# Patient Record
Sex: FEMALE | Race: WHITE | NOT HISPANIC OR LATINO | Employment: PART TIME | URBAN - METROPOLITAN AREA
[De-identification: names, ages, dates, MRNs, and addresses within clinical notes are randomized per-mention and may not be internally consistent; named-entity substitution may affect disease eponyms.]

---

## 2023-08-10 ENCOUNTER — PATIENT OUTREACH (OUTPATIENT)
Dept: CARE COORDINATION | Facility: CLINIC | Age: 58
End: 2023-08-10
Payer: COMMERCIAL

## 2023-08-10 NOTE — PROGRESS NOTES
Outreach call to patient to support a smooth transition of care from recent admission.  Left voicemail message for patient with my contact information.  Enrolled patient in [employer] chatbot for additional support and patient education through transition period.  Will continue to monitor through transition period.    Meena Hurley RN/CM

## 2023-08-29 ENCOUNTER — PATIENT OUTREACH (OUTPATIENT)
Dept: CARE COORDINATION | Facility: CLINIC | Age: 58
End: 2023-08-29
Payer: COMMERCIAL

## 2023-08-29 NOTE — PROGRESS NOTES
Outreach call to patient to support a smooth transition of care from recent admission.  Left voicemail message for patient with my contact information.    Meena Hurley RN/CM

## 2023-09-15 ENCOUNTER — PATIENT OUTREACH (OUTPATIENT)
Dept: CARE COORDINATION | Facility: CLINIC | Age: 58
End: 2023-09-15
Payer: COMMERCIAL

## 2023-09-15 NOTE — PROGRESS NOTES
Outreach call to patient to check in 30 days after hospital discharge to support smooth transition of care.  Left message with CM name and contact number. No additional outreach needed at this time.   Meena Hurley RN/CM

## 2023-10-04 ENCOUNTER — TELEPHONE (OUTPATIENT)
Dept: PRIMARY CARE | Facility: CLINIC | Age: 58
End: 2023-10-04
Payer: COMMERCIAL

## 2023-10-05 ENCOUNTER — TELEPHONE (OUTPATIENT)
Dept: PRIMARY CARE | Facility: CLINIC | Age: 58
End: 2023-10-05
Payer: COMMERCIAL

## 2023-10-06 DIAGNOSIS — F32.A DEPRESSION, UNSPECIFIED DEPRESSION TYPE: Primary | ICD-10-CM

## 2023-10-06 RX ORDER — FLUOXETINE HYDROCHLORIDE 40 MG/1
40 CAPSULE ORAL DAILY
Qty: 90 CAPSULE | Refills: 1 | Status: SHIPPED | OUTPATIENT
Start: 2023-10-06 | End: 2024-03-10 | Stop reason: SDUPTHER

## 2023-10-23 DIAGNOSIS — Z12.39 ENCOUNTER FOR SCREENING FOR MALIGNANT NEOPLASM OF BREAST, UNSPECIFIED SCREENING MODALITY: Primary | ICD-10-CM

## 2023-10-31 ENCOUNTER — HOSPITAL ENCOUNTER (OUTPATIENT)
Dept: RADIOLOGY | Facility: HOSPITAL | Age: 58
Discharge: HOME | End: 2023-10-31
Payer: COMMERCIAL

## 2023-10-31 DIAGNOSIS — Z12.39 ENCOUNTER FOR SCREENING FOR MALIGNANT NEOPLASM OF BREAST, UNSPECIFIED SCREENING MODALITY: ICD-10-CM

## 2023-10-31 PROCEDURE — 77063 BREAST TOMOSYNTHESIS BI: CPT

## 2023-10-31 PROCEDURE — 77067 SCR MAMMO BI INCL CAD: CPT | Performed by: RADIOLOGY

## 2023-10-31 PROCEDURE — 77063 BREAST TOMOSYNTHESIS BI: CPT | Performed by: RADIOLOGY

## 2023-11-30 ENCOUNTER — APPOINTMENT (OUTPATIENT)
Dept: PRIMARY CARE | Facility: CLINIC | Age: 58
End: 2023-11-30
Payer: COMMERCIAL

## 2023-12-04 ENCOUNTER — ANCILLARY PROCEDURE (OUTPATIENT)
Dept: RADIOLOGY | Facility: CLINIC | Age: 58
End: 2023-12-04
Payer: COMMERCIAL

## 2023-12-04 ENCOUNTER — TELEPHONE (OUTPATIENT)
Dept: PRIMARY CARE | Facility: CLINIC | Age: 58
End: 2023-12-04
Payer: COMMERCIAL

## 2023-12-04 DIAGNOSIS — R05.9 COUGH, UNSPECIFIED TYPE: ICD-10-CM

## 2023-12-04 DIAGNOSIS — J06.9 VIRAL UPPER RESPIRATORY TRACT INFECTION: Primary | ICD-10-CM

## 2023-12-04 PROCEDURE — 71046 X-RAY EXAM CHEST 2 VIEWS: CPT | Mod: FY

## 2023-12-04 PROCEDURE — 71046 X-RAY EXAM CHEST 2 VIEWS: CPT | Performed by: RADIOLOGY

## 2023-12-04 RX ORDER — DOXYCYCLINE 100 MG/1
100 CAPSULE ORAL 2 TIMES DAILY
Qty: 14 CAPSULE | Refills: 0 | Status: SHIPPED | OUTPATIENT
Start: 2023-12-04 | End: 2023-12-14 | Stop reason: ALTCHOICE

## 2023-12-04 NOTE — TELEPHONE ENCOUNTER
Patient called stating that she has been sick since the middle of November.  She is coughing up yellow and green phlegm.  She wants to know if you would be willing to call in an antibiotic for her.  Both you and Dr Cedillo are double booked tomorrow.  [facility] in [place]

## 2023-12-11 ENCOUNTER — ANCILLARY PROCEDURE (OUTPATIENT)
Dept: RADIOLOGY | Facility: CLINIC | Age: 58
End: 2023-12-11
Payer: COMMERCIAL

## 2023-12-11 ENCOUNTER — TELEPHONE (OUTPATIENT)
Dept: PRIMARY CARE | Facility: CLINIC | Age: 58
End: 2023-12-11
Payer: COMMERCIAL

## 2023-12-11 DIAGNOSIS — R05.9 COUGH, UNSPECIFIED TYPE: ICD-10-CM

## 2023-12-11 DIAGNOSIS — R05.9 COUGH, UNSPECIFIED TYPE: Primary | ICD-10-CM

## 2023-12-11 PROCEDURE — 71046 X-RAY EXAM CHEST 2 VIEWS: CPT | Mod: FY

## 2023-12-11 PROCEDURE — 71046 X-RAY EXAM CHEST 2 VIEWS: CPT | Performed by: RADIOLOGY

## 2023-12-11 NOTE — TELEPHONE ENCOUNTER
Patient called stating that she has had little improvement after 7 days on antibiotics.  She is asking if there is any way you could put in an order for an xray for her.

## 2023-12-12 ENCOUNTER — TELEPHONE (OUTPATIENT)
Dept: PRIMARY CARE | Facility: CLINIC | Age: 58
End: 2023-12-12
Payer: COMMERCIAL

## 2023-12-13 NOTE — TELEPHONE ENCOUNTER
Finished Antibiotic yesterday.  Still has the cough.  When it is productive its yellow.  Does she need more antibiotic.

## 2023-12-14 ENCOUNTER — OFFICE VISIT (OUTPATIENT)
Dept: PRIMARY CARE | Facility: CLINIC | Age: 58
End: 2023-12-14
Payer: COMMERCIAL

## 2023-12-14 VITALS
DIASTOLIC BLOOD PRESSURE: 75 MMHG | OXYGEN SATURATION: 94 % | HEART RATE: 64 BPM | BODY MASS INDEX: 36.26 KG/M2 | WEIGHT: 231 LBS | HEIGHT: 67 IN | SYSTOLIC BLOOD PRESSURE: 108 MMHG

## 2023-12-14 DIAGNOSIS — R74.8 ELEVATED LIVER ENZYMES: Primary | ICD-10-CM

## 2023-12-14 DIAGNOSIS — Z95.820 S/P ANGIOPLASTY WITH STENT: ICD-10-CM

## 2023-12-14 DIAGNOSIS — F19.982 DRUG-INDUCED INSOMNIA (MULTI): ICD-10-CM

## 2023-12-14 DIAGNOSIS — G82.20: ICD-10-CM

## 2023-12-14 DIAGNOSIS — I50.9 EDEMA DUE TO CONGESTIVE HEART FAILURE (MULTI): ICD-10-CM

## 2023-12-14 DIAGNOSIS — R35.0 URINARY FREQUENCY: ICD-10-CM

## 2023-12-14 DIAGNOSIS — B37.9 CANDIDIASIS: ICD-10-CM

## 2023-12-14 DIAGNOSIS — R21 SKIN RASH: ICD-10-CM

## 2023-12-14 PROBLEM — F41.9 ANXIETY: Status: ACTIVE | Noted: 2023-12-14

## 2023-12-14 PROBLEM — I21.9 MYOCARDIAL INFARCTION (MULTI): Status: ACTIVE | Noted: 2023-12-14

## 2023-12-14 PROBLEM — J18.9 PNEUMONIA: Status: ACTIVE | Noted: 2023-08-01

## 2023-12-14 PROBLEM — G47.00 INSOMNIA: Status: ACTIVE | Noted: 2023-12-14

## 2023-12-14 PROBLEM — G47.33 OSA (OBSTRUCTIVE SLEEP APNEA): Status: ACTIVE | Noted: 2023-12-14

## 2023-12-14 LAB
POC APPEARANCE, URINE: CLEAR
POC BILIRUBIN, URINE: NEGATIVE
POC BLOOD, URINE: ABNORMAL
POC COLOR, URINE: YELLOW
POC GLUCOSE, URINE: NEGATIVE MG/DL
POC KETONES, URINE: NEGATIVE MG/DL
POC LEUKOCYTES, URINE: NEGATIVE
POC NITRITE,URINE: NEGATIVE
POC PH, URINE: 6 PH
POC PROTEIN, URINE: NEGATIVE MG/DL
POC SPECIFIC GRAVITY, URINE: 1.01
POC UROBILINOGEN, URINE: 0.2 EU/DL

## 2023-12-14 PROCEDURE — 99396 PREV VISIT EST AGE 40-64: CPT | Performed by: NURSE PRACTITIONER

## 2023-12-14 PROCEDURE — 81003 URINALYSIS AUTO W/O SCOPE: CPT | Performed by: NURSE PRACTITIONER

## 2023-12-14 PROCEDURE — 1036F TOBACCO NON-USER: CPT | Performed by: NURSE PRACTITIONER

## 2023-12-14 PROCEDURE — 3008F BODY MASS INDEX DOCD: CPT | Performed by: NURSE PRACTITIONER

## 2023-12-14 RX ORDER — NYSTATIN 100000 [USP'U]/G
1 POWDER TOPICAL 2 TIMES DAILY
Qty: 30 G | Refills: 11 | Status: SHIPPED | OUTPATIENT
Start: 2023-12-14 | End: 2024-12-13

## 2023-12-14 RX ORDER — ERGOCALCIFEROL 1.25 MG/1
1 CAPSULE ORAL
COMMUNITY

## 2023-12-14 RX ORDER — CLONAZEPAM 0.5 MG/1
0.5 TABLET, ORALLY DISINTEGRATING ORAL NIGHTLY
COMMUNITY
Start: 2023-06-26 | End: 2023-12-14 | Stop reason: SDUPTHER

## 2023-12-14 RX ORDER — LEVOTHYROXINE SODIUM 50 UG/1
50 TABLET ORAL EVERY MORNING
COMMUNITY
End: 2024-02-02

## 2023-12-14 RX ORDER — NYSTATIN 10B UNIT
POWDER (EA) MISCELLANEOUS
COMMUNITY
Start: 2003-05-12 | End: 2024-01-02 | Stop reason: ENTERED-IN-ERROR

## 2023-12-14 RX ORDER — NYSTATIN AND TRIAMCINOLONE ACETONIDE 100000; 1 [USP'U]/G; MG/G
OINTMENT TOPICAL 2 TIMES DAILY
Qty: 15 G | Refills: 0 | Status: SHIPPED | OUTPATIENT
Start: 2023-12-14 | End: 2024-01-12

## 2023-12-14 RX ORDER — ALBUTEROL SULFATE 90 UG/1
2 AEROSOL, METERED RESPIRATORY (INHALATION) EVERY 4 HOURS PRN
COMMUNITY
End: 2024-01-12

## 2023-12-14 RX ORDER — POTASSIUM CHLORIDE 750 MG/1
10 CAPSULE, EXTENDED RELEASE ORAL DAILY
COMMUNITY
End: 2024-01-04 | Stop reason: HOSPADM

## 2023-12-14 RX ORDER — CLOTRIMAZOLE 1 %
CREAM (GRAM) TOPICAL EVERY 12 HOURS
COMMUNITY
Start: 2021-12-15 | End: 2024-01-02 | Stop reason: ENTERED-IN-ERROR

## 2023-12-14 RX ORDER — GABAPENTIN 400 MG/1
400 CAPSULE ORAL 3 TIMES DAILY
COMMUNITY

## 2023-12-14 RX ORDER — METOPROLOL SUCCINATE 25 MG/1
25 TABLET, EXTENDED RELEASE ORAL EVERY MORNING
COMMUNITY
End: 2024-01-09

## 2023-12-14 RX ORDER — IBUPROFEN 600 MG/1
TABLET ORAL
COMMUNITY
End: 2024-01-02 | Stop reason: ENTERED-IN-ERROR

## 2023-12-14 RX ORDER — KETOCONAZOLE 20 MG/G
1 CREAM TOPICAL 2 TIMES DAILY
Status: ON HOLD | COMMUNITY
Start: 2018-11-09 | End: 2024-01-19

## 2023-12-14 RX ORDER — AMITRIPTYLINE HYDROCHLORIDE 25 MG/1
25 TABLET, FILM COATED ORAL NIGHTLY
COMMUNITY
Start: 2023-09-21

## 2023-12-14 RX ORDER — ALBUTEROL SULFATE 0.83 MG/ML
2.5 SOLUTION RESPIRATORY (INHALATION) EVERY 4 HOURS PRN
COMMUNITY

## 2023-12-14 RX ORDER — PNV NO.95/FERROUS FUM/FOLIC AC 28MG-0.8MG
100 TABLET ORAL EVERY MORNING
COMMUNITY
End: 2024-01-19 | Stop reason: SINTOL

## 2023-12-14 RX ORDER — NYSTATIN 100000 U/G
OINTMENT TOPICAL 2 TIMES DAILY
Qty: 30 G | Refills: 3 | Status: SHIPPED | OUTPATIENT
Start: 2023-12-14 | End: 2024-04-23

## 2023-12-14 RX ORDER — TIZANIDINE 4 MG/1
4 TABLET ORAL NIGHTLY PRN
COMMUNITY

## 2023-12-14 RX ORDER — ATORVASTATIN CALCIUM 40 MG/1
40 TABLET, FILM COATED ORAL NIGHTLY
COMMUNITY

## 2023-12-14 RX ORDER — MECLIZINE HYDROCHLORIDE 25 MG/1
25 TABLET ORAL EVERY 6 HOURS PRN
COMMUNITY

## 2023-12-14 RX ORDER — FUROSEMIDE 40 MG/1
40 TABLET ORAL 2 TIMES DAILY
Status: ON HOLD | COMMUNITY
End: 2024-01-04 | Stop reason: SDUPTHER

## 2023-12-14 RX ORDER — ASPIRIN 81 MG/1
1 TABLET ORAL EVERY MORNING
COMMUNITY
Start: 2014-10-27

## 2023-12-14 RX ORDER — CLONAZEPAM 0.5 MG/1
0.5 TABLET, ORALLY DISINTEGRATING ORAL NIGHTLY
Qty: 30 TABLET | Refills: 2 | Status: SHIPPED | OUTPATIENT
Start: 2023-12-14 | End: 2024-02-22 | Stop reason: SDUPTHER

## 2023-12-14 RX ORDER — NITROGLYCERIN 0.4 MG/1
TABLET SUBLINGUAL
Status: ON HOLD | COMMUNITY
End: 2024-01-04 | Stop reason: SDUPTHER

## 2023-12-14 RX ORDER — KETOCONAZOLE 20 MG/ML
SHAMPOO, SUSPENSION TOPICAL
COMMUNITY
Start: 2017-09-28 | End: 2024-01-02 | Stop reason: ENTERED-IN-ERROR

## 2023-12-14 RX ORDER — FLUCONAZOLE 150 MG/1
150 TABLET ORAL ONCE
Qty: 1 TABLET | Refills: 0 | Status: SHIPPED | OUTPATIENT
Start: 2023-12-14 | End: 2023-12-14

## 2023-12-14 ASSESSMENT — ENCOUNTER SYMPTOMS
HEADACHES: 0
FATIGUE: 0
NUMBNESS: 0
PSYCHIATRIC NEGATIVE: 1
COUGH: 1
MUSCULOSKELETAL NEGATIVE: 1
SORE THROAT: 0
DIZZINESS: 0
SHORTNESS OF BREATH: 1
CHILLS: 0
PALPITATIONS: 0
ABDOMINAL PAIN: 0
CONSTIPATION: 0
WEAKNESS: 0
NAUSEA: 0
DIARRHEA: 0
VOMITING: 0
FEVER: 0

## 2023-12-14 ASSESSMENT — PATIENT HEALTH QUESTIONNAIRE - PHQ9
1. LITTLE INTEREST OR PLEASURE IN DOING THINGS: NOT AT ALL
SUM OF ALL RESPONSES TO PHQ9 QUESTIONS 1 AND 2: 0
2. FEELING DOWN, DEPRESSED OR HOPELESS: NOT AT ALL

## 2023-12-14 NOTE — PROGRESS NOTES
Subjective   Patient ID: Elaine Coffey is a 58 y.o. female who presents here to establish relationship with primary care provider.        HPI   Here to establish primary care provider.  .  4 children.  Works as a  at nursing home.  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  Non-smoker.  No alcohol use.  No illicit drug use.  Denies chest pain, palpitations, dizziness,  or GI issues.  Was recently treated for pneumonia, was hospitalized, still feels like she is recovering.  Does have occasional shortness of breath residual.  reports a rash behind ears, underneath breasts, under pannus down into her groin.  Has had it for a long time.  It is painful and red.  Has tried many different things at home and nothing seems to help.  Was using over-the-counter athlete's foot medication, chlorhexidine soap.  Has tried to keep it dry.  Gets UTIs with sex.  Has has slight symptoms today.  Would like to have it checked  Does have a cardiac history of MI with stent.  Sees cardiology on a regular basis for CAD follow-up and heart failure.  Does take a diuretic daily.  Has Lap-Band.  Has been prescribed reflux medicine for cough and hoarse voice.  Has stopped taking it although it did work at the time.  Diagnosed with MS 10 years ago.  Follows closely with that provider.  History of sleep apnea.  Does have CPAP but does not wear it.  Takes prescription medications as prescribed.  Takes clonazepam and Elavil for sleep.  Needs clonazepam filled today.      Review of Systems   Constitutional:  Negative for chills, fatigue and fever.   HENT:  Negative for congestion, ear pain and sore throat.    Eyes:  Negative for visual disturbance.   Respiratory:  Positive for cough and shortness of breath.    Cardiovascular:  Negative for chest pain, palpitations and leg swelling.   Gastrointestinal:  Negative for abdominal pain, constipation, diarrhea, nausea and vomiting.   Genitourinary: Negative.   "  Musculoskeletal: Negative.    Skin:  Positive for rash.   Neurological:  Negative for dizziness, weakness, numbness and headaches.   Psychiatric/Behavioral: Negative.         Objective   /75   Pulse 64   Ht 1.702 m (5' 7\")   Wt 105 kg (231 lb)   SpO2 94%   BMI 36.18 kg/m²     Physical Exam  Constitutional:       General: She is not in acute distress.     Appearance: Normal appearance.   HENT:      Head: Normocephalic and atraumatic.      Right Ear: Tympanic membrane, ear canal and external ear normal.      Left Ear: Tympanic membrane, ear canal and external ear normal.      Nose: Nose normal.      Mouth/Throat:      Mouth: Mucous membranes are moist.      Pharynx: Oropharynx is clear.   Eyes:      Extraocular Movements: Extraocular movements intact.      Conjunctiva/sclera: Conjunctivae normal.      Pupils: Pupils are equal, round, and reactive to light.   Cardiovascular:      Rate and Rhythm: Normal rate and regular rhythm.      Pulses: Normal pulses.      Heart sounds: Normal heart sounds. No murmur heard.  Pulmonary:      Effort: Pulmonary effort is normal.      Breath sounds: Normal breath sounds. No wheezing, rhonchi or rales.   Abdominal:      General: Bowel sounds are normal.      Palpations: Abdomen is soft.      Tenderness: There is no abdominal tenderness.   Musculoskeletal:         General: Normal range of motion.      Cervical back: Normal range of motion and neck supple.   Lymphadenopathy:      Comments: No lymphadenopathy noted   Skin:     General: Skin is warm and dry.      Findings: Rash (Red, shiny, small cracks, no signs of infection) present.          Neurological:      General: No focal deficit present.      Mental Status: She is alert and oriented to person, place, and time.      Cranial Nerves: No cranial nerve deficit.      Coordination: Coordination normal.      Gait: Gait normal.   Psychiatric:         Mood and Affect: Mood normal.         Behavior: Behavior normal. "         Assessment/Plan   Problem List Items Addressed This Visit             ICD-10-CM    S/P angioplasty with stent Z95.820    Skin rash R21    Edema due to congestive heart failure (CMS/HCC) I50.9     Edema controlled with Lasix  Sees cardiology         Relevant Medications    metoprolol succinate XL (Toprol-XL) 25 mg 24 hr tablet    nitroglycerin (Nitrostat) 0.4 mg SL tablet    Elevated liver enzymes - Primary R74.8    Relevant Orders    Comprehensive Metabolic Panel    TSH with reflex to Free T4 if abnormal    Lipid Panel    Vitamin D 25-Hydroxy,Total (for eval of Vitamin D levels)    CBC and Auto Differential    Paraparesis, bilateral (CMS/HCC) G82.20     Other Visit Diagnoses         Codes    Candidiasis     B37.9    Relevant Medications    fluconazole (Diflucan) 150 mg tablet    nystatin (Mycostatin) 100,000 unit/gram powder    nystatin (Mycostatin) ointment    nystatin-triamcinolone (Mycolog II) ointment    Urinary frequency     R35.0    Relevant Orders    POCT UA Automated manually resulted (Completed)        Follow up in 1 year or sooner if needed    I HAVE REVIEWED THE OARRS, WHICH WAS APPROPRIATE. I HAVE EVALUATED THE RISKS OF ABUSE, ADDICTION, DIVERSION, AND DEPENDENCE. PRESCRIBED MEDICATION SEEMS APPROPRIATE FOR DOCUMENTED DIAGNOSIS.  CSA: Obtained 12/14/2023  Urine drug screen: Performed today 12/14/2023.  Appropriately positive for benzos.

## 2023-12-14 NOTE — ASSESSMENT & PLAN NOTE
Continue clonazepam 0.5 mg at night for sleep  Will get CSA and urine drug screen today, as well as check OARRS report

## 2023-12-14 NOTE — ASSESSMENT & PLAN NOTE
Take Diflucan 150 mg x 1 dose today  Call office or MyChart message if rash does not improve or worsens

## 2023-12-14 NOTE — PATIENT INSTRUCTIONS
For yeast: Diflucan 150 mg x 1 dose today.  Start nystatin triamcinolone ointment to affected areas for 7 days.  Then start nystatin ointment daily or as needed.  May also use nystatin powder to affected areas daily or as needed.  Use Cetaphil soap once daily only.  Keep areas dry may use hair dryer.  Do not put any other products on affected area.  Have labs drawn.  Fast 12 hours.  May have black coffee tea or water  Suggest starting reflux medication for cough and hoarse voice, likely GERD symptoms.  Follow-up with cardiology at least annually  Follow-up in 2 to 3 months to check on rash.

## 2023-12-14 NOTE — ASSESSMENT & PLAN NOTE
Use nystatin prescriptions as directed.  Keep affected areas clean and dry.  Use mild soap.  Suggested silver fabric can be purchased online to wick moisture in between folds, look into purchasing this

## 2023-12-15 ENCOUNTER — LAB (OUTPATIENT)
Dept: LAB | Facility: LAB | Age: 58
End: 2023-12-15
Payer: COMMERCIAL

## 2023-12-15 DIAGNOSIS — R74.8 ELEVATED LIVER ENZYMES: ICD-10-CM

## 2023-12-15 LAB
ALBUMIN SERPL BCP-MCNC: 4.4 G/DL (ref 3.4–5)
ALP SERPL-CCNC: 125 U/L (ref 33–110)
ALT SERPL W P-5'-P-CCNC: 19 U/L (ref 7–45)
ANION GAP SERPL CALC-SCNC: 11 MMOL/L (ref 10–20)
AST SERPL W P-5'-P-CCNC: 17 U/L (ref 9–39)
BASOPHILS # BLD AUTO: 0.02 X10*3/UL (ref 0–0.1)
BASOPHILS NFR BLD AUTO: 0.5 %
BILIRUB SERPL-MCNC: 0.9 MG/DL (ref 0–1.2)
BUN SERPL-MCNC: 13 MG/DL (ref 6–23)
CALCIUM SERPL-MCNC: 9.1 MG/DL (ref 8.6–10.3)
CHLORIDE SERPL-SCNC: 101 MMOL/L (ref 98–107)
CHOLEST SERPL-MCNC: 157 MG/DL (ref 0–199)
CHOLESTEROL/HDL RATIO: 3.3
CO2 SERPL-SCNC: 33 MMOL/L (ref 21–32)
CREAT SERPL-MCNC: 0.86 MG/DL (ref 0.5–1.05)
EOSINOPHIL # BLD AUTO: 0.22 X10*3/UL (ref 0–0.7)
EOSINOPHIL NFR BLD AUTO: 5.4 %
ERYTHROCYTE [DISTWIDTH] IN BLOOD BY AUTOMATED COUNT: 12.3 % (ref 11.5–14.5)
GFR SERPL CREATININE-BSD FRML MDRD: 79 ML/MIN/1.73M*2
GLUCOSE SERPL-MCNC: 92 MG/DL (ref 74–99)
HCT VFR BLD AUTO: 43.3 % (ref 36–46)
HDLC SERPL-MCNC: 47 MG/DL
HGB BLD-MCNC: 14.5 G/DL (ref 12–16)
IMM GRANULOCYTES # BLD AUTO: 0 X10*3/UL (ref 0–0.7)
IMM GRANULOCYTES NFR BLD AUTO: 0 % (ref 0–0.9)
LDLC SERPL CALC-MCNC: 86 MG/DL
LYMPHOCYTES # BLD AUTO: 1.36 X10*3/UL (ref 1.2–4.8)
LYMPHOCYTES NFR BLD AUTO: 33.2 %
MCH RBC QN AUTO: 29.3 PG (ref 26–34)
MCHC RBC AUTO-ENTMCNC: 33.5 G/DL (ref 32–36)
MCV RBC AUTO: 88 FL (ref 80–100)
MONOCYTES # BLD AUTO: 0.42 X10*3/UL (ref 0.1–1)
MONOCYTES NFR BLD AUTO: 10.2 %
NEUTROPHILS # BLD AUTO: 2.08 X10*3/UL (ref 1.2–7.7)
NEUTROPHILS NFR BLD AUTO: 50.7 %
NON HDL CHOLESTEROL: 110 MG/DL (ref 0–149)
NRBC BLD-RTO: 0 /100 WBCS (ref 0–0)
PLATELET # BLD AUTO: 170 X10*3/UL (ref 150–450)
POTASSIUM SERPL-SCNC: 3.4 MMOL/L (ref 3.5–5.3)
PROT SERPL-MCNC: 6.3 G/DL (ref 6.4–8.2)
RBC # BLD AUTO: 4.95 X10*6/UL (ref 4–5.2)
SODIUM SERPL-SCNC: 142 MMOL/L (ref 136–145)
TRIGL SERPL-MCNC: 122 MG/DL (ref 0–149)
TSH SERPL-ACNC: 2.27 MIU/L (ref 0.44–3.98)
VLDL: 24 MG/DL (ref 0–40)
WBC # BLD AUTO: 4.1 X10*3/UL (ref 4.4–11.3)

## 2023-12-15 PROCEDURE — 84443 ASSAY THYROID STIM HORMONE: CPT

## 2023-12-15 PROCEDURE — 80061 LIPID PANEL: CPT

## 2023-12-15 PROCEDURE — 85025 COMPLETE CBC W/AUTO DIFF WBC: CPT

## 2023-12-15 PROCEDURE — 80053 COMPREHEN METABOLIC PANEL: CPT

## 2023-12-15 PROCEDURE — 36415 COLL VENOUS BLD VENIPUNCTURE: CPT

## 2023-12-15 PROCEDURE — 82306 VITAMIN D 25 HYDROXY: CPT

## 2023-12-16 LAB — 25(OH)D3 SERPL-MCNC: 48 NG/ML (ref 30–100)

## 2023-12-22 ENCOUNTER — APPOINTMENT (OUTPATIENT)
Dept: PRIMARY CARE | Facility: CLINIC | Age: 58
End: 2023-12-22
Payer: COMMERCIAL

## 2024-01-02 ENCOUNTER — APPOINTMENT (OUTPATIENT)
Dept: RADIOLOGY | Facility: HOSPITAL | Age: 59
DRG: 281 | End: 2024-01-02
Payer: COMMERCIAL

## 2024-01-02 ENCOUNTER — APPOINTMENT (OUTPATIENT)
Dept: CARDIOLOGY | Facility: HOSPITAL | Age: 59
DRG: 281 | End: 2024-01-02
Payer: COMMERCIAL

## 2024-01-02 ENCOUNTER — HOSPITAL ENCOUNTER (INPATIENT)
Facility: HOSPITAL | Age: 59
LOS: 2 days | Discharge: HOME | DRG: 281 | End: 2024-01-04
Attending: STUDENT IN AN ORGANIZED HEALTH CARE EDUCATION/TRAINING PROGRAM | Admitting: FAMILY MEDICINE
Payer: COMMERCIAL

## 2024-01-02 DIAGNOSIS — I10 PRIMARY HYPERTENSION: ICD-10-CM

## 2024-01-02 DIAGNOSIS — R07.9 CHEST PAIN, UNSPECIFIED TYPE: Primary | ICD-10-CM

## 2024-01-02 DIAGNOSIS — I21.4 NON-ST ELEVATION (NSTEMI) MYOCARDIAL INFARCTION (MULTI): ICD-10-CM

## 2024-01-02 LAB
ALBUMIN SERPL BCP-MCNC: 4.1 G/DL (ref 3.4–5)
ALP SERPL-CCNC: 113 U/L (ref 33–110)
ALT SERPL W P-5'-P-CCNC: 16 U/L (ref 7–45)
ANION GAP SERPL CALC-SCNC: 9 MMOL/L (ref 10–20)
APTT PPP: 35 SECONDS (ref 27–38)
AST SERPL W P-5'-P-CCNC: 15 U/L (ref 9–39)
B-HCG SERPL-ACNC: 2 MIU/ML
BASOPHILS # BLD AUTO: 0.02 X10*3/UL (ref 0–0.1)
BASOPHILS NFR BLD AUTO: 0.5 %
BILIRUB SERPL-MCNC: 0.8 MG/DL (ref 0–1.2)
BUN SERPL-MCNC: 12 MG/DL (ref 6–23)
CALCIUM SERPL-MCNC: 9 MG/DL (ref 8.6–10.3)
CARDIAC TROPONIN I PNL SERPL HS: 106 NG/L (ref 0–13)
CARDIAC TROPONIN I PNL SERPL HS: 55 NG/L (ref 0–13)
CARDIAC TROPONIN I PNL SERPL HS: 64 NG/L (ref 0–13)
CARDIAC TROPONIN I PNL SERPL HS: 9 NG/L (ref 0–13)
CHLORIDE SERPL-SCNC: 102 MMOL/L (ref 98–107)
CO2 SERPL-SCNC: 34 MMOL/L (ref 21–32)
CREAT SERPL-MCNC: 0.89 MG/DL (ref 0.5–1.05)
EOSINOPHIL # BLD AUTO: 0.21 X10*3/UL (ref 0–0.7)
EOSINOPHIL NFR BLD AUTO: 5.4 %
ERYTHROCYTE [DISTWIDTH] IN BLOOD BY AUTOMATED COUNT: 12.2 % (ref 11.5–14.5)
ERYTHROCYTE [DISTWIDTH] IN BLOOD BY AUTOMATED COUNT: 12.2 % (ref 11.5–14.5)
FLUAV RNA RESP QL NAA+PROBE: NOT DETECTED
FLUBV RNA RESP QL NAA+PROBE: NOT DETECTED
GFR SERPL CREATININE-BSD FRML MDRD: 76 ML/MIN/1.73M*2
GLUCOSE SERPL-MCNC: 103 MG/DL (ref 74–99)
HCT VFR BLD AUTO: 42.2 % (ref 36–46)
HCT VFR BLD AUTO: 42.2 % (ref 36–46)
HGB BLD-MCNC: 14.1 G/DL (ref 12–16)
HGB BLD-MCNC: 14.3 G/DL (ref 12–16)
IMM GRANULOCYTES # BLD AUTO: 0.01 X10*3/UL (ref 0–0.7)
IMM GRANULOCYTES NFR BLD AUTO: 0.3 % (ref 0–0.9)
INR PPP: 1 (ref 0.9–1.1)
LIPASE SERPL-CCNC: 38 U/L (ref 9–82)
LYMPHOCYTES # BLD AUTO: 1.12 X10*3/UL (ref 1.2–4.8)
LYMPHOCYTES NFR BLD AUTO: 29 %
MCH RBC QN AUTO: 28.8 PG (ref 26–34)
MCH RBC QN AUTO: 28.9 PG (ref 26–34)
MCHC RBC AUTO-ENTMCNC: 33.4 G/DL (ref 32–36)
MCHC RBC AUTO-ENTMCNC: 33.9 G/DL (ref 32–36)
MCV RBC AUTO: 85 FL (ref 80–100)
MCV RBC AUTO: 86 FL (ref 80–100)
MONOCYTES # BLD AUTO: 0.35 X10*3/UL (ref 0.1–1)
MONOCYTES NFR BLD AUTO: 9.1 %
NEUTROPHILS # BLD AUTO: 2.15 X10*3/UL (ref 1.2–7.7)
NEUTROPHILS NFR BLD AUTO: 55.7 %
NRBC BLD-RTO: 0 /100 WBCS (ref 0–0)
NRBC BLD-RTO: 0 /100 WBCS (ref 0–0)
PLATELET # BLD AUTO: 156 X10*3/UL (ref 150–450)
PLATELET # BLD AUTO: 192 X10*3/UL (ref 150–450)
POTASSIUM SERPL-SCNC: 3.8 MMOL/L (ref 3.5–5.3)
PROT SERPL-MCNC: 6.3 G/DL (ref 6.4–8.2)
PROTHROMBIN TIME: 11.4 SECONDS (ref 9.8–12.8)
RBC # BLD AUTO: 4.9 X10*6/UL (ref 4–5.2)
RBC # BLD AUTO: 4.95 X10*6/UL (ref 4–5.2)
SARS-COV-2 RNA RESP QL NAA+PROBE: NOT DETECTED
SODIUM SERPL-SCNC: 141 MMOL/L (ref 136–145)
UFH PPP CHRO-ACNC: 0.4 IU/ML
UFH PPP CHRO-ACNC: 0.5 IU/ML
WBC # BLD AUTO: 3.9 X10*3/UL (ref 4.4–11.3)
WBC # BLD AUTO: 4.9 X10*3/UL (ref 4.4–11.3)

## 2024-01-02 PROCEDURE — 2500000004 HC RX 250 GENERAL PHARMACY W/ HCPCS (ALT 636 FOR OP/ED): Performed by: STUDENT IN AN ORGANIZED HEALTH CARE EDUCATION/TRAINING PROGRAM

## 2024-01-02 PROCEDURE — 71045 X-RAY EXAM CHEST 1 VIEW: CPT | Mod: REPEAT PROCEDURE BY SAME PHYSICIAN | Performed by: RADIOLOGY

## 2024-01-02 PROCEDURE — 2500000001 HC RX 250 WO HCPCS SELF ADMINISTERED DRUGS (ALT 637 FOR MEDICARE OP): Performed by: PHYSICIAN ASSISTANT

## 2024-01-02 PROCEDURE — 96375 TX/PRO/DX INJ NEW DRUG ADDON: CPT

## 2024-01-02 PROCEDURE — 83690 ASSAY OF LIPASE: CPT | Performed by: STUDENT IN AN ORGANIZED HEALTH CARE EDUCATION/TRAINING PROGRAM

## 2024-01-02 PROCEDURE — 2500000004 HC RX 250 GENERAL PHARMACY W/ HCPCS (ALT 636 FOR OP/ED): Performed by: PHYSICIAN ASSISTANT

## 2024-01-02 PROCEDURE — 1200000002 HC GENERAL ROOM WITH TELEMETRY DAILY

## 2024-01-02 PROCEDURE — 93005 ELECTROCARDIOGRAM TRACING: CPT

## 2024-01-02 PROCEDURE — 85610 PROTHROMBIN TIME: CPT | Performed by: STUDENT IN AN ORGANIZED HEALTH CARE EDUCATION/TRAINING PROGRAM

## 2024-01-02 PROCEDURE — 99285 EMERGENCY DEPT VISIT HI MDM: CPT | Performed by: STUDENT IN AN ORGANIZED HEALTH CARE EDUCATION/TRAINING PROGRAM

## 2024-01-02 PROCEDURE — 96374 THER/PROPH/DIAG INJ IV PUSH: CPT

## 2024-01-02 PROCEDURE — 71045 X-RAY EXAM CHEST 1 VIEW: CPT | Performed by: RADIOLOGY

## 2024-01-02 PROCEDURE — 85025 COMPLETE CBC W/AUTO DIFF WBC: CPT | Performed by: STUDENT IN AN ORGANIZED HEALTH CARE EDUCATION/TRAINING PROGRAM

## 2024-01-02 PROCEDURE — 83036 HEMOGLOBIN GLYCOSYLATED A1C: CPT | Mod: GEALAB | Performed by: PHYSICIAN ASSISTANT

## 2024-01-02 PROCEDURE — 84484 ASSAY OF TROPONIN QUANT: CPT | Performed by: INTERNAL MEDICINE

## 2024-01-02 PROCEDURE — 84484 ASSAY OF TROPONIN QUANT: CPT | Performed by: STUDENT IN AN ORGANIZED HEALTH CARE EDUCATION/TRAINING PROGRAM

## 2024-01-02 PROCEDURE — 80053 COMPREHEN METABOLIC PANEL: CPT | Performed by: STUDENT IN AN ORGANIZED HEALTH CARE EDUCATION/TRAINING PROGRAM

## 2024-01-02 PROCEDURE — 71045 X-RAY EXAM CHEST 1 VIEW: CPT | Mod: 76

## 2024-01-02 PROCEDURE — 36415 COLL VENOUS BLD VENIPUNCTURE: CPT | Performed by: STUDENT IN AN ORGANIZED HEALTH CARE EDUCATION/TRAINING PROGRAM

## 2024-01-02 PROCEDURE — 99223 1ST HOSP IP/OBS HIGH 75: CPT | Performed by: STUDENT IN AN ORGANIZED HEALTH CARE EDUCATION/TRAINING PROGRAM

## 2024-01-02 PROCEDURE — 85520 HEPARIN ASSAY: CPT | Performed by: STUDENT IN AN ORGANIZED HEALTH CARE EDUCATION/TRAINING PROGRAM

## 2024-01-02 PROCEDURE — 71045 X-RAY EXAM CHEST 1 VIEW: CPT

## 2024-01-02 PROCEDURE — 84702 CHORIONIC GONADOTROPIN TEST: CPT | Performed by: PHYSICIAN ASSISTANT

## 2024-01-02 PROCEDURE — 87636 SARSCOV2 & INF A&B AMP PRB: CPT | Performed by: STUDENT IN AN ORGANIZED HEALTH CARE EDUCATION/TRAINING PROGRAM

## 2024-01-02 PROCEDURE — 85027 COMPLETE CBC AUTOMATED: CPT | Performed by: STUDENT IN AN ORGANIZED HEALTH CARE EDUCATION/TRAINING PROGRAM

## 2024-01-02 PROCEDURE — 2500000001 HC RX 250 WO HCPCS SELF ADMINISTERED DRUGS (ALT 637 FOR MEDICARE OP): Performed by: STUDENT IN AN ORGANIZED HEALTH CARE EDUCATION/TRAINING PROGRAM

## 2024-01-02 RX ORDER — POLYETHYLENE GLYCOL 3350 17 G/17G
17 POWDER, FOR SOLUTION ORAL DAILY
Status: DISCONTINUED | OUTPATIENT
Start: 2024-01-02 | End: 2024-01-04 | Stop reason: HOSPADM

## 2024-01-02 RX ORDER — FUROSEMIDE 40 MG/1
40 TABLET ORAL 2 TIMES DAILY
Status: CANCELLED | OUTPATIENT
Start: 2024-01-02

## 2024-01-02 RX ORDER — ALBUTEROL SULFATE 0.83 MG/ML
2.5 SOLUTION RESPIRATORY (INHALATION) EVERY 4 HOURS PRN
Status: CANCELLED | OUTPATIENT
Start: 2024-01-02

## 2024-01-02 RX ORDER — HEPARIN SODIUM 5000 [USP'U]/ML
4000 INJECTION, SOLUTION INTRAVENOUS; SUBCUTANEOUS ONCE
Status: COMPLETED | OUTPATIENT
Start: 2024-01-02 | End: 2024-01-02

## 2024-01-02 RX ORDER — ATORVASTATIN CALCIUM 80 MG/1
80 TABLET, FILM COATED ORAL NIGHTLY
Status: DISCONTINUED | OUTPATIENT
Start: 2024-01-02 | End: 2024-01-03

## 2024-01-02 RX ORDER — MORPHINE SULFATE 2 MG/ML
2 INJECTION, SOLUTION INTRAMUSCULAR; INTRAVENOUS EVERY 5 MIN PRN
Status: DISCONTINUED | OUTPATIENT
Start: 2024-01-02 | End: 2024-01-04 | Stop reason: HOSPADM

## 2024-01-02 RX ORDER — METOPROLOL SUCCINATE 25 MG/1
25 TABLET, EXTENDED RELEASE ORAL DAILY
Status: DISCONTINUED | OUTPATIENT
Start: 2024-01-02 | End: 2024-01-04 | Stop reason: HOSPADM

## 2024-01-02 RX ORDER — CLONAZEPAM 0.5 MG/1
0.5 TABLET, ORALLY DISINTEGRATING ORAL NIGHTLY
Status: CANCELLED | OUTPATIENT
Start: 2024-01-02

## 2024-01-02 RX ORDER — ERGOCALCIFEROL 1.25 MG/1
1250 CAPSULE ORAL
Status: CANCELLED | OUTPATIENT
Start: 2024-01-03

## 2024-01-02 RX ORDER — ATORVASTATIN CALCIUM 40 MG/1
40 TABLET, FILM COATED ORAL NIGHTLY
Status: CANCELLED | OUTPATIENT
Start: 2024-01-02

## 2024-01-02 RX ORDER — NAPROXEN SODIUM 220 MG/1
81 TABLET, FILM COATED ORAL DAILY
Status: DISCONTINUED | OUTPATIENT
Start: 2024-01-02 | End: 2024-01-04 | Stop reason: HOSPADM

## 2024-01-02 RX ORDER — LEVOTHYROXINE SODIUM 50 UG/1
50 TABLET ORAL DAILY
Status: CANCELLED | OUTPATIENT
Start: 2024-01-02

## 2024-01-02 RX ORDER — TIZANIDINE 4 MG/1
4 TABLET ORAL NIGHTLY PRN
Status: CANCELLED | OUTPATIENT
Start: 2024-01-02

## 2024-01-02 RX ORDER — AMITRIPTYLINE HYDROCHLORIDE 25 MG/1
25 TABLET, FILM COATED ORAL NIGHTLY
Status: CANCELLED | OUTPATIENT
Start: 2024-01-02

## 2024-01-02 RX ORDER — NAPROXEN SODIUM 220 MG/1
324 TABLET, FILM COATED ORAL ONCE
Status: COMPLETED | OUTPATIENT
Start: 2024-01-02 | End: 2024-01-02

## 2024-01-02 RX ORDER — FLUOXETINE HYDROCHLORIDE 20 MG/1
40 CAPSULE ORAL EVERY MORNING
Status: CANCELLED | OUTPATIENT
Start: 2024-01-03

## 2024-01-02 RX ORDER — METOPROLOL SUCCINATE 50 MG/1
25 TABLET, EXTENDED RELEASE ORAL EVERY MORNING
Status: CANCELLED | OUTPATIENT
Start: 2024-01-03

## 2024-01-02 RX ORDER — AMOXICILLIN 250 MG
2 CAPSULE ORAL 2 TIMES DAILY PRN
Status: DISCONTINUED | OUTPATIENT
Start: 2024-01-02 | End: 2024-01-04 | Stop reason: HOSPADM

## 2024-01-02 RX ORDER — ACETAMINOPHEN 325 MG/1
650 TABLET ORAL EVERY 4 HOURS PRN
Status: DISCONTINUED | OUTPATIENT
Start: 2024-01-02 | End: 2024-01-04 | Stop reason: HOSPADM

## 2024-01-02 RX ORDER — ONDANSETRON HYDROCHLORIDE 2 MG/ML
4 INJECTION, SOLUTION INTRAVENOUS ONCE
Status: COMPLETED | OUTPATIENT
Start: 2024-01-02 | End: 2024-01-02

## 2024-01-02 RX ORDER — NYSTATIN 100000 [USP'U]/G
1 POWDER TOPICAL 2 TIMES DAILY
Status: CANCELLED | OUTPATIENT
Start: 2024-01-02

## 2024-01-02 RX ORDER — ACETAMINOPHEN 650 MG/1
650 SUPPOSITORY RECTAL EVERY 4 HOURS PRN
Status: DISCONTINUED | OUTPATIENT
Start: 2024-01-02 | End: 2024-01-04 | Stop reason: HOSPADM

## 2024-01-02 RX ORDER — METOPROLOL SUCCINATE 50 MG/1
25 TABLET, EXTENDED RELEASE ORAL DAILY
Status: CANCELLED | OUTPATIENT
Start: 2024-01-02

## 2024-01-02 RX ORDER — ASPIRIN 81 MG/1
81 TABLET ORAL DAILY
Status: CANCELLED | OUTPATIENT
Start: 2024-01-02

## 2024-01-02 RX ORDER — PNV NO.95/FERROUS FUM/FOLIC AC 28MG-0.8MG
100 TABLET ORAL
Status: CANCELLED | OUTPATIENT
Start: 2024-01-02

## 2024-01-02 RX ORDER — CLOTRIMAZOLE 1 %
CREAM (GRAM) TOPICAL EVERY 12 HOURS
Status: CANCELLED | OUTPATIENT
Start: 2024-01-02

## 2024-01-02 RX ORDER — HEPARIN SODIUM 5000 [USP'U]/ML
2000-4000 INJECTION, SOLUTION INTRAVENOUS; SUBCUTANEOUS EVERY 4 HOURS PRN
Status: DISCONTINUED | OUTPATIENT
Start: 2024-01-02 | End: 2024-01-03

## 2024-01-02 RX ORDER — ATORVASTATIN CALCIUM 40 MG/1
40 TABLET, FILM COATED ORAL DAILY
Status: CANCELLED | OUTPATIENT
Start: 2024-01-02

## 2024-01-02 RX ORDER — HEPARIN SODIUM 10000 [USP'U]/100ML
0-4000 INJECTION, SOLUTION INTRAVENOUS CONTINUOUS
Status: DISCONTINUED | OUTPATIENT
Start: 2024-01-02 | End: 2024-01-04

## 2024-01-02 RX ORDER — MECLIZINE HYDROCHLORIDE 25 MG/1
25 TABLET ORAL EVERY 6 HOURS PRN
Status: CANCELLED | OUTPATIENT
Start: 2024-01-02

## 2024-01-02 RX ORDER — FLUOXETINE HYDROCHLORIDE 20 MG/1
40 CAPSULE ORAL DAILY
Status: CANCELLED | OUTPATIENT
Start: 2024-01-02

## 2024-01-02 RX ORDER — LEVOTHYROXINE SODIUM 50 UG/1
50 TABLET ORAL EVERY MORNING
Status: CANCELLED | OUTPATIENT
Start: 2024-01-03

## 2024-01-02 RX ORDER — ACETAMINOPHEN 160 MG/5ML
650 SOLUTION ORAL EVERY 4 HOURS PRN
Status: DISCONTINUED | OUTPATIENT
Start: 2024-01-02 | End: 2024-01-04 | Stop reason: HOSPADM

## 2024-01-02 RX ORDER — PNV NO.95/FERROUS FUM/FOLIC AC 28MG-0.8MG
100 TABLET ORAL EVERY MORNING
Status: CANCELLED | OUTPATIENT
Start: 2024-01-03

## 2024-01-02 RX ADMIN — ATORVASTATIN CALCIUM 80 MG: 80 TABLET, FILM COATED ORAL at 20:49

## 2024-01-02 RX ADMIN — ASPIRIN 81 MG 324 MG: 81 TABLET ORAL at 10:02

## 2024-01-02 RX ADMIN — TICAGRELOR 180 MG: 90 TABLET ORAL at 18:43

## 2024-01-02 RX ADMIN — ASPIRIN 81 MG 81 MG: 81 TABLET ORAL at 18:43

## 2024-01-02 RX ADMIN — HEPARIN SODIUM 4000 UNITS: 5000 INJECTION INTRAVENOUS; SUBCUTANEOUS at 12:29

## 2024-01-02 RX ADMIN — METOPROLOL SUCCINATE 25 MG: 25 TABLET, EXTENDED RELEASE ORAL at 18:43

## 2024-01-02 RX ADMIN — HEPARIN SODIUM 1000 UNITS/HR: 10000 INJECTION, SOLUTION INTRAVENOUS at 12:27

## 2024-01-02 RX ADMIN — ONDANSETRON 4 MG: 2 INJECTION, SOLUTION INTRAMUSCULAR; INTRAVENOUS at 12:35

## 2024-01-02 SDOH — SOCIAL STABILITY: SOCIAL INSECURITY: HAS ANYONE EVER THREATENED TO HURT YOUR FAMILY OR YOUR PETS?: NO

## 2024-01-02 SDOH — SOCIAL STABILITY: SOCIAL INSECURITY: HAVE YOU HAD THOUGHTS OF HARMING ANYONE ELSE?: NO

## 2024-01-02 SDOH — SOCIAL STABILITY: SOCIAL INSECURITY: ARE THERE ANY APPARENT SIGNS OF INJURIES/BEHAVIORS THAT COULD BE RELATED TO ABUSE/NEGLECT?: NO

## 2024-01-02 SDOH — SOCIAL STABILITY: SOCIAL INSECURITY: ARE YOU OR HAVE YOU BEEN THREATENED OR ABUSED PHYSICALLY, EMOTIONALLY, OR SEXUALLY BY ANYONE?: NO

## 2024-01-02 SDOH — SOCIAL STABILITY: SOCIAL INSECURITY: DO YOU FEEL ANYONE HAS EXPLOITED OR TAKEN ADVANTAGE OF YOU FINANCIALLY OR OF YOUR PERSONAL PROPERTY?: NO

## 2024-01-02 SDOH — SOCIAL STABILITY: SOCIAL INSECURITY: DOES ANYONE TRY TO KEEP YOU FROM HAVING/CONTACTING OTHER FRIENDS OR DOING THINGS OUTSIDE YOUR HOME?: NO

## 2024-01-02 SDOH — SOCIAL STABILITY: SOCIAL INSECURITY: ABUSE: ADULT

## 2024-01-02 SDOH — SOCIAL STABILITY: SOCIAL INSECURITY: WERE YOU ABLE TO COMPLETE ALL THE BEHAVIORAL HEALTH SCREENINGS?: YES

## 2024-01-02 SDOH — SOCIAL STABILITY: SOCIAL INSECURITY: DO YOU FEEL UNSAFE GOING BACK TO THE PLACE WHERE YOU ARE LIVING?: NO

## 2024-01-02 ASSESSMENT — ACTIVITIES OF DAILY LIVING (ADL)
LACK_OF_TRANSPORTATION: NO
WALKS IN HOME: INDEPENDENT
HEARING - RIGHT EAR: FUNCTIONAL
JUDGMENT_ADEQUATE_SAFELY_COMPLETE_DAILY_ACTIVITIES: YES
GROOMING: INDEPENDENT
TOILETING: INDEPENDENT
DRESSING YOURSELF: INDEPENDENT
BATHING: INDEPENDENT
HEARING - LEFT EAR: FUNCTIONAL
PATIENT'S MEMORY ADEQUATE TO SAFELY COMPLETE DAILY ACTIVITIES?: YES
FEEDING YOURSELF: INDEPENDENT
ASSISTIVE_DEVICE: EYEGLASSES
ADEQUATE_TO_COMPLETE_ADL: YES

## 2024-01-02 ASSESSMENT — COGNITIVE AND FUNCTIONAL STATUS - GENERAL
MOBILITY SCORE: 24
DAILY ACTIVITIY SCORE: 24
PATIENT BASELINE BEDBOUND: NO

## 2024-01-02 ASSESSMENT — LIFESTYLE VARIABLES
AUDIT-C TOTAL SCORE: 0
SKIP TO QUESTIONS 9-10: 1
HAVE YOU EVER FELT YOU SHOULD CUT DOWN ON YOUR DRINKING: NO
HAVE PEOPLE ANNOYED YOU BY CRITICIZING YOUR DRINKING: NO
HOW MANY STANDARD DRINKS CONTAINING ALCOHOL DO YOU HAVE ON A TYPICAL DAY: PATIENT DOES NOT DRINK
REASON UNABLE TO ASSESS: NO
AUDIT-C TOTAL SCORE: 0
HOW OFTEN DO YOU HAVE A DRINK CONTAINING ALCOHOL: NEVER
EVER FELT BAD OR GUILTY ABOUT YOUR DRINKING: NO
HOW OFTEN DO YOU HAVE 6 OR MORE DRINKS ON ONE OCCASION: NEVER
EVER HAD A DRINK FIRST THING IN THE MORNING TO STEADY YOUR NERVES TO GET RID OF A HANGOVER: NO

## 2024-01-02 ASSESSMENT — PAIN SCALES - GENERAL
PAINLEVEL_OUTOF10: 0 - NO PAIN
PAINLEVEL_OUTOF10: 0 - NO PAIN

## 2024-01-02 ASSESSMENT — PAIN - FUNCTIONAL ASSESSMENT: PAIN_FUNCTIONAL_ASSESSMENT: 0-10

## 2024-01-02 ASSESSMENT — COLUMBIA-SUICIDE SEVERITY RATING SCALE - C-SSRS
6. HAVE YOU EVER DONE ANYTHING, STARTED TO DO ANYTHING, OR PREPARED TO DO ANYTHING TO END YOUR LIFE?: NO
1. IN THE PAST MONTH, HAVE YOU WISHED YOU WERE DEAD OR WISHED YOU COULD GO TO SLEEP AND NOT WAKE UP?: NO
2. HAVE YOU ACTUALLY HAD ANY THOUGHTS OF KILLING YOURSELF?: NO

## 2024-01-02 ASSESSMENT — PATIENT HEALTH QUESTIONNAIRE - PHQ9
SUM OF ALL RESPONSES TO PHQ9 QUESTIONS 1 & 2: 0
1. LITTLE INTEREST OR PLEASURE IN DOING THINGS: NOT AT ALL
2. FEELING DOWN, DEPRESSED OR HOPELESS: NOT AT ALL

## 2024-01-02 NOTE — ED PROVIDER NOTES
CC: Chest Pain (Left sided rib pain that pt describes as a burning sensation. Pt also has c/o SOB, nausea, and weakness. Hx of MI 6 years ago. State she woke up at 0730 this AM with the pain.)     HPI:  Patient is a 58-year-old with a history of MI, hypertension, hyperlipidemia and MS not currently on medications presents to the emergency department chest pain.  Describes as a burning sensation in her ribs.  States it feels exactly like her prior MI.  Endorses associated nausea and states her legs feel weak.  She is also had associated cough.  No vomiting.  Diagnosed with pneumonia about a month ago at urgent care and written for doxycycline.  Was also given a prescription for amoxicillin which she started taking 2 weeks ago because she felt like her pneumonia symptoms were recurring.  Completed the course but still has a cough with purulent sputum production.  States she feels very tired.  PT came to the emergency department today because she woke up with this burning sensation and feels weak and dizzy.  She states her legs feels like noodles.  She denies abdominal pain.    Records Reviewed:  Recent available ED and inpatient notes reviewed in EMR.    PMHx/PSHx:  Per HPI.   - has no past medical history on file.  - has a past surgical history that includes  section, classic (2014); Other surgical history (2014); Other surgical history (2014); Hysterectomy (2014); and Other surgical history (2020).  - has Anxiety; Depression; Insomnia; LAP-BAND surgery status; Multiple sclerosis (CMS/HCC); Migraines; Myocardial infarction (CMS/HCC); FARZANA (obstructive sleep apnea); Pneumonia; S/P angioplasty with stent; Skin rash; Vitamin D deficiency; Edema due to congestive heart failure (CMS/HCC); Elevated liver enzymes; Paraparesis, bilateral (CMS/HCC); Candidiasis; and Urinary frequency on their problem list.    Medications:  Reviewed in EMR. See EMR for complete list of medications and  doses.    Allergies:  Gadolinium-containing contrast media, Iodinated contrast media, and Iodine    Social History:  - Tobacco:  reports that she has never smoked. She has never used smokeless tobacco.   - Alcohol:  reports no history of alcohol use.   - Illicit Drugs:  reports no history of drug use.     ROS:  Per HPI.       ???????????????????????????????????????????????????????????????  Triage Vitals:  T 36.5 °C (97.7 °F)  HR 87  /65  RR 16  O2 97 % None (Room air)    Physical Exam  ???????????????????????????????????????????????????????????????  GEN: Uncomfortable appearing, no acute distress  HEAD: atraumatic  EYES: PERRL, EOMI, no scleral icterus  ENT: mmm  NECK: no JVD  CVS/CHEST: reg rate, nl rhythm.  No rash.  PULM: CTA b/l no wheezes, crackles, or rhonchi   GI: soft, NT/ND, no rebound or guarding   BACK: no CVA tenderness, no vertebral point tenderness  EXT: no LE edema, 2+ periph pulses in bilat radial and DP   NEURO: Awake and alert, Strength and sensation is equal in b/l upper and lower extremities  SKIN: warm, dry, no rashes   PSYCH: AAOx3 answers questions appropriately    EKG:  See ED course     Assessment and Plan:  Patient presents to the emergency department with chest pain.  On arrival, patient is initially hemodynamically stable.  Initial EKG is nonischemic.  Given the character of patient's chest pain patient given 324 mg of chewable aspirin.  Patient has a heart score of 4.  Serial EKGs and troponins will be obtained given the concern for acute coronary syndrome.  Based on patient's history and physical, I do have a lower suspicion for other etiologies of chest pain including aortic dissection, given the character of the pain with equal peripheral pulses in upper and lower extremities.  I do have a lower suspicion for pulmonary embolism given lack of risk factors, no clinical signs of DVT, normal heart rate and is saturating appropriately on room air.  EKG without signs of  pericarditis.  On exam patient does have bilateral breath sounds.  Low suspicion for pneumothorax.  No history of vomiting and therefore low suspicion for esophageal rupture.  Patient's exam and history does appear concerning for acute coronary syndrome.  Pending initial work-up patient will be offered observation for provocative testing, serial labs and cardiology consultation.  Troponin elevated.  Placed on heparin drip for NSTEMI and cardiology, Dr. Lopez placed on consult as he is on for Dr. Gutierres today.  Patient admitted to medicine with cardiology on consult.      ED Course:  ED Course as of 01/02/24 0921   Tue Jan 02, 2024   0837 EKG read by me reviewed by me is normal sinus rhythm at 80 bpm.  Left axis deviation.  No significant ST segment elevation or depression.  No significant T wave abnormalities. [HD]      ED Course User Index  [HD] Odette Duong DO         Diagnoses as of 01/02/24 0921   Chest pain, unspecified type       Social Determinants Limiting Care:  None identified    Disposition:  admitted    Odette Duong DO      Procedures ? SmartLinks last updated 1/2/2024 9:21 AM        Odette Duong DO  01/02/24 1524

## 2024-01-02 NOTE — H&P
"History Of Present Illness  Elaine Coffey is a 58 y.o. female presenting with [facility] CAD s/p COLETTE (2018), MA, Asthma presented from home via EMS with left sided lower rib chest pain shortly after waking up in the morning. Describes pain as dull lasting for about 1-1.5 hr. Associated with dyspnea on exertion, nausea and lightheadedness. No worsening or alleviating factors. Denies fever, rigor, LE edema, cough, syncope. Pt was seen in 2023 with CP s/p stress test w/o evidence of ischemic event.  Initial ED work up was significant for transient hypotension (sBP of 88), elevated troponin w/o acute ST changes on ECG. Pt is currently asymptomatic during my evaluation.     Heart Rate:  [45-87]   Temp:  [36.5 °C (97.7 °F)]   Resp:  [13-25]   BP: (116-132)/(46-88)   Height:  [170.2 cm (5' 7\")]   Weight:  [105 kg (231 lb)]   SpO2:  [92 %-97 %]      Past Medical History  History reviewed. No pertinent past medical history.    Surgical History  Past Surgical History:   Procedure Laterality Date     SECTION, CLASSIC  2014     Section    HYSTERECTOMY  2014    Hysterectomy    OTHER SURGICAL HISTORY  2014    Oophorectomy - Bilat (Removal Of Both Ovaries) Laparoscopic    OTHER SURGICAL HISTORY  2014    Laparosc Gastric Restrictive Proc By Adjustable Gastric Band    OTHER SURGICAL HISTORY  2020    Abscess incision and drainage        Social History  She reports that she has never smoked. She has never used smokeless tobacco. She reports that she does not drink alcohol and does not use drugs.    Family History  Family History   Problem Relation Name Age of Onset    Bilateral breast cancer Mother      Ovarian cancer Maternal Grandmother          Allergies  Gadolinium-containing contrast media, Iodinated contrast media, and Iodine    Review of Systems   All other systems reviewed and are negative.       Physical Exam  Constitutional:       Appearance: Normal appearance. She is obese. She is not " "ill-appearing.   HENT:      Head: Normocephalic and atraumatic.      Right Ear: External ear normal.      Left Ear: External ear normal.      Nose: Nose normal.      Mouth/Throat:      Mouth: Mucous membranes are moist.   Eyes:      General: No scleral icterus.        Right eye: No discharge.         Left eye: No discharge.      Extraocular Movements: Extraocular movements intact.      Conjunctiva/sclera: Conjunctivae normal.      Pupils: Pupils are equal, round, and reactive to light.   Cardiovascular:      Rate and Rhythm: Normal rate and regular rhythm.   Pulmonary:      Effort: Pulmonary effort is normal.      Breath sounds: Normal breath sounds.   Abdominal:      General: Abdomen is flat. Bowel sounds are normal.      Palpations: Abdomen is soft.   Musculoskeletal:         General: Normal range of motion.   Skin:     General: Skin is warm and dry.      Capillary Refill: Capillary refill takes less than 2 seconds.   Neurological:      General: No focal deficit present.      Mental Status: She is alert and oriented to person, place, and time. Mental status is at baseline.   Psychiatric:         Mood and Affect: Mood normal.         Thought Content: Thought content normal.         Judgment: Judgment normal.          Last Recorded Vitals  Blood pressure (!) 125/46, pulse 62, temperature 36.5 °C (97.7 °F), resp. rate 14, height 1.702 m (5' 7\"), weight 105 kg (231 lb), SpO2 94 %.    Relevant Results      Lab Results   Component Value Date    WBC 4.9 01/02/2024    HGB 14.1 01/02/2024    HCT 42.2 01/02/2024    MCV 86 01/02/2024     01/02/2024     Lab Results   Component Value Date    GLUCOSE 103 (H) 01/02/2024    CALCIUM 9.0 01/02/2024     01/02/2024    K 3.8 01/02/2024    CO2 34 (H) 01/02/2024     01/02/2024    BUN 12 01/02/2024    CREATININE 0.89 01/02/2024     Lab Results   Component Value Date    ALT 16 01/02/2024    AST 15 01/02/2024    ALKPHOS 113 (H) 01/02/2024    BILITOT 0.8 01/02/2024 "       Encounter Date: 01/02/24   ECG 12 lead   Result Value    Ventricular Rate 80    Atrial Rate 80    MD Interval 138    QRS Duration 92    QT Interval 408    QTC Calculation(Bazett) 470    P Axis 59    R Axis 2    T Axis 56    QRS Count 13    Q Onset 209    P Onset 140    P Offset 189    T Offset 413    QTC Fredericia 449    Narrative    Normal sinus rhythm  Low voltage QRS  Inferior infarct (cited on or before 02-JAN-2024)  Cannot rule out Anterior infarct , age undetermined  Abnormal ECG  When compared with ECG of 25-AUG-2023 13:15,  Previous ECG has undetermined rhythm, needs review     === 01/02/24 ===    XR CHEST 1 VIEW    - Impression -  No pleural effusion      Assessment/Plan   Principal Problem:    Chest pain    # Chest pain  # Elevated troponin concerned for NSTEMI  # CAD s/p COLETTE (2018)   # Transient hypotension  # MS not active therapy  # Asthma w/o exacerbation  # Obesity  -CP orderset  -ASA, statin, hep gtt  -hold metoprolol due to hypotension  -PRN IVF  -Cards recs appreciated         Jody Landers

## 2024-01-02 NOTE — PROGRESS NOTES
"Pharmacy Medication History Review    Elaine Coffey is a 58 y.o. female admitted for Chest pain. Pharmacy reviewed the patient's [id number] medications and allergies for accuracy.    The list below reflectives the updated PTA list. Please review each medication in order reconciliation for additional clarification and justification.  (Not in a hospital admission)       The list below reflectives the updated allergy list. Please review each documented allergy for additional clarification and justification.  Allergies  Reviewed by Allison Henry RN on 1/2/2024        Severity Reactions Comments    Gadolinium-containing Contrast Media High Anaphylaxis Pt developed nausea without vomiting , SOB, after receiving the IV contrast . Pt states she always gets nauseated but this time \"was worse\"/pt/    Iodinated Contrast Media High Anaphylaxis Unable to breath, chest pain, severe itching of hands and flushing of chest and face, hypotension and bradycardia. This was reaction to MRI contrast. Patient does not have any known allergy to CT contrast.    Iodine High Itching, Shortness of breath Chest pains, turned red, face hot            Below are additional concerns with the patient's PTA list.      Kelsi Juan CPhT    "

## 2024-01-02 NOTE — LETTER
January 4, 2024     Patient: Elaine Coffey   YOB: 1965   Date of Visit: 1/2/2024       To Whom It May Concern:    Elaine Coffey was admitted to the hospital from 1/2/2024 to 1/4/24 ?. Please excuse Elaine for her absence from work, she may return to work on 1/23/24    If you have any questions or concerns, please don't hesitate to call.         Sincerely,       Jody Landers, DO

## 2024-01-02 NOTE — NURSING NOTE
Accidentally entered two heparin assays for 1725 and 1726, these are not correct times, do not collect.  The next heparin assay is at 2100

## 2024-01-02 NOTE — LETTER
January 4, 2024     Patient: Elaine Coffey   YOB: 1965   Date of Visit: 1/2/2024       To Whom It May Concern:    Elaine Coffey was admitted to the hospital from 1/2/2024 to ?. Please excuse Elaine for her absence from work she may go back to work on 1/8/2023  If you have any questions or concerns, please don't hesitate to call.         Sincerely,         Jody Landers, DO

## 2024-01-02 NOTE — CONSULTS
"Inpatient consult to Cardiology  Consult performed by: Katia Reina PA-C  Consult ordered by: Odette Duong DO        History Of Present Illness:    Elaine Coffey is a 58 y.o. female presenting with L sided chest pain (burning and dull/aching in quality) that began after she woke up this morning. Pain did not radiate, improved in severity overtime but is ongoing. No noticed aggravating or alleviating factors. Reports associated weakness, fatigue and feeling as if her legs \"were rubber\" as well as shortness of breath with minimal exertion. Symptoms are similar in quality and severity to 2018 when she had a heart attack. Reports that she has felt more fatigued over the last few days but otherwise has been active, no issues. Denies any headache or blurry vision, recent illness. Is compliant with all medications.      Last Recorded Vitals:  Vitals:    01/02/24 1500 01/02/24 1515 01/02/24 1547 01/02/24 1614   BP: 128/57  134/83 138/78   BP Location:   Left arm Left arm   Patient Position:    Sitting   Pulse: 70 68 76 85   Resp: 17 14 18 18   Temp:    36.7 °C (98.1 °F)   TempSrc:    Temporal   SpO2: 96% 94% 93% 93%   Weight:   105 kg (231 lb)    Height:   1.702 m (5' 7\")        Last Labs:  CBC - 1/2/2024: 12:25 PM  4.9 14.1 192    42.2      CMP - 1/2/2024:  8:37 AM  9.0 6.3 15 --- 0.8   _ 4.1 16 113      PTT - 1/2/2024: 12:25 PM  1.0   11.4 35     Troponin I, High Sensitivity   Date/Time Value Ref Range Status   01/02/2024 04:46 PM 55 (HH) 0 - 13 ng/L Final     Comment:     Previous result verified on 1/2/2024 1034 on specimen/case [id number] called with component [facility] for procedure Troponin I, High Sensitivity, Initial with value 64 ng/L.   01/02/2024 11:31  (HH) 0 - 13 ng/L Final     Comment:     Previous result verified on 1/2/2024 1034 on specimen/case [id number] called with component [facility] for procedure Troponin I, High Sensitivity, Initial with value 64 ng/L.   01/02/2024 10:01 AM 64 () 0 " - 13 ng/L Final     BNP   Date/Time Value Ref Range Status   08/25/2023 11:35 AM 15 0 - 99 pg/mL Final     Comment:     .  <100 pg/mL - Heart failure unlikely  100-299 pg/mL - Intermediate probability of acute heart  .               failure exacerbation. Correlate with clinical  .               context and patient history.    >=300 pg/mL - Heart Failure likely. Correlate with clinical  .               context and patient history.  BNP testing is performed using different testing   methodology at [facility] than at other   system hospitals. Direct result comparisons should   only be made within the same method.     05/02/2023 03:05 PM 17 0 - 99 pg/mL Final     Comment:     .  <100 pg/mL - Heart failure unlikely  100-299 pg/mL - Intermediate probability of acute heart  .               failure exacerbation. Correlate with clinical  .               context and patient history.    >=300 pg/mL - Heart Failure likely. Correlate with clinical  .               context and patient history.  BNP testing is performed using different testing   methodology at [facility] ([facility] than at other   system hospitals. Direct result comparisons should   only be made within the same method.       Hemoglobin A1C   Date/Time Value Ref Range Status   09/23/2020 02:03 PM 5.6 4.0 - 6.0 % Final     Comment:     Hemoglobin A1C levels are related to mean blood glucose during the   preceding 2-3 months. The relationship table below may be used as a   general guide. Each 1% increase in HGB A1C is a reflection of an   increase in mean glucose of approximately 30 mg/dl.   Reference: Diabetes Care, volume 29, supplement 1 Jan. 2006                        HGB A1C ................. Approx. Mean Glucose   _______________________________________________   6%   ...............................  120 mg/dl   7%   ...............................  150 mg/dl   8%   ...............................  180 mg/dl   9%    "...............................  210 mg/dl   10%  ...............................  240 mg/dl  Performed at [address] [place] [address]       LDL Calculated   Date/Time Value Ref Range Status   12/15/2023 08:51 AM 86 <=99 mg/dL Final     Comment:                                 Near   Borderline      AGE      Desirable  Optimal    High     High     Very High     0-19 Y     0 - 109     ---    110-129   >/= 130     ----    20-24 Y     0 - 119     ---    120-159   >/= 160     ----      >24 Y     0 -  99   100-129  130-159   160-189     >/=190     09/23/2020 02:03 PM 79 65 - 130 MG/DL Final   01/01/2018 02:34 AM 67 65 - 130 MG/DL Final     VLDL   Date/Time Value Ref Range Status   12/15/2023 08:51 AM 24 0 - 40 mg/dL Final   01/06/2018 06:45 AM 31 0 - 40 mg/dL Final      Last I/O:  No intake/output data recorded.    Past Cardiology Tests (Last 3 Years):  EKG:  ECG 12 lead 01/02/2024 (Preliminary)  EKG from ED admission independently reviewed, NSR w/o ischemic changes.     ECG 12 lead 01/02/2024 (Preliminary)    Echo:  Transthoacic Echocardiogram (8/20/2021):   CONCLUSIONS:   1. The left ventricular systolic function is normal with a 60% estimated ejection fraction.   2. Spectral Doppler shows an impaired relaxation pattern of left ventricular diastolic filling.   3. There is trace to mild mitral and tricuspid regurgitation.    Ejection Fractions:  No results found for: \"EF\"  Cath:  No results found for this or any previous visit from the past 1095 days.    Stress Test:  Nuclear Stress Test 05/25/2023  IMPRESSION:  1. No evidence of inducible myocardial ischemia. Predominantly fixed  moderate-sized moderate to severe perfusion defects involving  inferior wall as well as inferolateral wall, likely represent prior  infarction. Similar to prior exam on 11/20/2018.  2. The left ventricle is normal in size.  3. Gated images demonstrate normal LV wall motion except hypokinesis  in the inferior wall with a " post-stress LV EF estimated at 56%.    Cardiac Imaging:  No results found for this or any previous visit from the past 1095 days.      Past Medical History:  She has no past medical history on file.    Past Surgical History:  She has a past surgical history that includes  section, classic (2014); Other surgical history (2014); Other surgical history (2014); Hysterectomy (2014); and Other surgical history (2020).      Social History:  She reports that she has never smoked. She has never used smokeless tobacco. She reports that she does not drink alcohol and does not use drugs.  Former smoker. No etoh or illicit drug use.   Family History:  Family History   Problem Relation Name Age of Onset    Bilateral breast cancer Mother      Ovarian cancer Maternal Grandmother     MI mother and father.      Allergies:  Gadolinium-containing contrast media, Iodinated contrast media, and Iodine    Inpatient Medications:  Scheduled medications   Medication Dose Route Frequency    lidocaine-diphenhydraMINE-Maalox 1:1:1  10 mL Swish & Swallow Once    polyethylene glycol  17 g oral Daily     PRN medications   Medication    heparin    oxygen     Continuous Medications   Medication Dose Last Rate    heparin  0-4,000 Units/hr 1,000 Units/hr (24 1625)     Outpatient Medications:  Current Outpatient Medications   Medication Instructions    albuterol 90 mcg/actuation inhaler 2 puffs, inhalation, Every 4 hours PRN    albuterol 2.5 mg, nebulization, Every 4 hours PRN    amitriptyline (ELAVIL) 25 mg, oral, Nightly    aspirin 81 mg EC tablet 1 tablet, oral, Daily    atorvastatin (LIPITOR) 40 mg, oral, Nightly    clonazePAM (KLONOPIN) 0.5 mg, oral, Nightly    cyanocobalamin (VITAMIN B-12) 100 mcg, oral, Every morning    ergocalciferol (Vitamin D-2) 1.25 MG (86525 UT) capsule 1 capsule, oral, Weekly, On     FLUoxetine (PROZAC) 40 mg, oral, Daily    furosemide (LASIX) 40 mg, oral, 2 times daily     gabapentin (NEURONTIN) 400 mg, oral, 3 times daily, Last OARRS fill: 4/7/23 #270 for 90 days    ketoconazole (NIZOral) 2 % cream 1 Application, Topical, 2 times daily    levothyroxine (SYNTHROID, LEVOXYL) 50 mcg, oral, Every morning    meclizine (ANTIVERT) 25 mg, oral, Every 6 hours PRN    metoprolol succinate XL (TOPROL-XL) 25 mg, oral, Every morning    nitroglycerin (Nitrostat) 0.4 mg SL tablet DISSOLVE 1 TABLET UNDER THE TONGUE AS NEEDED FOR CHEST PAIN.    nystatin (Mycostatin) 100,000 unit/gram powder 1 Application, Topical, 2 times daily    nystatin (Mycostatin) ointment Topical, 2 times daily    nystatin-triamcinolone (Mycolog II) ointment Topical, 2 times daily    potassium chloride ER (Micro-K) 10 mEq ER capsule 10 mEq, oral, Daily    tiZANidine (ZANAFLEX) 4 mg, oral, Nightly PRN, FOR SPASMS       Physical Exam:  Physical Exam  Constitutional:       Appearance: Normal appearance.   HENT:      Head: Normocephalic.      Nose: Nose normal.      Mouth/Throat:      Mouth: Mucous membranes are moist.   Eyes:      Conjunctiva/sclera: Conjunctivae normal.   Neck:      Comments: No jvd  Cardiovascular:      Rate and Rhythm: Normal rate and regular rhythm.      Heart sounds: No murmur heard.     No friction rub. No gallop.   Pulmonary:      Effort: Pulmonary effort is normal.      Breath sounds: Normal breath sounds.   Abdominal:      Palpations: Abdomen is soft.   Musculoskeletal:         General: No swelling.      Right lower leg: No edema.      Left lower leg: No edema.   Skin:     General: Skin is warm and dry.   Neurological:      General: No focal deficit present.      Mental Status: She is alert and oriented to person, place, and time. Mental status is at baseline.   Psychiatric:         Mood and Affect: Mood normal.         Behavior: Behavior normal.            Assessment/Plan   Elaine Coffey is a 57 yo female with a PMH of Asthma, MS, CAD w/ LHC with PTCA vs PCI in 2018 who presented with chest pain,  shortness of breath. Troponin 9 >64> 106. EKG non ischemic.     #Chest Pain  #NSTEMI  #Hx CAD s/p LHC in 2018 with PTCA vs PCI @ OSH    -Heparin gtt  -ASA/Brilinta loading  -Lipid panel, hgba1c pending  -Monitor on tele  -NPO @ midnight for [facility]  -Echo in AM  -Hold home Lasix  -c/w home Metoprolol   -Will follow     Peripheral IV 01/02/24 20 G Proximal;Right;Anterior Forearm (Active)   Site Assessment Clean;Dry;Intact 01/02/24 1551   Dressing Status Dry;Clean 01/02/24 1551   Number of days: 0       Code Status:  No Order    I spent  minutes in the professional and overall care of this patient.        Katia Reina PA-C

## 2024-01-03 ENCOUNTER — APPOINTMENT (OUTPATIENT)
Dept: CARDIOLOGY | Facility: HOSPITAL | Age: 59
DRG: 281 | End: 2024-01-03
Payer: COMMERCIAL

## 2024-01-03 LAB
ANION GAP SERPL CALC-SCNC: 12 MMOL/L (ref 10–20)
AORTIC VALVE MEAN GRADIENT: 6
AORTIC VALVE PEAK VELOCITY: 1.59
AV PEAK GRADIENT: 10.1
AVA (PEAK VEL): 2.07
AVA (VTI): 2.25
BUN SERPL-MCNC: 12 MG/DL (ref 6–23)
CALCIUM SERPL-MCNC: 9.3 MG/DL (ref 8.6–10.3)
CHLORIDE SERPL-SCNC: 101 MMOL/L (ref 98–107)
CHOLEST SERPL-MCNC: 143 MG/DL (ref 0–199)
CHOLESTEROL/HDL RATIO: 3.7
CO2 SERPL-SCNC: 32 MMOL/L (ref 21–32)
CREAT SERPL-MCNC: 0.91 MG/DL (ref 0.5–1.05)
EJECTION FRACTION APICAL 4 CHAMBER: 69.9
EJECTION FRACTION: 63
ERYTHROCYTE [DISTWIDTH] IN BLOOD BY AUTOMATED COUNT: 12.2 % (ref 11.5–14.5)
EST. AVERAGE GLUCOSE BLD GHB EST-MCNC: 105 MG/DL
EST. AVERAGE GLUCOSE BLD GHB EST-MCNC: 105 MG/DL
GFR SERPL CREATININE-BSD FRML MDRD: 74 ML/MIN/1.73M*2
GLUCOSE SERPL-MCNC: 108 MG/DL (ref 74–99)
HBA1C MFR BLD: 5.3 %
HBA1C MFR BLD: 5.3 %
HCT VFR BLD AUTO: 42.6 % (ref 36–46)
HDLC SERPL-MCNC: 38.8 MG/DL
HGB BLD-MCNC: 14.2 G/DL (ref 12–16)
LDLC SERPL CALC-MCNC: 74 MG/DL
LEFT ATRIUM VOLUME AREA LENGTH INDEX BSA: 22.8
LEFT VENTRICLE INTERNAL DIMENSION DIASTOLE: 5.1 (ref 3.5–6)
LEFT VENTRICULAR OUTFLOW TRACT DIAMETER: 2
MCH RBC QN AUTO: 28.6 PG (ref 26–34)
MCHC RBC AUTO-ENTMCNC: 33.3 G/DL (ref 32–36)
MCV RBC AUTO: 86 FL (ref 80–100)
MITRAL VALVE E/A RATIO: 1.03
MITRAL VALVE E/E' RATIO: 8.1
NON HDL CHOLESTEROL: 104 MG/DL (ref 0–149)
NRBC BLD-RTO: 0 /100 WBCS (ref 0–0)
PLATELET # BLD AUTO: 177 X10*3/UL (ref 150–450)
POTASSIUM SERPL-SCNC: 3.9 MMOL/L (ref 3.5–5.3)
RBC # BLD AUTO: 4.96 X10*6/UL (ref 4–5.2)
RIGHT VENTRICLE FREE WALL PEAK S': 11
RIGHT VENTRICLE PEAK SYSTOLIC PRESSURE: 22.5
SODIUM SERPL-SCNC: 141 MMOL/L (ref 136–145)
TRICUSPID ANNULAR PLANE SYSTOLIC EXCURSION: 2.2
TRIGL SERPL-MCNC: 150 MG/DL (ref 0–149)
UFH PPP CHRO-ACNC: 0.4 IU/ML
VLDL: 30 MG/DL (ref 0–40)
WBC # BLD AUTO: 4.8 X10*3/UL (ref 4.4–11.3)

## 2024-01-03 PROCEDURE — 7100000009 HC PHASE TWO TIME - INITIAL BASE CHARGE: Performed by: INTERNAL MEDICINE

## 2024-01-03 PROCEDURE — 7100000010 HC PHASE TWO TIME - EACH INCREMENTAL 1 MINUTE: Performed by: INTERNAL MEDICINE

## 2024-01-03 PROCEDURE — 2500000004 HC RX 250 GENERAL PHARMACY W/ HCPCS (ALT 636 FOR OP/ED): Performed by: STUDENT IN AN ORGANIZED HEALTH CARE EDUCATION/TRAINING PROGRAM

## 2024-01-03 PROCEDURE — 2720000007 HC OR 272 NO HCPCS: Performed by: INTERNAL MEDICINE

## 2024-01-03 PROCEDURE — 93306 TTE W/DOPPLER COMPLETE: CPT | Performed by: STUDENT IN AN ORGANIZED HEALTH CARE EDUCATION/TRAINING PROGRAM

## 2024-01-03 PROCEDURE — 2500000004 HC RX 250 GENERAL PHARMACY W/ HCPCS (ALT 636 FOR OP/ED): Performed by: PHYSICIAN ASSISTANT

## 2024-01-03 PROCEDURE — 85520 HEPARIN ASSAY: CPT | Performed by: STUDENT IN AN ORGANIZED HEALTH CARE EDUCATION/TRAINING PROGRAM

## 2024-01-03 PROCEDURE — 85027 COMPLETE CBC AUTOMATED: CPT | Performed by: FAMILY MEDICINE

## 2024-01-03 PROCEDURE — 2500000005 HC RX 250 GENERAL PHARMACY W/O HCPCS: Performed by: INTERNAL MEDICINE

## 2024-01-03 PROCEDURE — B2111ZZ FLUOROSCOPY OF MULTIPLE CORONARY ARTERIES USING LOW OSMOLAR CONTRAST: ICD-10-PCS | Performed by: INTERNAL MEDICINE

## 2024-01-03 PROCEDURE — 2500000004 HC RX 250 GENERAL PHARMACY W/ HCPCS (ALT 636 FOR OP/ED): Performed by: INTERNAL MEDICINE

## 2024-01-03 PROCEDURE — 2550000001 HC RX 255 CONTRASTS: Performed by: INTERNAL MEDICINE

## 2024-01-03 PROCEDURE — 2500000001 HC RX 250 WO HCPCS SELF ADMINISTERED DRUGS (ALT 637 FOR MEDICARE OP): Performed by: PHYSICIAN ASSISTANT

## 2024-01-03 PROCEDURE — 80048 BASIC METABOLIC PNL TOTAL CA: CPT | Performed by: FAMILY MEDICINE

## 2024-01-03 PROCEDURE — 2500000002 HC RX 250 W HCPCS SELF ADMINISTERED DRUGS (ALT 637 FOR MEDICARE OP, ALT 636 FOR OP/ED): Performed by: PHYSICIAN ASSISTANT

## 2024-01-03 PROCEDURE — 1200000002 HC GENERAL ROOM WITH TELEMETRY DAILY

## 2024-01-03 PROCEDURE — C1894 INTRO/SHEATH, NON-LASER: HCPCS | Performed by: INTERNAL MEDICINE

## 2024-01-03 PROCEDURE — 76942 ECHO GUIDE FOR BIOPSY: CPT | Performed by: INTERNAL MEDICINE

## 2024-01-03 PROCEDURE — 93458 L HRT ARTERY/VENTRICLE ANGIO: CPT | Performed by: INTERNAL MEDICINE

## 2024-01-03 PROCEDURE — 83036 HEMOGLOBIN GLYCOSYLATED A1C: CPT | Mod: GEALAB | Performed by: FAMILY MEDICINE

## 2024-01-03 PROCEDURE — 4A023N7 MEASUREMENT OF CARDIAC SAMPLING AND PRESSURE, LEFT HEART, PERCUTANEOUS APPROACH: ICD-10-PCS | Performed by: INTERNAL MEDICINE

## 2024-01-03 PROCEDURE — 93306 TTE W/DOPPLER COMPLETE: CPT

## 2024-01-03 PROCEDURE — 96373 THER/PROPH/DIAG INJ IA: CPT | Performed by: INTERNAL MEDICINE

## 2024-01-03 PROCEDURE — C1887 CATHETER, GUIDING: HCPCS | Performed by: INTERNAL MEDICINE

## 2024-01-03 PROCEDURE — 2500000004 HC RX 250 GENERAL PHARMACY W/ HCPCS (ALT 636 FOR OP/ED): Performed by: FAMILY MEDICINE

## 2024-01-03 PROCEDURE — 99152 MOD SED SAME PHYS/QHP 5/>YRS: CPT | Performed by: INTERNAL MEDICINE

## 2024-01-03 PROCEDURE — 80061 LIPID PANEL: CPT | Performed by: STUDENT IN AN ORGANIZED HEALTH CARE EDUCATION/TRAINING PROGRAM

## 2024-01-03 PROCEDURE — 36415 COLL VENOUS BLD VENIPUNCTURE: CPT | Performed by: FAMILY MEDICINE

## 2024-01-03 PROCEDURE — 99232 SBSQ HOSP IP/OBS MODERATE 35: CPT | Performed by: PHYSICIAN ASSISTANT

## 2024-01-03 RX ORDER — HEPARIN SODIUM 1000 [USP'U]/ML
INJECTION, SOLUTION INTRAVENOUS; SUBCUTANEOUS AS NEEDED
Status: DISCONTINUED | OUTPATIENT
Start: 2024-01-03 | End: 2024-01-03 | Stop reason: HOSPADM

## 2024-01-03 RX ORDER — ATORVASTATIN CALCIUM 40 MG/1
40 TABLET, FILM COATED ORAL NIGHTLY
Status: DISCONTINUED | OUTPATIENT
Start: 2024-01-03 | End: 2024-01-04 | Stop reason: HOSPADM

## 2024-01-03 RX ORDER — DIPHENHYDRAMINE HYDROCHLORIDE 50 MG/ML
INJECTION INTRAMUSCULAR; INTRAVENOUS AS NEEDED
Status: DISCONTINUED | OUTPATIENT
Start: 2024-01-03 | End: 2024-01-03 | Stop reason: HOSPADM

## 2024-01-03 RX ORDER — FAMOTIDINE 10 MG/ML
INJECTION INTRAVENOUS AS NEEDED
Status: DISCONTINUED | OUTPATIENT
Start: 2024-01-03 | End: 2024-01-03 | Stop reason: HOSPADM

## 2024-01-03 RX ORDER — NITROGLYCERIN 40 MG/100ML
INJECTION INTRAVENOUS AS NEEDED
Status: DISCONTINUED | OUTPATIENT
Start: 2024-01-03 | End: 2024-01-03 | Stop reason: HOSPADM

## 2024-01-03 RX ORDER — EZETIMIBE 10 MG/1
10 TABLET ORAL NIGHTLY
Status: DISCONTINUED | OUTPATIENT
Start: 2024-01-03 | End: 2024-01-04 | Stop reason: HOSPADM

## 2024-01-03 RX ORDER — ONDANSETRON HYDROCHLORIDE 2 MG/ML
INJECTION, SOLUTION INTRAVENOUS AS NEEDED
Status: DISCONTINUED | OUTPATIENT
Start: 2024-01-03 | End: 2024-01-03 | Stop reason: HOSPADM

## 2024-01-03 RX ORDER — VERAPAMIL HYDROCHLORIDE 2.5 MG/ML
INJECTION, SOLUTION INTRAVENOUS AS NEEDED
Status: DISCONTINUED | OUTPATIENT
Start: 2024-01-03 | End: 2024-01-03 | Stop reason: HOSPADM

## 2024-01-03 RX ORDER — FENTANYL CITRATE 50 UG/ML
INJECTION, SOLUTION INTRAMUSCULAR; INTRAVENOUS AS NEEDED
Status: DISCONTINUED | OUTPATIENT
Start: 2024-01-03 | End: 2024-01-03 | Stop reason: HOSPADM

## 2024-01-03 RX ORDER — ENOXAPARIN SODIUM 100 MG/ML
40 INJECTION SUBCUTANEOUS DAILY
Status: DISCONTINUED | OUTPATIENT
Start: 2024-01-04 | End: 2024-01-04 | Stop reason: HOSPADM

## 2024-01-03 RX ORDER — LIDOCAINE HYDROCHLORIDE 20 MG/ML
INJECTION, SOLUTION INFILTRATION; PERINEURAL AS NEEDED
Status: DISCONTINUED | OUTPATIENT
Start: 2024-01-03 | End: 2024-01-03 | Stop reason: HOSPADM

## 2024-01-03 RX ORDER — MIDAZOLAM HYDROCHLORIDE 1 MG/ML
INJECTION INTRAMUSCULAR; INTRAVENOUS AS NEEDED
Status: DISCONTINUED | OUTPATIENT
Start: 2024-01-03 | End: 2024-01-03 | Stop reason: HOSPADM

## 2024-01-03 RX ADMIN — ATORVASTATIN CALCIUM 40 MG: 40 TABLET, FILM COATED ORAL at 20:32

## 2024-01-03 RX ADMIN — ACETAMINOPHEN 650 MG: 325 TABLET ORAL at 22:30

## 2024-01-03 RX ADMIN — ACETAMINOPHEN 650 MG: 325 TABLET ORAL at 09:16

## 2024-01-03 RX ADMIN — HEPARIN SODIUM 1000 UNITS/HR: 10000 INJECTION, SOLUTION INTRAVENOUS at 09:15

## 2024-01-03 RX ADMIN — ASPIRIN 81 MG 81 MG: 81 TABLET ORAL at 09:16

## 2024-01-03 RX ADMIN — TICAGRELOR 90 MG: 90 TABLET ORAL at 20:32

## 2024-01-03 RX ADMIN — SODIUM CHLORIDE, SODIUM LACTATE, POTASSIUM CHLORIDE, AND CALCIUM CHLORIDE 1000 ML: 600; 310; 30; 20 INJECTION, SOLUTION INTRAVENOUS at 05:00

## 2024-01-03 RX ADMIN — METOPROLOL SUCCINATE 25 MG: 25 TABLET, EXTENDED RELEASE ORAL at 09:16

## 2024-01-03 RX ADMIN — POLYETHYLENE GLYCOL 3350 17 G: 17 POWDER, FOR SOLUTION ORAL at 09:16

## 2024-01-03 RX ADMIN — TICAGRELOR 90 MG: 90 TABLET ORAL at 09:16

## 2024-01-03 RX ADMIN — EZETIMIBE 10 MG: 10 TABLET ORAL at 20:32

## 2024-01-03 ASSESSMENT — PAIN SCALES - GENERAL
PAINLEVEL_OUTOF10: 0 - NO PAIN
PAINLEVEL_OUTOF10: 3

## 2024-01-03 ASSESSMENT — PAIN - FUNCTIONAL ASSESSMENT
PAIN_FUNCTIONAL_ASSESSMENT: 0-10

## 2024-01-03 ASSESSMENT — COGNITIVE AND FUNCTIONAL STATUS - GENERAL
DAILY ACTIVITIY SCORE: 24
MOBILITY SCORE: 24

## 2024-01-03 NOTE — PROGRESS NOTES
01/03/24 1034   Discharge Planning   Living Arrangements Spouse/significant other   Support Systems Spouse/significant other   Assistance Needed patient is alert and oriented x3, independent with ADL's, uses no assistive devices, no DME, drives   Type of Residence Private residence   Number of Stairs to Enter Residence 1   Number of Stairs Within Residence 14   Home or Post Acute Services None   Patient expects to be discharged to: Home no needs   Does the patient need discharge transport arranged? Yes   RoundTrip coordination needed? Yes   Has discharge transport been arranged? No

## 2024-01-03 NOTE — PROGRESS NOTES
Subjective Data:  Patient doing well. Reports short (minutes) episodes of return of L sided chest discomfort overnight. Resolved quickly and without intervention. Denies any chest pain, chest pressure, palpitations, dizziness, cough, shortness of breath, orthopnea, edema.  Is anxious regarding LHC.     Overnight Events:    No events reported aside from above.      Objective Data:  Last Recorded Vitals:  Vitals:    01/02/24 2153 01/03/24 0431 01/03/24 0915 01/03/24 1355   BP:  144/77 142/84 123/67   BP Location:  Left arm     Patient Position:  Sitting     Pulse:  73 69 68   Resp:  18  18   Temp: 36.1 °C (97 °F) 36.3 °C (97.3 °F)  36.4 °C (97.5 °F)   TempSrc:  Temporal     SpO2:  94%     Weight:  101 kg (221 lb 12.5 oz)     Height:           Last Labs:  CBC - 1/3/2024:  6:40 AM  4.8 14.2 177    42.6      CMP - 1/3/2024:  6:40 AM  9.3 6.3 15 --- 0.8   _ 4.1 16 113      PTT - 1/2/2024: 12:25 PM  1.0   11.4 35     TROPHS   Date/Time Value Ref Range Status   01/02/2024 04:46 PM 55 0 - 13 ng/L Final     Comment:     Previous result verified on 1/2/2024 1034 on specimen/case [id number] called with component [facility] for procedure Troponin I, High Sensitivity, Initial with value 64 ng/L.   01/02/2024 11:31  0 - 13 ng/L Final     Comment:     Previous result verified on 1/2/2024 1034 on specimen/case [id number] called with component [facility] for procedure Troponin I, High Sensitivity, Initial with value 64 ng/L.   01/02/2024 10:01 AM 64 0 - 13 ng/L Final     BNP   Date/Time Value Ref Range Status   08/25/2023 11:35 AM 15 0 - 99 pg/mL Final     Comment:     .  <100 pg/mL - Heart failure unlikely  100-299 pg/mL - Intermediate probability of acute heart  .               failure exacerbation. Correlate with clinical  .               context and patient history.    >=300 pg/mL - Heart Failure likely. Correlate with clinical  .               context and patient history.  BNP testing is performed using different testing    methodology at [facility] than at other   [facility]. Direct result comparisons should   only be made within the same method.     05/02/2023 03:05 PM 17 0 - 99 pg/mL Final     Comment:     .  <100 pg/mL - Heart failure unlikely  100-299 pg/mL - Intermediate probability of acute heart  .               failure exacerbation. Correlate with clinical  .               context and patient history.    >=300 pg/mL - Heart Failure likely. Correlate with clinical  .               context and patient history.  BNP testing is performed using different testing   methodology at [facility] than at other   [facility]. Direct result comparisons should   only be made within the same method.       HGBA1C   Date/Time Value Ref Range Status   01/02/2024 12:25 PM 5.3 see below % Final   09/23/2020 02:03 PM 5.6 4.0 - 6.0 % Final     Comment:     Hemoglobin A1C levels are related to mean blood glucose during the   preceding 2-3 months. The relationship table below may be used as a   general guide. Each 1% increase in HGB A1C is a reflection of an   increase in mean glucose of approximately 30 mg/dl.   Reference: Diabetes Care, volume 29, supplement 1 Jan. 2006                        HGB A1C ................. Approx. Mean Glucose   _______________________________________________   6%   ...............................  120 mg/dl   7%   ...............................  150 mg/dl   8%   ...............................  180 mg/dl   9%   ...............................  210 mg/dl   10%  ...............................  240 mg/dl  Performed at [address] [address]       LDLCALC   Date/Time Value Ref Range Status   01/03/2024 06:40 AM 74 <=99 mg/dL Final     Comment:                                 Near   Borderline      AGE      Desirable  Optimal    High     High     Very High     0-19 Y     0 - 109     ---    110-129   >/= 130     ----    20-24 Y     0 - 119     ---    120-159    >/= 160     ----      >24 Y     0 -  99   100-129  130-159   160-189     >/=190     12/15/2023 08:51 AM 86 <=99 mg/dL Final     Comment:                                 Near   Borderline      AGE      Desirable  Optimal    High     High     Very High     0-19 Y     0 - 109     ---    110-129   >/= 130     ----    20-24 Y     0 - 119     ---    120-159   >/= 160     ----      >24 Y     0 -  99   100-129  130-159   160-189     >/=190     09/23/2020 02:03 PM 79 65 - 130 MG/DL Final   01/01/2018 02:34 AM 67 65 - 130 MG/DL Final     VLDL   Date/Time Value Ref Range Status   01/03/2024 06:40 AM 30 0 - 40 mg/dL Final   12/15/2023 08:51 AM 24 0 - 40 mg/dL Final   01/06/2018 06:45 AM 31 0 - 40 mg/dL Final      Last I/O:  I/O last 3 completed shifts:  In: 39.7 (0.4 mL/kg) [I.V.:39.7 (0.4 mL/kg)]  Out: - (0 mL/kg)   Weight: 100.6 kg     Past Cardiology Tests (Last 3 Years):  EKG:  ECG 12 lead 01/02/2024 (Preliminary)      ECG 12 lead 01/02/2024 (Preliminary)    Echo:  Transthoracic Echo (TTE) Complete 01/03/2024  CONCLUSIONS:   1. Left ventricular systolic function is normal with a 55-60% estimated ejection fraction.   2. RVSP within normal limits.     Transthoracic Echocardiogram (8/20/2021):   CONCLUSIONS:   1. The left ventricular systolic function is normal with a 60% estimated ejection fraction.   2. Spectral Doppler shows an impaired relaxation pattern of left ventricular diastolic filling.   3. There is trace to mild mitral and tricuspid regurgitation.     Ejection Fractions:  EF   Date/Time Value Ref Range Status   01/03/2024 08:47 AM 63       Cath:  No results found for this or any previous visit from the past 1095 days.    Stress Test:  Nuclear Stress Test 05/25/2023  IMPRESSION:  1. No evidence of inducible myocardial ischemia. Predominantly fixed  moderate-sized moderate to severe perfusion defects involving  inferior wall as well as inferolateral wall, likely represent prior  infarction. Similar to prior exam on  11/20/2018.  2. The left ventricle is normal in size.  3. Gated images demonstrate normal LV wall motion except hypokinesis  in the inferior wall with a post-stress LV EF estimated at 56%.    Cardiac Imaging:  No results found for this or any previous visit from the past 1095 days.      Inpatient Medications:  Scheduled medications   Medication Dose Route Frequency    aspirin  81 mg oral Daily    atorvastatin  80 mg oral Nightly    lidocaine-diphenhydraMINE-Maalox 1:1:1  10 mL Swish & Swallow Once    metoprolol succinate XL  25 mg oral Daily    perflutren lipid microspheres  0.5-10 mL of dilution intravenous Once in imaging    perflutren protein A microsphere  0.5 mL intravenous Once in imaging    polyethylene glycol  17 g oral Daily    sulfur hexafluoride microsphr  2 mL intravenous Once in imaging    ticagrelor  90 mg oral BID     PRN medications   Medication    acetaminophen    Or    acetaminophen    Or    acetaminophen    heparin    morphine    oxygen    sennosides-docusate sodium     Continuous Medications   Medication Dose Last Rate    heparin  0-4,000 Units/hr 1,000 Units/hr (01/03/24 0915)       Physical Exam:  Physical Exam  Constitutional:       Appearance: Normal appearance.   HENT:      Head: Normocephalic.      Nose: Nose normal.      Mouth/Throat:      Mouth: Mucous membranes are moist.   Eyes:      Conjunctiva/sclera: Conjunctivae normal.   Cardiovascular:      Rate and Rhythm: Normal rate and regular rhythm.      Heart sounds: No murmur heard.     No friction rub. No gallop.      Comments: Tele: NSR 65 bpm  Pulmonary:      Effort: Pulmonary effort is normal.      Breath sounds: Normal breath sounds.   Abdominal:      Palpations: Abdomen is soft.   Musculoskeletal:         General: No swelling. Normal range of motion.   Skin:     General: Skin is warm and dry.   Neurological:      General: No focal deficit present.      Mental Status: She is alert and oriented to person, place, and time. Mental status  is at baseline.   Psychiatric:         Mood and Affect: Mood normal.         Behavior: Behavior normal.            Assessment/Plan   Elaine Coffey is a 59 yo female with a PMH of Asthma, MS, CAD w/ LHC with PTCA vs PCI in 2018 who presented with chest pain, shortness of breath. Troponin 9 >64> 106. EKG non ischemic. Documented contrast allergy to MRI (Gadolinium contrast). No hx of issue w/ CT contrast, previous LHC x3 w/o allergy.      #Chest Pain  #NSTEMI  #Hx CAD s/p LHC in 2018 with PTCA vs PCI @ OSH     -Heparin gtt  -Continue ASA, Brilinta, Metoprolol  -Lipid panel reveiwed, hgba1c 5.5  -Dec Atorvastatin to 40mg daily, Start Zetia 10mg daily   -Monitor on tele  -NPO for LHC today   -Echo reviewed with normal LVSF, EF 55-60%.   -Hold home Lasix, resume post LHC  -c/w home Metoprolol   -Will follow        Peripheral IV 01/02/24 20 G Proximal;Right;Anterior Forearm (Active)   Site Assessment Clean;Dry;Intact 01/03/24 1501   Dressing Status Dry;Clean 01/03/24 1501   Number of days: 1       Code Status:  Full Code    I spent  minutes in the professional and overall care of this patient.        Katia Reina PA-C

## 2024-01-03 NOTE — PROGRESS NOTES
[facility] Hospitalist Progress Note     [phone]: Please page me for patient care issues.  [phone]: Please page night hospitalist for any issues.     Elaine Coffey  :  1965(58 y.o.)  MRN:  [id number]  PCP: Tena Lee, SHEILA-CNP    Assessment and Plan:    Principal Problem:    Chest pain  Active Problems:    Chest pain, unspecified type    # NSTEMI  # CAD s/p COLETTE (2018)   # Transient hypotension  # MS not active therapy   # Asthma w/o exacerbation  # Obesity  -CP order set  -started on Ticagrelor per cards   -ASA, statin, metoprolol, hep gtt  -TTE w/o acute findings  -PRN IVF  -plans for cardiac cath on 1/3/23  -Cards recs appreciated        DVT Prophylaxis: full anticoagulation hep gtt    Code status: Full Code  Diet: NPO Diet; Effective midnight    Disposition:anticipate discharge 1/3 vs  pending cardiac cath and cards final recs    patient and family updated, I personally examined the patient, and I personally reviewed chart, data, labs radiology reports    Total time spent: 35 minutes, of total time providing counseling or in coordination of care. Total time on this day of visit includes record and documentation review before and after visit including documentation and time not explicitly included on EMR time stamp    Subjective:  Interval History:  HPI  The patient complains of chest pain  The patient feels their symptoms areimproving  no events or new concerns    Elaine Coffey is a 58 y.o. female presenting with [facility] CAD s/p COLETTE (), MA, Asthma presented from home via EMS with left sided lower rib chest pain shortly after waking up in the morning. Describes pain as dull lasting for about 1-1.5 hr. Associated with dyspnea on exertion, nausea and lightheadedness. No worsening or alleviating factors. Denies fever, rigor, LE edema, cough, syncope. Pt was seen in 2023 with CP s/p stress test w/o evidence of ischemic event.  Initial ED work up was significant for transient hypotension (sBP of 88),  "elevated troponin w/o acute ST changes on ECG. Pt is currently asymptomatic during my evaluation.      Scheduled Meds:aspirin, 81 mg, oral, Daily  atorvastatin, 80 mg, oral, Nightly  lidocaine-diphenhydraMINE-Maalox 1:1:1, 10 mL, Swish & Swallow, Once  metoprolol succinate XL, 25 mg, oral, Daily  perflutren lipid microspheres, 0.5-10 mL of dilution, intravenous, Once in imaging  perflutren protein A microsphere, 0.5 mL, intravenous, Once in imaging  polyethylene glycol, 17 g, oral, Daily  sulfur hexafluoride microsphr, 2 mL, intravenous, Once in imaging  ticagrelor, 90 mg, oral, BID      Continuous Infusions:heparin, 0-4,000 Units/hr, Last Rate: 1,000 Units/hr (01/03/24 0915)      PRN Meds:PRN medications: acetaminophen **OR** acetaminophen **OR** acetaminophen, heparin, morphine, oxygen, sennosides-docusate sodium    Review of Systems   All other systems reviewed and are negative.      Interval Pertinent History:  Social History     Tobacco Use    Smoking status: Never    Smokeless tobacco: Never   Substance Use Topics    Alcohol use: Never       Objective:    Patient Vitals for the past 24 hrs:   BP Temp Temp src Pulse Resp SpO2 Height Weight   01/03/24 0915 142/84 -- -- 69 -- -- -- --   01/03/24 0431 144/77 36.3 °C (97.3 °F) Temporal 73 18 94 % -- 101 kg (221 lb 12.5 oz)   01/02/24 2153 -- 36.1 °C (97 °F) -- -- -- -- -- --   01/02/24 2130 132/76 36.3 °C (97.3 °F) Temporal 72 18 96 % -- --   01/02/24 1614 138/78 36.7 °C (98.1 °F) Temporal 85 18 93 % -- --   01/02/24 1547 134/83 -- -- 76 18 93 % 1.702 m (5' 7\") 105 kg (231 lb)   01/02/24 1515 -- -- -- 68 14 94 % -- --   01/02/24 1500 128/57 -- -- 70 17 96 % -- --   01/02/24 1445 -- -- -- 62 14 95 % -- --   01/02/24 1430 126/57 -- -- 76 23 95 % -- --   01/02/24 1415 -- -- -- 71 15 95 % -- --   01/02/24 1400 132/88 -- -- 76 13 96 % -- --   01/02/24 1345 -- -- -- 68 25 97 % -- --   01/02/24 1330 116/78 -- -- 57 16 96 % -- --   01/02/24 1315 -- -- -- 61 17 96 % -- -- "   24 1300 118/68 -- -- 69 18 97 % -- --   24 1245 -- -- -- 62 16 94 % -- --   24 1230 -- -- -- (!) 45 16 97 % -- --   24 1215 -- -- -- 59 15 96 % -- --   24 1200 -- -- -- 61 13 96 % -- --   24 1145 -- -- -- 62 14 94 % -- --   24 1135 (!) 125/46 -- -- 59 16 96 % -- --   24 1035 116/57 -- -- 63 14 96 % -- --   24 1030 -- -- -- 59 17 96 % -- --   24 1015 -- -- -- 66 16 97 % -- --       Average, Min, and Max for last 24 hours Vitals:  TEMPERATURE:  Temp  Av.3 °C (97.4 °F)  Min: 36.1 °C (97 °F)  Max: 36.7 °C (98.1 °F)    RESPIRATIONS RANGE: Resp  Av.6  Min: 13  Max: 25    PULSE RANGE: Pulse  Av.2  Min: 45  Max: 85    BLOOD PRESSURE RANGE:  Systolic (24hrs), Av , Min:116 , Max:144   ; Diastolic (24hrs), Av, Min:46, Max:88      PULSE OXIMETRY RANGE: SpO2  Av.4 %  Min: 93 %  Max: 97 %    I/O last 3 completed shifts:  In: 39.7 (0.4 mL/kg) [I.V.:39.7 (0.4 mL/kg)]  Out: - (0 mL/kg)   Weight: 100.6 kg     Physical Exam  Vitals and nursing note reviewed. Chaperone present: spouse at bedside.   Constitutional:       Appearance: Normal appearance. She is obese.   HENT:      Head: Normocephalic and atraumatic.      Right Ear: External ear normal.      Left Ear: External ear normal.      Nose: Nose normal.      Mouth/Throat:      Mouth: Mucous membranes are moist.   Eyes:      General: No scleral icterus.        Right eye: No discharge.         Left eye: No discharge.      Extraocular Movements: Extraocular movements intact.      Conjunctiva/sclera: Conjunctivae normal.      Pupils: Pupils are equal, round, and reactive to light.   Cardiovascular:      Rate and Rhythm: Normal rate and regular rhythm.   Pulmonary:      Effort: Pulmonary effort is normal.      Breath sounds: Normal breath sounds.   Abdominal:      General: Abdomen is flat. Bowel sounds are normal.      Palpations: Abdomen is soft.   Musculoskeletal:         General: Normal range  "of motion.   Skin:     General: Skin is warm and dry.      Capillary Refill: Capillary refill takes less than 2 seconds.   Neurological:      General: No focal deficit present.      Mental Status: She is alert and oriented to person, place, and time. Mental status is at baseline.   Psychiatric:         Mood and Affect: Mood normal.         Thought Content: Thought content normal.         Judgment: Judgment normal.         Lab Results   Component Value Date     01/03/2024    K 3.9 01/03/2024     01/03/2024    CO2 32 01/03/2024    BUN 12 01/03/2024    CREATININE 0.91 01/03/2024    GLUCOSE 108 (H) 01/03/2024    CALCIUM 9.3 01/03/2024    PROT 6.3 (L) 01/02/2024    BILITOT 0.8 01/02/2024    ALKPHOS 113 (H) 01/02/2024    AST 15 01/02/2024    ALT 16 01/02/2024    GLOB 2.6 09/23/2020       Lab Results   Component Value Date    WBC 4.8 01/03/2024    HGB 14.2 01/03/2024    HCT 42.6 01/03/2024    MCV 86 01/03/2024     01/03/2024    LYMPHOPCT 29.0 01/02/2024    RBC 4.96 01/03/2024    MCH 28.6 01/03/2024    MCHC 33.3 01/03/2024    RDW 12.2 01/03/2024    CRP 0.12 03/11/2023       No components found for: \"LABA1C\"  No components found for: \"LFT\"    IMAGES:     Transthoracic Echo (TTE) Complete    Result Date: 1/3/2024   [facility], [address] [address]               Tel [phone] and Fax [phone] TRANSTHORACIC ECHOCARDIOGRAM REPORT  Patient Name:      JOSSY Sarkar Physician:    [id number] Jessie Lopez MD Study Date:        1/3/2024             Ordering Provider:    [id number] DAMION HALL MRN/PID:           [id number]             Fellow: Accession#:        [id number]         Nurse: Date of Birth/Age: 1965 / 58 years Sonographer:          Radha Pugh                                                               [facility] Gender:         "    F                    Additional Staff: Height:            170.18 cm            Admit Date:           1/2/2024 Weight:            100.25 kg            Admission Status:     Inpatient -                                                               Routine BSA:               2.11 m2              Encounter#:           [id number]                                         Department Location:  [facility] Non                                                               Invasive Blood Pressure: 144 /77 mmHg Study Type:    TRANSTHORACIC ECHO (TTE) COMPLETE Diagnosis/ICD: Chest pain, unspecified-R07.9 Indication:    Chest Pain CPT Code:      Echo Complete w Full Doppler-[id number] Patient History: Pertinent History: Chest Pain and Dyspnea. Study Detail: The following Echo studies were performed: 2D, M-Mode, Doppler and               color flow. Technically challenging study due to prominent lung               artifact. The patient was awake.  PHYSICIAN INTERPRETATION: Left Ventricle: The left ventricular systolic function is normal, with an estimated ejection fraction of 55-60%. The left ventricular cavity size is normal. Spectral Doppler shows a normal pattern of left ventricular diastolic filling. Left Atrium: The left atrium is normal in size. Right Ventricle: The right ventricle is normal in size. There is normal right ventricular global systolic function. Right Atrium: The right atrium is normal in size. Aortic Valve: The aortic valve is trileaflet. There is no evidence of aortic valve stenosis. There is no evidence of aortic valve regurgitation. The peak instantaneous gradient of the aortic valve is 10.1 mmHg. The mean gradient of the aortic valve is 6.0 mmHg. Mitral Valve: The mitral valve is normal in structure. There is trace to mild mitral valve regurgitation. Tricuspid Valve: The tricuspid valve was not well visualized. There is trace to mild tricuspid regurgitation. The Doppler estimated RVSP is within normal limits  at 22.5 mmHg. Pulmonic Valve: The pulmonic valve is not well visualized. There is physiologic pulmonic valve regurgitation. Pericardium: There is no pericardial effusion noted. Aorta: The aortic root is normal. Systemic Veins: The inferior vena cava appears to be of normal size. There is IVC inspiratory collapse greater than 50%. In comparison to the previous echocardiogram(s): Compared with study from 8/20/2021, no significant change.  CONCLUSIONS:  1. Left ventricular systolic function is normal with a 55-60% estimated ejection fraction.  2. RVSP within normal limits. QUANTITATIVE DATA SUMMARY: 2D MEASUREMENTS:                           Normal Ranges: Ao Root d:     2.90 cm    (2.0-3.7cm) IVSd:          1.41 cm    (0.6-1.1cm) LVPWd:         1.41 cm    (0.6-1.1cm) LVIDd:         5.10 cm    (3.9-5.9cm) LVIDs:         3.71 cm LV Mass Index: 143.9 g/m2 LV % FS        27.3 % LA VOLUME:                               Normal Ranges: LA Vol A4C:        38.4 ml    (22+/-6mL/m2) LA Vol A2C:        57.3 ml LA Vol BP:         48.0 ml LA Vol Index A4C:  18.2ml/m2 LA Vol Index A2C:  27.2 ml/m2 LA Vol Index BP:   22.8 ml/m2 LA Area A4C:       15.1 cm2 LA Area A2C:       18.9 cm2 LA Major Axis A4C: 5.0 cm LA Major Axis A2C: 5.3 cm LA Volume Index:   21.3 ml/m2 LA Vol A4C:        35.0 ml LA Vol A2C:        55.0 ml RA VOLUME BY A/L METHOD:                       Normal Ranges: RA Area A4C: 13.3 cm2 M-MODE MEASUREMENTS:                  Normal Ranges: Ao Root: 2.70 cm (2.0-3.7cm) AoV Exc: 1.60 cm (1.5-2.5cm) LAs:     3.20 cm (2.7-4.0cm) AORTA MEASUREMENTS:                      Normal Ranges: AoV Exc:     1.60 cm (1.5-2.5cm) Ao Sinus, d: 2.90 cm (2.1-3.5cm) Asc Ao, d:   3.10 cm (2.1-3.4cm) LV SYSTOLIC FUNCTION BY 2D PLANIMETRY (MOD):                     Normal Ranges: EF-A4C View: 69.9 % (>=55%) EF-A2C View: 54.4 % EF-Biplane:  62.6 % LV DIASTOLIC FUNCTION:                               Normal Ranges: MV Peak E:        0.89 m/s     (0.7-1.2 m/s) MV Peak A:        0.86 m/s    (0.42-0.7 m/s) E/A Ratio:        1.03        (1.0-2.2) MV e'             0.11 m/s    (>8.0) MV lateral e'     0.11 m/s MV medial e'      0.07 m/s MV A Dur:         143.00 msec E/e' Ratio:       8.10        (<8.0) PulmV Sys Carlos:    62.70 cm/s PulmV Dotson Carlos:   49.70 cm/s PulmV S/D Carlos:    1.30 PulmV A Revs Carlos: 26.70 cm/s PulmV A Revs Dur: 108.00 msec MITRAL VALVE:                 Normal Ranges: MV DT: 248 msec (150-240msec) AORTIC VALVE:                                    Normal Ranges: AoV Vmax:                1.59 m/s  (<=1.7m/s) AoV Peak PG:             10.1 mmHg (<20mmHg) AoV Mean P.0 mmHg  (1.7-11.5mmHg) LVOT Max Carlos:            1.05 m/s  (<=1.1m/s) AoV VTI:                 35.60 cm  (18-25cm) LVOT VTI:                25.50 cm LVOT Diameter:           2.00 cm   (1.8-2.4cm) AoV Area, VTI:           2.25 cm2  (2.5-5.5cm2) AoV Area,Vmax:           2.07 cm2  (2.5-4.5cm2) AoV Dimensionless Index: 0.72  RIGHT VENTRICLE: RV Basal 3.08 cm RV Mid   2.47 cm RV Major 7.6 cm TAPSE:   22.4 mm RV s'    0.11 m/s TRICUSPID VALVE/RVSP:                             Normal Ranges: Peak TR Velocity: 2.21 m/s RV Syst Pressure: 22.5 mmHg (< 30mmHg) IVC Diam:         1.74 cm PULMONIC VALVE:                      Normal Ranges: PV Max Carlos: 0.9 m/s  (0.6-0.9m/s) PV Max PG:  3.3 mmHg Pulmonary Veins: PulmV A Revs Dur: 108.00 msec PulmV A Revs Carlos: 26.70 cm/s PulmV Dotson Carlos:   49.70 cm/s PulmV S/D Carlos:    1.30 PulmV Sys Carlos:    62.70 cm/s  [id number] Jessie Lopez MD Electronically signed on 1/3/2024 at 9:31:43 AM  ** Final **    === 10/06/20 ===    MR ANKLE LEFT WO CONTRAST    - Impression -  1. 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  2. No posterior tibial tenosynovitis. No tendinitis or focal  tendon tear.  3. Sequela of chronic subtalar sprain with chronic thickened cervical  ligament and mild hindfoot valgus.  4. Chronic plantar fasciitis.    This report has been produced using speech recognition.    Original Interpreting Physician:   SALVADOR MONTES MD  Original Transcribed by/Date: PSCB   Oct  7 2020  8:41A  Original Electronically Signed by/Date: SALVADOR MONTES MD Oct  7 2020  8:41A    Addendum Interpreting Physician:  Addendum Transcribed by/Date: NO ADDENDUM  Addendum Electronically Signed by/Date:  === 12/11/21 ===    CT ABDOMEN PELVIS WO IV CONTRAST    - Impression -  Appearance of bilateral abdominal wall cellulitis, more significant  on the right. Few foci of subcutaneous air within the right abdominal  wall may be related to recent injection site versus infection. No  drainable collection seen. Mild right inguinal lymphadenopathy,  likely reactive.    Additional findings including cholelithiasis, as detail.    Additional results of the last 24 hours have been reviewed.    Electronically signed by Jody Landers DO on 01/03/24 at 10:09 AM

## 2024-01-04 ENCOUNTER — PHARMACY VISIT (OUTPATIENT)
Dept: PHARMACY | Facility: CLINIC | Age: 59
End: 2024-01-04
Payer: MEDICARE

## 2024-01-04 VITALS
RESPIRATION RATE: 18 BRPM | OXYGEN SATURATION: 96 % | HEIGHT: 67 IN | BODY MASS INDEX: 34.81 KG/M2 | SYSTOLIC BLOOD PRESSURE: 117 MMHG | HEART RATE: 66 BPM | TEMPERATURE: 96.9 F | DIASTOLIC BLOOD PRESSURE: 70 MMHG | WEIGHT: 221.78 LBS

## 2024-01-04 LAB
ANION GAP SERPL CALC-SCNC: 12 MMOL/L (ref 10–20)
ATRIAL RATE: 65 BPM
ATRIAL RATE: 80 BPM
BUN SERPL-MCNC: 16 MG/DL (ref 6–23)
CALCIUM SERPL-MCNC: 9.4 MG/DL (ref 8.6–10.3)
CHLORIDE SERPL-SCNC: 101 MMOL/L (ref 98–107)
CO2 SERPL-SCNC: 28 MMOL/L (ref 21–32)
CREAT SERPL-MCNC: 0.87 MG/DL (ref 0.5–1.05)
ERYTHROCYTE [DISTWIDTH] IN BLOOD BY AUTOMATED COUNT: 11.9 % (ref 11.5–14.5)
GFR SERPL CREATININE-BSD FRML MDRD: 78 ML/MIN/1.73M*2
GLUCOSE SERPL-MCNC: 165 MG/DL (ref 74–99)
HCT VFR BLD AUTO: 41 % (ref 36–46)
HGB BLD-MCNC: 14.2 G/DL (ref 12–16)
MCH RBC QN AUTO: 29 PG (ref 26–34)
MCHC RBC AUTO-ENTMCNC: 34.6 G/DL (ref 32–36)
MCV RBC AUTO: 84 FL (ref 80–100)
NRBC BLD-RTO: 0 /100 WBCS (ref 0–0)
P AXIS: 59 DEGREES
P AXIS: 67 DEGREES
P OFFSET: 189 MS
P OFFSET: 191 MS
P ONSET: 139 MS
P ONSET: 140 MS
PLATELET # BLD AUTO: 207 X10*3/UL (ref 150–450)
POTASSIUM SERPL-SCNC: 3.7 MMOL/L (ref 3.5–5.3)
PR INTERVAL: 138 MS
PR INTERVAL: 144 MS
Q ONSET: 209 MS
Q ONSET: 211 MS
QRS COUNT: 11 BEATS
QRS COUNT: 13 BEATS
QRS DURATION: 92 MS
QRS DURATION: 96 MS
QT INTERVAL: 408 MS
QT INTERVAL: 412 MS
QTC CALCULATION(BAZETT): 428 MS
QTC CALCULATION(BAZETT): 470 MS
QTC FREDERICIA: 422 MS
QTC FREDERICIA: 449 MS
R AXIS: -1 DEGREES
R AXIS: 2 DEGREES
RBC # BLD AUTO: 4.89 X10*6/UL (ref 4–5.2)
SODIUM SERPL-SCNC: 137 MMOL/L (ref 136–145)
T AXIS: 41 DEGREES
T AXIS: 56 DEGREES
T OFFSET: 413 MS
T OFFSET: 417 MS
VENTRICULAR RATE: 65 BPM
VENTRICULAR RATE: 80 BPM
WBC # BLD AUTO: 10.3 X10*3/UL (ref 4.4–11.3)

## 2024-01-04 PROCEDURE — 99239 HOSP IP/OBS DSCHRG MGMT >30: CPT | Performed by: INTERNAL MEDICINE

## 2024-01-04 PROCEDURE — RXMED WILLOW AMBULATORY MEDICATION CHARGE

## 2024-01-04 PROCEDURE — 85027 COMPLETE CBC AUTOMATED: CPT | Performed by: FAMILY MEDICINE

## 2024-01-04 PROCEDURE — 36415 COLL VENOUS BLD VENIPUNCTURE: CPT | Performed by: FAMILY MEDICINE

## 2024-01-04 PROCEDURE — 2500000004 HC RX 250 GENERAL PHARMACY W/ HCPCS (ALT 636 FOR OP/ED): Performed by: PHYSICIAN ASSISTANT

## 2024-01-04 PROCEDURE — 2500000001 HC RX 250 WO HCPCS SELF ADMINISTERED DRUGS (ALT 637 FOR MEDICARE OP): Performed by: PHYSICIAN ASSISTANT

## 2024-01-04 PROCEDURE — 94760 N-INVAS EAR/PLS OXIMETRY 1: CPT

## 2024-01-04 PROCEDURE — 80048 BASIC METABOLIC PNL TOTAL CA: CPT | Performed by: FAMILY MEDICINE

## 2024-01-04 PROCEDURE — 99232 SBSQ HOSP IP/OBS MODERATE 35: CPT | Performed by: PHYSICIAN ASSISTANT

## 2024-01-04 RX ORDER — NITROGLYCERIN 0.4 MG/1
TABLET SUBLINGUAL
Qty: 84 TABLET | Refills: 1 | Status: SHIPPED | OUTPATIENT
Start: 2024-01-04 | End: 2024-01-20 | Stop reason: HOSPADM

## 2024-01-04 RX ORDER — SPIRONOLACTONE 25 MG/1
25 TABLET ORAL DAILY
Qty: 90 TABLET | Refills: 1 | Status: SHIPPED | OUTPATIENT
Start: 2024-01-04 | End: 2024-01-04 | Stop reason: SDUPTHER

## 2024-01-04 RX ORDER — FUROSEMIDE 40 MG/1
40 TABLET ORAL DAILY
Start: 2024-01-04 | End: 2024-01-04 | Stop reason: SDUPTHER

## 2024-01-04 RX ORDER — SPIRONOLACTONE 25 MG/1
25 TABLET ORAL DAILY
Qty: 90 TABLET | Refills: 1 | Status: SHIPPED | OUTPATIENT
Start: 2024-01-04 | End: 2024-01-20 | Stop reason: HOSPADM

## 2024-01-04 RX ORDER — FUROSEMIDE 10 MG/ML
40 INJECTION INTRAMUSCULAR; INTRAVENOUS ONCE
Status: COMPLETED | OUTPATIENT
Start: 2024-01-04 | End: 2024-01-04

## 2024-01-04 RX ORDER — SPIRONOLACTONE 25 MG/1
25 TABLET ORAL DAILY
Status: DISCONTINUED | OUTPATIENT
Start: 2024-01-04 | End: 2024-01-04 | Stop reason: HOSPADM

## 2024-01-04 RX ORDER — EZETIMIBE 10 MG/1
10 TABLET ORAL NIGHTLY
Qty: 90 TABLET | Refills: 1 | Status: CANCELLED | OUTPATIENT
Start: 2024-01-04

## 2024-01-04 RX ORDER — NITROGLYCERIN 0.4 MG/1
TABLET SUBLINGUAL
Qty: 90 TABLET | Refills: 1 | Status: SHIPPED | OUTPATIENT
Start: 2024-01-04 | End: 2024-01-04 | Stop reason: SDUPTHER

## 2024-01-04 RX ORDER — FUROSEMIDE 40 MG/1
40 TABLET ORAL DAILY
Qty: 90 TABLET | Refills: 1 | Status: SHIPPED | OUTPATIENT
Start: 2024-01-04 | End: 2024-01-20 | Stop reason: HOSPADM

## 2024-01-04 RX ADMIN — ASPIRIN 81 MG 81 MG: 81 TABLET ORAL at 09:04

## 2024-01-04 RX ADMIN — ACETAMINOPHEN 650 MG: 325 TABLET ORAL at 09:06

## 2024-01-04 RX ADMIN — TICAGRELOR 90 MG: 90 TABLET ORAL at 09:05

## 2024-01-04 RX ADMIN — FUROSEMIDE 40 MG: 10 INJECTION, SOLUTION INTRAMUSCULAR; INTRAVENOUS at 11:24

## 2024-01-04 RX ADMIN — SPIRONOLACTONE 25 MG: 25 TABLET ORAL at 11:24

## 2024-01-04 RX ADMIN — METOPROLOL SUCCINATE 25 MG: 25 TABLET, EXTENDED RELEASE ORAL at 09:05

## 2024-01-04 ASSESSMENT — COGNITIVE AND FUNCTIONAL STATUS - GENERAL
MOBILITY SCORE: 24
DAILY ACTIVITIY SCORE: 24
MOBILITY SCORE: 24
DAILY ACTIVITIY SCORE: 24

## 2024-01-04 ASSESSMENT — PAIN DESCRIPTION - LOCATION: LOCATION: ARM

## 2024-01-04 ASSESSMENT — PAIN DESCRIPTION - ORIENTATION: ORIENTATION: RIGHT

## 2024-01-04 ASSESSMENT — PAIN SCALES - GENERAL
PAINLEVEL_OUTOF10: 2
PAINLEVEL_OUTOF10: 3

## 2024-01-04 ASSESSMENT — PAIN SCALES - WONG BAKER: WONGBAKER_NUMERICALRESPONSE: HURTS LITTLE BIT

## 2024-01-04 NOTE — CARE PLAN
The patient's goals for the shift include      The clinical goals for the shift include progressing    Problem: Pain  Goal: Takes deep breaths with improved pain control throughout the shift  Outcome: Progressing  Goal: Turns in bed with improved pain control throughout the shift  Outcome: Progressing  Goal: Walks with improved pain control throughout the shift  Outcome: Progressing  Goal: Performs ADL's with improved pain control throughout shift  Outcome: Progressing  Goal: Participates in PT with improved pain control throughout the shift  Outcome: Progressing  Goal: Free from opioid side effects throughout the shift  Outcome: Progressing  Goal: Free from acute confusion related to pain meds throughout the shift  Outcome: Progressing

## 2024-01-04 NOTE — NURSING NOTE
Met with patient and family at bedside to discuss Cardiac Rehab and eligibility.   Informational Brochure provided. Patient and family verbalized understanding. Julien LEWIS.

## 2024-01-04 NOTE — PROGRESS NOTES
Subjective Data:  Patient doing well. Denies any chest pain, chest pressure, palpitations, dizziness, cough, shortness of breath, orthopnea, edema.      Overnight Events:    No acute issues reported.      Objective Data:  Last Recorded Vitals:  Vitals:    01/03/24 1720 01/03/24 1735 01/03/24 2020 01/04/24 0540   BP: 120/70 116/71 135/76 122/86   BP Location:   Left arm Left arm   Patient Position:   Lying Sitting   Pulse: 63 62 81 77   Resp: 17 18 18    Temp:   36 °C (96.8 °F) 36.1 °C (96.9 °F)   TempSrc:   Temporal Temporal   SpO2:   93% 94%   Weight:       Height:           Last Labs:  CBC - 1/4/2024:  7:06 AM  10.3 14.2 207    41.0      CMP - 1/4/2024:  7:07 AM  9.4 6.3 15 --- 0.8   _ 4.1 16 113      PTT - 1/2/2024: 12:25 PM  1.0   11.4 35     TROPHS   Date/Time Value Ref Range Status   01/02/2024 04:46 PM 55 0 - 13 ng/L Final     Comment:     Previous result verified on 1/2/2024 1034 on specimen/case [id number] called with component TRPHS for procedure Troponin I, High Sensitivity, Initial with value 64 ng/L.   01/02/2024 11:31  0 - 13 ng/L Final     Comment:     Previous result verified on 1/2/2024 1034 on specimen/case [id number] called with component TRPHS for procedure Troponin I, High Sensitivity, Initial with value 64 ng/L.   01/02/2024 10:01 AM 64 0 - 13 ng/L Final     BNP   Date/Time Value Ref Range Status   08/25/2023 11:35 AM 15 0 - 99 pg/mL Final     Comment:     .  <100 pg/mL - Heart failure unlikely  100-299 pg/mL - Intermediate probability of acute heart  .               failure exacerbation. Correlate with clinical  .               context and patient history.    >=300 pg/mL - Heart Failure likely. Correlate with clinical  .               context and patient history.  BNP testing is performed using different testing   methodology at [facility] than at other   [facility] hospitals. Direct result comparisons should   only be made within the same method.     05/02/2023 03:05  PM 17 0 - 99 pg/mL Final     Comment:     .  <100 pg/mL - Heart failure unlikely  100-299 pg/mL - Intermediate probability of acute heart  .               failure exacerbation. Correlate with clinical  .               context and patient history.    >=300 pg/mL - Heart Failure likely. Correlate with clinical  .               context and patient history.  BNP testing is performed using different testing   methodology at [facility] than at other   [facility]. Direct result comparisons should   only be made within the same method.       HGBA1C   Date/Time Value Ref Range Status   01/03/2024 06:40 AM 5.3 see below % Final   01/02/2024 12:25 PM 5.3 see below % Final     LDLCALC   Date/Time Value Ref Range Status   01/03/2024 06:40 AM 74 <=99 mg/dL Final     Comment:                                 Near   Borderline      AGE      Desirable  Optimal    High     High     Very High     0-19 Y     0 - 109     ---    110-129   >/= 130     ----    20-24 Y     0 - 119     ---    120-159   >/= 160     ----      >24 Y     0 -  99   100-129  130-159   160-189     >/=190     12/15/2023 08:51 AM 86 <=99 mg/dL Final     Comment:                                 Near   Borderline      AGE      Desirable  Optimal    High     High     Very High     0-19 Y     0 - 109     ---    110-129   >/= 130     ----    20-24 Y     0 - 119     ---    120-159   >/= 160     ----      >24 Y     0 -  99   100-129  130-159   160-189     >/=190     09/23/2020 02:03 PM 79 65 - 130 MG/DL Final   01/01/2018 02:34 AM 69 65 - 130 MG/DL Final     VLDL   Date/Time Value Ref Range Status   01/03/2024 06:40 AM 30 0 - 40 mg/dL Final   12/15/2023 08:51 AM 24 0 - 40 mg/dL Final   01/06/2018 06:45 AM 31 0 - 40 mg/dL Final      Last I/O:  I/O last 3 completed shifts:  In: 999.9 (9.9 mL/kg) [IV Piggyback:999.9]  Out: 10 (0.1 mL/kg) [Blood:10]  Weight: 100.6 kg     Past Cardiology Tests (Last 3 Years):  EKG:  ECG 12 lead 01/02/2024  (Preliminary)      ECG 12 lead 01/02/2024 (Preliminary)    Echo:  Transthoracic Echo (TTE) Complete 01/03/2024  CONCLUSIONS:   1. Left ventricular systolic function is normal with a 55-60% estimated ejection fraction.   2. RVSP within normal limits.    Ejection Fractions:  EF   Date/Time Value Ref Range Status   01/03/2024 08:47 AM 63       Cath:  [facility] 01/04/23: No obstructive CAD, EDP 22.     Stress Test:  Nuclear Stress Test 05/25/2023      Cardiac Imaging:  No results found for this or any previous visit from the past 1095 days.      Inpatient Medications:  Scheduled medications   Medication Dose Route Frequency    aspirin  81 mg oral Daily    atorvastatin  40 mg oral Nightly    enoxaparin  40 mg subcutaneous Daily    ezetimibe  10 mg oral Nightly    lidocaine-diphenhydraMINE-Maalox 1:1:1  10 mL Swish & Swallow Once    metoprolol succinate XL  25 mg oral Daily    perflutren lipid microspheres  0.5-10 mL of dilution intravenous Once in imaging    perflutren protein A microsphere  0.5 mL intravenous Once in imaging    polyethylene glycol  17 g oral Daily    sulfur hexafluoride microsphr  2 mL intravenous Once in imaging    ticagrelor  90 mg oral BID     PRN medications   Medication    acetaminophen    Or    acetaminophen    Or    acetaminophen    morphine    oxygen    sennosides-docusate sodium     Continuous Medications   Medication Dose Last Rate       Physical Exam:  Physical Exam  Constitutional:       Appearance: Normal appearance.   HENT:      Head: Normocephalic.      Nose: Nose normal.      Mouth/Throat:      Mouth: Mucous membranes are moist.   Eyes:      Conjunctiva/sclera: Conjunctivae normal.   Cardiovascular:      Rate and Rhythm: Normal rate and regular rhythm.      Heart sounds: No murmur heard.     No friction rub. No gallop.   Pulmonary:      Effort: Pulmonary effort is normal.      Breath sounds: Normal breath sounds.   Abdominal:      Palpations: Abdomen is soft.   Musculoskeletal:          General: Normal range of motion.   Skin:     General: Skin is warm and dry.   Neurological:      General: No focal deficit present.      Mental Status: She is alert. Mental status is at baseline.   Psychiatric:         Mood and Affect: Mood normal.         Behavior: Behavior normal.            Assessment/Plan   Elaine Coffey is a 59 yo female with a PMH of Asthma, MS, CAD w/ LHC with PTCA vs PCI in 2018 who presented with chest pain, shortness of breath. Troponin 9 >64> 106. EKG non ischemic. Documented contrast allergy to MRI (Gadolinium contrast). No hx of issue w/ CT contrast, previous LHC x3 w/o allergy.      #Chest Pain  #NSTEMI, LHC with no obstructive CAD  #Hx CAD s/p LHC in 2018 with PTCA vs PCI @ OSH     -Discontinue Heparin gtt  -Continue ASA, Brilinta, Metoprolol, Atorvastatin, Zetia  -IV Lasix x1 today, resume lasix 40mg daily tomorrow, Add spironolactone 25mg daily   -Lipid panel reveiwed, hgba1c 5.5  -Dec Atorvastatin to 40mg daily, Start Zetia 10mg daily   -Monitor on tele  -C with no obstructive CAD.   -Echo reviewed with normal LVSF, EF 55-60%.   -Ok to d/c per CV standpoint.     Peripheral IV 01/02/24 20 G Proximal;Right;Anterior Forearm (Active)   Site Assessment Clean;Dry;Intact 01/04/24 0800   Dressing Status Dry;Clean 01/04/24 0800   Number of days: 2       Code Status:  Full Code    I spent 30 minutes in the professional and overall care of this patient.        Katia Reina PA-C

## 2024-01-04 NOTE — PROGRESS NOTES
01/04/24 1038   Discharge Planning   Living Arrangements Spouse/significant other   Support Systems Spouse/significant other   Assistance Needed patient is alert and oriented x3, independent with ADL's, uses no assistive devices, no DME, drives   Type of Residence Private residence   Number of Stairs to Enter Residence 1   Number of Stairs Within Residence 14   Home or Post Acute Services None   Patient expects to be discharged to: Home no needs   Does the patient need discharge transport arranged? No     1/4/2024 1030: Spoke to patient regarding discharge planning, voiced interest in cardiac rehab referral. Referral previously placed and cardiac rehab RN made aware of patient interest.

## 2024-01-04 NOTE — DISCHARGE SUMMARY
[facility] Hospitalist  Discharge Summary with Discharge Day Progress Note and Transition Note                 Elaine Coffey                  : 1965                      MRN:  [id number]     ADMIT DATE: 2024            DISCHARGE DATE:  2024     PRIMARYCARE PHYSICIAN:  NAYANA Beal     VISIT STATUS: Admission     CODE STATUS:  Full Code     DISCHARGE DIAGNOSES:    Principal Problem:    Chest pain  Active Problems:    Chest pain, unspecified type       HOSPITAL COURSE:  Elaine Coffey is a 58 y.o. female presenting with [facility] CAD s/p COLETTE (2018), MA, Asthma presented from home via EMS with left sided lower rib chest pain shortly after waking up in the morning. Describes pain as dull lasting for about 1-1.5 hr. Associated with dyspnea on exertion, nausea and lightheadedness. No worsening or alleviating factors. Denies fever, rigor, LE edema, cough, syncope. Pt was seen in 2023 with CP s/p stress test w/o evidence of ischemic event.  Initial ED work up was significant for transient hypotension (sBP of 88), elevated troponin w/o acute ST changes on ECG.     Patient underwent LHC on 1/3/23 without evidence of obstructtive CAD. Patient was discharged home asymptomatic with instructions to follow up with her cardiologist and PCP.    # Elevated troponin without s/o ACS/MI s/p LHC (1/3/23)  # CAD s/p COLETTE (2018)   # Transient hypotension  # MS not active therapy   # Asthma w/o exacerbation  # Obesity     DAY OF DISCHARGE:  Review of Systems   All other systems reviewed and are negative.       Patient Vitals for the past 24 hrs:   BP Temp Temp src Pulse Resp SpO2   24 0540 122/86 36.1 °C (96.9 °F) Temporal 77 -- 94 %   24 135/76 36 °C (96.8 °F) Temporal 81 18 93 %   24 1735 116/71 -- -- 62 18 --   24 1720 120/70 -- -- 63 17 --   24 1705 116/71 -- -- 65 18 --   24 1650 124/78 -- -- 65 16 --   24 1635 145/65 -- -- 67 12 --   24 1605 -- -- -- -- -- 98 %    24 1355 123/67 36.4 °C (97.5 °F) -- 68 18 --   24 0915 142/84 -- -- 69 -- --        Average, Min, and Max forlast 24 hours Vitals:  TEMPERATURE:  Temp  Av.2 °C (97.1 °F)  Min: 36 °C (96.8 °F)  Max: 36.4 °C (97.5 °F)     RESPIRATIONS RANGE: Resp  Av.7  Min: 12  Max: 18     PULSE RANGE: Pulse  Av.6  Min: 62  Max: 81     BLOOD PRESSURE RANGE:  Systolic (24hrs), Av , Min:116 , Max:145   ; Diastolic (24hrs), Av, Min:65, Max:86       PULSE OXIMETRYRANGE: SpO2  Av %  Min: 93 %  Max: 98 %     I/O last 3 completed shifts:  In: 999.9 (9.9 mL/kg) [IV Piggyback:999.9]  Out: 10 (0.1 mL/kg) [Blood:10]  Weight: 100.6 kg      Physical Exam  Vitals and nursing note reviewed.   Constitutional:       Appearance: Normal appearance.   HENT:      Head: Normocephalic and atraumatic.      Right Ear: External ear normal.      Left Ear: External ear normal.      Nose: Nose normal.      Mouth/Throat:      Mouth: Mucous membranes are moist.   Eyes:      General: No scleral icterus.        Right eye: No discharge.         Left eye: No discharge.      Extraocular Movements: Extraocular movements intact.      Conjunctiva/sclera: Conjunctivae normal.      Pupils: Pupils are equal, round, and reactive to light.   Cardiovascular:      Rate and Rhythm: Normal rate and regular rhythm.   Pulmonary:      Effort: Pulmonary effort is normal.      Breath sounds: Normal breath sounds.   Abdominal:      General: Abdomen is flat. Bowel sounds are normal.      Palpations: Abdomen is soft.   Musculoskeletal:         General: Normal range of motion.      Right lower leg: No edema.      Left lower leg: No edema.   Skin:     General: Skin is warm and dry.      Capillary Refill: Capillary refill takes less than 2 seconds.   Neurological:      General: No focal deficit present.      Mental Status: She is alert and oriented to person, place, and time. Mental status is at baseline.   Psychiatric:         Mood and Affect: Mood  normal.         Thought Content: Thought content normal.         Judgment: Judgment normal.          PROCEDURES:  [facility] 1/3/23     CONSULTANTS:  Cardiology     DISCHARGE MEDICATIONS:        Significant Medication Changes:         Your medication list        START taking these medications        Instructions Last Dose Given Next Dose Due   spironolactone 25 mg tablet  Commonly known as: Aldactone      Take 1 tablet (25 mg) by mouth once daily.              CHANGE how you take these medications        Instructions Last Dose Given Next Dose Due   FLUoxetine 40 mg capsule  Commonly known as: PROzac  What changed: when to take this      Take 1 capsule (40 mg) by mouth once daily.              CONTINUE taking these medications        Instructions Last Dose Given Next Dose Due   albuterol 90 mcg/actuation inhaler           albuterol 2.5 mg /3 mL (0.083 %) nebulizer solution           amitriptyline 25 mg tablet  Commonly known as: Elavil           aspirin 81 mg EC tablet           atorvastatin 40 mg tablet  Commonly known as: Lipitor           clonazePAM 0.5 mg disintegrating tablet  Commonly known as: KlonoPIN      Take 1 tablet (0.5 mg) by mouth once daily at bedtime.       cyanocobalamin 100 mcg tablet  Commonly known as: Vitamin B-12           ergocalciferol 1.25 MG (73376 UT) capsule  Commonly known as: Vitamin D-2           furosemide 40 mg tablet  Commonly known as: Lasix           gabapentin 400 mg capsule  Commonly known as: Neurontin           ketoconazole 2 % cream  Commonly known as: NIZOral           levothyroxine 50 mcg tablet  Commonly known as: Synthroid, Levoxyl           meclizine 25 mg tablet  Commonly known as: Antivert           metoprolol succinate XL 25 mg 24 hr tablet  Commonly known as: Toprol-XL           nitroglycerin 0.4 mg SL tablet  Commonly known as: Nitrostat      DISSOLVE 1 TABLET UNDER THE TONGUE AS NEEDED FOR CHEST PAIN.       nystatin 100,000 unit/gram powder  Commonly known as:  Mycostatin      Apply 1 Application topically 2 times a day.       nystatin ointment  Commonly known as: Mycostatin      Apply topically 2 times a day.       nystatin-triamcinolone ointment  Commonly known as: Mycolog II      Apply topically 2 times a day.       tiZANidine 4 mg tablet  Commonly known as: Zanaflex                  STOP taking these medications      potassium chloride ER 10 mEq ER capsule  Commonly known as: Micro-K                  Where to Get Your Medications        [facility] - [facility] AT Portal to Registered [facility] Sites  [facility], [place] [address]      Phone: [phone]   spironolactone 25 mg tablet       These medications were sent to [facility]  [address] [facility] [address]      Hours: 9 AM to 5 PM Mon-Fri Phone: [phone]   nitroglycerin 0.4 mg SL tablet            DIET: cardiac     ACTIVITY: resume regular activity          COMPLEXITY OF FOLLOW UP:   Moderate Complexity: follow up within 7-14 calendar days (50992)   Severe Complexity: follow up within 7 calendar days (25369)     FOLLOW UP TESTING, PENDING RESULTS ORREFERRALS AT TRANSITIONAL CARE VISIT:     Yes  FU with PCP and cardiology     No     DISPOSITION:  Home     FACILITY/HOME CARE AGENCY NAME:      Follow up with NAYANA Beal  .     Notification (telephone encounter) to PCP initiated:    Yes     No     INSTRUCTIONS TO MA/SW: Please call patient on day after discharge (must document patient  contacted within 2 business days of discharge).     FOLLOW UP QUESTIONS FOR MA/SW:  1. Did you get medications filled and takingthem as instructed from discharge?  2. Are you following your discharge instructions from your hospital stay?  3. Please confirm patient is scheduled for a follow up appointment within the above time frame.     DISCHARGE TIME: > 30 minutes     Electronically signed by Jody  DO Anshul on 01/04/24 at 8:20 AM

## 2024-01-05 ENCOUNTER — PATIENT OUTREACH (OUTPATIENT)
Dept: PRIMARY CARE | Facility: CLINIC | Age: 59
End: 2024-01-05
Payer: COMMERCIAL

## 2024-01-05 ENCOUNTER — DOCUMENTATION (OUTPATIENT)
Dept: PRIMARY CARE | Facility: CLINIC | Age: 59
End: 2024-01-05
Payer: COMMERCIAL

## 2024-01-05 NOTE — SIGNIFICANT EVENT
Follow Up Phone Call    Outgoing phone call    Spoke to: Elaine Coffey Relationship:self   Phone number: [phone]      Outcome: I left a message on answering machine   Chief Complaint   Patient presents with    Chest Pain     Left sided rib pain that pt describes as a burning sensation. Pt also has c/o SOB, nausea, and weakness. Hx of MI 6 years ago. State she woke up at 0730 this AM with the pain.          Diagnosis:Not applicable

## 2024-01-05 NOTE — PROGRESS NOTES
Discharge Facility:  [facility]  Discharge Diagnosis:  chest pain  Admission Date:  1/2/24  Discharge Date:   1/4/24    PCP Appointment Date:  none noted.  Message to office.    Specialist Appointment Date:   None noted    Hospital Encounter and Summary: Linked

## 2024-01-08 ENCOUNTER — HOSPITAL ENCOUNTER (EMERGENCY)
Facility: HOSPITAL | Age: 59
Discharge: HOME | End: 2024-01-09
Attending: EMERGENCY MEDICINE
Payer: COMMERCIAL

## 2024-01-08 ENCOUNTER — APPOINTMENT (OUTPATIENT)
Dept: CARDIOLOGY | Facility: HOSPITAL | Age: 59
End: 2024-01-08
Payer: COMMERCIAL

## 2024-01-08 ENCOUNTER — APPOINTMENT (OUTPATIENT)
Dept: RADIOLOGY | Facility: HOSPITAL | Age: 59
End: 2024-01-08
Payer: COMMERCIAL

## 2024-01-08 DIAGNOSIS — R07.9 CHEST PAIN, UNSPECIFIED TYPE: Primary | ICD-10-CM

## 2024-01-08 LAB
ALBUMIN SERPL BCP-MCNC: 4.6 G/DL (ref 3.4–5)
ALP SERPL-CCNC: 134 U/L (ref 33–110)
ALT SERPL W P-5'-P-CCNC: 26 U/L (ref 7–45)
ANION GAP SERPL CALC-SCNC: 16 MMOL/L (ref 10–20)
APTT PPP: 34 SECONDS (ref 27–38)
AST SERPL W P-5'-P-CCNC: 20 U/L (ref 9–39)
BASOPHILS # BLD AUTO: 0.02 X10*3/UL (ref 0–0.1)
BASOPHILS NFR BLD AUTO: 0.3 %
BILIRUB SERPL-MCNC: 1.1 MG/DL (ref 0–1.2)
BNP SERPL-MCNC: 19 PG/ML (ref 0–99)
BUN SERPL-MCNC: 13 MG/DL (ref 6–23)
CALCIUM SERPL-MCNC: 9.5 MG/DL (ref 8.6–10.3)
CARDIAC TROPONIN I PNL SERPL HS: 1517 NG/L (ref 0–13)
CARDIAC TROPONIN I PNL SERPL HS: 1591 NG/L (ref 0–13)
CHLORIDE SERPL-SCNC: 98 MMOL/L (ref 98–107)
CO2 SERPL-SCNC: 30 MMOL/L (ref 21–32)
CREAT SERPL-MCNC: 0.94 MG/DL (ref 0.5–1.05)
D DIMER PPP FEU-MCNC: <215 NG/ML FEU
EGFRCR SERPLBLD CKD-EPI 2021: 71 ML/MIN/1.73M*2
EOSINOPHIL # BLD AUTO: 0.2 X10*3/UL (ref 0–0.7)
EOSINOPHIL NFR BLD AUTO: 2.9 %
ERYTHROCYTE [DISTWIDTH] IN BLOOD BY AUTOMATED COUNT: 12.4 % (ref 11.5–14.5)
FLUAV RNA RESP QL NAA+PROBE: NOT DETECTED
FLUBV RNA RESP QL NAA+PROBE: NOT DETECTED
GLUCOSE SERPL-MCNC: 89 MG/DL (ref 74–99)
HCT VFR BLD AUTO: 46 % (ref 36–46)
HGB BLD-MCNC: 15.5 G/DL (ref 12–16)
IMM GRANULOCYTES # BLD AUTO: 0.01 X10*3/UL (ref 0–0.7)
IMM GRANULOCYTES NFR BLD AUTO: 0.1 % (ref 0–0.9)
INR PPP: 1 (ref 0.9–1.1)
LYMPHOCYTES # BLD AUTO: 1.66 X10*3/UL (ref 1.2–4.8)
LYMPHOCYTES NFR BLD AUTO: 23.8 %
MCH RBC QN AUTO: 28.7 PG (ref 26–34)
MCHC RBC AUTO-ENTMCNC: 33.7 G/DL (ref 32–36)
MCV RBC AUTO: 85 FL (ref 80–100)
MONOCYTES # BLD AUTO: 0.55 X10*3/UL (ref 0.1–1)
MONOCYTES NFR BLD AUTO: 7.9 %
NEUTROPHILS # BLD AUTO: 4.54 X10*3/UL (ref 1.2–7.7)
NEUTROPHILS NFR BLD AUTO: 65 %
NRBC BLD-RTO: 0 /100 WBCS (ref 0–0)
PLATELET # BLD AUTO: 199 X10*3/UL (ref 150–450)
POTASSIUM SERPL-SCNC: 4.3 MMOL/L (ref 3.5–5.3)
PROT SERPL-MCNC: 6.9 G/DL (ref 6.4–8.2)
PROTHROMBIN TIME: 11.5 SECONDS (ref 9.8–12.8)
RBC # BLD AUTO: 5.41 X10*6/UL (ref 4–5.2)
RSV RNA RESP QL NAA+PROBE: NOT DETECTED
SARS-COV-2 RNA RESP QL NAA+PROBE: NOT DETECTED
SODIUM SERPL-SCNC: 140 MMOL/L (ref 136–145)
WBC # BLD AUTO: 7 X10*3/UL (ref 4.4–11.3)

## 2024-01-08 PROCEDURE — 83880 ASSAY OF NATRIURETIC PEPTIDE: CPT

## 2024-01-08 PROCEDURE — 93005 ELECTROCARDIOGRAM TRACING: CPT

## 2024-01-08 PROCEDURE — 99285 EMERGENCY DEPT VISIT HI MDM: CPT | Performed by: EMERGENCY MEDICINE

## 2024-01-08 PROCEDURE — 84484 ASSAY OF TROPONIN QUANT: CPT

## 2024-01-08 PROCEDURE — 84075 ASSAY ALKALINE PHOSPHATASE: CPT

## 2024-01-08 PROCEDURE — 85730 THROMBOPLASTIN TIME PARTIAL: CPT

## 2024-01-08 PROCEDURE — 71046 X-RAY EXAM CHEST 2 VIEWS: CPT

## 2024-01-08 PROCEDURE — 87636 SARSCOV2 & INF A&B AMP PRB: CPT

## 2024-01-08 PROCEDURE — 36415 COLL VENOUS BLD VENIPUNCTURE: CPT

## 2024-01-08 PROCEDURE — 2500000004 HC RX 250 GENERAL PHARMACY W/ HCPCS (ALT 636 FOR OP/ED): Performed by: PHYSICIAN ASSISTANT

## 2024-01-08 PROCEDURE — 96366 THER/PROPH/DIAG IV INF ADDON: CPT | Performed by: EMERGENCY MEDICINE

## 2024-01-08 PROCEDURE — 85610 PROTHROMBIN TIME: CPT

## 2024-01-08 PROCEDURE — 71046 X-RAY EXAM CHEST 2 VIEWS: CPT | Mod: FOREIGN READ | Performed by: RADIOLOGY

## 2024-01-08 PROCEDURE — 96365 THER/PROPH/DIAG IV INF INIT: CPT | Mod: 59 | Performed by: EMERGENCY MEDICINE

## 2024-01-08 PROCEDURE — 99223 1ST HOSP IP/OBS HIGH 75: CPT

## 2024-01-08 PROCEDURE — 85025 COMPLETE CBC W/AUTO DIFF WBC: CPT

## 2024-01-08 PROCEDURE — 85379 FIBRIN DEGRADATION QUANT: CPT | Performed by: PHYSICIAN ASSISTANT

## 2024-01-08 RX ORDER — HEPARIN SODIUM 5000 [USP'U]/ML
2000-4000 INJECTION, SOLUTION INTRAVENOUS; SUBCUTANEOUS EVERY 4 HOURS PRN
Status: DISCONTINUED | OUTPATIENT
Start: 2024-01-08 | End: 2024-01-09

## 2024-01-08 RX ORDER — HEPARIN SODIUM 10000 [USP'U]/100ML
0-4000 INJECTION, SOLUTION INTRAVENOUS CONTINUOUS
Status: DISCONTINUED | OUTPATIENT
Start: 2024-01-08 | End: 2024-01-09

## 2024-01-08 RX ORDER — ACETAMINOPHEN 325 MG/1
650 TABLET ORAL ONCE
Status: COMPLETED | OUTPATIENT
Start: 2024-01-08 | End: 2024-01-08

## 2024-01-08 RX ORDER — HEPARIN SODIUM 5000 [USP'U]/ML
4000 INJECTION, SOLUTION INTRAVENOUS; SUBCUTANEOUS ONCE
Status: COMPLETED | OUTPATIENT
Start: 2024-01-08 | End: 2024-01-08

## 2024-01-08 RX ORDER — ACETAMINOPHEN 325 MG/1
650 TABLET ORAL EVERY 6 HOURS PRN
Status: DISCONTINUED | OUTPATIENT
Start: 2024-01-08 | End: 2024-01-09 | Stop reason: HOSPADM

## 2024-01-08 RX ADMIN — ACETAMINOPHEN 650 MG: 325 TABLET ORAL at 17:55

## 2024-01-08 RX ADMIN — HEPARIN SODIUM 4000 UNITS: 5000 INJECTION INTRAVENOUS; SUBCUTANEOUS at 20:27

## 2024-01-08 RX ADMIN — HEPARIN SODIUM 1000 UNITS/HR: 10000 INJECTION, SOLUTION INTRAVENOUS at 20:27

## 2024-01-08 ASSESSMENT — LIFESTYLE VARIABLES
HAVE PEOPLE ANNOYED YOU BY CRITICIZING YOUR DRINKING: NO
EVER HAD A DRINK FIRST THING IN THE MORNING TO STEADY YOUR NERVES TO GET RID OF A HANGOVER: NO
REASON UNABLE TO ASSESS: NO
HAVE YOU EVER FELT YOU SHOULD CUT DOWN ON YOUR DRINKING: NO
EVER FELT BAD OR GUILTY ABOUT YOUR DRINKING: NO

## 2024-01-08 ASSESSMENT — PAIN DESCRIPTION - DESCRIPTORS
DESCRIPTORS: ACHING
DESCRIPTORS: ACHING;SHOOTING;SHARP

## 2024-01-08 ASSESSMENT — COLUMBIA-SUICIDE SEVERITY RATING SCALE - C-SSRS
1. IN THE PAST MONTH, HAVE YOU WISHED YOU WERE DEAD OR WISHED YOU COULD GO TO SLEEP AND NOT WAKE UP?: NO
6. HAVE YOU EVER DONE ANYTHING, STARTED TO DO ANYTHING, OR PREPARED TO DO ANYTHING TO END YOUR LIFE?: NO
2. HAVE YOU ACTUALLY HAD ANY THOUGHTS OF KILLING YOURSELF?: NO
1. IN THE PAST MONTH, HAVE YOU WISHED YOU WERE DEAD OR WISHED YOU COULD GO TO SLEEP AND NOT WAKE UP?: NO
2. HAVE YOU ACTUALLY HAD ANY THOUGHTS OF KILLING YOURSELF?: NO
6. HAVE YOU EVER DONE ANYTHING, STARTED TO DO ANYTHING, OR PREPARED TO DO ANYTHING TO END YOUR LIFE?: NO

## 2024-01-08 ASSESSMENT — PAIN SCALES - GENERAL: PAINLEVEL_OUTOF10: 4

## 2024-01-08 NOTE — ED PROVIDER NOTES
"HPI   Chief Complaint   Patient presents with    Chest Pain     PT presents to the ER with chest pain that began at 0930 today. Pt was having mid sternal cp that went into her left shoulder, PT was having tooth pain, and states that it was all sharp and shooting. Pain has somewhat subsided. Pt had a MI on the .        This is a 58-year-old female with PMH CAD s/p COLETTE (2018), MS not on meds, Asthma presenting for evaluation of left-sided burning and intermittently sharp nonpleuritic nonexertional chest pain with associated shortness of breath with radiation into the jaw and \"tingling\" in the skull.  Had negative left heart cath 2024.  She took her home heart medications and aspirin without any relief.  Her heart cath showed no obstructive CAD. Denies any associated cough, nausea, vomiting, diaphoresis, syncope, or near syncope. Nonsmoker.      History provided by:  Patient   used: No                        No data recorded                Patient History   No past medical history on file.  Past Surgical History:   Procedure Laterality Date    CARDIAC CATHETERIZATION N/A 1/3/2024    Procedure: Left Heart Cath;  Surgeon: John Gutierres MD;  Location: [facility] Cardiac Cath Lab;  Service: Cardiovascular;  Laterality: N/A;     SECTION, CLASSIC  2014     Section    HYSTERECTOMY  2014    Hysterectomy    OTHER SURGICAL HISTORY  2014    Oophorectomy - Bilat (Removal Of Both Ovaries) Laparoscopic    OTHER SURGICAL HISTORY  2014    Laparosc Gastric Restrictive Proc By Adjustable Gastric Band    OTHER SURGICAL HISTORY  2020    Abscess incision and drainage     Family History   Problem Relation Name Age of Onset    Bilateral breast cancer Mother      Ovarian cancer Maternal Grandmother       Social History     Tobacco Use    Smoking status: Never    Smokeless tobacco: Never   Vaping Use    Vaping Use: Never used   Substance Use Topics    Alcohol use: " Never    Drug use: Never       Physical Exam   ED Triage Vitals [01/08/24 1506]   Temp Heart Rate Resp BP   36.6 °C (97.9 °F) 84 20 142/85      SpO2 Temp Source Heart Rate Source Patient Position   96 % Tympanic Monitor Sitting      BP Location FiO2 (%)     Left arm --       Physical Exam    General: Vitals noted, no distress. Afebrile  Neck: Trachea midline. No JVD appreciated.  Cardiac: Regular rate and rhythm. No murmur  Pulmonary: Lungs clear bilaterally with good aeration. No rales, rhonchi or wheezing  Abdomen: Soft. Nontender  Extremities: No peripheral edema  Skin: No rash  Neuro: No focal neurologic deficits    ED Course & MDM        Medical Decision Making  DDx: ACS, PE, dissection, flower-/myocarditis, pneumothorax, pneumonia, dysrhythmia, mechanical chest wall pain    Patient is stable hemodynamically.  Visibly nontoxic-appearing no apparent distress.  Recently had negative left heart cath.  EKG and delta EKG without acute ischemic changes.  Troponin over 1500.  D-dimer pending.  Patient already gave herself aspirin earlier this morning.  Chest x-ray showed no acute cardiopulmonary process as read by the radiologist.  Reached out to Dr. Gutierres awaiting response.  This visit was staffed with the attending physician Dr. Garcia who will determine further care and disposition.      Disclaimer: This note was dictated using speech recognition software. An attempt at proofreading was made to minimize errors. Minor errors in transcription may be present. Please call if questions.    Amount and/or Complexity of Data Reviewed  Labs: ordered.  Radiology: ordered.  ECG/medicine tests: ordered and independent interpretation performed.     Details: EKG interpreted by me: Normal sinus rhythm.  Rate 78.  Left axis.  Inferior anterior lateral Q waves.  QTc 426 ms.  No STEMI.  EKG interpreted by me: Normal sinus rhythm. Rate 63. Left axis. Inferior Q waves. QTc 427 ms. No STEMI.        Procedure  Procedures     Avinash CASTRO  ESTRELLITA Valerio  01/08/24 4206

## 2024-01-09 VITALS
DIASTOLIC BLOOD PRESSURE: 78 MMHG | HEART RATE: 80 BPM | HEIGHT: 67 IN | BODY MASS INDEX: 36.1 KG/M2 | OXYGEN SATURATION: 96 % | WEIGHT: 230 LBS | RESPIRATION RATE: 18 BRPM | TEMPERATURE: 97.9 F | SYSTOLIC BLOOD PRESSURE: 121 MMHG

## 2024-01-09 DIAGNOSIS — I20.1 VARIANT ANGINA (CMS-HCC): ICD-10-CM

## 2024-01-09 DIAGNOSIS — Z95.820 S/P ANGIOPLASTY WITH STENT: Primary | ICD-10-CM

## 2024-01-09 LAB
ALBUMIN SERPL BCP-MCNC: 4.1 G/DL (ref 3.4–5)
ANION GAP SERPL CALC-SCNC: 10 MMOL/L (ref 10–20)
BUN SERPL-MCNC: 15 MG/DL (ref 6–23)
CALCIUM SERPL-MCNC: 9.2 MG/DL (ref 8.6–10.3)
CARDIAC TROPONIN I PNL SERPL HS: 1983 NG/L (ref 0–13)
CARDIAC TROPONIN I PNL SERPL HS: 625 NG/L (ref 0–13)
CHLORIDE SERPL-SCNC: 99 MMOL/L (ref 98–107)
CO2 SERPL-SCNC: 34 MMOL/L (ref 21–32)
CREAT SERPL-MCNC: 0.95 MG/DL (ref 0.5–1.05)
EGFRCR SERPLBLD CKD-EPI 2021: 70 ML/MIN/1.73M*2
ERYTHROCYTE [DISTWIDTH] IN BLOOD BY AUTOMATED COUNT: 12.8 % (ref 11.5–14.5)
GLUCOSE SERPL-MCNC: 118 MG/DL (ref 74–99)
HCT VFR BLD AUTO: 42.1 % (ref 36–46)
HGB BLD-MCNC: 14 G/DL (ref 12–16)
MAGNESIUM SERPL-MCNC: 2.28 MG/DL (ref 1.6–2.4)
MCH RBC QN AUTO: 29 PG (ref 26–34)
MCHC RBC AUTO-ENTMCNC: 33.3 G/DL (ref 32–36)
MCV RBC AUTO: 87 FL (ref 80–100)
NRBC BLD-RTO: 0 /100 WBCS (ref 0–0)
PHOSPHATE SERPL-MCNC: 2.8 MG/DL (ref 2.5–4.9)
PLATELET # BLD AUTO: 186 X10*3/UL (ref 150–450)
POTASSIUM SERPL-SCNC: 3.8 MMOL/L (ref 3.5–5.3)
RBC # BLD AUTO: 4.83 X10*6/UL (ref 4–5.2)
SODIUM SERPL-SCNC: 139 MMOL/L (ref 136–145)
UFH PPP CHRO-ACNC: 0.5 IU/ML
UFH PPP CHRO-ACNC: 0.6 IU/ML
WBC # BLD AUTO: 6.4 X10*3/UL (ref 4.4–11.3)

## 2024-01-09 PROCEDURE — 2500000001 HC RX 250 WO HCPCS SELF ADMINISTERED DRUGS (ALT 637 FOR MEDICARE OP)

## 2024-01-09 PROCEDURE — 83735 ASSAY OF MAGNESIUM: CPT

## 2024-01-09 PROCEDURE — 84484 ASSAY OF TROPONIN QUANT: CPT

## 2024-01-09 PROCEDURE — 85520 HEPARIN ASSAY: CPT | Performed by: EMERGENCY MEDICINE

## 2024-01-09 PROCEDURE — 96376 TX/PRO/DX INJ SAME DRUG ADON: CPT | Performed by: EMERGENCY MEDICINE

## 2024-01-09 PROCEDURE — 2500000004 HC RX 250 GENERAL PHARMACY W/ HCPCS (ALT 636 FOR OP/ED)

## 2024-01-09 PROCEDURE — 96374 THER/PROPH/DIAG INJ IV PUSH: CPT | Mod: 59 | Performed by: EMERGENCY MEDICINE

## 2024-01-09 PROCEDURE — 36415 COLL VENOUS BLD VENIPUNCTURE: CPT

## 2024-01-09 PROCEDURE — 36415 COLL VENOUS BLD VENIPUNCTURE: CPT | Performed by: EMERGENCY MEDICINE

## 2024-01-09 PROCEDURE — 80069 RENAL FUNCTION PANEL: CPT

## 2024-01-09 PROCEDURE — 85027 COMPLETE CBC AUTOMATED: CPT

## 2024-01-09 PROCEDURE — 2500000001 HC RX 250 WO HCPCS SELF ADMINISTERED DRUGS (ALT 637 FOR MEDICARE OP): Performed by: PHYSICIAN ASSISTANT

## 2024-01-09 RX ORDER — EZETIMIBE 10 MG/1
10 TABLET ORAL NIGHTLY
Qty: 30 TABLET | Refills: 11 | Status: SHIPPED | OUTPATIENT
Start: 2024-01-09 | End: 2024-03-01

## 2024-01-09 RX ORDER — AMITRIPTYLINE HYDROCHLORIDE 25 MG/1
25 TABLET, FILM COATED ORAL NIGHTLY
Status: DISCONTINUED | OUTPATIENT
Start: 2024-01-09 | End: 2024-01-09 | Stop reason: HOSPADM

## 2024-01-09 RX ORDER — ATORVASTATIN CALCIUM 40 MG/1
40 TABLET, FILM COATED ORAL NIGHTLY
Status: DISCONTINUED | OUTPATIENT
Start: 2024-01-09 | End: 2024-01-09 | Stop reason: HOSPADM

## 2024-01-09 RX ORDER — ERGOCALCIFEROL 1.25 MG/1
1250 CAPSULE ORAL
Status: DISCONTINUED | OUTPATIENT
Start: 2024-01-09 | End: 2024-01-09 | Stop reason: HOSPADM

## 2024-01-09 RX ORDER — DILTIAZEM HYDROCHLORIDE 240 MG/1
240 CAPSULE, COATED, EXTENDED RELEASE ORAL DAILY
Status: DISCONTINUED | OUTPATIENT
Start: 2024-01-09 | End: 2024-01-09 | Stop reason: HOSPADM

## 2024-01-09 RX ORDER — CLOPIDOGREL BISULFATE 75 MG/1
75 TABLET ORAL DAILY
Qty: 30 TABLET | Refills: 11 | Status: SHIPPED | OUTPATIENT
Start: 2024-01-09 | End: 2025-01-08

## 2024-01-09 RX ORDER — SPIRONOLACTONE 25 MG/1
25 TABLET ORAL DAILY
Status: DISCONTINUED | OUTPATIENT
Start: 2024-01-09 | End: 2024-01-09

## 2024-01-09 RX ORDER — EZETIMIBE 10 MG/1
10 TABLET ORAL NIGHTLY
Status: DISCONTINUED | OUTPATIENT
Start: 2024-01-09 | End: 2024-01-09 | Stop reason: HOSPADM

## 2024-01-09 RX ORDER — METOPROLOL SUCCINATE 50 MG/1
25 TABLET, EXTENDED RELEASE ORAL EVERY MORNING
Status: DISCONTINUED | OUTPATIENT
Start: 2024-01-09 | End: 2024-01-09

## 2024-01-09 RX ORDER — FLUOXETINE HYDROCHLORIDE 20 MG/1
40 CAPSULE ORAL DAILY
Status: DISCONTINUED | OUTPATIENT
Start: 2024-01-09 | End: 2024-01-09 | Stop reason: HOSPADM

## 2024-01-09 RX ORDER — ALBUTEROL SULFATE 90 UG/1
2 AEROSOL, METERED RESPIRATORY (INHALATION) EVERY 4 HOURS PRN
Status: DISCONTINUED | OUTPATIENT
Start: 2024-01-09 | End: 2024-01-09 | Stop reason: HOSPADM

## 2024-01-09 RX ORDER — DILTIAZEM HYDROCHLORIDE 240 MG/1
240 CAPSULE, COATED, EXTENDED RELEASE ORAL DAILY
Qty: 30 CAPSULE | Refills: 3 | Status: SHIPPED | OUTPATIENT
Start: 2024-01-09 | End: 2024-03-08

## 2024-01-09 RX ORDER — ASPIRIN 81 MG/1
81 TABLET ORAL DAILY
Status: DISCONTINUED | OUTPATIENT
Start: 2024-01-09 | End: 2024-01-09 | Stop reason: HOSPADM

## 2024-01-09 RX ORDER — PANTOPRAZOLE SODIUM 40 MG/10ML
40 INJECTION, POWDER, LYOPHILIZED, FOR SOLUTION INTRAVENOUS
Status: DISCONTINUED | OUTPATIENT
Start: 2024-01-09 | End: 2024-01-09 | Stop reason: HOSPADM

## 2024-01-09 RX ORDER — UBIDECARENONE 75 MG
250 CAPSULE ORAL EVERY MORNING
Status: DISCONTINUED | OUTPATIENT
Start: 2024-01-09 | End: 2024-01-09 | Stop reason: HOSPADM

## 2024-01-09 RX ORDER — CLOPIDOGREL BISULFATE 75 MG/1
75 TABLET ORAL DAILY
Status: DISCONTINUED | OUTPATIENT
Start: 2024-01-09 | End: 2024-01-09 | Stop reason: HOSPADM

## 2024-01-09 RX ORDER — NITROGLYCERIN 0.4 MG/1
0.4 TABLET SUBLINGUAL EVERY 5 MIN PRN
Status: DISCONTINUED | OUTPATIENT
Start: 2024-01-09 | End: 2024-01-09 | Stop reason: HOSPADM

## 2024-01-09 RX ORDER — LORAZEPAM 2 MG/ML
1 INJECTION INTRAMUSCULAR ONCE
Status: COMPLETED | OUTPATIENT
Start: 2024-01-09 | End: 2024-01-09

## 2024-01-09 RX ORDER — LEVOTHYROXINE SODIUM 50 UG/1
50 TABLET ORAL EVERY MORNING
Status: DISCONTINUED | OUTPATIENT
Start: 2024-01-09 | End: 2024-01-09 | Stop reason: HOSPADM

## 2024-01-09 RX ORDER — CLONAZEPAM 1 MG/1
0.5 TABLET ORAL NIGHTLY
Status: DISCONTINUED | OUTPATIENT
Start: 2024-01-09 | End: 2024-01-09 | Stop reason: HOSPADM

## 2024-01-09 RX ORDER — PANTOPRAZOLE SODIUM 40 MG/1
40 TABLET, DELAYED RELEASE ORAL
Status: DISCONTINUED | OUTPATIENT
Start: 2024-01-09 | End: 2024-01-09 | Stop reason: HOSPADM

## 2024-01-09 RX ADMIN — METOPROLOL SUCCINATE 25 MG: 50 TABLET, EXTENDED RELEASE ORAL at 10:13

## 2024-01-09 RX ADMIN — LEVOTHYROXINE SODIUM 50 MCG: 50 TABLET ORAL at 10:40

## 2024-01-09 RX ADMIN — LORAZEPAM 1 MG: 2 INJECTION INTRAMUSCULAR; INTRAVENOUS at 09:55

## 2024-01-09 RX ADMIN — FLUOXETINE 40 MG: 20 CAPSULE ORAL at 10:13

## 2024-01-09 RX ADMIN — SPIRONOLACTONE 25 MG: 25 TABLET ORAL at 10:40

## 2024-01-09 RX ADMIN — GABAPENTIN 400 MG: 300 CAPSULE ORAL at 10:13

## 2024-01-09 RX ADMIN — CLOPIDOGREL BISULFATE 75 MG: 75 TABLET ORAL at 13:25

## 2024-01-09 RX ADMIN — ASPIRIN 81 MG: 81 TABLET, COATED ORAL at 10:40

## 2024-01-09 RX ADMIN — TICAGRELOR 90 MG: 90 TABLET ORAL at 10:14

## 2024-01-09 ASSESSMENT — ACTIVITIES OF DAILY LIVING (ADL): LACK_OF_TRANSPORTATION: NO

## 2024-01-09 ASSESSMENT — PAIN SCALES - GENERAL
PAINLEVEL_OUTOF10: 0 - NO PAIN

## 2024-01-09 ASSESSMENT — PAIN - FUNCTIONAL ASSESSMENT: PAIN_FUNCTIONAL_ASSESSMENT: 0-10

## 2024-01-09 NOTE — PROGRESS NOTES
"Pharmacy Medication History Review    Elaine Coffey is a 58 y.o. female admitted for Chest pain, unspecified type. Pharmacy reviewed the patient's acymg-re-dmnoaqbef medications and allergies for accuracy.    The list below reflectives the updated PTA list. Please review each medication in order reconciliation for additional clarification and justification.  (Not in a hospital admission)       The list below reflectives the updated allergy list. Please review each documented allergy for additional clarification and justification.  Allergies  Reviewed by Alejandra Cruz, EMT on 1/8/2024        Severity Reactions Comments    Gadolinium-containing Contrast Media High Anaphylaxis Pt developed nausea without vomiting , SOB, after receiving the IV contrast . Pt states she always gets nauseated but this time \"was worse\"/pt/    Iodinated Contrast Media High Anaphylaxis Unable to breath, chest pain, severe itching of hands and flushing of chest and face, hypotension and bradycardia. This was reaction to MRI contrast. Patient does not have any known allergy to CT contrast.    Iodine High Itching, Shortness of breath Chest pains, turned red, face hot            Below are additional concerns with the patient's PTA list.      Kelsi Juan CPhT    "

## 2024-01-09 NOTE — PROGRESS NOTES
01/09/24 0823   Discharge Planning   Living Arrangements Spouse/significant other   Support Systems Spouse/significant other   Assistance Needed A&Ox3; independent with ADLs with no assistive devices; drives; room air at baseline and room air now;   Type of Residence Private residence   Number of Stairs to Enter Residence 1   Number of Stairs Within Residence 14   Do you have animals or pets at home? Yes   Type of Animals or Pets 2 dogs   Who is requesting discharge planning? Provider   Patient expects to be discharged to: home no needs. May need new home going anti-coagulant?   Does the patient need discharge transport arranged? Yes   RoundTrip coordination needed? Yes   Has discharge transport been arranged? No   Financial Resource Strain   How hard is it for you to pay for the very basics like food, housing, medical care, and heating? Not hard   Housing Stability   In the last 12 months, was there a time when you were not able to pay the mortgage or rent on time? N   In the last 12 months, how many places have you lived? 1   In the last 12 months, was there a time when you did not have a steady place to sleep or slept in a shelter (including now)? N   Transportation Needs   In the past 12 months, has lack of transportation kept you from medical appointments or from getting medications? no   In the past 12 months, has lack of transportation kept you from meetings, work, or from getting things needed for daily living? No     01/09/2024 1348pm  Patient discharging on 3 new medications to be filled at her [facility] (Ezetimibe $10 per fill/diltiazem $10 per fill/ Plavix $10 per fill. Spoke with patient and spouse bedside and they are fine with costs. Patient/spouse to stop at pharmacy once discharged.

## 2024-01-09 NOTE — DISCHARGE SUMMARY
Discharge Diagnosis  Chest pain, unspecified type    Issues Requiring Follow-Up  Chest pain    Discharge Meds     Your medication list        ASK your doctor about these medications        Instructions Last Dose Given Next Dose Due   albuterol 90 mcg/actuation inhaler           albuterol 2.5 mg /3 mL (0.083 %) nebulizer solution           amitriptyline 25 mg tablet  Commonly known as: Elavil           aspirin 81 mg EC tablet           atorvastatin 40 mg tablet  Commonly known as: Lipitor           clonazePAM 0.5 mg disintegrating tablet  Commonly known as: KlonoPIN      Take 1 tablet (0.5 mg) by mouth once daily at bedtime.       cyanocobalamin 100 mcg tablet  Commonly known as: Vitamin B-12           ergocalciferol 1.25 MG (33388 UT) capsule  Commonly known as: Vitamin D-2           FLUoxetine 40 mg capsule  Commonly known as: PROzac      Take 1 capsule (40 mg) by mouth once daily.       furosemide 40 mg tablet  Commonly known as: Lasix      Take 1 tablet (40 mg) by mouth once daily.       gabapentin 400 mg capsule  Commonly known as: Neurontin           ketoconazole 2 % cream  Commonly known as: NIZOral           levothyroxine 50 mcg tablet  Commonly known as: Synthroid, Levoxyl           meclizine 25 mg tablet  Commonly known as: Antivert           metoprolol succinate XL 25 mg 24 hr tablet  Commonly known as: Toprol-XL           nitroglycerin 0.4 mg SL tablet  Commonly known as: Nitrostat      DISSOLVE 1 TABLET UNDER THE TONGUE AS NEEDED FOR CHEST PAIN.       nystatin 100,000 unit/gram powder  Commonly known as: Mycostatin      Apply 1 Application topically 2 times a day.       nystatin ointment  Commonly known as: Mycostatin      Apply topically 2 times a day.       nystatin-triamcinolone ointment  Commonly known as: Mycolog II      Apply topically 2 times a day.       spironolactone 25 mg tablet  Commonly known as: Aldactone      Take 1 tablet (25 mg) by mouth once daily.       tiZANidine 4 mg tablet  Commonly  known as: Zanaflex                    Test Results Pending At Discharge  Pending Labs       Order Current Status    Low molecular wgt heparin Collected (01/09/24 0435)            Hospital Course  Elaine Coffey is a 58 y.o. female with a past medical history significant for multiple sclerosis, CAD post PCI with cardiac stent, MI, hypertension, asthma, transaminitis, depression/anxiety most recently, on 1/2/2024 for NSTEMI status post negative left heart cath presents to [facility] ED with a chief complaint of left-sided chest discomfort radiating to the shoulder and left arm beginning at 9:30 AM to 10:30 AM.  Pain developed into a sharp nonpleuritic none exertional pain on her left side of the chest accompanied by shortness of breath dizziness and nausea and generalized weakness.  She also notes a throbbing sensation around her chest area.  Denies cough, vomiting diaphoresis syncope or near syncope at this time.  Review of systems otherwise negative except was mentioned above.   In the ED, pt was vitally stable, labs were significant for Troponin 1, high-sensitivity 1591, Alkaline phosphatase 134, triglycerides 150, CXR: Not demonstrated any acute cardiopulmonary processes , EKG: Normal sinus rhythm with a rate of 63 Q waves noted QTc 427 negative for STEMI. She was started on Aspirin,  heparin drip, placed on telemetry and cardiology consulted with concern for NSTEMI .   Echo obtained on 1/3/2024 demonstrated a normal left ventricular systolic function with an estimated ejection fraction of 55 to 60% and an RVSP within normal limits .   Nuclear stress test obtained on 5/25/2023 did not demonstrate evidence of inducible myocardial ischemia, hypokinesis in the inferior wall with a post-rest LV EF estimated at 56% .  Further lab showed troponin trending down 1517>1591>1983>625. Cardiology suggested it's vasospastic angina and discharging her on CCB including her regular medication with cardiology follow up.   She was  prescribed Plavix 75 mg daily, cardizem 240 mg daily and Zetia 10 mg daily.     Pertinent Physical Exam At Time of Discharge  Physical Exam  Constitutional: patient was seen and examined at the bedside and appeared anxious and tearful.   Eyes: PERRL bilaterally   Head: atraumatic, normocephalic  Heart: RRR, no M/R/G  Lungs: CTA, b/l, no W/R/R  Abdomen: soft, ND, NT, + BS in all 4 quadrants   Extremities: no edema, ecchymoses, erythema   Neuro: AAOx3 to person/place/time  Psych: appropriate mood and behavior      Outpatient Follow-Up  Future Appointments   Date Time Provider Department Center   1/30/2024 11:00 AM NAYANA Parham [id number] [facility]   2/22/2024  9:00 AM NAYANA Beal [id number] [facility]         Wandy Chang MD

## 2024-01-09 NOTE — H&P
Internal Medicine-History and Physical Note   Patient: Elaine Coffey  Room/bed:  12/12  Admitted on: 1/8/2024    Age: 58 y.o.   Gender:female   Code Status:  Full Code   Admitting Dx: No admission diagnoses are documented for this encounter.    MRN: [id number]  PCP: SHEILA Beal-VIJAY     Attending: Dr. Higgins Nurse: No care team member to display  TCC: No care team member to display      Chief Complaint     Chief Complaint   Patient presents with    Chest Pain     PT presents to the ER with chest pain that began at 0930 today. Pt was having mid sternal cp that went into her left shoulder, PT was having tooth pain, and states that it was all sharp and shooting. Pain has somewhat subsided. Pt had a MI on the 2nd.       HPI    HPI: Elaine Coffey is a 58 y.o. female with a past medical history significant for multiple sclerosis, CAD post PCI with cardiac stent, MI, hypertension, asthma, transaminitis, depression/anxiety most recently, on 1/2/2024 for NSTEMI status post negative left heart cath presents to [facility] ED with a chief complaint of left-sided chest discomfort radiating to the shoulder and left arm beginning at 9:30 AM to 10:30 AM.  Pain developed into a sharp nonpleuritic none exertional pain on her left side of the chest accompanied by shortness of breath dizziness and nausea and generalized weakness.  She also notes a throbbing sensation around her chest area.  Denies cough, vomiting diaphoresis syncope or near syncope at this time.  Review of systems otherwise negative except was mentioned above.    ED course:     Vitals: Temperature 36.6, heart rate 84, respirations 20, blood pressure 142/85, SpO2 96% on room air  Labs:   -CBC: Unremarkable  -CMP: Alkaline phosphatase 134, triglycerides 150 otherwise unremarkable  -Troponin 1, high-sensitivity 1591  -BNP 19  -D-dimer  unremarkable  Imaging:   -CXR: Not demonstrated any acute cardiopulmonary processes  -EKG: Normal sinus rhythm with a rate of 63 Q waves noted QTc 427 negative for STEMI    Interventions: Aspirin (81 mg x 3 taken at home) started on heparin drip, cardiology consult in place        Past Medical History   No past medical history on file.     Surgical History     Past Surgical History:   Procedure Laterality Date    CARDIAC CATHETERIZATION N/A 1/3/2024    Procedure: Left Heart Cath;  Surgeon: John Gutierres MD;  Location: [facility] Cardiac Cath Lab;  Service: Cardiovascular;  Laterality: N/A;     SECTION, CLASSIC  2014     Section    HYSTERECTOMY  2014    Hysterectomy    OTHER SURGICAL HISTORY  2014    Oophorectomy - Bilat (Removal Of Both Ovaries) Laparoscopic    OTHER SURGICAL HISTORY  2014    Laparosc Gastric Restrictive Proc By Adjustable Gastric Band    OTHER SURGICAL HISTORY  2020    Abscess incision and drainage       Family History     Family History   Problem Relation Name Age of Onset    Bilateral breast cancer Mother      Ovarian cancer Maternal Grandmother         Social History     Social History     Socioeconomic History    Marital status:      Spouse name: Not on file    Number of children: Not on file    Years of education: Not on file    Highest education level: Not on file   Occupational History    Not on file   Tobacco Use    Smoking status: Never    Smokeless tobacco: Never   Vaping Use    Vaping Use: Never used   Substance and Sexual Activity    Alcohol use: Never    Drug use: Never    Sexual activity: Not on file   Other Topics Concern    Not on file   Social History Narrative    Not on file     Social Determinants of Health     Financial Resource Strain: Low Risk  (2024)    Overall Financial Resource Strain (CARDIA)     Difficulty of Paying Living Expenses: Not very hard   Food Insecurity: Not on file   Transportation Needs: No Transportation  "Needs (1/2/2024)    PRAPARE - Transportation     Lack of Transportation (Medical): No     Lack of Transportation (Non-Medical): No   Physical Activity: Not on file   Stress: Not on file   Social Connections: Not on file   Intimate Partner Violence: Not on file   Housing Stability: Low Risk  (1/2/2024)    Housing Stability Vital Sign     Unable to Pay for Housing in the Last Year: No     Number of Places Lived in the Last Year: 1     Unstable Housing in the Last Year: No     Tobacco Use: Low Risk  (1/4/2024)    Patient History     Smoking Tobacco Use: Never     Smokeless Tobacco Use: Never     Passive Exposure: Not on file      Social History     Substance and Sexual Activity   Alcohol Use Never        Allergies     Allergies   Allergen Reactions    Gadolinium-Containing Contrast Media Anaphylaxis     Pt developed nausea without vomiting , SOB, after receiving the IV contrast . Pt states she always gets nauseated but this time \"was worse\"/pt/    Iodinated Contrast Media Anaphylaxis     Unable to breath, chest pain, severe itching of hands and flushing of chest and face, hypotension and bradycardia. This was reaction to MRI contrast. Patient does not have any known allergy to CT contrast.    Iodine Itching and Shortness of breath     Chest pains, turned red, face hot      Meds    Scheduled medications     Continuous medications  heparin, 0-4,000 Units/hr, Last Rate: 1,000 Units/hr (01/08/24 2027)      PRN medications  PRN medications: acetaminophen, heparin     Objective     Vitals  Visit Vitals  /85 (BP Location: Left arm, Patient Position: Sitting)   Pulse 84   Temp 36.6 °C (97.9 °F) (Tympanic)   Resp 20   Ht 1.702 m (5' 7\")   Wt 104 kg (230 lb)   SpO2 96%   BMI 36.02 kg/m²   OB Status Postmenopausal   Smoking Status Never   BSA 2.22 m²        Physical Examination:  Gen: well appearing, in NAD on room air  HEENT: AT/NC, no conjunctival pallor, anicteric sclera, EOMI   Neck: supple, no LAD/JVD  Chest: Good air " "entry b/l, no adventitious sounds heard  CVS: s1 & S2 only, no MGR  Abd: soft, flat, NT/ND, BS+  Ext: no cyanosis/clubbing or pedal edema  Neuro: Aox3, cn 2-12 intact, motor 5/5 globally, sensation intact    I/Os  No intake or output data in the 24 hours ending 01/08/24 2139    Vent Settings         Labs:   Results from last 72 hours   Lab Units 01/08/24  1721   SODIUM mmol/L 140   POTASSIUM mmol/L 4.3   CHLORIDE mmol/L 98   CO2 mmol/L 30   BUN mg/dL 13   CREATININE mg/dL 0.94   GLUCOSE mg/dL 89   CALCIUM mg/dL 9.5   ANION GAP mmol/L 16   EGFR mL/min/1.73m*2 71      Results from last 72 hours   Lab Units 01/08/24  1721   WBC AUTO x10*3/uL 7.0   HEMOGLOBIN g/dL 15.5   HEMATOCRIT % 46.0   PLATELETS AUTO x10*3/uL 199   NEUTROS PCT AUTO % 65.0   LYMPHS PCT AUTO % 23.8   MONOS PCT AUTO % 7.9   EOS PCT AUTO % 2.9      Lab Results   Component Value Date    CALCIUM 9.5 01/08/2024      Lab Results   Component Value Date    CRP 0.12 03/11/2023      [unfilled]     Micro/ID:   No results found for the last 90 days.                   No lab exists for component: \"AGALPCRNB\"   .ID  Lab Results   Component Value Date    BLOODCULT  08/04/2023     No Growth at 1 days~No Growth at 2 days~No Growth at 3 days~NO GROWTH at 4 days - FINAL REPORT    BLOODCULT  08/04/2023     No Growth at 1 days~No Growth at 2 days~No Growth at 3 days~NO GROWTH at 4 days - FINAL REPORT         Images    XR chest 2 views  Narrative: STUDY:  Chest Radiographs;  1/8/2024 at 3:30 PM.  INDICATION:  Chest pain.  COMPARISON:  XR chest 1/2/2024 (two studies), 8/25/2023.  ACCESSION NUMBER(S):  [id number]  ORDERING CLINICIAN:  ANASTACIA ARAUJO  TECHNIQUE:  Frontal and lateral chest.   FINDINGS:  CARDIOMEDIASTINAL SILHOUETTE:  Cardiomediastinal silhouette is normal in size and configuration.     LUNGS:  Lungs are clear.     ABDOMEN:  No remarkable upper abdominal findings.     BONES:  No acute osseous changes.  Impression: No acute process.  Signed by Baldo " MD Jaci  ECG 12 lead  Normal sinus rhythm  Inferior infarct (cited on or before 02-JAN-2024)  Anterolateral infarct (cited on or before 02-JAN-2024)  Abnormal ECG  When compared with ECG of 02-JAN-2024 12:22,  Questionable change in initial forces of Anterolateral leads      Assessment and Plan    Problem list:   Principal Problem:    Chest pain, unspecified type      Elaine Coffey is a 58 y.o. female admitted for chest pain elevated troponins with concern for NSTEMI cardiology consulted placed on heparin drip.      Acute Medical Issues   # Chest pain/elevated troponin c/f NSTEMI  -Troponin:1500 on admission.Will trend.   -Heparin gtt   -ASA 81 mg ( patient took 81 mgx3 at home before admission)   -Placed on tele   -Echo obtained on 1/3/2024 demonstrated a normal left ventricular systolic function with an estimated ejection fraction of 55 to 60% and an RVSP within normal limits  -Echo obtained in 8/20/2021 demonstrated impaired relaxation pattern of left ventricular diastolic filling as well as trace to mild mitral and tricuspid regurg  -Nuclear stress test obtained on 5/25/2023 did not demonstrate evidence of inducible myocardial ischemia, hypokinesis in the inferior wall with a post-rest LV EF estimated at 56%  -EKG findings: Nonischemic changes  -Cardiology consulted, appreciate recommendations   -Heart score:5        Chronic Medical Issues   #Multiple sclerosis on remission  -Monitor  #Chronic transaminitis  -Continue current medical management   -Monitor   #Hypothyroidism   -Continue Levothyroxine 50 mcg daily  #CAD  #HTN   -Continue aspirin 81 mg daily  -Continue metoprolol succinate 25 mg daily  -Continue atorvastatin 40 mg daily  -Continue spironolactone 25 mg daily  #Depression/anxiety  -Continue clonazepam 0.5 mg nightly  -Continue fluoxetine 40 mg daily  -Continue gabapentin 400 mg 3 times daily      IVF: PRN   Electrolytes: Replete as needed   Diet: cardiac   GI ppx: PPI  DVT ppx: Heparin  gtt  Antibiotics: None  Lines: 1 PIV   Code: Full Code       Disposition: 58 y.o.female admitted for chest pain, elevated troponin.Placed on heparin drip, cardiology consulted.HS>48 hours.     Ada Bowen MD  Internal Medicine PGY-2

## 2024-01-09 NOTE — CONSULTS
Consults    Reason For Consult  NSTEMI    History Of Present Illness  Elaine Coffey is a 58 y.o. female presenting with past medical history of recently admitted for  NSTEMI, MS, Hypothyroidism, Depression comes to the emergency department due to left sided pain. Patient states that yesterday around 9am, she noticed left sided pain that radiated to her left shoulder. She had episodic chest pain at that time. Patient associates symptoms with shortness of breath and feeling nauseated. She states that she took three dose of aspirin at that time and think it helped. Patient denies fever, chest pain, abdominal pain, leg pain or leg swelling.     ED course:      Vitals: Temperature 36.6, heart rate 84, respirations 20, blood pressure 142/85, SpO2 96% on room air  Labs:   -CBC: Unremarkable  -CMP: Alkaline phosphatase 134, triglycerides 150 otherwise unremarkable  -Troponin 1, high-sensitivity 1591  -BNP 19  -D-dimer unremarkable  Imaging:   -CXR: Not demonstrated any acute cardiopulmonary processes  -EKG: Normal sinus rhythm with a rate of 63 Q waves noted QTc 427 negative for STEMI     Past Medical History  She has a past medical history of Coronary artery disease, Hidradenitis suppurativa, Multiple sclerosis (CMS/HCC), and Myocardial infarction (CMS/[facility]).    Surgical History  She has a past surgical history that includes  section, classic (2014); Other surgical history (2014); Other surgical history (2014); Hysterectomy (2014); Other surgical history (2020); Cardiac catheterization (N/A, 2024); and Ankle surgery (Left).     Social History  She reports that she has never smoked. She has never used smokeless tobacco. She reports that she does not drink alcohol and does not use drugs.    Family History  Family History   Problem Relation Name Age of Onset    Heart attack Mother      Bilateral breast cancer Mother      Emphysema Mother      Blood clot Mother      Heart attack Father       Lymphoma Father      Blood clot Sister      Ovarian cancer Maternal Grandmother      Stroke Maternal Grandmother      Heart attack Maternal Grandmother          Allergies  Gadolinium-containing contrast media    Review of Systems  ROS as stated Above     Physical Exam  Alert and oriented  Lungs: Clear Breath sounds in both lungs  Heart: s1s2 heard on auscultation   Abdomen: Soft, non tender, normal bowel sounds  Extremities: no cyanosis or edema         Last Recorded Vitals  /78   Pulse 80   Temp 36.6 °C (97.9 °F)   Resp 18   Wt 104 kg (230 lb)   SpO2 96%     Relevant Results  ECG 12 lead    Result Date: 1/9/2024  Normal sinus rhythm Cannot rule out Anterior infarct (cited on or before 02-JAN-2024) Abnormal ECG When compared with ECG of 08-JAN-2024 15:02, (unconfirmed) Criteria for Inferior infarct are no longer Present Questionable change in initial forces of Anterolateral leads    XR chest 2 views    Result Date: 1/8/2024  STUDY: Chest Radiographs;  1/8/2024 at 3:30 PM. INDICATION: Chest pain. COMPARISON: XR chest 1/2/2024 (two studies), 8/25/2023. ACCESSION NUMBER(S): [id number] ORDERING CLINICIAN: ANASTACIA ARAUJO TECHNIQUE:  Frontal and lateral chest. FINDINGS: CARDIOMEDIASTINAL SILHOUETTE: Cardiomediastinal silhouette is normal in size and configuration.  LUNGS: Lungs are clear.  ABDOMEN: No remarkable upper abdominal findings.  BONES: No acute osseous changes.    No acute process. Signed by Baldo Beatty MD    ECG 12 lead    Result Date: 1/8/2024  Normal sinus rhythm Inferior infarct (cited on or before 02-JAN-2024) Anterolateral infarct (cited on or before 02-JAN-2024) Abnormal ECG When compared with ECG of 02-JAN-2024 12:22, Questionable change in initial forces of Anterolateral leads        Assessment/Plan   Elaine Coffey is a 58 y.o. female presenting with past medical history of recently admitted for  NSTEMI, MS, Hypothyroidism, Depression comes to the emergency department due to  left sided pain.     # Vasospastic Angina  #Elevated Troponin    Cardiac Imaging  # LHC on 01/03 showed non Obstructive CAD  #Echocardiogram on 01/03 showed  Left ventricular systolic function is normal with a 55-60% estimated ejection fraction.   2. RVSP within normal limits.      Recommendation  - Start Diltiazem 240 daily  - Start Clopidogrel 75 daily   - OK to Discontinue Heparin Drip   - Use Sublingual Nitroglycerin as needed for  chest pain   -Discontinue Metoprolol Succinate 25 daily   - Continue Aspirin 81 daily, Atorvastatin 40 daily and Ezetimibe 10   - OK to Discharge from Cardiology Standpoint     Thank You for the Consult. Please feel free to reach out if there is any concern            John Gutierres MD    Patient seen, discussed and examined with Dr. Green, above is representative of my medical decision making.     John Gutierres MD

## 2024-01-09 NOTE — HOSPITAL COURSE
Elaine Coffey is a 58 y.o. female with a past medical history significant for multiple sclerosis, CAD post PCI with cardiac stent, MI, hypertension, asthma, transaminitis, depression/anxiety most recently, on 1/2/2024 for NSTEMI status post negative left heart cath presents to [facility] ED with a chief complaint of left-sided chest discomfort radiating to the shoulder and left arm beginning at 9:30 AM to 10:30 AM.  Pain developed into a sharp nonpleuritic none exertional pain on her left side of the chest accompanied by shortness of breath dizziness and nausea and generalized weakness.  She also notes a throbbing sensation around her chest area.  Denies cough, vomiting diaphoresis syncope or near syncope at this time.  Review of systems otherwise negative except was mentioned above.   In the ED, pt was vitally stable, labs were significant for Troponin 1, high-sensitivity 1591, Alkaline phosphatase 134, triglycerides 150, CXR: Not demonstrated any acute cardiopulmonary processes , EKG: Normal sinus rhythm with a rate of 63 Q waves noted QTc 427 negative for STEMI. She was started on Aspirin,  heparin drip, placed on telemetry and cardiology consulted with concern for NSTEMI .   Echo obtained on 1/3/2024 demonstrated a normal left ventricular systolic function with an estimated ejection fraction of 55 to 60% and an RVSP within normal limits .   Nuclear stress test obtained on 5/25/2023 did not demonstrate evidence of inducible myocardial ischemia, hypokinesis in the inferior wall with a post-rest LV EF estimated at 56% .  Further lab showed troponin trending down 1517>1591>1983>625. Cardiology suggested it's vasospastic angina and discharging her on CCB including her regular medication with cardiology follow up.

## 2024-01-09 NOTE — DISCHARGE INSTRUCTIONS
Your [symptoms] were due to vasospastic angina which is resolved.  You are now stable enough to be discharged home.    The following recommendations are made for you at time of hospital discharge:  - Please take your home medications as instructed prior to hospitalization.   - The following changes to your home medications have been made during your hospital stay:  Please start taking Plavix 75 mg one pill daily, cardizem 240 mg one pill daily, Zetia 10 mg one pill once at bed time.   - Please follow-up with your primary care provider within 5-7 days from time of discharge. Please call your PCP's office to schedule an appointment. Likely will need to bring photo ID and insurance card to your appointment.   - please follow up with cardiology .  -   services has been requested to make an appointment for you however if you do not hear back from them within 2-3 days, please call [phone] to find out about appointments with  services.  - If you experience any worsening symptoms or any new acute concerns arise, please contact your primary care provider to discuss and possibly arrange an appointment. If you cannot get in touch with provider or severe symptoms are present, please return to nearest emergency room/urgent care for evaluation and treatment.

## 2024-01-10 LAB
ATRIAL RATE: 63 BPM
ATRIAL RATE: 78 BPM
P AXIS: 41 DEGREES
P AXIS: 62 DEGREES
P OFFSET: 187 MS
P OFFSET: 192 MS
P ONSET: 142 MS
P ONSET: 144 MS
PR INTERVAL: 136 MS
PR INTERVAL: 138 MS
Q ONSET: 211 MS
Q ONSET: 212 MS
QRS COUNT: 11 BEATS
QRS COUNT: 13 BEATS
QRS DURATION: 88 MS
QRS DURATION: 94 MS
QT INTERVAL: 374 MS
QT INTERVAL: 418 MS
QTC CALCULATION(BAZETT): 426 MS
QTC CALCULATION(BAZETT): 427 MS
QTC FREDERICIA: 408 MS
QTC FREDERICIA: 424 MS
R AXIS: -1 DEGREES
R AXIS: -18 DEGREES
T AXIS: 24 DEGREES
T AXIS: 44 DEGREES
T OFFSET: 398 MS
T OFFSET: 421 MS
VENTRICULAR RATE: 63 BPM
VENTRICULAR RATE: 78 BPM

## 2024-01-12 DIAGNOSIS — J45.909 UNCOMPLICATED ASTHMA, UNSPECIFIED ASTHMA SEVERITY, UNSPECIFIED WHETHER PERSISTENT (HHS-HCC): Primary | ICD-10-CM

## 2024-01-12 DIAGNOSIS — B37.9 CANDIDIASIS: ICD-10-CM

## 2024-01-12 RX ORDER — ALBUTEROL SULFATE 90 UG/1
AEROSOL, METERED RESPIRATORY (INHALATION)
Qty: 18 G | Refills: 1 | Status: SHIPPED | OUTPATIENT
Start: 2024-01-12

## 2024-01-12 RX ORDER — NYSTATIN AND TRIAMCINOLONE ACETONIDE 100000; 1 [USP'U]/G; MG/G
OINTMENT TOPICAL 2 TIMES DAILY
Qty: 15 G | Refills: 0 | Status: SHIPPED | OUTPATIENT
Start: 2024-01-12 | End: 2024-02-22 | Stop reason: SDUPTHER

## 2024-01-17 ENCOUNTER — PATIENT OUTREACH (OUTPATIENT)
Dept: PRIMARY CARE | Facility: CLINIC | Age: 59
End: 2024-01-17
Payer: COMMERCIAL

## 2024-01-17 NOTE — PROGRESS NOTES
Unable to reach patient for call back after patient not having a  follow up appointment with PCP.   LVM with call back number for patient to call if needed

## 2024-01-19 ENCOUNTER — APPOINTMENT (OUTPATIENT)
Dept: CARDIOLOGY | Facility: HOSPITAL | Age: 59
End: 2024-01-19
Payer: COMMERCIAL

## 2024-01-19 ENCOUNTER — APPOINTMENT (OUTPATIENT)
Dept: RADIOLOGY | Facility: HOSPITAL | Age: 59
End: 2024-01-19
Payer: COMMERCIAL

## 2024-01-19 ENCOUNTER — HOSPITAL ENCOUNTER (OUTPATIENT)
Facility: HOSPITAL | Age: 59
Setting detail: OBSERVATION
Discharge: HOME | End: 2024-01-20
Attending: EMERGENCY MEDICINE | Admitting: INTERNAL MEDICINE
Payer: COMMERCIAL

## 2024-01-19 DIAGNOSIS — R07.9 CHEST PAIN, UNSPECIFIED TYPE: Primary | ICD-10-CM

## 2024-01-19 DIAGNOSIS — R79.89 ELEVATED TROPONIN: ICD-10-CM

## 2024-01-19 DIAGNOSIS — I21.4 NON-ST ELEVATION MYOCARDIAL INFARCTION (NSTEMI) (MULTI): ICD-10-CM

## 2024-01-19 DIAGNOSIS — Z09 HOSPITAL DISCHARGE FOLLOW-UP: ICD-10-CM

## 2024-01-19 LAB
ALBUMIN SERPL BCP-MCNC: 4.8 G/DL (ref 3.4–5)
ALP SERPL-CCNC: 136 U/L (ref 33–110)
ALT SERPL W P-5'-P-CCNC: 21 U/L (ref 7–45)
ANION GAP SERPL CALC-SCNC: 12 MMOL/L (ref 10–20)
APPEARANCE UR: CLEAR
AST SERPL W P-5'-P-CCNC: 17 U/L (ref 9–39)
BASOPHILS # BLD AUTO: 0.02 X10*3/UL (ref 0–0.1)
BASOPHILS NFR BLD AUTO: 0.3 %
BILIRUB SERPL-MCNC: 1.2 MG/DL (ref 0–1.2)
BILIRUB UR STRIP.AUTO-MCNC: NEGATIVE MG/DL
BNP SERPL-MCNC: 8 PG/ML (ref 0–99)
BUN SERPL-MCNC: 16 MG/DL (ref 6–23)
CALCIUM SERPL-MCNC: 9.6 MG/DL (ref 8.6–10.3)
CARDIAC TROPONIN I PNL SERPL HS: 134 NG/L (ref 0–13)
CARDIAC TROPONIN I PNL SERPL HS: 17 NG/L (ref 0–13)
CARDIAC TROPONIN I PNL SERPL HS: 34 NG/L (ref 0–13)
CHLORIDE SERPL-SCNC: 96 MMOL/L (ref 98–107)
CO2 SERPL-SCNC: 26 MMOL/L (ref 21–32)
COLOR UR: NORMAL
CREAT SERPL-MCNC: 1 MG/DL (ref 0.5–1.05)
D DIMER PPP FEU-MCNC: <215 NG/ML FEU
EGFRCR SERPLBLD CKD-EPI 2021: 66 ML/MIN/1.73M*2
EOSINOPHIL # BLD AUTO: 0.16 X10*3/UL (ref 0–0.7)
EOSINOPHIL NFR BLD AUTO: 2.7 %
ERYTHROCYTE [DISTWIDTH] IN BLOOD BY AUTOMATED COUNT: 12.8 % (ref 11.5–14.5)
GLUCOSE SERPL-MCNC: 109 MG/DL (ref 74–99)
GLUCOSE UR STRIP.AUTO-MCNC: NEGATIVE MG/DL
HCT VFR BLD AUTO: 44.7 % (ref 36–46)
HGB BLD-MCNC: 15.6 G/DL (ref 12–16)
IMM GRANULOCYTES # BLD AUTO: 0.02 X10*3/UL (ref 0–0.7)
IMM GRANULOCYTES NFR BLD AUTO: 0.3 % (ref 0–0.9)
INR PPP: 1 (ref 0.9–1.1)
KETONES UR STRIP.AUTO-MCNC: NEGATIVE MG/DL
LEUKOCYTE ESTERASE UR QL STRIP.AUTO: NEGATIVE
LIPASE SERPL-CCNC: 39 U/L (ref 9–82)
LYMPHOCYTES # BLD AUTO: 1.49 X10*3/UL (ref 1.2–4.8)
LYMPHOCYTES NFR BLD AUTO: 25.6 %
MCH RBC QN AUTO: 29.4 PG (ref 26–34)
MCHC RBC AUTO-ENTMCNC: 34.9 G/DL (ref 32–36)
MCV RBC AUTO: 84 FL (ref 80–100)
MONOCYTES # BLD AUTO: 0.58 X10*3/UL (ref 0.1–1)
MONOCYTES NFR BLD AUTO: 9.9 %
NEUTROPHILS # BLD AUTO: 3.56 X10*3/UL (ref 1.2–7.7)
NEUTROPHILS NFR BLD AUTO: 61.2 %
NITRITE UR QL STRIP.AUTO: NEGATIVE
NRBC BLD-RTO: 0 /100 WBCS (ref 0–0)
PH UR STRIP.AUTO: 7 [PH]
PLATELET # BLD AUTO: 248 X10*3/UL (ref 150–450)
POTASSIUM SERPL-SCNC: 4.1 MMOL/L (ref 3.5–5.3)
PROT SERPL-MCNC: 7.7 G/DL (ref 6.4–8.2)
PROT UR STRIP.AUTO-MCNC: NEGATIVE MG/DL
PROTHROMBIN TIME: 11.3 SECONDS (ref 9.8–12.8)
RBC # BLD AUTO: 5.3 X10*6/UL (ref 4–5.2)
RBC # UR STRIP.AUTO: NEGATIVE /UL
SODIUM SERPL-SCNC: 130 MMOL/L (ref 136–145)
SP GR UR STRIP.AUTO: 1.01
UROBILINOGEN UR STRIP.AUTO-MCNC: <2 MG/DL
WBC # BLD AUTO: 5.8 X10*3/UL (ref 4.4–11.3)

## 2024-01-19 PROCEDURE — 71275 CT ANGIOGRAPHY CHEST: CPT

## 2024-01-19 PROCEDURE — 71045 X-RAY EXAM CHEST 1 VIEW: CPT | Mod: FOREIGN READ | Performed by: RADIOLOGY

## 2024-01-19 PROCEDURE — 93005 ELECTROCARDIOGRAM TRACING: CPT

## 2024-01-19 PROCEDURE — 85025 COMPLETE CBC W/AUTO DIFF WBC: CPT | Performed by: PHYSICIAN ASSISTANT

## 2024-01-19 PROCEDURE — 84075 ASSAY ALKALINE PHOSPHATASE: CPT | Performed by: PHYSICIAN ASSISTANT

## 2024-01-19 PROCEDURE — 83880 ASSAY OF NATRIURETIC PEPTIDE: CPT | Performed by: PHYSICIAN ASSISTANT

## 2024-01-19 PROCEDURE — 81003 URINALYSIS AUTO W/O SCOPE: CPT | Performed by: STUDENT IN AN ORGANIZED HEALTH CARE EDUCATION/TRAINING PROGRAM

## 2024-01-19 PROCEDURE — 2500000004 HC RX 250 GENERAL PHARMACY W/ HCPCS (ALT 636 FOR OP/ED): Performed by: STUDENT IN AN ORGANIZED HEALTH CARE EDUCATION/TRAINING PROGRAM

## 2024-01-19 PROCEDURE — 2500000002 HC RX 250 W HCPCS SELF ADMINISTERED DRUGS (ALT 637 FOR MEDICARE OP, ALT 636 FOR OP/ED): Performed by: STUDENT IN AN ORGANIZED HEALTH CARE EDUCATION/TRAINING PROGRAM

## 2024-01-19 PROCEDURE — 99223 1ST HOSP IP/OBS HIGH 75: CPT | Performed by: STUDENT IN AN ORGANIZED HEALTH CARE EDUCATION/TRAINING PROGRAM

## 2024-01-19 PROCEDURE — 2500000001 HC RX 250 WO HCPCS SELF ADMINISTERED DRUGS (ALT 637 FOR MEDICARE OP): Performed by: NURSE PRACTITIONER

## 2024-01-19 PROCEDURE — 2500000001 HC RX 250 WO HCPCS SELF ADMINISTERED DRUGS (ALT 637 FOR MEDICARE OP): Performed by: PHYSICIAN ASSISTANT

## 2024-01-19 PROCEDURE — 85610 PROTHROMBIN TIME: CPT | Performed by: PHYSICIAN ASSISTANT

## 2024-01-19 PROCEDURE — 99285 EMERGENCY DEPT VISIT HI MDM: CPT | Performed by: EMERGENCY MEDICINE

## 2024-01-19 PROCEDURE — 2550000001 HC RX 255 CONTRASTS: Performed by: STUDENT IN AN ORGANIZED HEALTH CARE EDUCATION/TRAINING PROGRAM

## 2024-01-19 PROCEDURE — 83690 ASSAY OF LIPASE: CPT | Performed by: PHYSICIAN ASSISTANT

## 2024-01-19 PROCEDURE — 71275 CT ANGIOGRAPHY CHEST: CPT | Performed by: RADIOLOGY

## 2024-01-19 PROCEDURE — 84484 ASSAY OF TROPONIN QUANT: CPT | Performed by: PHYSICIAN ASSISTANT

## 2024-01-19 PROCEDURE — 96374 THER/PROPH/DIAG INJ IV PUSH: CPT | Performed by: EMERGENCY MEDICINE

## 2024-01-19 PROCEDURE — 36415 COLL VENOUS BLD VENIPUNCTURE: CPT | Performed by: PHYSICIAN ASSISTANT

## 2024-01-19 PROCEDURE — 71045 X-RAY EXAM CHEST 1 VIEW: CPT | Mod: FR

## 2024-01-19 PROCEDURE — 2500000004 HC RX 250 GENERAL PHARMACY W/ HCPCS (ALT 636 FOR OP/ED): Performed by: PHYSICIAN ASSISTANT

## 2024-01-19 PROCEDURE — G0378 HOSPITAL OBSERVATION PER HR: HCPCS

## 2024-01-19 PROCEDURE — 2500000001 HC RX 250 WO HCPCS SELF ADMINISTERED DRUGS (ALT 637 FOR MEDICARE OP): Performed by: STUDENT IN AN ORGANIZED HEALTH CARE EDUCATION/TRAINING PROGRAM

## 2024-01-19 PROCEDURE — 84484 ASSAY OF TROPONIN QUANT: CPT | Performed by: EMERGENCY MEDICINE

## 2024-01-19 PROCEDURE — 96375 TX/PRO/DX INJ NEW DRUG ADDON: CPT | Performed by: EMERGENCY MEDICINE

## 2024-01-19 PROCEDURE — 99222 1ST HOSP IP/OBS MODERATE 55: CPT | Performed by: STUDENT IN AN ORGANIZED HEALTH CARE EDUCATION/TRAINING PROGRAM

## 2024-01-19 PROCEDURE — 85379 FIBRIN DEGRADATION QUANT: CPT | Performed by: PHYSICIAN ASSISTANT

## 2024-01-19 RX ORDER — FLUOXETINE HYDROCHLORIDE 20 MG/1
40 CAPSULE ORAL EVERY MORNING
Status: DISCONTINUED | OUTPATIENT
Start: 2024-01-20 | End: 2024-01-20 | Stop reason: HOSPADM

## 2024-01-19 RX ORDER — GABAPENTIN 400 MG/1
400 CAPSULE ORAL 3 TIMES DAILY
Status: DISCONTINUED | OUTPATIENT
Start: 2024-01-19 | End: 2024-01-20 | Stop reason: HOSPADM

## 2024-01-19 RX ORDER — LACTULOSE 10 G/15ML
20 SOLUTION ORAL 2 TIMES DAILY
Status: DISCONTINUED | OUTPATIENT
Start: 2024-01-19 | End: 2024-01-20 | Stop reason: HOSPADM

## 2024-01-19 RX ORDER — METOPROLOL SUCCINATE 25 MG/1
25 TABLET, EXTENDED RELEASE ORAL DAILY
Status: DISCONTINUED | OUTPATIENT
Start: 2024-01-19 | End: 2024-01-20

## 2024-01-19 RX ORDER — ATORVASTATIN CALCIUM 40 MG/1
40 TABLET, FILM COATED ORAL NIGHTLY
Status: DISCONTINUED | OUTPATIENT
Start: 2024-01-19 | End: 2024-01-20 | Stop reason: HOSPADM

## 2024-01-19 RX ORDER — METOPROLOL SUCCINATE 25 MG/1
25 TABLET, EXTENDED RELEASE ORAL DAILY
Qty: 30 TABLET | Refills: 0 | Status: SHIPPED | OUTPATIENT
Start: 2024-01-19 | End: 2024-01-20 | Stop reason: HOSPADM

## 2024-01-19 RX ORDER — CLONAZEPAM 1 MG/1
0.5 TABLET ORAL NIGHTLY
Status: DISCONTINUED | OUTPATIENT
Start: 2024-01-19 | End: 2024-01-20 | Stop reason: HOSPADM

## 2024-01-19 RX ORDER — NITROGLYCERIN 0.4 MG/1
0.4 TABLET SUBLINGUAL EVERY 5 MIN PRN
Status: DISCONTINUED | OUTPATIENT
Start: 2024-01-19 | End: 2024-01-20 | Stop reason: HOSPADM

## 2024-01-19 RX ORDER — MECLIZINE HYDROCHLORIDE 25 MG/1
25 TABLET ORAL EVERY 6 HOURS PRN
Status: DISCONTINUED | OUTPATIENT
Start: 2024-01-19 | End: 2024-01-20 | Stop reason: HOSPADM

## 2024-01-19 RX ORDER — LEVOTHYROXINE SODIUM 50 UG/1
50 TABLET ORAL EVERY MORNING
Status: DISCONTINUED | OUTPATIENT
Start: 2024-01-20 | End: 2024-01-20 | Stop reason: HOSPADM

## 2024-01-19 RX ORDER — ONDANSETRON HYDROCHLORIDE 2 MG/ML
4 INJECTION, SOLUTION INTRAVENOUS ONCE
Status: COMPLETED | OUTPATIENT
Start: 2024-01-19 | End: 2024-01-19

## 2024-01-19 RX ORDER — ERGOCALCIFEROL 1.25 MG/1
1250 CAPSULE ORAL
Status: DISCONTINUED | OUTPATIENT
Start: 2024-01-20 | End: 2024-01-20 | Stop reason: HOSPADM

## 2024-01-19 RX ORDER — NITROGLYCERIN 0.4 MG/1
0.4 TABLET SUBLINGUAL ONCE
Status: COMPLETED | OUTPATIENT
Start: 2024-01-19 | End: 2024-01-19

## 2024-01-19 RX ORDER — ASPIRIN 81 MG/1
81 TABLET ORAL EVERY MORNING
Status: DISCONTINUED | OUTPATIENT
Start: 2024-01-20 | End: 2024-01-20 | Stop reason: HOSPADM

## 2024-01-19 RX ORDER — FAMOTIDINE 10 MG/ML
20 INJECTION INTRAVENOUS ONCE
Status: COMPLETED | OUTPATIENT
Start: 2024-01-19 | End: 2024-01-19

## 2024-01-19 RX ORDER — ACETAMINOPHEN 325 MG/1
975 TABLET ORAL ONCE
Status: COMPLETED | OUTPATIENT
Start: 2024-01-19 | End: 2024-01-19

## 2024-01-19 RX ORDER — DILTIAZEM HYDROCHLORIDE 120 MG/1
240 CAPSULE, COATED, EXTENDED RELEASE ORAL DAILY
Status: DISCONTINUED | OUTPATIENT
Start: 2024-01-19 | End: 2024-01-20 | Stop reason: HOSPADM

## 2024-01-19 RX ORDER — TIZANIDINE 4 MG/1
4 TABLET ORAL NIGHTLY PRN
Status: DISCONTINUED | OUTPATIENT
Start: 2024-01-19 | End: 2024-01-20 | Stop reason: HOSPADM

## 2024-01-19 RX ORDER — ISOSORBIDE MONONITRATE 30 MG/1
30 TABLET, EXTENDED RELEASE ORAL DAILY
Status: DISCONTINUED | OUTPATIENT
Start: 2024-01-19 | End: 2024-01-20 | Stop reason: HOSPADM

## 2024-01-19 RX ORDER — ALUMINUM HYDROXIDE, MAGNESIUM HYDROXIDE, AND SIMETHICONE 1200; 120; 1200 MG/30ML; MG/30ML; MG/30ML
30 SUSPENSION ORAL ONCE
Status: COMPLETED | OUTPATIENT
Start: 2024-01-19 | End: 2024-01-19

## 2024-01-19 RX ORDER — EZETIMIBE 10 MG/1
10 TABLET ORAL NIGHTLY
Status: DISCONTINUED | OUTPATIENT
Start: 2024-01-19 | End: 2024-01-20 | Stop reason: HOSPADM

## 2024-01-19 RX ORDER — CLOPIDOGREL BISULFATE 75 MG/1
75 TABLET ORAL DAILY
Status: DISCONTINUED | OUTPATIENT
Start: 2024-01-19 | End: 2024-01-20 | Stop reason: HOSPADM

## 2024-01-19 RX ORDER — SPIRONOLACTONE 25 MG/1
25 TABLET ORAL EVERY MORNING
Status: DISCONTINUED | OUTPATIENT
Start: 2024-01-20 | End: 2024-01-20

## 2024-01-19 RX ORDER — AMITRIPTYLINE HYDROCHLORIDE 25 MG/1
25 TABLET, FILM COATED ORAL NIGHTLY
Status: DISCONTINUED | OUTPATIENT
Start: 2024-01-19 | End: 2024-01-20 | Stop reason: HOSPADM

## 2024-01-19 RX ORDER — FUROSEMIDE 40 MG/1
40 TABLET ORAL EVERY MORNING
Status: DISCONTINUED | OUTPATIENT
Start: 2024-01-20 | End: 2024-01-20 | Stop reason: HOSPADM

## 2024-01-19 RX ORDER — ENOXAPARIN SODIUM 100 MG/ML
40 INJECTION SUBCUTANEOUS EVERY 24 HOURS
Status: DISCONTINUED | OUTPATIENT
Start: 2024-01-19 | End: 2024-01-20 | Stop reason: HOSPADM

## 2024-01-19 RX ORDER — ALBUTEROL SULFATE 90 UG/1
2 AEROSOL, METERED RESPIRATORY (INHALATION) EVERY 4 HOURS PRN
Status: DISCONTINUED | OUTPATIENT
Start: 2024-01-19 | End: 2024-01-20 | Stop reason: HOSPADM

## 2024-01-19 RX ADMIN — IOHEXOL 74 ML: 350 INJECTION, SOLUTION INTRAVENOUS at 19:46

## 2024-01-19 RX ADMIN — NITROGLYCERIN 0.4 MG: 0.4 TABLET SUBLINGUAL at 11:46

## 2024-01-19 RX ADMIN — ISOSORBIDE MONONITRATE 30 MG: 30 TABLET, EXTENDED RELEASE ORAL at 16:40

## 2024-01-19 RX ADMIN — ALUMINUM HYDROXIDE, MAGNESIUM HYDROXIDE, AND DIMETHICONE 30 ML: 200; 20; 200 SUSPENSION ORAL at 12:11

## 2024-01-19 RX ADMIN — ATORVASTATIN CALCIUM 40 MG: 40 TABLET, FILM COATED ORAL at 20:52

## 2024-01-19 RX ADMIN — ENOXAPARIN SODIUM 40 MG: 40 INJECTION SUBCUTANEOUS at 15:44

## 2024-01-19 RX ADMIN — FAMOTIDINE 20 MG: 10 INJECTION, SOLUTION INTRAVENOUS at 12:11

## 2024-01-19 RX ADMIN — CLONAZEPAM 0.5 MG: 1 TABLET ORAL at 20:52

## 2024-01-19 RX ADMIN — EZETIMIBE 10 MG: 10 TABLET ORAL at 20:52

## 2024-01-19 RX ADMIN — LACTULOSE 20 G: 20 SOLUTION ORAL at 21:46

## 2024-01-19 RX ADMIN — ONDANSETRON 4 MG: 2 INJECTION INTRAMUSCULAR; INTRAVENOUS at 10:08

## 2024-01-19 RX ADMIN — AMITRIPTYLINE HYDROCHLORIDE 25 MG: 25 TABLET, FILM COATED ORAL at 20:52

## 2024-01-19 RX ADMIN — GABAPENTIN 400 MG: 400 CAPSULE ORAL at 20:52

## 2024-01-19 RX ADMIN — METOPROLOL SUCCINATE 25 MG: 25 TABLET, EXTENDED RELEASE ORAL at 16:40

## 2024-01-19 RX ADMIN — ACETAMINOPHEN 975 MG: 325 TABLET ORAL at 21:45

## 2024-01-19 SDOH — SOCIAL STABILITY: SOCIAL INSECURITY: HAS ANYONE EVER THREATENED TO HURT YOUR FAMILY OR YOUR PETS?: NO

## 2024-01-19 SDOH — SOCIAL STABILITY: SOCIAL INSECURITY: DOES ANYONE TRY TO KEEP YOU FROM HAVING/CONTACTING OTHER FRIENDS OR DOING THINGS OUTSIDE YOUR HOME?: NO

## 2024-01-19 SDOH — SOCIAL STABILITY: SOCIAL INSECURITY: ABUSE: ADULT

## 2024-01-19 SDOH — SOCIAL STABILITY: SOCIAL INSECURITY: DO YOU FEEL UNSAFE GOING BACK TO THE PLACE WHERE YOU ARE LIVING?: NO

## 2024-01-19 SDOH — SOCIAL STABILITY: SOCIAL INSECURITY: ARE YOU OR HAVE YOU BEEN THREATENED OR ABUSED PHYSICALLY, EMOTIONALLY, OR SEXUALLY BY ANYONE?: NO

## 2024-01-19 SDOH — SOCIAL STABILITY: SOCIAL INSECURITY: DO YOU FEEL ANYONE HAS EXPLOITED OR TAKEN ADVANTAGE OF YOU FINANCIALLY OR OF YOUR PERSONAL PROPERTY?: NO

## 2024-01-19 SDOH — SOCIAL STABILITY: SOCIAL INSECURITY: HAVE YOU HAD THOUGHTS OF HARMING ANYONE ELSE?: YES

## 2024-01-19 SDOH — SOCIAL STABILITY: SOCIAL INSECURITY: ARE THERE ANY APPARENT SIGNS OF INJURIES/BEHAVIORS THAT COULD BE RELATED TO ABUSE/NEGLECT?: NO

## 2024-01-19 ASSESSMENT — COGNITIVE AND FUNCTIONAL STATUS - GENERAL
MOBILITY SCORE: 24
MOBILITY SCORE: 24
DAILY ACTIVITIY SCORE: 24
MOBILITY SCORE: 24
DAILY ACTIVITIY SCORE: 24
DAILY ACTIVITIY SCORE: 24
PATIENT BASELINE BEDBOUND: NO

## 2024-01-19 ASSESSMENT — ENCOUNTER SYMPTOMS
VOMITING: 0
NUMBNESS: 0
DYSPHORIC MOOD: 0
DIARRHEA: 0
COUGH: 1
WOUND: 0
MYALGIAS: 0
SHORTNESS OF BREATH: 1
WHEEZING: 1
FREQUENCY: 0
LIGHT-HEADEDNESS: 1
NERVOUS/ANXIOUS: 0
RHINORRHEA: 1
FEVER: 0
FATIGUE: 1
NAUSEA: 1
ARTHRALGIAS: 0
CONSTIPATION: 1
PALPITATIONS: 1
CHILLS: 1
DYSURIA: 0
DIZZINESS: 1

## 2024-01-19 ASSESSMENT — LIFESTYLE VARIABLES
HOW OFTEN DO YOU HAVE A DRINK CONTAINING ALCOHOL: NEVER
AUDIT-C TOTAL SCORE: 0
SKIP TO QUESTIONS 9-10: 1
HOW OFTEN DO YOU HAVE 6 OR MORE DRINKS ON ONE OCCASION: NEVER
EVER FELT BAD OR GUILTY ABOUT YOUR DRINKING: NO
EVER HAD A DRINK FIRST THING IN THE MORNING TO STEADY YOUR NERVES TO GET RID OF A HANGOVER: NO
REASON UNABLE TO ASSESS: NO
AUDIT-C TOTAL SCORE: 0
HOW MANY STANDARD DRINKS CONTAINING ALCOHOL DO YOU HAVE ON A TYPICAL DAY: PATIENT DOES NOT DRINK
HAVE YOU EVER FELT YOU SHOULD CUT DOWN ON YOUR DRINKING: NO
HAVE PEOPLE ANNOYED YOU BY CRITICIZING YOUR DRINKING: NO

## 2024-01-19 ASSESSMENT — HEART SCORE
AGE: 45-64
HEART SCORE: 6
ECG: NORMAL
TROPONIN: GREATER THAN OR EQUAL TO 3 TIMES NORMAL LIMIT
HISTORY: MODERATELY SUSPICIOUS
RISK FACTORS: >2 RISK FACTORS OR HX OF ATHEROSCLEROTIC DISEASE

## 2024-01-19 ASSESSMENT — COLUMBIA-SUICIDE SEVERITY RATING SCALE - C-SSRS
2. HAVE YOU ACTUALLY HAD ANY THOUGHTS OF KILLING YOURSELF?: NO
1. IN THE PAST MONTH, HAVE YOU WISHED YOU WERE DEAD OR WISHED YOU COULD GO TO SLEEP AND NOT WAKE UP?: NO
6. HAVE YOU EVER DONE ANYTHING, STARTED TO DO ANYTHING, OR PREPARED TO DO ANYTHING TO END YOUR LIFE?: NO

## 2024-01-19 ASSESSMENT — PAIN SCALES - GENERAL
PAINLEVEL_OUTOF10: 0 - NO PAIN
PAINLEVEL_OUTOF10: 0 - NO PAIN

## 2024-01-19 ASSESSMENT — PATIENT HEALTH QUESTIONNAIRE - PHQ9
2. FEELING DOWN, DEPRESSED OR HOPELESS: NOT AT ALL
SUM OF ALL RESPONSES TO PHQ9 QUESTIONS 1 & 2: 0
1. LITTLE INTEREST OR PLEASURE IN DOING THINGS: NOT AT ALL

## 2024-01-19 ASSESSMENT — ACTIVITIES OF DAILY LIVING (ADL)
DRESSING YOURSELF: INDEPENDENT
ASSISTIVE_DEVICE: EYEGLASSES
JUDGMENT_ADEQUATE_SAFELY_COMPLETE_DAILY_ACTIVITIES: YES
HEARING - LEFT EAR: FUNCTIONAL
PATIENT'S MEMORY ADEQUATE TO SAFELY COMPLETE DAILY ACTIVITIES?: YES
WALKS IN HOME: INDEPENDENT
TOILETING: INDEPENDENT
HEARING - RIGHT EAR: FUNCTIONAL
LACK_OF_TRANSPORTATION: NO
GROOMING: INDEPENDENT
FEEDING YOURSELF: INDEPENDENT
ADEQUATE_TO_COMPLETE_ADL: YES
BATHING: INDEPENDENT

## 2024-01-19 ASSESSMENT — PAIN DESCRIPTION - DESCRIPTORS: DESCRIPTORS: BURNING

## 2024-01-19 ASSESSMENT — PAIN - FUNCTIONAL ASSESSMENT: PAIN_FUNCTIONAL_ASSESSMENT: 0-10

## 2024-01-19 NOTE — ED PROVIDER NOTES
"HPI   Chief Complaint   Patient presents with    Chest Pain     Started at 07:30 after eating breakfast. States she had a NSTEMI on  and . States it is \"pressure/burning\" in her left chest by her ribs. Also endorses leg weakness, teeth hurting and nausea.        This is a 58-year-old female with PMH CAD s/p COLETTE (2018), MS not on meds, Asthma presenting for evaluation of left-sided burning, nonexertional chest pain with associated shortness of breath with radiation into jaw and tooth tingling  Had negative left heart cath 2024 and subsequent hospitalization 10 days ago for NSTEMI in which she would heparinized and then heparin DC'd once etiology was felt to be vasospastic she is on Plavix and aspirin..   Her heart cath showed no obstructive CAD. Denies any associated cough, nausea, vomiting, diaphoresis, syncope, or near syncope. Nonsmoker.      History provided by:  Patient   used: No                        Quinn Coma Scale Score: 15                  Patient History   No past medical history on file.  Past Surgical History:   Procedure Laterality Date    CARDIAC CATHETERIZATION N/A 1/3/2024    Procedure: Left Heart Cath;  Surgeon: John Gutierres MD;  Location: [facility] Cardiac Cath Lab;  Service: Cardiovascular;  Laterality: N/A;     SECTION, CLASSIC  2014     Section    HYSTERECTOMY  2014    Hysterectomy    OTHER SURGICAL HISTORY  2014    Oophorectomy - Bilat (Removal Of Both Ovaries) Laparoscopic    OTHER SURGICAL HISTORY  2014    Laparosc Gastric Restrictive Proc By Adjustable Gastric Band    OTHER SURGICAL HISTORY  2020    Abscess incision and drainage     Family History   Problem Relation Name Age of Onset    Bilateral breast cancer Mother      Ovarian cancer Maternal Grandmother       Social History     Tobacco Use    Smoking status: Never    Smokeless tobacco: Never   Vaping Use    Vaping Use: Never used   Substance Use Topics "    Alcohol use: Never    Drug use: Never       Physical Exam   ED Triage Vitals [01/19/24 0938]   Temp Heart Rate Resp BP   36.5 °C (97.7 °F) (!) 111 18 140/79      SpO2 Temp Source Heart Rate Source Patient Position   98 % Skin Monitor Lying      BP Location FiO2 (%)     Right arm --       Physical Exam  Constitutional:       Appearance: Normal appearance.   HENT:      Mouth/Throat:      Mouth: Mucous membranes are moist.      Pharynx: Oropharynx is clear.   Cardiovascular:      Rate and Rhythm: Normal rate and regular rhythm.      Pulses: Normal pulses.      Heart sounds: Normal heart sounds.   Pulmonary:      Effort: Pulmonary effort is normal.      Breath sounds: Normal breath sounds.   Abdominal:      General: There is no distension.      Palpations: Abdomen is soft.      Tenderness: There is no abdominal tenderness. There is no guarding.   Musculoskeletal:      Cervical back: Normal range of motion and neck supple.   Skin:     General: Skin is warm and dry.   Neurological:      General: No focal deficit present.      Mental Status: She is alert and oriented to person, place, and time.         ED Course & MDM   Diagnoses as of 01/19/24 1344   Chest pain, unspecified type   Elevated troponin       Medical Decision Making  DDx: ACS, PE, dissection, flower-/myocarditis, pneumothorax, pneumonia, dysrhythmia, mechanical chest wall pain    Is tachycardic with otherwise stable hemodynamics.  Initially chest pain had abated but recurred here in the emergency department and had delta EKG and troponin was checked.  No acute ST segment or T wave changes noted on repeat EKG although troponin uptrending initially to just 39 and then 134, D-dimer less than 215 therefore low suspicion for PE.  Patient's symptoms initially worsened with nitro so trialed Maalox and Pepcid with little to no effect.  Lipase negative.  Discussed the case with cardiology Dr. Lopez who came and evaluated the patient here in the ED, advised holding  off on heparin for now as etiology is likely vasospastic, patient does not want to go home so we will observe overnight to the hospitalist team to trend troponins and trial on Imdur.  This visit was staffed with the attending physician Dr. Palmer.      Disclaimer: This note was dictated using speech recognition software. An attempt at proofreading was made to minimize errors. Minor errors in transcription may be present. Please call if questions.    Amount and/or Complexity of Data Reviewed  External Data Reviewed: notes.  Labs: ordered.  Radiology: ordered and independent interpretation performed.  ECG/medicine tests: ordered and independent interpretation performed.     Details: EKG interpreted by me: Sinus tachycardia.  Rate 111.  LAFB.  LAD.  Inferior and anterolateral Q waves.  QTc 495 ms.  No acute ST segment changes.  EKG interpreted by me: Normal sinus rhythm.  Rate 91.  LAFB. LAD. Inferior and anterolateral Q waves. QTc 467 ms. No acute ST segment changes.        Procedure  Procedures     Avinash R Iman, ESTRELLITA  01/19/24 4826

## 2024-01-19 NOTE — H&P
History Of Present Illness  Elaine Coffey is a 58 y.o. female CAD sp stenting, NSTEMI, asthma, multiple sclerosis presenting with chest pain.    Patient reports symptoms started around 7:30 AM with burning sensation underneath left breast with radiation to left lateral thorax and left shoulder.  Patient also reports nausea, shortness of breath, dizziness and palpitations.  Patient also reporting dark frothy urine and flank pain.      Patient reports a similar episode Monday 1/15/24.  Symptoms resolved.  However at that time she reports she had home 's/100's, 's.       Past Medical History  No past medical history on file.    Surgical History  Past Surgical History:   Procedure Laterality Date    CARDIAC CATHETERIZATION N/A 1/3/2024    Procedure: Left Heart Cath;  Surgeon: John Gutierres MD;  Location: [facility];  Service: Cardiovascular;  Laterality: N/A;     SECTION, CLASSIC  2014     Section    HYSTERECTOMY  2014    Hysterectomy    OTHER SURGICAL HISTORY  2014    Oophorectomy - Bilat (Removal Of Both Ovaries) Laparoscopic    OTHER SURGICAL HISTORY  2014    Laparosc Gastric Restrictive Proc By Adjustable Gastric Band    OTHER SURGICAL HISTORY  2020    Abscess incision and drainage        Social History  She reports that she has never smoked. She has never used smokeless tobacco. She reports that she does not drink alcohol and does not use drugs.    Family History  Family History   Problem Relation Name Age of Onset    Bilateral breast cancer Mother      Ovarian cancer Maternal Grandmother          Allergies  Gadolinium-containing contrast media, Iodinated contrast media, and Iodine    Review of Systems   Constitutional:  Positive for chills and fatigue. Negative for fever.   HENT:  Positive for congestion, nosebleeds and rhinorrhea.    Respiratory:  Positive for cough, shortness of breath and wheezing.    Cardiovascular:  Positive for chest  "pain and palpitations. Negative for leg swelling.   Gastrointestinal:  Positive for constipation and nausea. Negative for diarrhea and vomiting.   Genitourinary:  Negative for dysuria, frequency and urgency.   Musculoskeletal:  Negative for arthralgias and myalgias.   Skin:  Negative for pallor, rash and wound.   Neurological:  Positive for dizziness and light-headedness. Negative for syncope and numbness.   Psychiatric/Behavioral:  Negative for dysphoric mood. The patient is not nervous/anxious.         Physical Exam  Constitutional:       Appearance: Normal appearance.   HENT:      Head: Normocephalic and atraumatic.      Right Ear: External ear normal.      Left Ear: External ear normal.      Nose: Nose normal.      Mouth/Throat:      Mouth: Mucous membranes are moist.      Pharynx: Oropharynx is clear.   Eyes:      Extraocular Movements: Extraocular movements intact.      Conjunctiva/sclera: Conjunctivae normal.      Pupils: Pupils are equal, round, and reactive to light.   Cardiovascular:      Rate and Rhythm: Normal rate and regular rhythm.      Pulses: Normal pulses.      Heart sounds: Normal heart sounds.   Pulmonary:      Effort: Pulmonary effort is normal.      Breath sounds: Normal breath sounds.   Abdominal:      General: There is no distension.      Palpations: Abdomen is soft.      Tenderness: There is no abdominal tenderness.   Skin:     General: Skin is warm and dry.      Capillary Refill: Capillary refill takes less than 2 seconds.   Neurological:      Mental Status: She is alert and oriented to person, place, and time. Mental status is at baseline.   Psychiatric:         Mood and Affect: Mood normal.         Behavior: Behavior normal.          Last Recorded Vitals  Blood pressure 125/76, pulse 84, temperature 36.5 °C (97.7 °F), temperature source Tympanic, resp. rate 18, height 1.702 m (5' 7\"), weight 103 kg (228 lb), SpO2 95 %.    Relevant Results  Scheduled medications  amitriptyline, 25 mg, " oral, Nightly  [START ON 1/20/2024] aspirin, 81 mg, oral, q AM  atorvastatin, 40 mg, oral, Nightly  clonazePAM, 0.5 mg, oral, Nightly  clopidogrel, 75 mg, oral, Daily  dilTIAZem CD, 240 mg, oral, Daily  enoxaparin, 40 mg, subcutaneous, q24h  [START ON 1/20/2024] ergocalciferol, 1,250 mcg, oral, Weekly  ezetimibe, 10 mg, oral, Nightly  [START ON 1/20/2024] FLUoxetine, 40 mg, oral, q AM  [Held by provider] furosemide, 40 mg, oral, q AM  gabapentin, 400 mg, oral, TID  isosorbide mononitrate ER, 30 mg, oral, Daily  [START ON 1/20/2024] levothyroxine, 50 mcg, oral, q AM  metoprolol succinate XL, 25 mg, oral, Daily  [START ON 1/20/2024] spironolactone, 25 mg, oral, q AM      Continuous medications     PRN medications  PRN medications: albuterol, meclizine, nitroglycerin, tiZANidine    Results for orders placed or performed during the hospital encounter of 01/19/24 (from the past 24 hour(s))   CBC and Auto Differential   Result Value Ref Range    WBC 5.8 4.4 - 11.3 x10*3/uL    nRBC 0.0 0.0 - 0.0 /100 WBCs    RBC 5.30 (H) 4.00 - 5.20 x10*6/uL    Hemoglobin 15.6 12.0 - 16.0 g/dL    Hematocrit 44.7 36.0 - 46.0 %    MCV 84 80 - 100 fL    MCH 29.4 26.0 - 34.0 pg    MCHC 34.9 32.0 - 36.0 g/dL    RDW 12.8 11.5 - 14.5 %    Platelets 248 150 - 450 x10*3/uL    Neutrophils % 61.2 40.0 - 80.0 %    Immature Granulocytes %, Automated 0.3 0.0 - 0.9 %    Lymphocytes % 25.6 13.0 - 44.0 %    Monocytes % 9.9 2.0 - 10.0 %    Eosinophils % 2.7 0.0 - 6.0 %    Basophils % 0.3 0.0 - 2.0 %    Neutrophils Absolute 3.56 1.20 - 7.70 x10*3/uL    Immature Granulocytes Absolute, Automated 0.02 0.00 - 0.70 x10*3/uL    Lymphocytes Absolute 1.49 1.20 - 4.80 x10*3/uL    Monocytes Absolute 0.58 0.10 - 1.00 x10*3/uL    Eosinophils Absolute 0.16 0.00 - 0.70 x10*3/uL    Basophils Absolute 0.02 0.00 - 0.10 x10*3/uL   Comprehensive metabolic panel   Result Value Ref Range    Glucose 109 (H) 74 - 99 mg/dL    Sodium 130 (L) 136 - 145 mmol/L    Potassium 4.1 3.5 -  5.3 mmol/L    Chloride 96 (L) 98 - 107 mmol/L    Bicarbonate 26 21 - 32 mmol/L    Anion Gap 12 10 - 20 mmol/L    Urea Nitrogen 16 6 - 23 mg/dL    Creatinine 1.00 0.50 - 1.05 mg/dL    eGFR 66 >60 mL/min/1.73m*2    Calcium 9.6 8.6 - 10.3 mg/dL    Albumin 4.8 3.4 - 5.0 g/dL    Alkaline Phosphatase 136 (H) 33 - 110 U/L    Total Protein 7.7 6.4 - 8.2 g/dL    AST 17 9 - 39 U/L    Bilirubin, Total 1.2 0.0 - 1.2 mg/dL    ALT 21 7 - 45 U/L   Protime-INR   Result Value Ref Range    Protime 11.3 9.8 - 12.8 seconds    INR 1.0 0.9 - 1.1   B-type natriuretic peptide   Result Value Ref Range    BNP 8 0 - 99 pg/mL   D-dimer, VTE Exclusion   Result Value Ref Range    D-Dimer, Quantitative VTE Exclusion <215 <=500 ng/mL FEU   Troponin I, High Sensitivity, Initial   Result Value Ref Range    Troponin I, High Sensitivity 17 (H) 0 - 13 ng/L   Troponin, High Sensitivity, 1 Hour   Result Value Ref Range    Troponin I, High Sensitivity 34 (H) 0 - 13 ng/L   Lipase   Result Value Ref Range    Lipase 39 9 - 82 U/L   Troponin I, High Sensitivity   Result Value Ref Range    Troponin I, High Sensitivity 134 (HH) 0 - 13 ng/L     XR chest 1 view    Result Date: 1/19/2024  STUDY: Chest Radiograph;  1/19/2024 at 10:19 AM. INDICATION: Chest pain. COMPARISON: XR chest 1/8/2024, 1/2/2024 (two studies), 8/25/2023. ACCESSION NUMBER(S): [id number] ORDERING CLINICIAN: ITA CASTRO WIRE TECHNIQUE:  Frontal chest was obtained at 10:19 hours. FINDINGS: CARDIOMEDIASTINAL SILHOUETTE: Cardiomediastinal silhouette is normal in size and configuration.  LUNGS: Minimal atelectasis or consolidation in the left base.  ABDOMEN: No remarkable upper abdominal findings.  BONES: No acute osseous changes.    Minimal atelectasis or consolidation in the left base. Signed by Baldo Beatty MD        Assessment/Plan   Principal Problem:    Chest pain    Chest pain   - likely cardiac etiology, cardiology was consulted to emergency department.  Working diagnosis vasospastic  angina.    - patient with history of adverse reactions to rapid acting nitroglycerin.  Will receive first dose IMDUR in hospital, monitor overnight  - continue home antihypertensives - diltiazem 240 mg every day, furosemide 40 mg every day  - continue home clopidogrel  - ordered CT angio chest to rule out PE as possible cause .    Flank pain  - UA with reflex microscopy and culture    HLD  Continue home atorvastatin 40 mg every day, ezetimibe 10 mg every day    Multiple sclerosis  - continue high dose vitamin D  - meclizine, tizanidine PRN  - gabapentin 400 mg TID, amitriptyline 25 mg at bedtime.     Depression  - continue home fluoxetine.     Hypothyroidism  - continue home synthroid    F: encourage po  E: monitor and replace PRN  N: cardiac adults diet  GI: no Ppx indicated.   DVT: Lovenox and SCDs  Dispo: anticipate length of stay less than 2 overnights.          Patient staffed with attending physician, Dr Anshul Borges, DO

## 2024-01-19 NOTE — ED TRIAGE NOTES
"Patient here for chest pain Started at 07:30 after eating breakfast. States she had a NSTEMI on Jan 2 and 8. States it is \"pressure/burning\" in her left chest by her ribs. Also endorses leg weakness, teeth hurting and nausea.   "

## 2024-01-19 NOTE — CONSULTS
Inpatient consult to Cardiology  Consult performed by: Katia Reina PA-C  Consult ordered by: Roxana Weaver DO        History Of Present Illness:    Elaine Coffey is a 58 y.o. female presenting with chest discomfort that began this AM, pain similar to previous episodes. Notes that she was also dizzy and short of breath. Pain was short lived and resolved. She was scheduled to follow up in the clinic today so deferred care at that time. On her way to the office for her outpatient cardiology visit she began to experience chest discomfort and dizziness again, she came to the ED, where she received nitro and the pain resolved. It has not returned. Currently, Denies any chest pain, chest pressure, palpitations, dizziness, cough, shortness of breath, orthopnea, edema.       Last Recorded Vitals:  Vitals:    01/19/24 1101 01/19/24 1133 01/19/24 1235 01/19/24 1402   BP: 130/86 136/84 125/76 123/70   BP Location:       Patient Position:       Pulse: 84 95 84 88   Resp: 18 18 18 18   Temp: 36.6 °C (97.9 °F) 36.4 °C (97.5 °F) 36.5 °C (97.7 °F) 36.6 °C (97.9 °F)   TempSrc:  Tympanic Tympanic Tympanic   SpO2: 98% 98% 95% 94%   Weight:       Height:           Last Labs:  CBC - 1/19/2024:  9:59 AM  5.8 15.6 248    44.7      CMP - 1/19/2024:  9:59 AM  9.6 7.7 17 --- 1.2   2.8 4.8 21 136      PTT - 1/8/2024:  5:21 PM  1.0   11.3 34     Troponin I, High Sensitivity   Date/Time Value Ref Range Status   01/19/2024 12:25  (HH) 0 - 13 ng/L Final   01/19/2024 10:58 AM 34 (H) 0 - 13 ng/L Final   01/19/2024 09:59 AM 17 (H) 0 - 13 ng/L Final     BNP   Date/Time Value Ref Range Status   01/19/2024 09:59 AM 8 0 - 99 pg/mL Final   01/08/2024 05:21 PM 19 0 - 99 pg/mL Final     Hemoglobin A1C   Date/Time Value Ref Range Status   01/03/2024 06:40 AM 5.3 see below % Final   01/02/2024 12:25 PM 5.3 see below % Final     LDL Calculated   Date/Time Value Ref Range Status   01/03/2024 06:40 AM 74 <=99 mg/dL Final     Comment:                                  Near   Borderline      AGE      Desirable  Optimal    High     High     Very High     0-19 Y     0 - 109     ---    110-129   >/= 130     ----    20-24 Y     0 - 119     ---    120-159   >/= 160     ----      >24 Y     0 -  99   100-129  130-159   160-189     >/=190     12/15/2023 08:51 AM 86 <=99 mg/dL Final     Comment:                                 Near   Borderline      AGE      Desirable  Optimal    High     High     Very High     0-19 Y     0 - 109     ---    110-129   >/= 130     ----    20-24 Y     0 - 119     ---    120-159   >/= 160     ----      >24 Y     0 -  99   100-129  130-159   160-189     >/=190     2020 02:03 PM 79 65 - 130 MG/DL Final   2018 02:34 AM 67 65 - 130 MG/DL Final     VLDL   Date/Time Value Ref Range Status   2024 06:40 AM 30 0 - 40 mg/dL Final   12/15/2023 08:51 AM 24 0 - 40 mg/dL Final   2018 06:45 AM 31 0 - 40 mg/dL Final      Last I/O:  No intake/output data recorded.    Past Cardiology Tests (Last 3 Years):  EKG:  EKG in the ED reviewed, normal sinus rhythm with inverted/flat T wave V1/V2 as compared to 2024.     Echo:  Transthoracic Echo (TTE) Complete 2024  EKG   Ejection Fractions:  EF   Date/Time Value Ref Range Status   2024 08:47 AM 63       Cath:  Cardiac catheterization - coronary  (Needs Review)    Stress Test:  Nuclear Stress Test 2023    Cardiac Imaging:  No results found for this or any previous visit from the past 1095 days.      Past Medical History:  She has a past medical history of Coronary artery disease, Hidradenitis suppurativa, Multiple sclerosis (CMS/HCC), and Myocardial infarction (CMS/HCC).    Past Surgical History:  She has a past surgical history that includes  section, classic (2014); Other surgical history (2014); Other surgical history (2014); Hysterectomy (2014); Other surgical history (2020); Cardiac catheterization (N/A, 2024);  and Ankle surgery (Left).      Social History:  She reports that she has never smoked. She has never used smokeless tobacco. She reports that she does not drink alcohol and does not use drugs.    Family History:  Family History   Problem Relation Name Age of Onset    Heart attack Mother      Bilateral breast cancer Mother      Emphysema Mother      Blood clot Mother      Heart attack Father      Lymphoma Father      Blood clot Sister      Ovarian cancer Maternal Grandmother      Stroke Maternal Grandmother      Heart attack Maternal Grandmother          Allergies:  Gadolinium-containing contrast media, Iodinated contrast media, and Iodine    Inpatient Medications:  Scheduled medications   Medication Dose Route Frequency    amitriptyline  25 mg oral Nightly    [START ON 1/20/2024] aspirin  81 mg oral q AM    atorvastatin  40 mg oral Nightly    [Held by provider] clonazePAM  0.5 mg oral Nightly    clopidogrel  75 mg oral Daily    dilTIAZem CD  240 mg oral Daily    enoxaparin  40 mg subcutaneous q24h    [START ON 1/20/2024] ergocalciferol  1,250 mcg oral Weekly    ezetimibe  10 mg oral Nightly    [START ON 1/20/2024] FLUoxetine  40 mg oral q AM    [START ON 1/20/2024] furosemide  40 mg oral q AM    gabapentin  400 mg oral TID    [START ON 1/20/2024] levothyroxine  50 mcg oral q AM    [START ON 1/20/2024] spironolactone  25 mg oral q AM     PRN medications   Medication    albuterol    meclizine    nitroglycerin    tiZANidine     Continuous Medications   Medication Dose Last Rate     Outpatient Medications:  Current Outpatient Medications   Medication Instructions    albuterol 90 mcg/actuation inhaler INHALE 1 TO 2 PUFFS EVERY 4 TO 6 HOURS AS NEEDED    albuterol 2.5 mg, nebulization, Every 4 hours PRN    amitriptyline (ELAVIL) 25 mg, oral, Nightly    aspirin 81 mg EC tablet 1 tablet, oral, Every morning    atorvastatin (LIPITOR) 40 mg, oral, Nightly    clonazePAM (KLONOPIN) 0.5 mg, oral, Nightly    clopidogrel (PLAVIX)  75 mg, oral, Daily    dilTIAZem CD (CARDIZEM CD) 240 mg, oral, Daily    ergocalciferol (Vitamin D-2) 1.25 MG (06502 UT) capsule 1 capsule, oral, Weekly, On Saturdays    ezetimibe (ZETIA) 10 mg, oral, Nightly    FLUoxetine (PROZAC) 40 mg, oral, Daily    furosemide (LASIX) 40 mg, oral, Daily    gabapentin (NEURONTIN) 400 mg, oral, 3 times daily, Last OAR[place] fill: 4/7/23 #270 for 90 days    levothyroxine (SYNTHROID, LEVOXYL) 50 mcg, oral, Every morning    meclizine (ANTIVERT) 25 mg, oral, Every 6 hours PRN    nitroglycerin (Nitrostat) 0.4 mg SL tablet DISSOLVE 1 TABLET UNDER THE TONGUE AS NEEDED FOR CHEST PAIN.    nystatin (Mycostatin) 100,000 unit/gram powder 1 Application, Topical, 2 times daily    nystatin (Mycostatin) ointment Topical, 2 times daily    nystatin-triamcinolone (Mycolog II) ointment Topical, 2 times daily, to affected area    spironolactone (ALDACTONE) 25 mg, oral, Daily    tiZANidine (ZANAFLEX) 4 mg, oral, Nightly PRN, FOR SPASMS       Physical Exam:  Physical Exam  Constitutional:       Appearance: Normal appearance.   HENT:      Head: Normocephalic.      Nose: Nose normal.      Mouth/Throat:      Mouth: Mucous membranes are moist.   Eyes:      Conjunctiva/sclera: Conjunctivae normal.   Neck:      Comments: No JVD  Cardiovascular:      Rate and Rhythm: Normal rate and regular rhythm.      Heart sounds: No murmur heard.     No friction rub. No gallop.   Pulmonary:      Effort: Pulmonary effort is normal.      Breath sounds: Normal breath sounds.   Abdominal:      Palpations: Abdomen is soft.   Musculoskeletal:         General: Normal range of motion.      Cervical back: Normal range of motion.      Right lower leg: No edema.      Left lower leg: No edema.   Skin:     General: Skin is warm and dry.   Neurological:      General: No focal deficit present.      Mental Status: She is alert and oriented to person, place, and time. Mental status is at baseline.   Psychiatric:         Mood and Affect: Mood  normal.         Behavior: Behavior normal.            Assessment/Plan   Elaine Coffey is a 59 yo female with a PMH of Asthma, MS, CAD w/ LHC with PTCA vs PCI in 2018 who presented with chest pain, [facility] 01/03/2024 w/o obstructive CAD.     #Chest pain 2/2 coronary vasospasm  #HTN  #HLD    -Start Imdur 30mg daily  -Continue home ASA, Atorvastatin 40mg daily, Plavix 75mg daily, Diltiazem 240mg daily, Lasix 40mg daily, Zetia, Spironolactone.   -Discontinue nitro PRN   -Will follow     Peripheral IV 01/19/24 20 G Left Antecubital (Active)   Site Assessment Clean;Dry 01/19/24 0942   Dressing Type Transparent;Securing device 01/19/24 0942   Line Status Blood return noted;Flushed 01/19/24 0942   Dressing Status Clean;Dry 01/19/24 0942   Number of days: 0       Code Status:  Full Code    I spent  minutes in the professional and overall care of this patient.        Katia Reina PA-C

## 2024-01-19 NOTE — CARE PLAN
Problem: Pain  Goal: My pain/discomfort is manageable  Outcome: Progressing     Problem: Safety  Goal: Patient will be injury free during hospitalization  Outcome: Progressing  Goal: I will remain free of falls  Outcome: Progressing     Problem: Daily Care  Goal: Daily care needs are met  Outcome: Progressing     Problem: Psychosocial Needs  Goal: Demonstrates ability to cope with hospitalization/illness  Outcome: Progressing  Goal: Collaborate with me, my family, and caregiver to identify my specific goals  Outcome: Progressing     Problem: Discharge Barriers  Goal: My discharge needs are met  Outcome: Progressing   The patient's goals for the shift include      The clinical goals for the shift include Pt will have no worsening chest pain throughout shift.

## 2024-01-19 NOTE — PROGRESS NOTES
01/19/24 1456   Discharge Planning   Living Arrangements Spouse/significant other   Support Systems Spouse/significant other   Assistance Needed A&OX3; independent with ADLs with no assistive devices; drives; room air at baseline and currently on room air; recently started on Plavix prior to hospitalization   Type of Residence Private residence   Number of Stairs to Enter Residence 1   Number of Stairs Within Residence 14   Do you have animals or pets at home? Yes   Type of Animals or Pets 2 dogs 2cats 1bird   Who is requesting discharge planning? Provider   Home or Post Acute Services None   Patient expects to be discharged to: home no needs   Does the patient need discharge transport arranged? Yes   RoundTrip coordination needed? Yes   Has discharge transport been arranged? No   Financial Resource Strain   How hard is it for you to pay for the very basics like food, housing, medical care, and heating? Not hard   Housing Stability   In the last 12 months, was there a time when you were not able to pay the mortgage or rent on time? N   In the last 12 months, how many places have you lived? 1   In the last 12 months, was there a time when you did not have a steady place to sleep or slept in a shelter (including now)? N   Transportation Needs   In the past 12 months, has lack of transportation kept you from medical appointments or from getting medications? no   In the past 12 months, has lack of transportation kept you from meetings, work, or from getting things needed for daily living? No

## 2024-01-20 VITALS
HEIGHT: 67 IN | BODY MASS INDEX: 35.79 KG/M2 | DIASTOLIC BLOOD PRESSURE: 62 MMHG | TEMPERATURE: 97.5 F | OXYGEN SATURATION: 96 % | SYSTOLIC BLOOD PRESSURE: 114 MMHG | RESPIRATION RATE: 18 BRPM | HEART RATE: 75 BPM | WEIGHT: 228 LBS

## 2024-01-20 LAB
ALBUMIN SERPL BCP-MCNC: 4.1 G/DL (ref 3.4–5)
ALP SERPL-CCNC: 104 U/L (ref 33–110)
ALT SERPL W P-5'-P-CCNC: 16 U/L (ref 7–45)
ANION GAP SERPL CALC-SCNC: 10 MMOL/L (ref 10–20)
ANION GAP SERPL CALC-SCNC: 9 MMOL/L (ref 10–20)
AST SERPL W P-5'-P-CCNC: 15 U/L (ref 9–39)
BASOPHILS # BLD AUTO: 0.03 X10*3/UL (ref 0–0.1)
BASOPHILS NFR BLD AUTO: 0.5 %
BILIRUB SERPL-MCNC: 1 MG/DL (ref 0–1.2)
BUN SERPL-MCNC: 14 MG/DL (ref 6–23)
BUN SERPL-MCNC: 14 MG/DL (ref 6–23)
CALCIUM SERPL-MCNC: 9.2 MG/DL (ref 8.6–10.3)
CALCIUM SERPL-MCNC: 9.4 MG/DL (ref 8.6–10.3)
CHLORIDE SERPL-SCNC: 100 MMOL/L (ref 98–107)
CHLORIDE SERPL-SCNC: 99 MMOL/L (ref 98–107)
CO2 SERPL-SCNC: 30 MMOL/L (ref 21–32)
CO2 SERPL-SCNC: 32 MMOL/L (ref 21–32)
CREAT SERPL-MCNC: 0.91 MG/DL (ref 0.5–1.05)
CREAT SERPL-MCNC: 0.93 MG/DL (ref 0.5–1.05)
EGFRCR SERPLBLD CKD-EPI 2021: 72 ML/MIN/1.73M*2
EGFRCR SERPLBLD CKD-EPI 2021: 74 ML/MIN/1.73M*2
EOSINOPHIL # BLD AUTO: 0.18 X10*3/UL (ref 0–0.7)
EOSINOPHIL NFR BLD AUTO: 2.9 %
ERYTHROCYTE [DISTWIDTH] IN BLOOD BY AUTOMATED COUNT: 12.8 % (ref 11.5–14.5)
ERYTHROCYTE [DISTWIDTH] IN BLOOD BY AUTOMATED COUNT: 12.9 % (ref 11.5–14.5)
GLUCOSE SERPL-MCNC: 105 MG/DL (ref 74–99)
GLUCOSE SERPL-MCNC: 113 MG/DL (ref 74–99)
HCT VFR BLD AUTO: 39.7 % (ref 36–46)
HCT VFR BLD AUTO: 41.3 % (ref 36–46)
HGB BLD-MCNC: 13.4 G/DL (ref 12–16)
HGB BLD-MCNC: 13.8 G/DL (ref 12–16)
HOLD SPECIMEN: NORMAL
IMM GRANULOCYTES # BLD AUTO: 0.01 X10*3/UL (ref 0–0.7)
IMM GRANULOCYTES NFR BLD AUTO: 0.2 % (ref 0–0.9)
LYMPHOCYTES # BLD AUTO: 1.21 X10*3/UL (ref 1.2–4.8)
LYMPHOCYTES NFR BLD AUTO: 19.3 %
MAGNESIUM SERPL-MCNC: 2.16 MG/DL (ref 1.6–2.4)
MAGNESIUM SERPL-MCNC: 2.24 MG/DL (ref 1.6–2.4)
MCH RBC QN AUTO: 29 PG (ref 26–34)
MCH RBC QN AUTO: 29.1 PG (ref 26–34)
MCHC RBC AUTO-ENTMCNC: 33.4 G/DL (ref 32–36)
MCHC RBC AUTO-ENTMCNC: 33.8 G/DL (ref 32–36)
MCV RBC AUTO: 86 FL (ref 80–100)
MCV RBC AUTO: 87 FL (ref 80–100)
MONOCYTES # BLD AUTO: 0.53 X10*3/UL (ref 0.1–1)
MONOCYTES NFR BLD AUTO: 8.4 %
NEUTROPHILS # BLD AUTO: 4.32 X10*3/UL (ref 1.2–7.7)
NEUTROPHILS NFR BLD AUTO: 68.7 %
NRBC BLD-RTO: 0 /100 WBCS (ref 0–0)
NRBC BLD-RTO: 0 /100 WBCS (ref 0–0)
PLATELET # BLD AUTO: 219 X10*3/UL (ref 150–450)
PLATELET # BLD AUTO: 237 X10*3/UL (ref 150–450)
POTASSIUM SERPL-SCNC: 4.2 MMOL/L (ref 3.5–5.3)
POTASSIUM SERPL-SCNC: 4.4 MMOL/L (ref 3.5–5.3)
PROT SERPL-MCNC: 6.1 G/DL (ref 6.4–8.2)
RBC # BLD AUTO: 4.61 X10*6/UL (ref 4–5.2)
RBC # BLD AUTO: 4.76 X10*6/UL (ref 4–5.2)
SODIUM SERPL-SCNC: 135 MMOL/L (ref 136–145)
SODIUM SERPL-SCNC: 137 MMOL/L (ref 136–145)
WBC # BLD AUTO: 5.6 X10*3/UL (ref 4.4–11.3)
WBC # BLD AUTO: 6.3 X10*3/UL (ref 4.4–11.3)

## 2024-01-20 PROCEDURE — 83735 ASSAY OF MAGNESIUM: CPT | Performed by: STUDENT IN AN ORGANIZED HEALTH CARE EDUCATION/TRAINING PROGRAM

## 2024-01-20 PROCEDURE — G0378 HOSPITAL OBSERVATION PER HR: HCPCS

## 2024-01-20 PROCEDURE — 83735 ASSAY OF MAGNESIUM: CPT | Performed by: INTERNAL MEDICINE

## 2024-01-20 PROCEDURE — 36415 COLL VENOUS BLD VENIPUNCTURE: CPT | Performed by: INTERNAL MEDICINE

## 2024-01-20 PROCEDURE — 85027 COMPLETE CBC AUTOMATED: CPT | Performed by: STUDENT IN AN ORGANIZED HEALTH CARE EDUCATION/TRAINING PROGRAM

## 2024-01-20 PROCEDURE — 80053 COMPREHEN METABOLIC PANEL: CPT | Performed by: STUDENT IN AN ORGANIZED HEALTH CARE EDUCATION/TRAINING PROGRAM

## 2024-01-20 PROCEDURE — 2500000004 HC RX 250 GENERAL PHARMACY W/ HCPCS (ALT 636 FOR OP/ED): Performed by: STUDENT IN AN ORGANIZED HEALTH CARE EDUCATION/TRAINING PROGRAM

## 2024-01-20 PROCEDURE — 85025 COMPLETE CBC W/AUTO DIFF WBC: CPT | Performed by: INTERNAL MEDICINE

## 2024-01-20 PROCEDURE — 2500000001 HC RX 250 WO HCPCS SELF ADMINISTERED DRUGS (ALT 637 FOR MEDICARE OP): Performed by: STUDENT IN AN ORGANIZED HEALTH CARE EDUCATION/TRAINING PROGRAM

## 2024-01-20 PROCEDURE — 36415 COLL VENOUS BLD VENIPUNCTURE: CPT | Performed by: STUDENT IN AN ORGANIZED HEALTH CARE EDUCATION/TRAINING PROGRAM

## 2024-01-20 PROCEDURE — 80048 BASIC METABOLIC PNL TOTAL CA: CPT | Mod: CCI | Performed by: INTERNAL MEDICINE

## 2024-01-20 PROCEDURE — 99232 SBSQ HOSP IP/OBS MODERATE 35: CPT | Performed by: INTERNAL MEDICINE

## 2024-01-20 PROCEDURE — 2500000001 HC RX 250 WO HCPCS SELF ADMINISTERED DRUGS (ALT 637 FOR MEDICARE OP)

## 2024-01-20 RX ORDER — LEVOTHYROXINE SODIUM 50 UG/1
TABLET ORAL
Status: COMPLETED
Start: 2024-01-20 | End: 2024-01-20

## 2024-01-20 RX ORDER — ACETAMINOPHEN 325 MG/1
975 TABLET ORAL EVERY 8 HOURS
Status: DISCONTINUED | OUTPATIENT
Start: 2024-01-20 | End: 2024-01-20 | Stop reason: HOSPADM

## 2024-01-20 RX ORDER — ISOSORBIDE MONONITRATE 30 MG/1
30 TABLET, EXTENDED RELEASE ORAL DAILY
Qty: 30 TABLET | Refills: 1 | Status: SHIPPED | OUTPATIENT
Start: 2024-01-21 | End: 2024-01-26 | Stop reason: SDDI

## 2024-01-20 RX ADMIN — GABAPENTIN 400 MG: 400 CAPSULE ORAL at 08:55

## 2024-01-20 RX ADMIN — FLUOXETINE 40 MG: 20 CAPSULE ORAL at 08:54

## 2024-01-20 RX ADMIN — ISOSORBIDE MONONITRATE 30 MG: 30 TABLET, EXTENDED RELEASE ORAL at 08:55

## 2024-01-20 RX ADMIN — ERGOCALCIFEROL 1250 MCG: 1.25 CAPSULE ORAL at 08:54

## 2024-01-20 RX ADMIN — CLOPIDOGREL BISULFATE 75 MG: 75 TABLET ORAL at 08:55

## 2024-01-20 RX ADMIN — LEVOTHYROXINE SODIUM 50 MCG: 50 TABLET ORAL at 05:34

## 2024-01-20 RX ADMIN — ACETAMINOPHEN 975 MG: 325 TABLET ORAL at 08:54

## 2024-01-20 RX ADMIN — DILTIAZEM HYDROCHLORIDE 240 MG: 120 CAPSULE, COATED, EXTENDED RELEASE ORAL at 08:54

## 2024-01-20 RX ADMIN — SPIRONOLACTONE 25 MG: 25 TABLET ORAL at 08:55

## 2024-01-20 RX ADMIN — ASPIRIN 81 MG: 81 TABLET, COATED ORAL at 08:54

## 2024-01-20 SDOH — ECONOMIC STABILITY: INCOME INSECURITY: IN THE LAST 12 MONTHS, WAS THERE A TIME WHEN YOU WERE NOT ABLE TO PAY THE MORTGAGE OR RENT ON TIME?: NO

## 2024-01-20 SDOH — ECONOMIC STABILITY: HOUSING INSECURITY: IN THE LAST 12 MONTHS, HOW MANY PLACES HAVE YOU LIVED?: 1

## 2024-01-20 SDOH — ECONOMIC STABILITY: FOOD INSECURITY: WITHIN THE PAST 12 MONTHS, THE FOOD YOU BOUGHT JUST DIDN'T LAST AND YOU DIDN'T HAVE MONEY TO GET MORE.: NEVER TRUE

## 2024-01-20 SDOH — ECONOMIC STABILITY: HOUSING INSECURITY: IN THE LAST 12 MONTHS, WAS THERE A TIME WHEN YOU WERE NOT ABLE TO PAY THE MORTGAGE OR RENT ON TIME?: NO

## 2024-01-20 SDOH — ECONOMIC STABILITY: FOOD INSECURITY: WITHIN THE PAST 12 MONTHS, YOU WORRIED THAT YOUR FOOD WOULD RUN OUT BEFORE YOU GOT MONEY TO BUY MORE.: NEVER TRUE

## 2024-01-20 SDOH — ECONOMIC STABILITY: HOUSING INSECURITY
IN THE LAST 12 MONTHS, WAS THERE A TIME WHEN YOU DID NOT HAVE A STEADY PLACE TO SLEEP OR SLEPT IN A SHELTER (INCLUDING NOW)?: NO

## 2024-01-20 SDOH — ECONOMIC STABILITY: GENERAL

## 2024-01-20 SDOH — ECONOMIC STABILITY: HOUSING INSECURITY

## 2024-01-20 SDOH — ECONOMIC STABILITY: FOOD INSECURITY: WITHIN THE PAST 12 MONTHS, THE FOOD YOU BOUGHT JUST DIDN’T LAST AND YOU DIDN’T HAVE MONEY TO GET MORE.: NEVER TRUE

## 2024-01-20 SDOH — ECONOMIC STABILITY: HOUSING INSECURITY: IN THE PAST 12 MONTHS HAS THE ELECTRIC, GAS, OIL, OR WATER COMPANY THREATENED TO SHUT OFF SERVICES IN YOUR HOME?: NO

## 2024-01-20 SDOH — ECONOMIC STABILITY: FOOD INSECURITY

## 2024-01-20 SDOH — ECONOMIC STABILITY: TRANSPORTATION INSECURITY

## 2024-01-20 SDOH — ECONOMIC STABILITY: TRANSPORTATION INSECURITY
IN THE PAST 12 MONTHS, HAS LACK OF TRANSPORTATION KEPT YOU FROM MEETINGS, WORK, OR FROM GETTING THINGS NEEDED FOR DAILY LIVING?: NO

## 2024-01-20 SDOH — ECONOMIC STABILITY: TRANSPORTATION INSECURITY: IN THE PAST 12 MONTHS, HAS LACK OF TRANSPORTATION KEPT YOU FROM MEDICAL APPOINTMENTS OR FROM GETTING MEDICATIONS?: NO

## 2024-01-20 SDOH — ECONOMIC STABILITY: FOOD INSECURITY: WITHIN THE PAST 12 MONTHS, YOU WORRIED THAT YOUR FOOD WOULD RUN OUT BEFORE YOU GOT THE MONEY TO BUY MORE.: NEVER TRUE

## 2024-01-20 SDOH — ECONOMIC STABILITY: TRANSPORTATION INSECURITY
IN THE PAST 12 MONTHS, HAS THE LACK OF TRANSPORTATION KEPT YOU FROM MEDICAL APPOINTMENTS OR FROM GETTING MEDICATIONS?: NO

## 2024-01-20 ASSESSMENT — PAIN DESCRIPTION - ORIENTATION: ORIENTATION: OTHER (COMMENT)

## 2024-01-20 ASSESSMENT — COGNITIVE AND FUNCTIONAL STATUS - GENERAL
MOBILITY SCORE: 24
DAILY ACTIVITIY SCORE: 24

## 2024-01-20 ASSESSMENT — PAIN SCALES - GENERAL
PAINLEVEL_OUTOF10: 3
PAINLEVEL_OUTOF10: 7

## 2024-01-20 ASSESSMENT — PAIN - FUNCTIONAL ASSESSMENT
PAIN_FUNCTIONAL_ASSESSMENT: 0-10
PAIN_FUNCTIONAL_ASSESSMENT: 0-10

## 2024-01-20 ASSESSMENT — ACTIVITIES OF DAILY LIVING (ADL): LACK_OF_TRANSPORTATION: NO

## 2024-01-20 ASSESSMENT — SOCIAL DETERMINANTS OF HEALTH (SDOH): IN THE PAST 12 MONTHS, HAS THE ELECTRIC, GAS, OIL, OR WATER COMPANY THREATENED TO SHUT OFF SERVICE IN YOUR HOME?: NO

## 2024-01-20 ASSESSMENT — PAIN DESCRIPTION - LOCATION: LOCATION: HEAD

## 2024-01-20 NOTE — PROGRESS NOTES
[facility] Hospitalist Progress Note       [id number]: Please page me for patient care issues.  [phone]: Please page night hospitalist for any issues.     Elaine Coffey  :  1965(58 y.o.)  MRN:  [id number]  PCP: SHEILA Beal-CNP    Assessment and Plan:  Principal Problem:    Chest pain    # Recurrent chest pain likely due to coronary vasospasm rule out other etiologies  # hx CAD s/p COLETTE  -recent C w/o obstructive CAD  -Trial of Imdur  -continue  home ASA, diltiazem,   -Hold home Lasix due to risk of hypotension  -UA unremarkable for UTI  - CTA chest w/o acute findings except incidental 1.4 cm sclerotic lesion at T12, mildly  increased since prior CT 2018.  And rib fractures: anterior right 3rd rib and nondisplaced anterior right 4th rib,       DVT Prophylaxis: Subq Lovenox    Code status: Full Code  Diet: Adult diet Regular    Disposition:await consultant recommendations and anticipate discharge  vs     Level of MDM:  Moderate    I personally examined the patient and I personally reviewed chart, data, labs radiology reports    Total time spent: 35 minutes, of total time providing counseling or in coordination of care. Total time on this day of visit includes record and documentation review before and after visit including documentation and time not explicitly included on EMR time stamp    Subjective:  Interval History:  HPI  The patient complains of CP  The patient feels their symptoms areimproving  no events or new concerns    Elaine Coffey is a 58 y.o. female with PMH CAD s/p COLETTE (), MS, Asthma, recently admitted for CP s/p C (1/3/23). Pt presented to the ED again with another episode of CP (burning sensation underneath left breast with radiation to left lateral thorax and left shoulder). Associated with nausea, shortness of breath, dizziness and palpitations w/o obstructive CAD. ED work up revealed elevated torponin. Following cardiology evaluation it was deemed that patients  symptoms are likely due to coronary vasospasm. Patient was admitted and trial of imdur was initiated.    Scheduled Meds:acetaminophen, 975 mg, oral, q8h  amitriptyline, 25 mg, oral, Nightly  aspirin, 81 mg, oral, q AM  atorvastatin, 40 mg, oral, Nightly  clonazePAM, 0.5 mg, oral, Nightly  clopidogrel, 75 mg, oral, Daily  dilTIAZem CD, 240 mg, oral, Daily  enoxaparin, 40 mg, subcutaneous, q24h  ergocalciferol, 1,250 mcg, oral, Weekly  ezetimibe, 10 mg, oral, Nightly  FLUoxetine, 40 mg, oral, q AM  [Held by provider] furosemide, 40 mg, oral, q AM  gabapentin, 400 mg, oral, TID  isosorbide mononitrate ER, 30 mg, oral, Daily  lactulose, 20 g, oral, BID  levothyroxine, 50 mcg, oral, q AM  spironolactone, 25 mg, oral, q AM      Continuous Infusions:   PRN Meds:PRN medications: albuterol, meclizine, nitroglycerin, tiZANidine    Review of Systems   All other systems reviewed and are negative.    Interval Pertinent History:  Social History     Tobacco Use    Smoking status: Never    Smokeless tobacco: Never   Substance Use Topics    Alcohol use: Never       Objective:  Patient Vitals for the past 24 hrs:   BP Temp Temp src Pulse Resp SpO2   24 0908 109/69 35.7 °C (96.3 °F) Temporal 76 18 94 %   24 0420 109/67 36 °C (96.8 °F) Temporal 76 18 94 %   24 1754 139/78 -- -- 94 -- 95 %   24 1639 125/60 36.1 °C (97 °F) Temporal 101 18 95 %   24 1549 126/82 36 °C (96.8 °F) -- 83 19 94 %   24 1402 123/70 36.6 °C (97.9 °F) Tympanic 88 18 94 %   24 1235 125/76 36.5 °C (97.7 °F) Tympanic 84 18 95 %   24 1133 136/84 36.4 °C (97.5 °F) Tympanic 95 18 98 %       Average, Min, and Max for last 24 hours Vitals:  TEMPERATURE:  Temp  Av.2 °C (97.1 °F)  Min: 35.7 °C (96.3 °F)  Max: 36.6 °C (97.9 °F)    RESPIRATIONS RANGE: Resp  Av.1  Min: 18  Max: 19    PULSE RANGE: Pulse  Av.1  Min: 76  Max: 101    BLOOD PRESSURE RANGE:  Systolic (24hrs), Av , Min:109 , Max:139   ; Diastolic  (24hrs), Av, Min:60, Max:84      PULSE OXIMETRY RANGE: SpO2  Av.9 %  Min: 94 %  Max: 98 %    I/O last 3 completed shifts:  In: 240 (2.3 mL/kg) [P.O.:240]  Out: - (0 mL/kg)   Weight: 103.4 kg     Physical Exam  Vitals and nursing note reviewed.   Constitutional:       Appearance: Normal appearance.   HENT:      Head: Normocephalic and atraumatic.      Right Ear: External ear normal.      Left Ear: External ear normal.      Nose: Nose normal.      Mouth/Throat:      Mouth: Mucous membranes are moist.   Eyes:      General: No scleral icterus.        Right eye: No discharge.         Left eye: No discharge.      Extraocular Movements: Extraocular movements intact.      Conjunctiva/sclera: Conjunctivae normal.      Pupils: Pupils are equal, round, and reactive to light.   Cardiovascular:      Rate and Rhythm: Normal rate and regular rhythm.   Pulmonary:      Effort: Pulmonary effort is normal.      Breath sounds: Normal breath sounds.   Abdominal:      General: Abdomen is flat. Bowel sounds are normal.      Palpations: Abdomen is soft.   Musculoskeletal:         General: Normal range of motion.      Right lower leg: No edema.      Left lower leg: No edema.   Skin:     General: Skin is warm and dry.      Capillary Refill: Capillary refill takes less than 2 seconds.   Neurological:      General: No focal deficit present.   Psychiatric:         Mood and Affect: Mood normal.         Thought Content: Thought content normal.         Judgment: Judgment normal.         Lab Results   Component Value Date     (L) 2024    K 4.4 2024    CL 99 2024    CO2 30 2024    BUN 14 2024    CREATININE 0.91 2024    GLUCOSE 113 (H) 2024    CALCIUM 9.4 2024    PROT 6.1 (L) 2024    BILITOT 1.0 2024    ALKPHOS 104 2024    AST 15 2024    ALT 16 2024    GLOB 2.6 2020       Lab Results   Component Value Date    WBC 6.3 2024    HGB 13.8 2024     "HCT 41.3 01/20/2024    MCV 87 01/20/2024     01/20/2024    LYMPHOPCT 19.3 01/20/2024    RBC 4.76 01/20/2024    MCH 29.0 01/20/2024    MCHC 33.4 01/20/2024    RDW 12.9 01/20/2024    CRP 0.12 03/11/2023       No components found for: \"LABA1C\"  No components found for: \"LFT\"    IMAGES:  Encounter Date: 01/08/24   ECG 12 lead   Result Value    Ventricular Rate 63    Atrial Rate 63    DE Interval 136    QRS Duration 94    QT Interval 418    QTC Calculation(Bazett) 427    P Axis 41    R Axis -18    T Axis 44    QRS Count 11    Q Onset 212    P Onset 144    P Offset 187    T Offset 421    QTC Fredericia 424    Narrative    Normal sinus rhythm  Cannot rule out Anterior infarct (cited on or before 02-JAN-2024)  Abnormal ECG  When compared with ECG of 08-JAN-2024 15:02, (unconfirmed)  Criteria for Inferior infarct are no longer Present  Questionable change in initial forces of Anterolateral leads  See ED provider note for full interpretation and clinical correlation  Confirmed by Cyndy Cruz ([id number]) on 1/10/2024 3:36:00 PM     CT angio chest for pulmonary embolism    Result Date: 1/19/2024  Impression: 1. No evidence of pulmonary emboli. 2. Mild atelectasis at the lingula. 3. Cholelithiasis. 4. Large amount of stool at the visualized colon. Please correlate for constipation. 5. Redemonstration of 1.4 cm sclerotic lesion at T12, mildly increased since prior CT 01/05/2018. 6. Rib fracture with callus formation at the anterior right 3rd rib and nondisplaced fracture at the anterior right 4th rib, new since prior CT 05/02/2023, may represent subacute/healing fractures. Please correlate with clinical history/point tenderness.     MACRO: None.   Signed by: Guerline Nunez 1/19/2024 8:23 PM Dictation workstation:   [id number]    Transthoracic Echo (TTE) Complete    Result Date: 1/3/2024   [facility], [address] [address]               Tel [phone] and Fax [phone] TRANSTHORACIC " ECHOCARDIOGRAM REPORT  Patient Name:      JOSSY KNAPP         [place] Physician:    [id number] Jessie Lopez MD Study Date:        1/3/2024             Ordering Provider:    [id number] DAMION C                                                               ISABEL MRN/PID:           [id number]             Fellow: Accession#:        [id number]         Nurse: Date of Birth/Age: 1965 / 58 years Sonographer:          Radha Pugh RDCS Gender:            F                    Additional Staff: Height:            170.18 cm            Admit Date:           1/2/2024 Weight:            100.25 kg            Admission Status:     Inpatient -                                                               Routine BSA:               2.11 m2              Encounter#:           [id number]                                         Department Location:  [facility] Non                                                               Invasive Blood Pressure: 144 /77 mmHg Study Type:    TRANSTHORACIC ECHO (TTE) COMPLETE Diagnosis/ICD: Chest pain, unspecified-R07.9 Indication:    Chest Pain CPT Code:      Echo Complete w Full [id number] Patient History: Pertinent History: Chest Pain and Dyspnea. Study Detail: The following Echo studies were performed: 2D, M-Mode, Doppler and               color flow. Technically challenging study due to prominent lung               artifact. The patient was awake.  PHYSICIAN INTERPRETATION: Left Ventricle: The left ventricular systolic function is normal, with an estimated ejection fraction of 55-60%. The left ventricular cavity size is normal. Spectral Doppler shows a normal pattern of left ventricular diastolic filling. Left Atrium: The left atrium is normal in size. Right Ventricle: The right ventricle is normal in size. There is normal right ventricular global systolic function.  Right Atrium: The right atrium is normal in size. Aortic Valve: The aortic valve is trileaflet. There is no evidence of aortic valve stenosis. There is no evidence of aortic valve regurgitation. The peak instantaneous gradient of the aortic valve is 10.1 mmHg. The mean gradient of the aortic valve is 6.0 mmHg. Mitral Valve: The mitral valve is normal in structure. There is trace to mild mitral valve regurgitation. Tricuspid Valve: The tricuspid valve was not well visualized. There is trace to mild tricuspid regurgitation. The Doppler estimated RVSP is within normal limits at 22.5 mmHg. Pulmonic Valve: The pulmonic valve is not well visualized. There is physiologic pulmonic valve regurgitation. Pericardium: There is no pericardial effusion noted. Aorta: The aortic root is normal. Systemic Veins: The inferior vena cava appears to be of normal size. There is IVC inspiratory collapse greater than 50%. In comparison to the previous echocardiogram(s): Compared with study from 8/20/2021, no significant change.  CONCLUSIONS:  1. Left ventricular systolic function is normal with a 55-60% estimated ejection fraction.  2. RVSP within normal limits. QUANTITATIVE DATA SUMMARY: 2D MEASUREMENTS:                           Normal Ranges: Ao Root d:     2.90 cm    (2.0-3.7cm) IVSd:          1.41 cm    (0.6-1.1cm) LVPWd:         1.41 cm    (0.6-1.1cm) LVIDd:         5.10 cm    (3.9-5.9cm) LVIDs:         3.71 cm LV Mass Index: 143.9 g/m2 LV % FS        27.3 % LA VOLUME:                               Normal Ranges: LA Vol A4C:        38.4 ml    (22+/-6mL/m2) LA Vol A2C:        57.3 ml LA Vol BP:         48.0 ml LA Vol Index A4C:  18.2ml/m2 LA Vol Index A2C:  27.2 ml/m2 LA Vol Index BP:   22.8 ml/m2 LA Area A4C:       15.1 cm2 LA Area A2C:       18.9 cm2 LA Major Axis A4C: 5.0 cm LA Major Axis A2C: 5.3 cm LA Volume Index:   21.3 ml/m2 LA Vol A4C:        35.0 ml LA Vol A2C:        55.0 ml RA VOLUME BY A/L METHOD:                        Normal Ranges: RA Area A4C: 13.3 cm2 M-MODE MEASUREMENTS:                  Normal Ranges: Ao Root: 2.70 cm (2.0-3.7cm) AoV Exc: 1.60 cm (1.5-2.5cm) LAs:     3.20 cm (2.7-4.0cm) AORTA MEASUREMENTS:                      Normal Ranges: AoV Exc:     1.60 cm (1.5-2.5cm) Ao Sinus, d: 2.90 cm (2.1-3.5cm) Asc Ao, d:   3.10 cm (2.1-3.4cm) LV SYSTOLIC FUNCTION BY 2D PLANIMETRY (MOD):                     Normal Ranges: EF-A4C View: 69.9 % (>=55%) EF-A2C View: 54.4 % EF-Biplane:  62.6 % LV DIASTOLIC FUNCTION:                               Normal Ranges: MV Peak E:        0.89 m/s    (0.7-1.2 m/s) MV Peak A:        0.86 m/s    (0.42-0.7 m/s) E/A Ratio:        1.03        (1.0-2.2) MV e'             0.11 m/s    (>8.0) MV lateral e'     0.11 m/s MV medial e'      0.07 m/s MV A Dur:         143.00 msec E/e' Ratio:       8.10        (<8.0) PulmV Sys Carlos:    62.70 cm/s PulmV Dotson Carlos:   49.70 cm/s PulmV S/D Carlos:    1.30 PulmV A Revs Carlos: 26.70 cm/s PulmV A Revs Dur: 108.00 msec MITRAL VALVE:                 Normal Ranges: MV DT: 248 msec (150-240msec) AORTIC VALVE:                                    Normal Ranges: AoV Vmax:                1.59 m/s  (<=1.7m/s) AoV Peak PG:             10.1 mmHg (<20mmHg) AoV Mean P.0 mmHg  (1.7-11.5mmHg) LVOT Max Carlos:            1.05 m/s  (<=1.1m/s) AoV VTI:                 35.60 cm  (18-25cm) LVOT VTI:                25.50 cm LVOT Diameter:           2.00 cm   (1.8-2.4cm) AoV Area, VTI:           2.25 cm2  (2.5-5.5cm2) AoV Area,Vmax:           2.07 cm2  (2.5-4.5cm2) AoV Dimensionless Index: 0.72  RIGHT VENTRICLE: RV Basal 3.08 cm RV Mid   2.47 cm RV Major 7.6 cm TAPSE:   22.4 mm RV s'    0.11 m/s TRICUSPID VALVE/RVSP:                             Normal Ranges: Peak TR Velocity: 2.21 m/s RV Syst Pressure: 22.5 mmHg (< 30mmHg) IVC Diam:         1.74 cm PULMONIC VALVE:                      Normal Ranges: PV Max Carlos: 0.9 m/s  (0.6-0.9m/s) PV Max PG:  3.3 mmHg Pulmonary Veins: PulmV A Revs  Dur: 108.00 msec PulmV A Revs Carlos: 26.70 cm/s PulmV Dotson Carlos:   49.70 cm/s PulmV S/D Carlos:    1.30 PulmV Sys Carlos:    62.70 cm/s  [id number] Jessie Lopez MD Electronically signed on 1/3/2024 at 9:31:43 AM  ** Final **    === 01/19/24 ===    XR CHEST 1 VIEW    - Impression -  Minimal atelectasis or consolidation in the left base.  Signed by Baldo Beatty MD  === 01/19/24 ===    CT ANGIO CHEST FOR PULMONARY EMBOLISM    - Impression -  1. No evidence of pulmonary emboli.  2. Mild atelectasis at the lingula.  3. Cholelithiasis.  4. Large amount of stool at the visualized colon. Please correlate  for constipation.  5. Redemonstration of 1.4 cm sclerotic lesion at T12, mildly  increased since prior CT 01/05/2018.  6. Rib fracture with callus formation at the anterior right 3rd rib  and nondisplaced fracture at the anterior right 4th rib, new since  prior CT 05/02/2023, may represent subacute/healing fractures. Please  correlate with clinical history/point tenderness.      MACRO:  None.    Signed by: Guerline Nunez 1/19/2024 8:23 PM  Dictation workstation:   [id number]  === 01/19/24 ===    XR CHEST 1 VIEW    - Impression -  Minimal atelectasis or consolidation in the left base.  Signed by Baldo Beatty MD  === 10/06/20 ===    MR ANKLE LEFT WO CONTRAST    - Impression -  1. 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  2. No posterior tibial tenosynovitis. No tendinitis or focal tendon tear.  3. Sequela of chronic subtalar sprain with chronic thickened cervical  ligament and mild hindfoot valgus.  4. Chronic plantar fasciitis.    This report has been produced using speech recognition.    Original Interpreting Physician:   SALVADOR MONTES MD  Original Transcribed by/Date: [facility]   Oct  7 2020  8:41A  Original Electronically Signed by/Date:  SALVADOR MONTES MD Oct  7 2020  8:41A    Addendum Interpreting Physician:  Addendum Transcribed by/Date: NO ADDENDUM  Addendum Electronically Signed by/Date:    Additional results of the last 24 hours have been reviewed.    Electronically signed by Jody Landers DO on 01/20/24 at 11:12 AM    Dictated using [employer] Version 2.4  Proof read however unrecognized voice recognition errors may have occurred

## 2024-01-20 NOTE — DISCHARGE INSTRUCTIONS
-Continue Imdur 30mg daily,   -Continue home ASA, Atorvastatin 40mg daily, Plavix 75mg daily, Diltiazem 240mg daily, Lasix 40mg daily, Zetia,   -Stop Spironolactone and Metoprolol (if your taking them at home)    -Discontinue nitro PRN   -Follow up with cardiology in 4-6 weeks

## 2024-01-20 NOTE — CARE PLAN
The patient's goals for the shift include      The clinical goals for the shift include pt will remain comfortable during shift

## 2024-01-20 NOTE — PROGRESS NOTES
Subjective Data:  Sitting up in bed,  at bedside. Denies chest pain, shortness of breath, heart palpations, dizziness or orthopnea.     Overnight Events:    No events overnight      Objective Data:  Last Recorded Vitals:  Vitals:    01/19/24 1639 01/19/24 1754 01/20/24 0420 01/20/24 0908   BP: 125/60 139/78 109/67 109/69   BP Location:    Right arm   Patient Position:   Lying Sitting   Pulse: 101 94 76 76   Resp: 18  18 18   Temp: 36.1 °C (97 °F)  36 °C (96.8 °F) 35.7 °C (96.3 °F)   TempSrc: Temporal  Temporal Temporal   SpO2: 95% 95% 94% 94%   Weight:       Height:           Last Labs:  CBC - 1/20/2024:  9:23 AM  6.3 13.8 237    41.3      CMP - 1/20/2024:  9:23 AM  9.4 6.1 15 --- 1.0   2.8 4.1 16 104      PTT - 1/8/2024:  5:21 PM  1.0   11.3 34     TROPHS   Date/Time Value Ref Range Status   01/19/2024 12:25  0 - 13 ng/L Final   01/19/2024 10:58 AM 34 0 - 13 ng/L Final   01/19/2024 09:59 AM 17 0 - 13 ng/L Final     BNP   Date/Time Value Ref Range Status   01/19/2024 09:59 AM 8 0 - 99 pg/mL Final   01/08/2024 05:21 PM 19 0 - 99 pg/mL Final     HGBA1C   Date/Time Value Ref Range Status   01/03/2024 06:40 AM 5.3 see below % Final   01/02/2024 12:25 PM 5.3 see below % Final     LDLCALC   Date/Time Value Ref Range Status   01/03/2024 06:40 AM 74 <=99 mg/dL Final     Comment:                                 Near   Borderline      AGE      Desirable  Optimal    High     High     Very High     0-19 Y     0 - 109     ---    110-129   >/= 130     ----    20-24 Y     0 - 119     ---    120-159   >/= 160     ----      >24 Y     0 -  99   100-129  130-159   160-189     >/=190     12/15/2023 08:51 AM 86 <=99 mg/dL Final     Comment:                                 Near   Borderline      AGE      Desirable  Optimal    High     High     Very High     0-19 Y     0 - 109     ---    110-129   >/= 130     ----    20-24 Y     0 - 119     ---    120-159   >/= 160     ----      >24 Y     0 -  99   100-129  130-159   160-189      >/=190     09/23/2020 02:03 PM 79 65 - 130 MG/DL Final   01/01/2018 02:34 AM 67 65 - 130 MG/DL Final     VLDL   Date/Time Value Ref Range Status   01/03/2024 06:40 AM 30 0 - 40 mg/dL Final   12/15/2023 08:51 AM 24 0 - 40 mg/dL Final   01/06/2018 06:45 AM 31 0 - 40 mg/dL Final      Last I/O:  I/O last 3 completed shifts:  In: 240 (2.3 mL/kg) [P.O.:240]  Out: - (0 mL/kg)   Weight: 103.4 kg     Past Cardiology Tests (Last 3 Years):  EKG:  ECG 12 lead 01/08/2024      ECG 12 lead 01/08/2024      ECG 12 lead 01/02/2024      ECG 12 lead 01/02/2024    Echo:  Transthoracic Echo (TTE) Complete 01/03/2024    Ejection Fractions:  EF   Date/Time Value Ref Range Status   01/03/2024 08:47 AM 63       Cath:  Cardiac catheterization - coronary  (Needs Review)    Stress Test:  Nuclear Stress Test 05/25/2023    Cardiac Imaging:  No results found for this or any previous visit from the past 1095 days.      Inpatient Medications:  Scheduled medications   Medication Dose Route Frequency    acetaminophen  975 mg oral q8h    amitriptyline  25 mg oral Nightly    aspirin  81 mg oral q AM    atorvastatin  40 mg oral Nightly    clonazePAM  0.5 mg oral Nightly    clopidogrel  75 mg oral Daily    dilTIAZem CD  240 mg oral Daily    enoxaparin  40 mg subcutaneous q24h    ergocalciferol  1,250 mcg oral Weekly    ezetimibe  10 mg oral Nightly    FLUoxetine  40 mg oral q AM    [Held by provider] furosemide  40 mg oral q AM    gabapentin  400 mg oral TID    isosorbide mononitrate ER  30 mg oral Daily    lactulose  20 g oral BID    levothyroxine  50 mcg oral q AM    spironolactone  25 mg oral q AM     PRN medications   Medication    albuterol    meclizine    nitroglycerin    tiZANidine     Continuous Medications   Medication Dose Last Rate       Physical Exam  Vitals reviewed.   HENT:      Head: Normocephalic.      Nose: Nose normal.   Eyes:      Pupils: Pupils are equal, round, and reactive to light.   Cardiovascular:      Rate and Rhythm: Normal  rate and regular rhythm.   Pulmonary:      Effort: Pulmonary effort is normal.      Breath sounds: Normal breath sounds.   Abdominal:      General: Abdomen is flat.      Palpations: Abdomen is soft.   Musculoskeletal:         General: Normal range of motion.      Cervical back: Normal range of motion.   Skin:     General: Skin is warm and dry.   Neurological:      General: No focal deficit present.      Mental Status: He is alert and oriented to person, place, and time.   Psychiatric:         Mood and Affect: Mood normal.         Behavior: Behavior normal.        Assessment/Plan   Elaine Coffey is a 59 yo female with a PMH of Asthma, MS, CAD w/ LHC with PTCA vs PCI in 2018 who presented with chest pain, [facility] 01/03/2024 w/o obstructive CAD.      #Chest pain 2/2 coronary vasospasm  #HTN  #HLD     -Continue Imdur 30mg daily,   -Continue home ASA, Atorvastatin 40mg daily, Plavix 75mg daily, Diltiazem 240mg daily, Lasix 40mg daily, Zetia,   -Stop Spironolactone.   -Discontinue nitro PRN   -Follow up with cardiology in 4-6 weeks     Cardiology will sign off, thank you.     Jayshree Erickson, APRN-CNP

## 2024-01-20 NOTE — SIGNIFICANT EVENT
Pt c/o constipation and only one small BM in a week. CT showed evidence of. She is passing flatus and not nauseated or vomiting.    Pt assessed and abdomen, distended, BS present x's 4 and nontender.    Plan-Lactulose x's 3 doses. Monitor for BM.

## 2024-01-20 NOTE — DISCHARGE SUMMARY
[facility] Hospitalist Discharge Summary and Transition Note           Elaine Coffey                  :  1965                     MRN:  [id number]     ADMIT DATE:  2024            DISCHARGE DATE:  2024     PRIMARY CARE PHYSICIAN:  SHEILA Beal-CNP     VISIT STATUS: Admission     CODE STATUS:  Full Code     DISCHARGE DIAGNOSES:    Principal Problem:    Chest pain       HOSPITAL COURSE:  Elaine Coffey is a 58 y.o. female with PMH CAD s/p COLETTE (), MS, Asthma, recently admitted for CP s/p C (1/3/23). Pt presented to the ED again with another episode of CP (burning sensation underneath left breast with radiation to left lateral thorax and left shoulder). Associated with nausea, shortness of breath, dizziness and palpitations w/o obstructive CAD. ED work up revealed elevated torponin. Following cardiology evaluation it was deemed that patients symptoms are likely due to coronary vasospasm. Patient was admitted and trial of imdur was initiated. Patient was discharge home once medically optimized with resolved symptoms.    # Recurrent chest pain likely due to coronary vasospasm rule out other etiologies  # hx CAD s/p COLETTE  # Incidental rib fracture-rec outpt dexa scan  # Asymptomatic cholelithiasis  -recent [facility] w/o obstructive CAD  -Trial of Imdur  -continue  home ASA, diltiazem,   -D home Lasix/aldactone due to risk of hypotension  -UA unremarkable for UTI  - CTA chest w/o acute findings except incidental 1.4 cm sclerotic lesion at T12, mildly  increased since prior CT 2018.  And rib fractures: anterior right 3rd rib and nondisplaced anterior right 4th rib,      PROCEDURES:  none     CONSULTANTS:  cardiology     DISCHARGE MEDICATIONS:        Significant Medication Changes:         Your medication list        START taking these medications        Instructions Last Dose Given Next Dose Due   isosorbide mononitrate ER 30 mg 24 hr tablet  Commonly known as: Imdur  Start taking on: January  21, 2024      Take 1 tablet (30 mg) by mouth once daily. Do not crush or chew. Do not start before January 21, 2024.              CHANGE how you take these medications        Instructions Last Dose Given Next Dose Due   FLUoxetine 40 mg capsule  Commonly known as: PROzac  What changed: when to take this      Take 1 capsule (40 mg) by mouth once daily.              CONTINUE taking these medications        Instructions Last Dose Given Next Dose Due   albuterol 2.5 mg /3 mL (0.083 %) nebulizer solution           albuterol 90 mcg/actuation inhaler      INHALE 1 TO 2 PUFFS EVERY 4 TO 6 HOURS AS NEEDED       amitriptyline 25 mg tablet  Commonly known as: Elavil           aspirin 81 mg EC tablet           atorvastatin 40 mg tablet  Commonly known as: Lipitor           clonazePAM 0.5 mg disintegrating tablet  Commonly known as: KlonoPIN      Take 1 tablet (0.5 mg) by mouth once daily at bedtime.       clopidogrel 75 mg tablet  Commonly known as: Plavix      Take 1 tablet (75 mg) by mouth once daily.       dilTIAZem  mg 24 hr capsule  Commonly known as: Cardizem CD      Take 1 capsule (240 mg) by mouth once daily.       ergocalciferol 1.25 MG (72043 UT) capsule  Commonly known as: Vitamin D-2           ezetimibe 10 mg tablet  Commonly known as: Zetia      Take 1 tablet (10 mg) by mouth once daily at bedtime.       gabapentin 400 mg capsule  Commonly known as: Neurontin           levothyroxine 50 mcg tablet  Commonly known as: Synthroid, Levoxyl           meclizine 25 mg tablet  Commonly known as: Antivert           nystatin 100,000 unit/gram powder  Commonly known as: Mycostatin      Apply 1 Application topically 2 times a day.       nystatin ointment  Commonly known as: Mycostatin      Apply topically 2 times a day.       nystatin-triamcinolone ointment  Commonly known as: Mycolog II      APPLY TO AFFECTED AREA TWICE A DAY       tiZANidine 4 mg tablet  Commonly known as: Zanaflex                  STOP taking these  medications      furosemide 40 mg tablet  Commonly known as: Lasix        nitroglycerin 0.4 mg SL tablet  Commonly known as: Nitrostat        spironolactone 25 mg tablet  Commonly known as: Aldactone                  Where to Get Your Medications        These medications were sent to [facility]/pharmacy #[id number] - [place], [place] - [address] [address]      Phone: [phone]   isosorbide mononitrate ER 30 mg 24 hr tablet           DIET: cardiac    COMPLEXITY OF FOLLOW UP:   []Moderate Complexity: follow up within 7-14 calendar days (34327)   [x]Severe Complexity: follow up within 7 calendar days (41850)     FOLLOW UP TESTING, PENDING RESULTS OR REFERRALS AT TRANSITIONAL CARE VISIT:    [x] Yes Monitor BP and dexa scan    [] No    DISPOSITION:  home       Follow up with NAYANA Beal  .        DISCHARGE TIME: > 30 minutes     Electronically signed by Jody Landers DO on 01/20/24 at 2:31 PM    Dictated using [employer] Version 2.4  Proof read however unrecognized voice recognition errors may have occurred

## 2024-01-20 NOTE — NURSING NOTE
Discharge instructions and medication changes reviewed with patient and spouse. New prescription noted along with side effects. Advised to monitor blood pressures at home. Follow up appointments noted to schedule per patient. IV removed, intact. Telemetry returned. No additional questions.

## 2024-01-22 ENCOUNTER — APPOINTMENT (OUTPATIENT)
Dept: PRIMARY CARE | Facility: CLINIC | Age: 59
End: 2024-01-22
Payer: COMMERCIAL

## 2024-01-22 ENCOUNTER — DOCUMENTATION (OUTPATIENT)
Dept: PRIMARY CARE | Facility: CLINIC | Age: 59
End: 2024-01-22
Payer: COMMERCIAL

## 2024-01-22 NOTE — PROGRESS NOTES
Discharge Facility:  [facility]  Discharge Diagnosis:  chest pain  Admission Date:  1/19/24  Discharge Date: 1/20/24    PCP Appointment Date:  2/22/24  NOT in 14 day window    Specialist Appointment Date:   Cardiac rehab 1/23/24    Hospital Encounter and Summary: Linked

## 2024-01-23 ENCOUNTER — APPOINTMENT (OUTPATIENT)
Dept: CARDIAC REHAB | Facility: HOSPITAL | Age: 59
End: 2024-01-23
Payer: COMMERCIAL

## 2024-01-26 ENCOUNTER — OFFICE VISIT (OUTPATIENT)
Dept: CARDIOLOGY | Facility: HOSPITAL | Age: 59
End: 2024-01-26
Payer: COMMERCIAL

## 2024-01-26 VITALS
BODY MASS INDEX: 36.74 KG/M2 | OXYGEN SATURATION: 97 % | HEART RATE: 86 BPM | SYSTOLIC BLOOD PRESSURE: 126 MMHG | DIASTOLIC BLOOD PRESSURE: 81 MMHG | WEIGHT: 234.57 LBS

## 2024-01-26 DIAGNOSIS — R07.9 CHEST PAIN, UNSPECIFIED TYPE: ICD-10-CM

## 2024-01-26 DIAGNOSIS — I21.29 MYOCARDIAL INFARCTION INVOLVING OTHER CORONARY ARTERY, UNSPECIFIED MI TYPE (MULTI): Primary | ICD-10-CM

## 2024-01-26 PROCEDURE — 99214 OFFICE O/P EST MOD 30 MIN: CPT | Performed by: NURSE PRACTITIONER

## 2024-01-26 PROCEDURE — 1036F TOBACCO NON-USER: CPT | Performed by: NURSE PRACTITIONER

## 2024-01-26 PROCEDURE — 3008F BODY MASS INDEX DOCD: CPT | Performed by: NURSE PRACTITIONER

## 2024-01-26 RX ORDER — RANOLAZINE 500 MG/1
500 TABLET, EXTENDED RELEASE ORAL 2 TIMES DAILY
Qty: 60 TABLET | Refills: 11 | Status: SHIPPED | OUTPATIENT
Start: 2024-01-26 | End: 2025-01-25

## 2024-01-26 ASSESSMENT — ENCOUNTER SYMPTOMS
ENDOCRINE NEGATIVE: 1
EYES NEGATIVE: 1
MYALGIAS: 1
PSYCHIATRIC NEGATIVE: 1
GASTROINTESTINAL NEGATIVE: 1
HEMATOLOGIC/LYMPHATIC NEGATIVE: 1
DEPRESSION: 0
NEUROLOGICAL NEGATIVE: 1
NECK PAIN: 1
RESPIRATORY NEGATIVE: 1

## 2024-01-26 NOTE — PATIENT INSTRUCTIONS
CALL WITH ANY QUESTIONS   TAKE LASIX ONE TABLET WHEN NEEDED   RESTART METOPROLOL 25 MG DAILY   STOP THE IMDUR   START RANEXA 500 MG TWICE A DAY   FOLLOW UP IN ONE MONTH

## 2024-01-26 NOTE — PROGRESS NOTES
Referred by Dr. Inge sutherland. provider found for No chief complaint on file.     History Of Present Illness:    Elaine Coffey is a very pleasant 58 year old female with a history of HTN, HLD, MS and CAD, she is here for a follow up visit. The patient is seen in collaboration with Dr. Gutierres. Mrs. Coffey was recently admitted twice with chest pain. Elevated troponin, heart cath showed minimal CAD. Echocardiogram showed a preserved LV function. She was started on Imdur, has had difficulty taking the medication due to headaches. She complains of increased swelling in her legs, feet and hands. Denies chest pain or shortness of breath.     Review of Systems   Constitutional: Positive for malaise/fatigue.   HENT: Negative.     Eyes: Negative.    Cardiovascular:  Positive for chest pain.   Respiratory: Negative.     Endocrine: Negative.    Hematologic/Lymphatic: Negative.    Skin: Negative.    Musculoskeletal:  Positive for myalgias and neck pain.   Gastrointestinal: Negative.    Neurological: Negative.    Psychiatric/Behavioral: Negative.          Past Medical History:  She has a past medical history of Abnormal ECG, Asthma, CHF (congestive heart failure) (CMS/HCC), Coronary artery disease, Hidradenitis suppurativa, Hypertension, Multiple sclerosis (CMS/HCC), Myocardial infarction (CMS/HCC), and Sleep apnea.    Past Surgical History:  She has a past surgical history that includes  section, classic (2014); Other surgical history (2014); Other surgical history (2014); Hysterectomy (2014); Other surgical history (2020); Cardiac catheterization (N/A, 2024); Ankle surgery (Left); and Coronary stent placement.      Social History:  She reports that she quit smoking about 32 years ago. Her smoking use included cigarettes. She started smoking about 41 years ago. She has a 5.00 pack-year smoking history. She has never used smokeless tobacco. She reports that she does not drink alcohol and does  not use drugs.    Family History:  Family History   Problem Relation Name Age of Onset    Heart attack Mother Elaine Garcia     Bilateral breast cancer Mother Elaine Garcia     Emphysema Mother Elaine Garcia     Blood clot Mother Elaine Garcia     Abnormal EKG Mother Elaine Garcia     Anemia Mother Elaine Garcia     Angina Mother Elaine Garcia     Depression Mother Elaine Garcia     Heart disease Mother Elaine Garcia     Lung disease Mother Elaine Garcia     Thyroid disease Mother Elaine Garcia     Heart attack Father Dawit Garcia     Lymphoma Father Dawit Garcia     Angina Father Dawit Garcia     Cancer Father Dawit Garcia     Diabetes type I Father Dawit Garcia     Heart disease Father Dawit Garcia     Hypertension Father Dawit Garcia     Blood clot Sister      Ovarian cancer Maternal Grandmother Delmy Martínez     Stroke Maternal Grandmother Delmy Martínez     Heart attack Maternal Grandmother Delmy Martínez     Diabetes type I Paternal Grandmother Olive Garcia     Asthma Sister Heidi Garcia     Kidney disease Sister Kaylynn Garcia         Allergies:  Gadolinium-containing contrast media    Outpatient Medications:  Current Outpatient Medications   Medication Instructions    albuterol 90 mcg/actuation inhaler INHALE 1 TO 2 PUFFS EVERY 4 TO 6 HOURS AS NEEDED    albuterol 2.5 mg, nebulization, Every 4 hours PRN    amitriptyline (ELAVIL) 25 mg, oral, Nightly    aspirin 81 mg EC tablet 1 tablet, oral, Every morning    atorvastatin (LIPITOR) 40 mg, oral, Nightly    clonazePAM (KLONOPIN) 0.5 mg, oral, Nightly    clopidogrel (PLAVIX) 75 mg, oral, Daily    dilTIAZem CD (CARDIZEM CD) 240 mg, oral, Daily    ergocalciferol (Vitamin D-2) 1.25 MG (21445 UT) capsule 1 capsule, oral, Weekly, On Saturdays    ezetimibe (ZETIA) 10 mg, oral, Nightly    FLUoxetine (PROZAC) 40 mg, oral, Daily    gabapentin (NEURONTIN) 400 mg, oral, 3 times daily, Last OARRS fill: 4/7/23 #270 for 90 days    isosorbide mononitrate ER  (IMDUR) 30 mg, oral, Daily, Do not crush or chew.    levothyroxine (SYNTHROID, LEVOXYL) 50 mcg, oral, Every morning    meclizine (ANTIVERT) 25 mg, oral, Every 6 hours PRN    nystatin (Mycostatin) 100,000 unit/gram powder 1 Application, Topical, 2 times daily    nystatin (Mycostatin) ointment Topical, 2 times daily    nystatin-triamcinolone (Mycolog II) ointment Topical, 2 times daily, to affected area    tiZANidine (ZANAFLEX) 4 mg, oral, Nightly PRN, FOR SPASMS        Last Recorded Vitals:  Vitals:    01/26/24 0810   BP: 126/81   Pulse: 86   SpO2: 97%   Weight: 106 kg (234 lb 9.1 oz)       Physical Exam:  Physical Exam  Vitals reviewed.   HENT:      Head: Normocephalic.      Nose: Nose normal.   Eyes:      Pupils: Pupils are equal, round, and reactive to light.   Cardiovascular:      Rate and Rhythm: Normal rate and regular rhythm.   Pulmonary:      Effort: Pulmonary effort is normal.      Breath sounds: Normal breath sounds.   Abdominal:      General: Abdomen is flat.      Palpations: Abdomen is soft.   Musculoskeletal:         General: Normal range of motion.      Cervical back: Normal range of motion.   Skin:     General: Skin is warm and dry.   Neurological:      General: No focal deficit present.      Mental Status: He is alert and oriented to person, place, and time.   Psychiatric:         Mood and Affect: Mood normal.          Last Labs:  CBC -  Lab Results   Component Value Date    WBC 6.3 01/20/2024    HGB 13.8 01/20/2024    HCT 41.3 01/20/2024    MCV 87 01/20/2024     01/20/2024       CMP -  Lab Results   Component Value Date    CALCIUM 9.4 01/20/2024    PHOS 2.8 01/09/2024    PROT 6.1 (L) 01/20/2024    ALBUMIN 4.1 01/20/2024    AST 15 01/20/2024    ALT 16 01/20/2024    ALKPHOS 104 01/20/2024    BILITOT 1.0 01/20/2024       LIPID PANEL -   Lab Results   Component Value Date    CHOL 143 01/03/2024    TRIG 150 (H) 01/03/2024    HDL 38.8 01/03/2024    CHHDL 3.7 01/03/2024    LDLF 86 01/06/2018    VLDL  30 01/03/2024    NHDL 104 01/03/2024       RENAL FUNCTION PANEL -   Lab Results   Component Value Date    GLUCOSE 113 (H) 01/20/2024     (L) 01/20/2024    K 4.4 01/20/2024    CL 99 01/20/2024    CO2 30 01/20/2024    ANIONGAP 10 01/20/2024    BUN 14 01/20/2024    CREATININE 0.91 01/20/2024    GFRMALE CANCELED 08/04/2023    CALCIUM 9.4 01/20/2024    PHOS 2.8 01/09/2024    ALBUMIN 4.1 01/20/2024        Lab Results   Component Value Date    BNP 8 01/19/2024    HGBA1C 5.3 01/03/2024       Last Cardiology Tests:  ECG:  ECG 12 lead 01/19/2024 (Preliminary)    Echo:  Transthoracic Echo (TTE) Complete 01/03/2024   1. Left ventricular systolic function is normal with a 55-60% estimated ejection fraction.   2. RVSP within normal limits.    Echocardiogram 8/20/2021  1. The left ventricular systolic function is normal with a 60% estimated ejection fraction.  2. Spectral Doppler shows an impaired relaxation pattern of left ventricular diastolic filling.  3. There is trace to mild mitral and tricuspid regurgitation.    echocardiogram 1/18/2018   1. The left ventricular systolic function is low normal with a 50-55% estimated  ejection fraction.   2. Spectral Doppler shows an impaired relaxation pattern of left ventricular  diastolic filling.   3. There is mild mitral and tricuspid regurgitaiton.      Echocardiogram 1/18/2018   LVEF 50-55%   mild MR   mild TR     Ejection Fractions:  LVEF 55-60%    Cath:  Cardiac catheterization - coronary   Cath 1/2/2024  . Left main: no significant angiographic disease.   2. LAD: no significant angiographic disease.   3. LCx: no significant angiographic disease.   4. RCA: patent proximal stent, no significant angiographic disease.   5. LVEDP 18mmHg, no aortic stenosis on LV-Ao gradient.    Cardiac cath 12/14/2018   1. No significant angiographic CAD, ostial-proximal RCA patent.   2. LVEDP 31mmHg, no aortic stenosis on LV-Ao gradient.  Stress Test:  Nuclear Stress Test 05/25/2023  1. No  evidence of inducible myocardial ischemia. Predominantly fixed  moderate-sized moderate to severe perfusion defects involving  inferior wall as well as inferolateral wall, likely represent prior  infarction. Similar to prior exam on 11/20/2018.  2. The left ventricle is normal in size.  3. Gated images demonstrate normal LV wall motion except hypokinesis  in the inferior wall with a post-stress LV EF estimated at 56%.      nuclear stress test 11/20/2018   1. Moderate-sized territory of largely fixed prior completed  myocardial infarction involving the basal inferior/inferolateral wall  with minimal nonsignificant flower-infarct ischemia in the  inferolateral territory.  2. There is moderate dilation of the left ventricle with an  end-diastolic volume calculated at 145 mL.  3. Wall motion or LV quantification was not assessed due to unable to  do gated imaged secondary to arrhythmia.    Cardiac Imaging:      Assessment/Plan      Mrs. Coffey is a very pleasant 58 year old female with a history of CAD, HTN, HLD and MS. She had a PCI on 1/4/2018 at [facility]. She had a repeat heart cath on 12/14/2018 showing a patent RCA and no significant CAD. Recently admitted with chest pain, and elevated troponin. Heart cath was unchanged from 12/2018. Echocardiogram showed a preserved LV function. Chest pain due to vasospasm. She is not tolerating the Imdur, heart rate has been elevated. She will stop the Imdur, and start Ranexa 500 mg twice a day. She will be restarted on Metoprolol ER 25 mg daily for better heart rate control.  Heart rate and blood pressure are well controlled today.      Plan   -call with any questions   -continue ASA, Atorvastatin, and Spironolactone  -continue Caredizem   -start Metoprolol ER 25 mg daily   -take Lasix 40 mg daily as needed for swelling   -stop the Imdur   -start Ranexa 500 mg daily   -start Cardiac rehab   -follow up in one month   -continue Lasix 80 mg daily          Mia Edmondson,  APRN-CNP

## 2024-01-27 LAB
ATRIAL RATE: 91 BPM
P AXIS: 44 DEGREES
P OFFSET: 186 MS
P ONSET: 141 MS
PR INTERVAL: 146 MS
Q ONSET: 214 MS
QRS COUNT: 15 BEATS
QRS DURATION: 98 MS
QT INTERVAL: 380 MS
QTC CALCULATION(BAZETT): 467 MS
QTC FREDERICIA: 437 MS
R AXIS: -59 DEGREES
T AXIS: 49 DEGREES
T OFFSET: 404 MS
VENTRICULAR RATE: 91 BPM

## 2024-01-29 ENCOUNTER — APPOINTMENT (OUTPATIENT)
Dept: CARDIOLOGY | Facility: HOSPITAL | Age: 59
End: 2024-01-29
Payer: COMMERCIAL

## 2024-01-30 ENCOUNTER — APPOINTMENT (OUTPATIENT)
Dept: CARDIOLOGY | Facility: HOSPITAL | Age: 59
End: 2024-01-30
Payer: COMMERCIAL

## 2024-01-30 ENCOUNTER — CLINICAL SUPPORT (OUTPATIENT)
Dept: CARDIAC REHAB | Facility: HOSPITAL | Age: 59
End: 2024-01-30
Payer: COMMERCIAL

## 2024-01-30 DIAGNOSIS — I20.1 VARIANT ANGINA (CMS-HCC): ICD-10-CM

## 2024-01-30 DIAGNOSIS — R07.9 CHEST PAIN, UNSPECIFIED TYPE: ICD-10-CM

## 2024-01-30 DIAGNOSIS — Z95.820 STATUS POST ANGIOPLASTY WITH STENT: ICD-10-CM

## 2024-01-30 LAB
ATRIAL RATE: 111 BPM
P AXIS: 58 DEGREES
P OFFSET: 187 MS
P ONSET: 141 MS
PR INTERVAL: 144 MS
Q ONSET: 213 MS
QRS COUNT: 19 BEATS
QRS DURATION: 88 MS
QT INTERVAL: 364 MS
QTC CALCULATION(BAZETT): 495 MS
QTC FREDERICIA: 447 MS
R AXIS: -57 DEGREES
T AXIS: 55 DEGREES
T OFFSET: 395 MS
VENTRICULAR RATE: 111 BPM

## 2024-02-01 ENCOUNTER — TELEPHONE (OUTPATIENT)
Dept: CARDIOLOGY | Facility: HOSPITAL | Age: 59
End: 2024-02-01
Payer: COMMERCIAL

## 2024-02-02 ENCOUNTER — PATIENT OUTREACH (OUTPATIENT)
Dept: PRIMARY CARE | Facility: CLINIC | Age: 59
End: 2024-02-02
Payer: COMMERCIAL

## 2024-02-02 DIAGNOSIS — E03.9 HYPOTHYROIDISM, UNSPECIFIED TYPE: Primary | ICD-10-CM

## 2024-02-02 RX ORDER — LEVOTHYROXINE SODIUM 50 UG/1
50 TABLET ORAL DAILY
Qty: 90 TABLET | Refills: 0 | Status: SHIPPED | OUTPATIENT
Start: 2024-02-02

## 2024-02-02 NOTE — PROGRESS NOTES
Unable to reach patient for call back after patient did not have a follow up appointment with PCP.   LVM with call back number for patient to call if needed

## 2024-02-04 ENCOUNTER — APPOINTMENT (OUTPATIENT)
Dept: RADIOLOGY | Facility: HOSPITAL | Age: 59
End: 2024-02-04
Payer: COMMERCIAL

## 2024-02-04 ENCOUNTER — HOSPITAL ENCOUNTER (EMERGENCY)
Facility: HOSPITAL | Age: 59
Discharge: HOME | End: 2024-02-04
Attending: STUDENT IN AN ORGANIZED HEALTH CARE EDUCATION/TRAINING PROGRAM
Payer: COMMERCIAL

## 2024-02-04 VITALS
DIASTOLIC BLOOD PRESSURE: 51 MMHG | WEIGHT: 237 LBS | HEART RATE: 71 BPM | HEIGHT: 67 IN | OXYGEN SATURATION: 94 % | TEMPERATURE: 97.5 F | SYSTOLIC BLOOD PRESSURE: 119 MMHG | BODY MASS INDEX: 37.2 KG/M2 | RESPIRATION RATE: 18 BRPM

## 2024-02-04 DIAGNOSIS — K80.80 BILIARY CALCULUS OF OTHER SITE WITHOUT OBSTRUCTION: ICD-10-CM

## 2024-02-04 DIAGNOSIS — R10.9 ABDOMINAL PAIN, UNSPECIFIED ABDOMINAL LOCATION: Primary | ICD-10-CM

## 2024-02-04 PROBLEM — K80.20 CHOLELITHIASIS: Status: ACTIVE | Noted: 2024-02-04

## 2024-02-04 LAB
ALBUMIN SERPL BCP-MCNC: 4.4 G/DL (ref 3.4–5)
ALP SERPL-CCNC: 110 U/L (ref 33–110)
ALT SERPL W P-5'-P-CCNC: 33 U/L (ref 7–45)
ANION GAP SERPL CALC-SCNC: 15 MMOL/L (ref 10–20)
APPEARANCE UR: CLEAR
AST SERPL W P-5'-P-CCNC: 20 U/L (ref 9–39)
BASOPHILS # BLD AUTO: 0.04 X10*3/UL (ref 0–0.1)
BASOPHILS NFR BLD AUTO: 0.3 %
BILIRUB SERPL-MCNC: 0.8 MG/DL (ref 0–1.2)
BILIRUB UR STRIP.AUTO-MCNC: NEGATIVE MG/DL
BUN SERPL-MCNC: 19 MG/DL (ref 6–23)
CALCIUM SERPL-MCNC: 9.7 MG/DL (ref 8.6–10.3)
CHLORIDE SERPL-SCNC: 99 MMOL/L (ref 98–107)
CO2 SERPL-SCNC: 30 MMOL/L (ref 21–32)
COLOR UR: ABNORMAL
CREAT SERPL-MCNC: 1.22 MG/DL (ref 0.5–1.05)
EGFRCR SERPLBLD CKD-EPI 2021: 52 ML/MIN/1.73M*2
EOSINOPHIL # BLD AUTO: 0.15 X10*3/UL (ref 0–0.7)
EOSINOPHIL NFR BLD AUTO: 1 %
ERYTHROCYTE [DISTWIDTH] IN BLOOD BY AUTOMATED COUNT: 13.8 % (ref 11.5–14.5)
FLUAV RNA RESP QL NAA+PROBE: NOT DETECTED
FLUBV RNA RESP QL NAA+PROBE: NOT DETECTED
GLUCOSE SERPL-MCNC: 130 MG/DL (ref 74–99)
GLUCOSE UR STRIP.AUTO-MCNC: NEGATIVE MG/DL
HCT VFR BLD AUTO: 43.7 % (ref 36–46)
HGB BLD-MCNC: 14.9 G/DL (ref 12–16)
IMM GRANULOCYTES # BLD AUTO: 0.05 X10*3/UL (ref 0–0.7)
IMM GRANULOCYTES NFR BLD AUTO: 0.3 % (ref 0–0.9)
KETONES UR STRIP.AUTO-MCNC: ABNORMAL MG/DL
LACTATE SERPL-SCNC: 1.4 MMOL/L (ref 0.4–2)
LEUKOCYTE ESTERASE UR QL STRIP.AUTO: NEGATIVE
LIPASE SERPL-CCNC: 27 U/L (ref 9–82)
LYMPHOCYTES # BLD AUTO: 0.53 X10*3/UL (ref 1.2–4.8)
LYMPHOCYTES NFR BLD AUTO: 3.6 %
MCH RBC QN AUTO: 30 PG (ref 26–34)
MCHC RBC AUTO-ENTMCNC: 34.1 G/DL (ref 32–36)
MCV RBC AUTO: 88 FL (ref 80–100)
MONOCYTES # BLD AUTO: 0.95 X10*3/UL (ref 0.1–1)
MONOCYTES NFR BLD AUTO: 6.4 %
NEUTROPHILS # BLD AUTO: 13.2 X10*3/UL (ref 1.2–7.7)
NEUTROPHILS NFR BLD AUTO: 88.4 %
NITRITE UR QL STRIP.AUTO: NEGATIVE
NRBC BLD-RTO: 0 /100 WBCS (ref 0–0)
PH UR STRIP.AUTO: 6 [PH]
PLATELET # BLD AUTO: 204 X10*3/UL (ref 150–450)
POTASSIUM SERPL-SCNC: 4 MMOL/L (ref 3.5–5.3)
PROT SERPL-MCNC: 6.6 G/DL (ref 6.4–8.2)
PROT UR STRIP.AUTO-MCNC: NEGATIVE MG/DL
RBC # BLD AUTO: 4.97 X10*6/UL (ref 4–5.2)
RBC # UR STRIP.AUTO: NEGATIVE /UL
SARS-COV-2 RNA RESP QL NAA+PROBE: NOT DETECTED
SODIUM SERPL-SCNC: 140 MMOL/L (ref 136–145)
SP GR UR STRIP.AUTO: 1.04
UROBILINOGEN UR STRIP.AUTO-MCNC: 2 MG/DL
WBC # BLD AUTO: 14.9 X10*3/UL (ref 4.4–11.3)

## 2024-02-04 PROCEDURE — 2550000001 HC RX 255 CONTRASTS: Performed by: STUDENT IN AN ORGANIZED HEALTH CARE EDUCATION/TRAINING PROGRAM

## 2024-02-04 PROCEDURE — 96374 THER/PROPH/DIAG INJ IV PUSH: CPT | Mod: 59 | Performed by: EMERGENCY MEDICINE

## 2024-02-04 PROCEDURE — 99285 EMERGENCY DEPT VISIT HI MDM: CPT | Mod: 25 | Performed by: STUDENT IN AN ORGANIZED HEALTH CARE EDUCATION/TRAINING PROGRAM

## 2024-02-04 PROCEDURE — 81003 URINALYSIS AUTO W/O SCOPE: CPT | Performed by: STUDENT IN AN ORGANIZED HEALTH CARE EDUCATION/TRAINING PROGRAM

## 2024-02-04 PROCEDURE — 76705 ECHO EXAM OF ABDOMEN: CPT | Mod: FOREIGN READ | Performed by: RADIOLOGY

## 2024-02-04 PROCEDURE — 96376 TX/PRO/DX INJ SAME DRUG ADON: CPT | Performed by: EMERGENCY MEDICINE

## 2024-02-04 PROCEDURE — 2500000004 HC RX 250 GENERAL PHARMACY W/ HCPCS (ALT 636 FOR OP/ED)

## 2024-02-04 PROCEDURE — 36415 COLL VENOUS BLD VENIPUNCTURE: CPT | Performed by: STUDENT IN AN ORGANIZED HEALTH CARE EDUCATION/TRAINING PROGRAM

## 2024-02-04 PROCEDURE — 74177 CT ABD & PELVIS W/CONTRAST: CPT

## 2024-02-04 PROCEDURE — 83605 ASSAY OF LACTIC ACID: CPT | Performed by: STUDENT IN AN ORGANIZED HEALTH CARE EDUCATION/TRAINING PROGRAM

## 2024-02-04 PROCEDURE — 2500000004 HC RX 250 GENERAL PHARMACY W/ HCPCS (ALT 636 FOR OP/ED): Performed by: EMERGENCY MEDICINE

## 2024-02-04 PROCEDURE — 76705 ECHO EXAM OF ABDOMEN: CPT

## 2024-02-04 PROCEDURE — 74177 CT ABD & PELVIS W/CONTRAST: CPT | Mod: FOREIGN READ | Performed by: RADIOLOGY

## 2024-02-04 PROCEDURE — 83690 ASSAY OF LIPASE: CPT | Performed by: STUDENT IN AN ORGANIZED HEALTH CARE EDUCATION/TRAINING PROGRAM

## 2024-02-04 PROCEDURE — 80053 COMPREHEN METABOLIC PANEL: CPT | Performed by: STUDENT IN AN ORGANIZED HEALTH CARE EDUCATION/TRAINING PROGRAM

## 2024-02-04 PROCEDURE — 85025 COMPLETE CBC W/AUTO DIFF WBC: CPT | Performed by: STUDENT IN AN ORGANIZED HEALTH CARE EDUCATION/TRAINING PROGRAM

## 2024-02-04 PROCEDURE — 87636 SARSCOV2 & INF A&B AMP PRB: CPT | Performed by: STUDENT IN AN ORGANIZED HEALTH CARE EDUCATION/TRAINING PROGRAM

## 2024-02-04 PROCEDURE — 2500000004 HC RX 250 GENERAL PHARMACY W/ HCPCS (ALT 636 FOR OP/ED): Performed by: STUDENT IN AN ORGANIZED HEALTH CARE EDUCATION/TRAINING PROGRAM

## 2024-02-04 RX ORDER — FENTANYL CITRATE 50 UG/ML
50 INJECTION, SOLUTION INTRAMUSCULAR; INTRAVENOUS ONCE
Status: COMPLETED | OUTPATIENT
Start: 2024-02-04 | End: 2024-02-04

## 2024-02-04 RX ORDER — PRAMOXINE HYDROCHLORIDE 10 MG/G
AEROSOL, FOAM TOPICAL EVERY 6 HOURS PRN
Qty: 15 G | Refills: 0 | Status: SHIPPED | OUTPATIENT
Start: 2024-02-04 | End: 2024-02-11

## 2024-02-04 RX ORDER — DICYCLOMINE HYDROCHLORIDE 20 MG/1
20 TABLET ORAL 4 TIMES DAILY PRN
Qty: 20 TABLET | Refills: 0 | Status: SHIPPED | OUTPATIENT
Start: 2024-02-04 | End: 2024-02-14

## 2024-02-04 RX ORDER — ONDANSETRON HYDROCHLORIDE 2 MG/ML
INJECTION, SOLUTION INTRAVENOUS
Status: COMPLETED
Start: 2024-02-04 | End: 2024-02-04

## 2024-02-04 RX ORDER — ONDANSETRON HYDROCHLORIDE 2 MG/ML
4 INJECTION, SOLUTION INTRAVENOUS ONCE
Status: COMPLETED | OUTPATIENT
Start: 2024-02-04 | End: 2024-02-04

## 2024-02-04 RX ADMIN — IOHEXOL 75 ML: 350 INJECTION, SOLUTION INTRAVENOUS at 04:34

## 2024-02-04 RX ADMIN — FENTANYL CITRATE 50 MCG: 50 INJECTION INTRAMUSCULAR; INTRAVENOUS at 07:58

## 2024-02-04 RX ADMIN — ONDANSETRON HYDROCHLORIDE 4 MG: 2 INJECTION, SOLUTION INTRAVENOUS at 04:05

## 2024-02-04 RX ADMIN — SODIUM CHLORIDE 1000 ML: 9 INJECTION, SOLUTION INTRAVENOUS at 04:04

## 2024-02-04 RX ADMIN — ONDANSETRON 4 MG: 2 INJECTION INTRAMUSCULAR; INTRAVENOUS at 04:05

## 2024-02-04 RX ADMIN — FENTANYL CITRATE 50 MCG: 50 INJECTION INTRAMUSCULAR; INTRAVENOUS at 04:05

## 2024-02-04 ASSESSMENT — PAIN DESCRIPTION - LOCATION: LOCATION: ABDOMEN

## 2024-02-04 ASSESSMENT — PAIN - FUNCTIONAL ASSESSMENT: PAIN_FUNCTIONAL_ASSESSMENT: 0-10

## 2024-02-04 ASSESSMENT — PAIN DESCRIPTION - ORIENTATION: ORIENTATION: LOWER

## 2024-02-04 ASSESSMENT — LIFESTYLE VARIABLES
HAVE PEOPLE ANNOYED YOU BY CRITICIZING YOUR DRINKING: NO
EVER HAD A DRINK FIRST THING IN THE MORNING TO STEADY YOUR NERVES TO GET RID OF A HANGOVER: NO
HAVE YOU EVER FELT YOU SHOULD CUT DOWN ON YOUR DRINKING: NO
EVER FELT BAD OR GUILTY ABOUT YOUR DRINKING: NO

## 2024-02-04 ASSESSMENT — PAIN SCALES - GENERAL: PAINLEVEL_OUTOF10: 10 - WORST POSSIBLE PAIN

## 2024-02-04 NOTE — ED PROVIDER NOTES
History/Exam limitations: none  HPI was provided by patient    HPI:    Chief Complaint   Patient presents with    Abdominal Pain     Lower abd/pelvic pain started yesterday. Describes the pain as a stabbing sensation. Pt endorses dizziness, nausea. Reports the pain initially started in the LLQ and has moved to the umbilicus region. Denied urinary s/s. Reports increased difficulty with BM's. HX of MI 6 years ago and 3 'mini' MI's last month. A&Ox4.         Elaine Coffey is a 58 y.o. female presents with chief complaint listed above.   They deny recent travel, suspicious food intake, similar symptoms found amongst close contacts or recent antibiotic use.  Pain is midline in her abdomen and so the left lower middle and right lower quadrant and slightly into her left flank.  Pain has been constant and sharp for about a day.  Described as sharp stabbing pain.  Pain on his note pain and pelvic pain but patient not having any.  No vaginal bleeding or discharge.  States slight difficulty having bowel movement given the abdominal pain.  Denies any pain like this occurring in the past.  Pain worse with palpation.  Admits to some nausea but no vomiting.  Pain not made better by anything.       ROS:  All other review of systems are negative except as noted above and HPI or ROS.   CONSTITUTIONAL: fever, chills  ENT: sore throat, congestion, rhinorrhea  CARDIOVASCULAR: chest pain, palpitations, swelling  RESPIRATORY: cough, wheeze, shortness of breath  GI: nausea, vomiting, diarrhea, abdominal pain,  black or tarry stools, constipation  GENITOURINARY: dysuria, hematuria, frequency  MUSCULOSKELETAL: deformity, neck pain  SKIN: rash, lesion  NEUROLOGIC: headache, numbness, focal weakness  NOTES: All systems reviewed, negative except as described above     Physical Exam:  GENERAL: Alert, oriented , cooperative,  in no acute distress.  HEAD: normocephalic, atraumatic  SKIN:  dry skin, no lesions.  EYES: PERRL, EOMs intact,   Conjunctiva pink with no erythema or exudates. No scleral icterus.   ENT: No external deformities. Nares patent, mucus membranes moist.  Pharynx clear, uvula midline.   NECK: Supple, without meningismus. Trachea at midline. No lymphadenopathy.  PULMONARY: Clear bilaterally. No crackles, rhonchi, wheezing.  No respiratory distress.  No work of breathing.  CARDIAC: Regular rate and regular rhythm.  Pulses 2+ in radials and dorsal pedal pulses bilaterally.  No murmur, rub, gallop.  No edema.  ABDOMEN: Soft, mid abdominal left lower periumbilical tender to palpation, active bowel sounds.  No palpable organomegaly.  No rebound or guarding.  No CVA tenderness.  No pulsatile masses.  Negative Steward sign, negative McBurney point  MUSCULOSKELETAL: Full range of motion throughout, no deformity.   NEUROLOGICAL:  no focal neuro deficits.  Strength 5 out of 5 throughout bilateral upper and lower extremities neurovascular intact in bilateral upper and lower extremities  PSYCHIATRIC: Appropriate mood and affect. Calm.    Medical Decision Making:     The patient presented for evaluation of abdominal pain.  Differential included but not limited to UTI, bowel obstruction, appendicitis, constipation, viral illness, pancreatitis, cholecystitis.  Imaging studies if performed were independently reviewed and interpreted by myself and confirmed by radiologist.  Was given IV fentanyl, Zofran and fluids.  On reevaluation patient's pain has improved and abdomen nontender on palpation and nonsurgical.  Nontoxic-appearing.  Pending urine and ultrasound to be performed and resulted patient was signed out to oncoming physician at shift change.       External Records Reviewed: I reviewed recent and relevant outside records            I discussed the differential, results and plan with the patient and/or family/friend/caregiver if present.         Past Medical History:   Diagnosis Date    Abnormal ECG     Asthma     CHF (congestive heart failure)  (CMS/[facility])     Coronary artery disease     Hidradenitis suppurativa     Hypertension     Multiple sclerosis (CMS/[facility])     Myocardial infarction (CMS/[facility])     Sleep apnea       Social History     Socioeconomic History    Marital status:      Spouse name: Not on file    Number of children: Not on file    Years of education: Not on file    Highest education level: Not on file   Occupational History    Not on file   Tobacco Use    Smoking status: Former     Packs/day: 1.00     Years: 5.00     Additional pack years: 0.00     Total pack years: 5.00     Types: Cigarettes     Start date: 1983     Quit date: 10/1/1991     Years since quittin.3    Smokeless tobacco: Never   Vaping Use    Vaping Use: Never used   Substance and Sexual Activity    Alcohol use: Never    Drug use: Never    Sexual activity: Not Currently     Partners: Male     Birth control/protection: Abstinence   Other Topics Concern    Not on file   Social History Narrative    Not on file     Social Determinants of Health     Financial Resource Strain: Low Risk  (2024)    Overall Financial Resource Strain (CARDIA)     Difficulty of Paying Living Expenses: Not hard at all   Food Insecurity: Not on file   Transportation Needs: No Transportation Needs (2024)    PRAPARE - Transportation     Lack of Transportation (Medical): No     Lack of Transportation (Non-Medical): No   Physical Activity: Not on file   Stress: Not on file   Social Connections: Not on file   Intimate Partner Violence: Not on file   Housing Stability: Low Risk  (2024)    Housing Stability Vital Sign     Unable to Pay for Housing in the Last Year: No     Number of Places Lived in the Last Year: 1     Unstable Housing in the Last Year: No     Current Outpatient Medications   Medication Instructions    albuterol 90 mcg/actuation inhaler INHALE 1 TO 2 PUFFS EVERY 4 TO 6 HOURS AS NEEDED    albuterol 2.5 mg, nebulization, Every 4 hours PRN    amitriptyline (ELAVIL) 25 mg,  "oral, Nightly    aspirin 81 mg EC tablet 1 tablet, oral, Every morning    atorvastatin (LIPITOR) 40 mg, oral, Nightly    clonazePAM (KLONOPIN) 0.5 mg, oral, Nightly    clopidogrel (PLAVIX) 75 mg, oral, Daily    dicyclomine (BENTYL) 20 mg, oral, 4 times daily PRN    dilTIAZem CD (CARDIZEM CD) 240 mg, oral, Daily    ergocalciferol (Vitamin D-2) 1.25 MG (88872 UT) capsule 1 capsule, oral, Weekly, On Saturdays    ezetimibe (ZETIA) 10 mg, oral, Nightly    FLUoxetine (PROZAC) 40 mg, oral, Daily    gabapentin (NEURONTIN) 400 mg, oral, 3 times daily, Last OARRS fill: 4/7/23 #270 for 90 days    levothyroxine (SYNTHROID, LEVOXYL) 50 mcg, oral, Daily    meclizine (ANTIVERT) 25 mg, oral, Every 6 hours PRN    nystatin (Mycostatin) 100,000 unit/gram powder 1 Application, Topical, 2 times daily    nystatin (Mycostatin) ointment Topical, 2 times daily    nystatin-triamcinolone (Mycolog II) ointment Topical, 2 times daily, to affected area    pramoxine (Proctofoam) 1 % foam rectal, Every 6 hours PRN    ranolazine (RANEXA) 500 mg, oral, 2 times daily, Do not crush, chew, or split.    tiZANidine (ZANAFLEX) 4 mg, oral, Nightly PRN, FOR SPASMS     Allergies   Allergen Reactions    Gadolinium-Containing Contrast Media Anaphylaxis     Pt developed nausea without vomiting , SOB, after receiving the IV contrast . Pt states she always gets nauseated but this time \"was worse\"/pt/           ED Triage Vitals [02/04/24 0337]   Temperature Heart Rate Respirations BP   36.4 °C (97.5 °F) 71 18 107/61      Pulse Ox Temp src Heart Rate Source Patient Position   97 % -- -- --      BP Location FiO2 (%)     -- --               Labs and Imaging were reviewed and discussed with patient          ED Course as of 02/04/24 1854   Sun Feb 04, 2024   0426 Creatinine(!): 1.22 [WL]   0426 WBC(!): 14.9 [WL]   0530 CT abdomen pelvis w IV contrast  IMPRESSION:  1.  Large amount fecal debris throughout the colon..  2.  Cholelithiasis.  Gallbladder is distended.   " [WL]   0530 with distended gallbladder findings with stones we will get an ultrasound the gallbladder. [WL]      ED Course User Index  [WL] Ilan Mckoy DO         Diagnoses as of 02/04/24 1854   Abdominal pain, unspecified abdominal location   Biliary calculus of other site without obstruction               Procedure  Procedures         Note: This note was dictated by speech recognition. Minor errors in transcription may be present.          Ilan Mckoy DO  02/04/24 1854

## 2024-02-04 NOTE — PROGRESS NOTES
This was a signout on February 4, 2024 at 0600 hrs.  Ultrasound shows gallstones.  Patient was given fentanyl and does feel better.  At this time she will be discharged with Bentyl and Proctofoam because she states she has some external hemorrhoids that are bothering her.  She can be safely followed up with her surgeon.    Roxana Weaver, DO

## 2024-02-06 ENCOUNTER — APPOINTMENT (OUTPATIENT)
Dept: PRIMARY CARE | Facility: CLINIC | Age: 59
End: 2024-02-06
Payer: COMMERCIAL

## 2024-02-07 ENCOUNTER — HOSPITAL ENCOUNTER (OUTPATIENT)
Dept: CARDIOLOGY | Facility: HOSPITAL | Age: 59
Discharge: HOME | End: 2024-02-07
Payer: COMMERCIAL

## 2024-02-07 DIAGNOSIS — Z95.820 STATUS POST ANGIOPLASTY WITH STENT: ICD-10-CM

## 2024-02-07 DIAGNOSIS — I20.1 VARIANT ANGINA (CMS-HCC): ICD-10-CM

## 2024-02-07 PROCEDURE — 93017 CV STRESS TEST TRACING ONLY: CPT

## 2024-02-07 PROCEDURE — 93016 CV STRESS TEST SUPVJ ONLY: CPT | Performed by: INTERNAL MEDICINE

## 2024-02-07 PROCEDURE — 93018 CV STRESS TEST I&R ONLY: CPT | Performed by: INTERNAL MEDICINE

## 2024-02-09 ENCOUNTER — DOCUMENTATION (OUTPATIENT)
Dept: CARDIAC REHAB | Facility: HOSPITAL | Age: 59
End: 2024-02-09
Payer: COMMERCIAL

## 2024-02-09 DIAGNOSIS — I50.9 EDEMA DUE TO CONGESTIVE HEART FAILURE (MULTI): ICD-10-CM

## 2024-02-09 DIAGNOSIS — Z95.5 STENTED CORONARY ARTERY: ICD-10-CM

## 2024-02-09 DIAGNOSIS — I21.4 ACUTE MYOCARDIAL INFARCTION, SUBENDOCARDIAL INFARCTION, INITIAL EPISODE OF CARE (MULTI): ICD-10-CM

## 2024-02-09 NOTE — PROGRESS NOTES
Cardiac Rehabilitation Initial Treatment Plan    Name: Elaine Coffey  Medical Record Number: [id number]  YOB: 1965  Age: 58 y.o.    Today’s Date: 2/9/2024  Primary Care Physician: SHEILA Beal-CNP  Referring Physician: Dr. Bhavik TRIPLETT  Program Location: [facility]  Primary Diagnosis: NSTEMI I21.4  Onset/Date of Diagnosis: 1/02/2024    Initial Assessment, not yet started program.    AACVPR Risk Stratification:   HIGH    Falls Risk: Medium  Psychosocial Assessment     PHQ 9=9 MILD DEPRESSION SEVERITY/ PT TAKING MEDICATION    Sent PH-Q 9 to MD if score > 20: No; score < 20    Pt reported/currently experiencing stress: Yes; Stress; Severity: moderate, Anxiety; Severity: moderate, and Depression; Severity: moderate  Patient uses stress management skills: Yes   History of: anxiety and depression  Currently seeing a mental health provider: No  Social Support: Yes, Whom:family  Quality of Life Survey:  Hina and Goldman   Quality of Life Survey:  PRE POST   GLOBAL SCORE 20.12 NA   HEALTH/FUNCTIONING SCORE 17.60 NA   SOCIAL/ECONOMIC SCORE 24.86 NA   PSYCHOLOGICAL/SPIRITUAL SCORE 13.71 NA   FAMILY SCORE 30 NA      Learning Assessment:  Learning assessment/barriers: Emotional  Preferred learning method: Reading handout  Barriers: None  Comments: NA    Stages of Change:Action    Psychosocial Plan    Goal Status: Initial Assessment; goals not yet started    Psychosocial Goals: Demonstrating proper techniques for stress management, Maintain or lower PH-Q 9 score by discharge, Identify strategies for managing depression, and Identify social supports    Psychosocial Interventions/Education:   *STRESS MANAGEMENT HANDOUT  *ASK PATIENT AT LEAST ONCE EVERY 30 DAYS ABOUT STRESS      Nutrition Assessment:    Hyperlipidemia: Yes     Lipids:   Lab Results   Component Value Date    CHOL 143 01/03/2024    HDL 38.8 01/03/2024    LDLF 86 01/06/2018    TRIG 150 (H) 01/03/2024       Current Dietary  Guidelines: Low fat, Low sodium Pt following weight watchers program.  Barriers to dietary change: no    Diet Habit Survey: Rate Your Plate  Pre:  64/69  Post: To be done at discharge.    Diabetes Assessment    Lab Results   Component Value Date    HGBA1C 5.3 01/03/2024       History of Diabetes: No    Weight Management     INTAKE WEIGHT 237.6    Nutrition Plan    Goal Status: Initial Assessment; goals not yet started    Nutrition Goals:   TARGET SCORE BY DISCHARGE 55-69  UNDERSTAND LOW FAT/ LOW CHOLESTEROL SODIUM RESTRICTED DIET (1500MG)  LOSE 1-2 LBS PER WEEK.     Nutrition Interventions/Education:   *NUTRITION EDUCATION HANDOUTS  *CHOLESTEROL MANAGEMENT HANDOUT        Exercise Assessment    No  Mode: NA  Frequency: NA  Duration: NA    Exercise Prescription     Exercise Prescription based on: Pre-rehab stress test   Frequency:  3 days/week   Mode: Treadmill   Duration: 45-60 total aerobic minutes   Intensity: RPE 12-13  Target HR:     MET Level: TBD ( stress result not available)  Patient wears supplemental O2: No     Modality Workload METs Duration (minutes)   1 Pre-Exercise      2 Treadmill 1.8  2.4 06 :00   3 Recumbent Bike 28  2.8 06 :00   4 NuStep 50  2.6 06 :00   5 Weights 2   10 :00   6 Post-Exercise        Resistance Training: Yes   Home Exercise Prescription given: To be given prior to discharge from program.    Exercise Plan    Goal Status: Initial Assessment; goals not yet started    Exercise Goals: Increase exercise MET level by 5-10% each week, Increase total exercise duration to 30-45 minutes, Initiate strength training 2-3 days a week, and Establish a home exercise program before discharge Demonstrate safe and appropriate use of equipment. Verbalize THR.    Exercise Interventions/Education:   *EXERCISE FUNDAMENTALS AND MAINTENANCE HANDOUT  *REVIEW THR AND INSTRUCTIONS FOR RADIAL PULSE CHECK      Other Core Components/Risk Factor Assessment:    Medication adherence  Current Medications:    Medication Documentation Review Audit       Reviewed by Macarena Ojeda MA (Medical Assistant) on 01/26/24 at 0810      Medication Order Taking? Sig Documenting Provider Last Dose Status   albuterol 2.5 mg /3 mL (0.083 %) nebulizer solution [id number] Yes Take 3 mL (2.5 mg) by nebulization every 4 hours if needed for wheezing. Historical Provider, MD Taking Active   albuterol 90 mcg/actuation inhaler [id number] Yes INHALE 1 TO 2 PUFFS EVERY 4 TO 6 HOURS AS NEEDED Raffy Balderrama DO Taking Active   amitriptyline (Elavil) 25 mg tablet [id number] Yes Take 1 tablet (25 mg) by mouth once daily at bedtime. Historical Provider, MD Taking Active   aspirin 81 mg EC tablet [id number] Yes Take 1 tablet (81 mg) by mouth once daily in the morning. Historical Provider, MD Taking Active   atorvastatin (Lipitor) 40 mg tablet [id number] Yes Take 1 tablet (40 mg) by mouth once daily at bedtime. Historical Provider, MD Taking Active   clonazePAM (KlonoPIN) 0.5 mg disintegrating tablet [id number] Yes Take 1 tablet (0.5 mg) by mouth once daily at bedtime.   Patient taking differently: Take 1 tablet (0.5 mg) by mouth once daily at bedtime. Last OARRS fill: 12/4/23 #30 for 30 days    Tena Lee APRN-CNP Taking Active   clopidogrel (Plavix) 75 mg tablet [id number] Yes Take 1 tablet (75 mg) by mouth once daily. Katia Reina PA-C Taking Active   dilTIAZem CD (Cardizem CD) 240 mg 24 hr capsule [id number] Yes Take 1 capsule (240 mg) by mouth once daily. Katia Reina PA-C Taking Active   ergocalciferol (Vitamin D-2) 1.25 MG (34002 UT) capsule [id number] Yes Take 1 capsule (1,250 mcg) by mouth 1 (one) time per week. On Saturdays Historical Provider, MD Taking Active   ezetimibe (Zetia) 10 mg tablet [id number] Yes Take 1 tablet (10 mg) by mouth once daily at bedtime. Katia Reina PA-C Taking Active   FLUoxetine (PROzac) 40 mg capsule [id number] Yes Take 1 capsule (40 mg) by mouth once daily.   Patient taking differently: Take 1  capsule (40 mg) by mouth once daily in the morning.    Raffy Balderrama,  Taking Active    Patient taking differently:   Discontinued 01/20/24 1545   gabapentin (Neurontin) 400 mg capsule [id number] Yes Take 1 capsule (400 mg) by mouth 3 times a day. Last OARRS fill: 4/7/23 #270 for 90 days Historical Provider, MD Taking Active   isosorbide mononitrate ER (Imdur) 30 mg 24 hr tablet [id number] Yes Take 1 tablet (30 mg) by mouth once daily. Do not crush or chew. Do not start before January 21, 2024. Jody Landers DO Taking Active   levothyroxine (Synthroid, Levoxyl) 50 mcg tablet [id number] Yes Take 1 tablet (50 mcg) by mouth once daily in the morning. Historical Provider, MD Taking Active   meclizine (Antivert) 25 mg tablet [id number] Yes Take 1 tablet (25 mg) by mouth every 6 hours if needed for dizziness. Historical Provider, MD Taking Active     Discontinued 01/20/24 1430     Discontinued 01/20/24 1545   nystatin (Mycostatin) 100,000 unit/gram powder [id number] Yes Apply 1 Application topically 2 times a day. NAYANA Beal Taking Active   nystatin (Mycostatin) ointment [id number] Yes Apply topically 2 times a day. NAYANA Beal Taking Active   nystatin-triamcinolone (Mycolog II) ointment [id number]  APPLY TO AFFECTED AREA TWICE A DAY NAYANA Beal  Flag for Review    Patient taking differently:   Discontinued 01/20/24 1545   tiZANidine (Zanaflex) 4 mg tablet [id number] Yes Take 1 tablet (4 mg) by mouth as needed at bedtime for muscle spasms. FOR SPASMS Historical Provider, MD Taking Active                                 Medication compliance: Yes   Uses pill box/organizer: Yes    Carries medication list:  No- will be provided    Blood Pressure Management  History of Hypertension: Yes   Medication Changes: No   Resting BP:  108/68    Heart Failure Management  Hx of Heart Failure: Yes;   Type (selection): HFpEF  Most recent EF:       Onset of heart failure diagnosis: 2018  Last heart  failure hospitalization: na  Number of HF admissions per year: na    Symptoms: Fatigue with exertion and LE edema  Is there a family Hx of HF: No   Does patient obtain daily weight Yes     KCCQ survey: Pre: NA  Post: NA    Heart Failure Reassessment: Initial Treatment Plan. Will reassess in 30 days.    Heart Failure Goals: Able to verbalize signs and symptoms of fluid retention and when to contact MD, Adhere to proper fluid restrictions, Adapt a low sodium diet and verbalize guidelines, and Obtain daily weight and verbalize proper method of obtaining weights    Smoking/Tobacco Assessment FORMER   Social History     Tobacco Use   Smoking Status Former    Packs/day: 1.00    Years: 5.00    Additional pack years: 0.00    Total pack years: 5.00    Types: Cigarettes    Start date: 1983    Quit date: 10/1/1991    Years since quittin.3   Smokeless Tobacco Never       Other Core Component Plan    Goal Status: Initial Assessment; goals not yet started    Other Core Component Goals: Medication compliance, Verbalize medication usage and drug actions by discharge, and Achieve resting BP of < 130/80 by discharge    Other Core Component Interventions/Education:   *EDUCATION: CAD, HYPERTENSION AND STROKE, RISK FACTORS FOR HEART DISEASE, UNDERSTANDING HEART VALVE DISEASE, UNDERSTANDING CHF.  *PROVIDE MED EDUCATION TOOL  *PROVIDE MED LIST IF NEEDED    Individual Patient Goals:    Increased energy for ADL's  Improve diet habits and lose weight. Continue with weight watchers program    Goal Status: Initial Assessment; goals not yet started    Staff Comments:  Advanced directives addressed with pt.    Rehab Staff Signature: Millie Ramos RN

## 2024-02-12 ENCOUNTER — CLINICAL SUPPORT (OUTPATIENT)
Dept: CARDIAC REHAB | Facility: HOSPITAL | Age: 59
End: 2024-02-12
Payer: COMMERCIAL

## 2024-02-12 DIAGNOSIS — I21.4 ACUTE MYOCARDIAL INFARCTION, SUBENDOCARDIAL INFARCTION, INITIAL EPISODE OF CARE (MULTI): ICD-10-CM

## 2024-02-12 PROCEDURE — 93798 PHYS/QHP OP CAR RHAB W/ECG: CPT

## 2024-02-12 RX ORDER — BUMETANIDE 1 MG/1
1 TABLET ORAL 2 TIMES DAILY
Qty: 60 TABLET | Refills: 5 | Status: SHIPPED | OUTPATIENT
Start: 2024-02-12 | End: 2024-02-22 | Stop reason: ALTCHOICE

## 2024-02-16 ENCOUNTER — CLINICAL SUPPORT (OUTPATIENT)
Dept: CARDIAC REHAB | Facility: HOSPITAL | Age: 59
End: 2024-02-16
Payer: COMMERCIAL

## 2024-02-16 DIAGNOSIS — I21.4 ACUTE MYOCARDIAL INFARCTION, SUBENDOCARDIAL INFARCTION, INITIAL EPISODE OF CARE (MULTI): ICD-10-CM

## 2024-02-16 DIAGNOSIS — I21.4 NON-ST ELEVATION MYOCARDIAL INFARCTION (NSTEMI), INITIAL CARE EPISODE (MULTI): ICD-10-CM

## 2024-02-16 PROCEDURE — 93798 PHYS/QHP OP CAR RHAB W/ECG: CPT | Mod: KX

## 2024-02-19 ENCOUNTER — APPOINTMENT (OUTPATIENT)
Dept: RADIOLOGY | Facility: HOSPITAL | Age: 59
End: 2024-02-19
Payer: COMMERCIAL

## 2024-02-19 ENCOUNTER — HOSPITAL ENCOUNTER (EMERGENCY)
Facility: HOSPITAL | Age: 59
Discharge: HOME | End: 2024-02-19
Attending: EMERGENCY MEDICINE
Payer: COMMERCIAL

## 2024-02-19 ENCOUNTER — APPOINTMENT (OUTPATIENT)
Dept: CARDIOLOGY | Facility: HOSPITAL | Age: 59
End: 2024-02-19
Payer: COMMERCIAL

## 2024-02-19 ENCOUNTER — CLINICAL SUPPORT (OUTPATIENT)
Dept: CARDIAC REHAB | Facility: HOSPITAL | Age: 59
End: 2024-02-19
Payer: COMMERCIAL

## 2024-02-19 VITALS
WEIGHT: 240 LBS | SYSTOLIC BLOOD PRESSURE: 119 MMHG | BODY MASS INDEX: 37.67 KG/M2 | HEIGHT: 67 IN | RESPIRATION RATE: 12 BRPM | TEMPERATURE: 97.5 F | OXYGEN SATURATION: 95 % | HEART RATE: 62 BPM | DIASTOLIC BLOOD PRESSURE: 64 MMHG

## 2024-02-19 DIAGNOSIS — I21.4 ACUTE MYOCARDIAL INFARCTION, SUBENDOCARDIAL INFARCTION, INITIAL EPISODE OF CARE (MULTI): ICD-10-CM

## 2024-02-19 DIAGNOSIS — R07.9 CHEST PAIN, UNSPECIFIED TYPE: Primary | ICD-10-CM

## 2024-02-19 LAB
ALBUMIN SERPL BCP-MCNC: 4.4 G/DL (ref 3.4–5)
ALP SERPL-CCNC: 107 U/L (ref 33–110)
ALT SERPL W P-5'-P-CCNC: 27 U/L (ref 7–45)
ANION GAP SERPL CALC-SCNC: 11 MMOL/L (ref 10–20)
APTT PPP: 33 SECONDS (ref 27–38)
AST SERPL W P-5'-P-CCNC: 19 U/L (ref 9–39)
BASOPHILS # BLD AUTO: 0.02 X10*3/UL (ref 0–0.1)
BASOPHILS NFR BLD AUTO: 0.4 %
BILIRUB SERPL-MCNC: 0.7 MG/DL (ref 0–1.2)
BNP SERPL-MCNC: 22 PG/ML (ref 0–99)
BUN SERPL-MCNC: 16 MG/DL (ref 6–23)
CALCIUM SERPL-MCNC: 9.3 MG/DL (ref 8.6–10.3)
CARDIAC TROPONIN I PNL SERPL HS: 5 NG/L (ref 0–13)
CARDIAC TROPONIN I PNL SERPL HS: 6 NG/L (ref 0–13)
CHLORIDE SERPL-SCNC: 100 MMOL/L (ref 98–107)
CO2 SERPL-SCNC: 32 MMOL/L (ref 21–32)
CREAT SERPL-MCNC: 1.02 MG/DL (ref 0.5–1.05)
D DIMER PPP FEU-MCNC: 286 NG/ML FEU
EGFRCR SERPLBLD CKD-EPI 2021: 65 ML/MIN/1.73M*2
EOSINOPHIL # BLD AUTO: 0.19 X10*3/UL (ref 0–0.7)
EOSINOPHIL NFR BLD AUTO: 3.6 %
ERYTHROCYTE [DISTWIDTH] IN BLOOD BY AUTOMATED COUNT: 13.2 % (ref 11.5–14.5)
FLUAV RNA RESP QL NAA+PROBE: NOT DETECTED
FLUBV RNA RESP QL NAA+PROBE: NOT DETECTED
GLUCOSE SERPL-MCNC: 107 MG/DL (ref 74–99)
HCT VFR BLD AUTO: 42.3 % (ref 36–46)
HGB BLD-MCNC: 14.5 G/DL (ref 12–16)
IMM GRANULOCYTES # BLD AUTO: 0.03 X10*3/UL (ref 0–0.7)
IMM GRANULOCYTES NFR BLD AUTO: 0.6 % (ref 0–0.9)
INR PPP: 1 (ref 0.9–1.1)
LYMPHOCYTES # BLD AUTO: 1.2 X10*3/UL (ref 1.2–4.8)
LYMPHOCYTES NFR BLD AUTO: 22.8 %
MAGNESIUM SERPL-MCNC: 2.02 MG/DL (ref 1.6–2.4)
MCH RBC QN AUTO: 29.8 PG (ref 26–34)
MCHC RBC AUTO-ENTMCNC: 34.3 G/DL (ref 32–36)
MCV RBC AUTO: 87 FL (ref 80–100)
MONOCYTES # BLD AUTO: 0.5 X10*3/UL (ref 0.1–1)
MONOCYTES NFR BLD AUTO: 9.5 %
NEUTROPHILS # BLD AUTO: 3.32 X10*3/UL (ref 1.2–7.7)
NEUTROPHILS NFR BLD AUTO: 63.1 %
NRBC BLD-RTO: 0 /100 WBCS (ref 0–0)
PLATELET # BLD AUTO: 221 X10*3/UL (ref 150–450)
POTASSIUM SERPL-SCNC: 3.9 MMOL/L (ref 3.5–5.3)
PROT SERPL-MCNC: 6.9 G/DL (ref 6.4–8.2)
PROTHROMBIN TIME: 11.1 SECONDS (ref 9.8–12.8)
RBC # BLD AUTO: 4.86 X10*6/UL (ref 4–5.2)
RSV RNA RESP QL NAA+PROBE: NOT DETECTED
SARS-COV-2 RNA RESP QL NAA+PROBE: NOT DETECTED
SODIUM SERPL-SCNC: 139 MMOL/L (ref 136–145)
WBC # BLD AUTO: 5.3 X10*3/UL (ref 4.4–11.3)

## 2024-02-19 PROCEDURE — 83880 ASSAY OF NATRIURETIC PEPTIDE: CPT

## 2024-02-19 PROCEDURE — 85610 PROTHROMBIN TIME: CPT | Performed by: EMERGENCY MEDICINE

## 2024-02-19 PROCEDURE — 36415 COLL VENOUS BLD VENIPUNCTURE: CPT

## 2024-02-19 PROCEDURE — 71046 X-RAY EXAM CHEST 2 VIEWS: CPT | Mod: FOREIGN READ | Performed by: RADIOLOGY

## 2024-02-19 PROCEDURE — 85379 FIBRIN DEGRADATION QUANT: CPT

## 2024-02-19 PROCEDURE — 85025 COMPLETE CBC W/AUTO DIFF WBC: CPT | Performed by: EMERGENCY MEDICINE

## 2024-02-19 PROCEDURE — 99283 EMERGENCY DEPT VISIT LOW MDM: CPT

## 2024-02-19 PROCEDURE — 2500000001 HC RX 250 WO HCPCS SELF ADMINISTERED DRUGS (ALT 637 FOR MEDICARE OP)

## 2024-02-19 PROCEDURE — 36415 COLL VENOUS BLD VENIPUNCTURE: CPT | Performed by: EMERGENCY MEDICINE

## 2024-02-19 PROCEDURE — 85730 THROMBOPLASTIN TIME PARTIAL: CPT

## 2024-02-19 PROCEDURE — 80053 COMPREHEN METABOLIC PANEL: CPT | Performed by: EMERGENCY MEDICINE

## 2024-02-19 PROCEDURE — 84484 ASSAY OF TROPONIN QUANT: CPT

## 2024-02-19 PROCEDURE — 93005 ELECTROCARDIOGRAM TRACING: CPT

## 2024-02-19 PROCEDURE — 84484 ASSAY OF TROPONIN QUANT: CPT | Performed by: EMERGENCY MEDICINE

## 2024-02-19 PROCEDURE — 71046 X-RAY EXAM CHEST 2 VIEWS: CPT

## 2024-02-19 PROCEDURE — 83735 ASSAY OF MAGNESIUM: CPT | Performed by: EMERGENCY MEDICINE

## 2024-02-19 PROCEDURE — 87637 SARSCOV2&INF A&B&RSV AMP PRB: CPT

## 2024-02-19 RX ORDER — NAPROXEN SODIUM 220 MG/1
324 TABLET, FILM COATED ORAL ONCE
Status: COMPLETED | OUTPATIENT
Start: 2024-02-19 | End: 2024-02-19

## 2024-02-19 RX ADMIN — ASPIRIN 81 MG 324 MG: 81 TABLET ORAL at 09:22

## 2024-02-19 ASSESSMENT — LIFESTYLE VARIABLES
EVER FELT BAD OR GUILTY ABOUT YOUR DRINKING: NO
HAVE PEOPLE ANNOYED YOU BY CRITICIZING YOUR DRINKING: NO
HAVE YOU EVER FELT YOU SHOULD CUT DOWN ON YOUR DRINKING: NO
EVER HAD A DRINK FIRST THING IN THE MORNING TO STEADY YOUR NERVES TO GET RID OF A HANGOVER: NO

## 2024-02-19 ASSESSMENT — COLUMBIA-SUICIDE SEVERITY RATING SCALE - C-SSRS
1. IN THE PAST MONTH, HAVE YOU WISHED YOU WERE DEAD OR WISHED YOU COULD GO TO SLEEP AND NOT WAKE UP?: NO
6. HAVE YOU EVER DONE ANYTHING, STARTED TO DO ANYTHING, OR PREPARED TO DO ANYTHING TO END YOUR LIFE?: NO
6. HAVE YOU EVER DONE ANYTHING, STARTED TO DO ANYTHING, OR PREPARED TO DO ANYTHING TO END YOUR LIFE?: NO
1. IN THE PAST MONTH, HAVE YOU WISHED YOU WERE DEAD OR WISHED YOU COULD GO TO SLEEP AND NOT WAKE UP?: NO
2. HAVE YOU ACTUALLY HAD ANY THOUGHTS OF KILLING YOURSELF?: NO

## 2024-02-19 ASSESSMENT — HEART SCORE
RISK FACTORS: >2 RISK FACTORS OR HX OF ATHEROSCLEROTIC DISEASE
HEART SCORE: 4
TROPONIN: LESS THAN OR EQUAL TO NORMAL LIMIT
ECG: NORMAL
AGE: 45-64
HISTORY: MODERATELY SUSPICIOUS

## 2024-02-19 ASSESSMENT — PAIN - FUNCTIONAL ASSESSMENT: PAIN_FUNCTIONAL_ASSESSMENT: 0-10

## 2024-02-19 ASSESSMENT — PAIN DESCRIPTION - LOCATION: LOCATION: CHEST

## 2024-02-19 ASSESSMENT — PAIN DESCRIPTION - FREQUENCY: FREQUENCY: INTERMITTENT

## 2024-02-19 ASSESSMENT — PAIN DESCRIPTION - PAIN TYPE: TYPE: ACUTE PAIN

## 2024-02-19 ASSESSMENT — PAIN SCALES - GENERAL: PAINLEVEL_OUTOF10: 5 - MODERATE PAIN

## 2024-02-19 ASSESSMENT — PAIN DESCRIPTION - DESCRIPTORS
DESCRIPTORS: STABBING
DESCRIPTORS: SHARP;STABBING

## 2024-02-19 ASSESSMENT — PAIN DESCRIPTION - ORIENTATION: ORIENTATION: LEFT

## 2024-02-19 ASSESSMENT — PAIN DESCRIPTION - ONSET: ONSET: ONGOING

## 2024-02-19 NOTE — PROGRESS NOTES
The patient was seen by the midlevel/resident.  I have personally saw the patient and made/approved the management plan and take responsibility for the patient management.  I reviewed the EKG's (when done) and agree with the interpretation.  I have seen and examined the patient; agree with the workup, evaluation, MDM, and diagnosis.  The care plan has been discussed with the midlevel/resident; I have reviewed the note and agree with the documented findings.       Chest discomfort.  She has a history of heart problems and was heading to cardiac rehab when her symptoms got worse.  She notes that seems  to come and go at times severe.  Right now she is feeling better.  Due to her elevated heart score and her symptoms she we will consult cardiology.  She was given some aspirin in the ED. Dr Shukla reports patient can be discharged. Discussed return indications with patient.   Diagnoses as of 02/19/24 1011   Chest pain, unspecified type     Max Dc MD

## 2024-02-19 NOTE — ED PROVIDER NOTES
HPI   Chief Complaint   Patient presents with    Chest Pain       Patient is a 58-year-old female with significant PMH of CHF, CAD, hypertension, history of MIs, 1 cardiac stent, FARZANA presents to the ED with cc of chest pain x 1 week.  Patient states is a sharp stabbing sensation that is intermittent.  Patient states she will also feel the pain in her left arm.  Patient denies any aggravating factors.  Patient states this does not feel similar to when she had an MI in the past.  Patient reports this does feel similar to when she has had pleurisy in the past.  Patient has not had any pain medication for symptoms.  Patient states normally it is a burning sensation under her left rib cage.  Patient follows with Dr. Gutierres for cardiology.  Patient is on Bumex daily which she is compliant with.  Patient states last week she gained 6 pounds in this week 3 pounds.  Patient states she has had increased swelling in her legs and feet.  Patient states she does feel short of breath and this began a week ago as well.  Patient states shortness of breath is worse with the chest pain.  Patient denies any injury to the chest.  Patient denies any history of blood clots recent travel or surgery.  Patient states she had a cardiac cath performed in January and was here in January for 2 NSTEMI's.  Patient has history of vasospasms.  Patient denies any history of asthma or COPD.  Patient denies any fever chills, congestion, cough, nausea, vomiting, abdominal pain, diarrhea.  Patient is a former smoker and quit in 1991.  Patient denies any alcohol or street drug abuse.                          Quinn Coma Scale Score: 15                     Patient History   Past Medical History:   Diagnosis Date    Abnormal ECG     Asthma     CHF (congestive heart failure) (CMS/HCC)     Coronary artery disease     Hidradenitis suppurativa     Hypertension     Multiple sclerosis (CMS/HCC)     Myocardial infarction (CMS/HCC)     Sleep apnea      Past  Surgical History:   Procedure Laterality Date    ANKLE SURGERY Left     CARDIAC CATHETERIZATION N/A 2024    Procedure: Left Heart Cath;  Surgeon: John Gutierres MD;  Location: [facility];  Service: Cardiovascular;  Laterality: N/A;     SECTION, CLASSIC  2014     Section    CORONARY STENT PLACEMENT      HYSTERECTOMY  2014    Hysterectomy    OTHER SURGICAL HISTORY  2014    Oophorectomy - Bilat (Removal Of Both Ovaries) Laparoscopic    OTHER SURGICAL HISTORY  2014    Laparosc Gastric Restrictive Proc By Adjustable Gastric Band    OTHER SURGICAL HISTORY  2020    Abscess incision and drainage     Family History   Problem Relation Name Age of Onset    Heart attack Mother Elaine Ellissko     Bilateral breast cancer Mother Elaine Olesko     Emphysema Mother Elaine Ellissko     Blood clot Mother Elaine Ellissko     Abnormal EKG Mother Elaine Olesko     Anemia Mother Elaine Olesko     Angina Mother Elaine Olesko     Depression Mother Elaine Ellissko     Heart disease Mother Elaine Olesko     Lung disease Mother Elaine Ellissko     Thyroid disease Mother Elaine Ellissko     Heart attack Father Dawit Ellissko     Lymphoma Father Dawit Olesko     Angina Father Dawit Olesko     Cancer Father Dawit Olesko     Diabetes type I Father Dawit Olesko     Heart disease Father Dawit Olesko     Hypertension Father Dawit Ellissko     Blood clot Sister      Ovarian cancer Maternal Grandmother Delmy Blackmonflores     Stroke Maternal Grandmother Delmy Blackmonwandai     Heart attack Maternal Grandmother Delmy Marcelai     Diabetes type I Paternal Grandmother Olive Ellissean     Asthma Sister Heidi Mishraarmando     Kidney disease Sister Kaylynn Garcia      Social History     Tobacco Use    Smoking status: Former     Packs/day: 1.00     Years: 5.00     Additional pack years: 0.00     Total pack years: 5.00     Types: Cigarettes     Start date: 1983     Quit date: 10/1/1991     Years since  quittin.4    Smokeless tobacco: Never   Vaping Use    Vaping Use: Never used   Substance Use Topics    Alcohol use: Never    Drug use: Never       Physical Exam   ED Triage Vitals [24 0908]   Temperature Heart Rate Respirations BP   36.4 °C (97.5 °F) 79 17 128/78      Pulse Ox Temp Source Heart Rate Source Patient Position   100 % Tympanic Monitor Lying      BP Location FiO2 (%)     Right arm --       Physical Exam  HENT:      Head: Normocephalic.   Eyes:      Extraocular Movements: Extraocular movements intact.      Pupils: Pupils are equal, round, and reactive to light.   Cardiovascular:      Rate and Rhythm: Normal rate and regular rhythm.      Heart sounds: Normal heart sounds.   Pulmonary:      Effort: Pulmonary effort is normal.      Breath sounds: Normal breath sounds. No decreased breath sounds, wheezing, rhonchi or rales.   Chest:      Chest wall: No tenderness.   Abdominal:      Palpations: Abdomen is soft.      Tenderness: There is no abdominal tenderness. There is no guarding or rebound.   Musculoskeletal:         General: Normal range of motion.      Cervical back: Normal range of motion.      Right lower leg: No tenderness. Edema present.      Left lower leg: No tenderness. Edema present.      Comments: +1 b/l   Skin:     General: Skin is warm.      Capillary Refill: Capillary refill takes less than 2 seconds.   Neurological:      General: No focal deficit present.      Mental Status: She is alert and oriented to person, place, and time.      Cranial Nerves: No cranial nerve deficit.      Motor: No weakness.   Psychiatric:         Mood and Affect: Mood normal.         ED Course & MDM   Diagnoses as of 24 1124   Chest pain, unspecified type       Medical Decision Making  Medical Decision Making:  Patient presented as described in HPI. Patient case including ROS, PE, and treatment and plan discussed with ED attending if attached as cosigner. Due to patients presentation orders completed  include as documented.  Patient presents to the ED with cc of chest pain x 1 week.  Patient states chest pain is intermittent denies any aggravating factors.  Patient denies any injury to the chest.  Patient states this feels similar to when she had pleurisy in the past.  Patient does have history of MIs and states this does not feel similar.  Patient follows with Dr. Gutierres.  Patient has had 1 cardiac stent.  Patient had a cardiac cath in January.  Patient has a history of CHF and is on Bumex.  Patient has not had any pain medication for symptoms at home.  Patient is nontoxic-appearing, abdomen is soft and nontender, no reproducible pain on palpation to the chest wall, lung sounds are clear bilaterally, +1 peripheral edema in lower extremities.  Patient given 324 mg of aspirin.  Pending labs and imaging.  Troponin x 2 negative.  Flu COVID RSV BNP CMP mag coags D-dimer CBC all within normal limits.  X-ray of the chest was negative.  Heart score is 4.  Discussed case with hospitalist who would like us to consult cardiology prior to admission due to patient having had a recent catheterization in January and has seen her cardiologist 3 times in the past month for chest pain.  I spoke with Dr. Gandhi on-call for cardiology who states as long as troponin is negative she can see her cardiologist as an outpatient.  Patient educated on this and will be discharged.  Patient educated on any worsening symptoms to return.  Patient remained stable.  Patient was advised to follow up with PCP or recommended provider in 2-3 days for another evaluation and exam. I advised patient/guardian to return or go to closest emergency room immediately if symptoms change, get worse, new symptoms develop prior to follow up. If there is no improvement in symptoms in the next 24 hours they are advised to return for further evaluation and exam. I also explained the plan and treatment course. Patient/guardian is in agreement with plan, treatment  course, and follow up and states verbally that they will comply.    EKG interpretation performed at 9:06 AM normal sinus rhythm, left axis deviation, no acute signs of ischemia.  Ventricular rate 77 bpm    Patient care discussed with: N/A  Social Determinants affecting care: N/A    Final diagnosis and disposition as below.  See CI    Homegoing. I discussed the differential; results and discharge plan with the patient and/or family/friend/caregiver if present.  I emphasized the importance of follow-up with the physician I referred them to in the timeframe recommended.  I explained reasons for the patient to return to the Emergency Department. They agreed that if they feel their condition is worsening or if they have any other concern they should call 911 immediately for further assistance. I gave the patient an opportunity to ask all questions they had and answered all of them accordingly. They understand return precautions and discharge instructions. The patient and/or family/friend/caregiver expressed understanding verbally and that they would comply.       Disposition:  Discharge      This note has been transcribed using voice recognition and may contain grammatical errors, misplaced words, incorrect words, incorrect phrases or other errors.        Labs Reviewed   COMPREHENSIVE METABOLIC PANEL - Abnormal       Result Value    Glucose 107 (*)     Sodium 139      Potassium 3.9      Chloride 100      Bicarbonate 32      Anion Gap 11      Urea Nitrogen 16      Creatinine 1.02      eGFR 65      Calcium 9.3      Albumin 4.4      Alkaline Phosphatase 107      Total Protein 6.9      AST 19      Bilirubin, Total 0.7      ALT 27     MAGNESIUM - Normal    Magnesium 2.02     PROTIME-INR - Normal    Protime 11.1      INR 1.0     TROPONIN I, HIGH SENSITIVITY - Normal    Troponin I, High Sensitivity 6      Narrative:     Less than 99th percentile of normal range cutoff-  Female and children under 18 years old <14 ng/L; Male <21  ng/L: Negative  Repeat testing should be performed if clinically indicated.     Female and children under 18 years old 14-50 ng/L; Male 21-50 ng/L:  Consistent with possible cardiac damage and possible increased clinical   risk. Serial measurements may help to assess extent of myocardial damage.     >50 ng/L: Consistent with cardiac damage, increased clinical risk and  myocardial infarction. Serial measurements may help assess extent of   myocardial damage.      NOTE: Children less than 1 year old may have higher baseline troponin   levels and results should be interpreted in conjunction with the overall   clinical context.     NOTE: Troponin I testing is performed using a different   testing methodology at [facility] than at other   [facility]. Direct result comparisons should only   be made within the same method.   B-TYPE NATRIURETIC PEPTIDE - Normal    BNP 22      Narrative:        <100 pg/mL - Heart failure unlikely  100-299 pg/mL - Intermediate probability of acute heart                  failure exacerbation. Correlate with clinical                  context and patient history.    >=300 pg/mL - Heart Failure likely. Correlate with clinical                  context and patient history.    BNP testing is performed using different testing methodology at [facility] than at other [facility]. Direct result comparisons should only be made within the same method.      SARS-COV-2 AND INFLUENZA A/B PCR - Normal    Flu A Result Not Detected      Flu B Result Not Detected      Coronavirus 2019, PCR Not Detected      Narrative:     This assay has received FDA Emergency Use Authorization (EUA) and  is only authorized for the duration of time that circumstances exist to justify the authorization of the emergency use of in vitro diagnostic tests for the detection of SARS-CoV-2 virus and/or diagnosis of COVID-19 infection under section 564(b)(1) of the Act, 21 U.S.C. 360bbb-3(b)(1).  Testing for SARS-CoV-2 is only recommended for patients who meet current clinical and/or epidemiological criteria as defined by federal, state, or local public health directives. This assay is an in vitro diagnostic nucleic acid amplification test for the qualitative detection of SARS-CoV-2, Influenza A, and Influenza B from nasopharyngeal specimens and has been validated for use at [facility]. Negative results do not preclude COVID-19 infections or Influenza A/B infections, and should not be used as the sole basis for diagnosis, treatment, or other management decisions. If Influenza A/B and RSV PCR results are negative, testing for Parainfluenza virus, Adenovirus and Metapneumovirus is routinely performed for [facility] pediatric oncology and intensive care inpatients, and is available on other patients by placing an add-on request.    RSV PCR - Normal    RSV PCR Not Detected      Narrative:     This assay is an FDA-cleared, in vitro diagnostic nucleic acid amplification test for the detection of RSV from nasopharyngeal specimens, and has been validated for use at [facility]. Negative results do not preclude RSV infections, and should not be used as the sole basis for diagnosis, treatment, or other management decisions. If Influenza A/B and RSV PCR results are negative, testing for Parainfluenza virus, Adenovirus and Metapneumovirus is routinely performed for pediatric oncology and intensive care inpatients at [facility], and is available on other patients by placing an add-on request.       APTT - Normal    aPTT 33      Narrative:     The APTT is no longer used for monitoring Unfractionated Heparin Therapy. For monitoring Heparin Therapy, use the Heparin Assay.   D-DIMER, NON VTE - Normal    D-Dimer Non VTE, Quant (ng/mL FEU) 286      Narrative:     The D-Dimer assay is reported in ng/mL Fibrinogen Equivalent Units (FEU). The results of this assay should NOT be used for the  exclusion of Deep Vein Thrombosis and/or Pulmonary Embolism.   TROPONIN I, HIGH SENSITIVITY - Normal    Troponin I, High Sensitivity 5      Narrative:     Less than 99th percentile of normal range cutoff-  Female and children under 18 years old <14 ng/L; Male <21 ng/L: Negative  Repeat testing should be performed if clinically indicated.     Female and children under 18 years old 14-50 ng/L; Male 21-50 ng/L:  Consistent with possible cardiac damage and possible increased clinical   risk. Serial measurements may help to assess extent of myocardial damage.     >50 ng/L: Consistent with cardiac damage, increased clinical risk and  myocardial infarction. Serial measurements may help assess extent of   myocardial damage.      NOTE: Children less than 1 year old may have higher baseline troponin   levels and results should be interpreted in conjunction with the overall   clinical context.     NOTE: Troponin I testing is performed using a different   testing methodology at [facility] than at other   [facility]. Direct result comparisons should only   be made within the same method.   CBC WITH AUTO DIFFERENTIAL    WBC 5.3      nRBC 0.0      RBC 4.86      Hemoglobin 14.5      Hematocrit 42.3      MCV 87      MCH 29.8      MCHC 34.3      RDW 13.2      Platelets 221      Neutrophils % 63.1      Immature Granulocytes %, Automated 0.6      Lymphocytes % 22.8      Monocytes % 9.5      Eosinophils % 3.6      Basophils % 0.4      Neutrophils Absolute 3.32      Immature Granulocytes Absolute, Automated 0.03      Lymphocytes Absolute 1.20      Monocytes Absolute 0.50      Eosinophils Absolute 0.19      Basophils Absolute 0.02        XR chest 2 views   Final Result   No acute process.   Signed by Baldo Beatty MD           Procedure  Procedures     Ariella Hernandez PA-C  02/19/24 112

## 2024-02-20 ENCOUNTER — PATIENT OUTREACH (OUTPATIENT)
Dept: PRIMARY CARE | Facility: CLINIC | Age: 59
End: 2024-02-20
Payer: COMMERCIAL

## 2024-02-20 ENCOUNTER — APPOINTMENT (OUTPATIENT)
Dept: CARDIOLOGY | Facility: CLINIC | Age: 59
End: 2024-02-20
Payer: COMMERCIAL

## 2024-02-20 ENCOUNTER — APPOINTMENT (OUTPATIENT)
Dept: CARDIOLOGY | Facility: HOSPITAL | Age: 59
End: 2024-02-20
Payer: COMMERCIAL

## 2024-02-20 NOTE — PROGRESS NOTES
Unable to reach patient for one month post discharge follow up call.   LVM with call back number for patient to call if needed

## 2024-02-22 ENCOUNTER — OFFICE VISIT (OUTPATIENT)
Dept: PRIMARY CARE | Facility: CLINIC | Age: 59
End: 2024-02-22
Payer: COMMERCIAL

## 2024-02-22 VITALS
BODY MASS INDEX: 37.67 KG/M2 | OXYGEN SATURATION: 94 % | SYSTOLIC BLOOD PRESSURE: 114 MMHG | HEART RATE: 75 BPM | WEIGHT: 240 LBS | DIASTOLIC BLOOD PRESSURE: 74 MMHG | HEIGHT: 67 IN

## 2024-02-22 DIAGNOSIS — I50.9 EDEMA DUE TO CONGESTIVE HEART FAILURE (MULTI): ICD-10-CM

## 2024-02-22 DIAGNOSIS — S22.49XA CLOSED FRACTURE OF MULTIPLE RIBS, UNSPECIFIED LATERALITY, INITIAL ENCOUNTER: ICD-10-CM

## 2024-02-22 DIAGNOSIS — R06.2 WHEEZING: ICD-10-CM

## 2024-02-22 DIAGNOSIS — R21 SKIN RASH: ICD-10-CM

## 2024-02-22 DIAGNOSIS — K80.20 GALLSTONES: ICD-10-CM

## 2024-02-22 DIAGNOSIS — E55.9 VITAMIN D DEFICIENCY: ICD-10-CM

## 2024-02-22 DIAGNOSIS — K21.9 GASTROESOPHAGEAL REFLUX DISEASE WITHOUT ESOPHAGITIS: ICD-10-CM

## 2024-02-22 DIAGNOSIS — R91.1 PULMONARY NODULE: ICD-10-CM

## 2024-02-22 DIAGNOSIS — B37.9 CANDIDIASIS: ICD-10-CM

## 2024-02-22 DIAGNOSIS — R07.9 CHEST PAIN, UNSPECIFIED TYPE: ICD-10-CM

## 2024-02-22 DIAGNOSIS — Z09 HOSPITAL DISCHARGE FOLLOW-UP: Primary | ICD-10-CM

## 2024-02-22 DIAGNOSIS — Z13.820 ENCOUNTER FOR SCREENING FOR OSTEOPOROSIS: ICD-10-CM

## 2024-02-22 DIAGNOSIS — F19.982 DRUG-INDUCED INSOMNIA (MULTI): ICD-10-CM

## 2024-02-22 DIAGNOSIS — G35 MULTIPLE SCLEROSIS (MULTI): ICD-10-CM

## 2024-02-22 PROCEDURE — 1036F TOBACCO NON-USER: CPT | Performed by: NURSE PRACTITIONER

## 2024-02-22 PROCEDURE — 99214 OFFICE O/P EST MOD 30 MIN: CPT | Performed by: NURSE PRACTITIONER

## 2024-02-22 PROCEDURE — 3008F BODY MASS INDEX DOCD: CPT | Performed by: NURSE PRACTITIONER

## 2024-02-22 RX ORDER — CLONAZEPAM 0.5 MG/1
0.5 TABLET, ORALLY DISINTEGRATING ORAL NIGHTLY
Qty: 30 TABLET | Refills: 2 | Status: SHIPPED | OUTPATIENT
Start: 2024-02-22

## 2024-02-22 RX ORDER — NYSTATIN AND TRIAMCINOLONE ACETONIDE 100000; 1 [USP'U]/G; MG/G
OINTMENT TOPICAL 2 TIMES DAILY
Qty: 60 G | Refills: 1 | Status: SHIPPED | OUTPATIENT
Start: 2024-02-22

## 2024-02-22 RX ORDER — TORSEMIDE 20 MG/1
20 TABLET ORAL DAILY
Qty: 30 TABLET | Refills: 11 | Status: SHIPPED | OUTPATIENT
Start: 2024-02-22 | End: 2024-02-28 | Stop reason: ALTCHOICE

## 2024-02-22 RX ORDER — OMEPRAZOLE 20 MG/1
20 CAPSULE, DELAYED RELEASE ORAL DAILY
Qty: 30 CAPSULE | Refills: 5 | Status: SHIPPED | OUTPATIENT
Start: 2024-02-22 | End: 2024-08-20

## 2024-02-22 ASSESSMENT — ENCOUNTER SYMPTOMS
PSYCHIATRIC NEGATIVE: 1
NAUSEA: 0
NUMBNESS: 0
WEAKNESS: 0
VOMITING: 0
FATIGUE: 0
DIARRHEA: 0
FEVER: 0
MUSCULOSKELETAL NEGATIVE: 1
PALPITATIONS: 0
ABDOMINAL PAIN: 0
SORE THROAT: 0
SHORTNESS OF BREATH: 0
CONSTIPATION: 0
CHILLS: 0
WHEEZING: 1
DIZZINESS: 0
HEADACHES: 0
COUGH: 1

## 2024-02-22 NOTE — ASSESSMENT & PLAN NOTE
Stop Bumex  Start torsemide 20 mg daily   Daily weights, decrease sodium intake  Follow-up with cardiology

## 2024-02-22 NOTE — PROGRESS NOTES
"Subjective   Patient ID: Elaine Coffey is a 58 y.o. female who presents for swelling and hospital follow up.      HPI   Recent multiple hospitalizations.  Had recent cath. History of PCI.  Follows cardiology closely.  Has had generalized edema, seems to be worsening over the past year.  Recently started on Bumex by cardiology.  Not effective at all.  Lasix effective in the past  58 year old female with a history of HTN, HLD, MS and CAD, she is here for a follow up visit.   Struggles with constipation.  Will consider over-the-counter Dulcolax  Follows up with MS doctor. Feels like MS is worsening.  Has had morning cough and wheezing.  May be related to GERD symptoms.  Rash in lower abdomen has resolved  Did have pneumonia recently.  Was found to have 2 fractured ribs.  No pain.  Would like bone density test  Gallstones found while she was hospitalized.  Have right upper quadrant pain.  Will follow-up with Dr. Melissa.  Denies chest pain, SOB, palpitations, dizziness,  or GI issues.  Taking medications as prescribed       Review of Systems   Constitutional:  Negative for chills, fatigue and fever.   HENT:  Negative for congestion, ear pain and sore throat.    Eyes:  Negative for visual disturbance.   Respiratory:  Positive for cough and wheezing. Negative for shortness of breath.    Cardiovascular:  Positive for leg swelling. Negative for chest pain and palpitations.   Gastrointestinal:  Negative for abdominal pain, constipation, diarrhea, nausea and vomiting.   Genitourinary: Negative.    Musculoskeletal: Negative.    Skin:  Negative for rash.   Neurological:  Negative for dizziness, weakness, numbness and headaches.   Psychiatric/Behavioral: Negative.         Objective   /74   Pulse 75   Ht 1.702 m (5' 7\")   Wt 109 kg (240 lb)   SpO2 94%   BMI 37.59 kg/m²     Physical Exam  Constitutional:       General: She is not in acute distress.     Appearance: Normal appearance.   HENT:      Head: Normocephalic and " atraumatic.      Right Ear: Tympanic membrane, ear canal and external ear normal.      Left Ear: Tympanic membrane, ear canal and external ear normal.      Nose: Nose normal.      Mouth/Throat:      Mouth: Mucous membranes are moist.      Pharynx: Oropharynx is clear.   Eyes:      Extraocular Movements: Extraocular movements intact.      Conjunctiva/sclera: Conjunctivae normal.      Pupils: Pupils are equal, round, and reactive to light.   Cardiovascular:      Rate and Rhythm: Normal rate and regular rhythm.      Pulses: Normal pulses.      Heart sounds: Normal heart sounds. No murmur heard.     Comments: Mild generalized edema  Pulmonary:      Effort: Pulmonary effort is normal.      Breath sounds: Normal breath sounds. No wheezing, rhonchi or rales.   Abdominal:      General: Bowel sounds are normal.      Palpations: Abdomen is soft.      Tenderness: There is no abdominal tenderness.   Musculoskeletal:         General: Normal range of motion.      Cervical back: Normal range of motion and neck supple.   Lymphadenopathy:      Comments: No lymphadenopathy noted   Skin:     General: Skin is warm and dry.      Findings: No rash.   Neurological:      General: No focal deficit present.      Mental Status: She is alert and oriented to person, place, and time.      Cranial Nerves: No cranial nerve deficit.      Coordination: Coordination normal.      Gait: Gait normal.   Psychiatric:         Mood and Affect: Mood normal.         Behavior: Behavior normal.         Assessment/Plan   Problem List Items Addressed This Visit             ICD-10-CM    Insomnia G47.00    Relevant Medications    clonazePAM (KlonoPIN) 0.5 mg disintegrating tablet    Multiple sclerosis (CMS/HCC) G35     Follow-up with specialist as scheduled         Skin rash R21     Resolved with prescribed cream and keeping area clean and dry         Vitamin D deficiency E55.9     Continue vitamin D supplement as prescribed by rheumatology.  Suggest starting 1200  mg daily         Edema due to congestive heart failure (CMS/[facility]) I50.9     Stop Bumex  Start torsemide 20 mg daily   Daily weights, decrease sodium intake  Follow-up with cardiology         Relevant Medications    torsemide (Demadex) 20 mg tablet    Candidiasis B37.9     Resolved.  Continue cream as needed to affected area         Relevant Medications    nystatin-triamcinolone (Mycolog II) ointment    Chest pain R07.9     Continue Ranexa as prescribed         Gallstones K80.20     Referral to general surgery         Relevant Orders    Referral to General Surgery    Gastroesophageal reflux disease without esophagitis K21.9     Heart omeprazole 20 mg daily morning cough.  Do not eat 2 hours before bedtime         Relevant Medications    omeprazole (PriLOSEC) 20 mg DR capsule    Wheezing R06.2     Continue albuterol inhaler  Referral to pulmonology for lung nodule and partially collapsed lung.         Relevant Orders    Referral to Pulmonology    Pulmonary nodule R91.1     Referral to pulmonology         Relevant Orders    Referral to Pulmonology    Hospital discharge follow-up - Primary Z09    Encounter for screening for osteoporosis Z13.820    Relevant Orders    XR DEXA bone density    Closed fracture of multiple ribs S22.49XA     Order for bone density placed        Mammogram: Due October 2024  Colonoscopy: ordered  Up-to-date.  Consider redraw in 6 months  Follow up in 6 months or sooner if needed       TIME SPENT:  Greater than 60 minutes face to face with patient, EDUCATION AND REASSURANCE.

## 2024-02-23 ENCOUNTER — APPOINTMENT (OUTPATIENT)
Dept: CARDIAC REHAB | Facility: HOSPITAL | Age: 59
End: 2024-02-23
Payer: COMMERCIAL

## 2024-02-24 LAB
ATRIAL RATE: 77 BPM
P AXIS: 53 DEGREES
P OFFSET: 180 MS
P ONSET: 134 MS
PR INTERVAL: 152 MS
Q ONSET: 210 MS
QRS COUNT: 13 BEATS
QRS DURATION: 96 MS
QT INTERVAL: 410 MS
QTC CALCULATION(BAZETT): 463 MS
QTC FREDERICIA: 445 MS
R AXIS: -22 DEGREES
T AXIS: 40 DEGREES
T OFFSET: 415 MS
VENTRICULAR RATE: 77 BPM

## 2024-02-26 ENCOUNTER — CLINICAL SUPPORT (OUTPATIENT)
Dept: CARDIAC REHAB | Facility: HOSPITAL | Age: 59
End: 2024-02-26
Payer: COMMERCIAL

## 2024-02-26 DIAGNOSIS — I21.4 ACUTE NON-ST-SEGMENT ELEVATION MYOCARDIAL INFARCTION (MULTI): ICD-10-CM

## 2024-02-26 PROCEDURE — 93798 PHYS/QHP OP CAR RHAB W/ECG: CPT | Mod: KX

## 2024-02-27 DIAGNOSIS — R07.9 CHEST PAIN, UNSPECIFIED TYPE: Primary | ICD-10-CM

## 2024-02-28 ENCOUNTER — OFFICE VISIT (OUTPATIENT)
Dept: SURGERY | Facility: CLINIC | Age: 59
End: 2024-02-28
Payer: COMMERCIAL

## 2024-02-28 VITALS
DIASTOLIC BLOOD PRESSURE: 77 MMHG | OXYGEN SATURATION: 94 % | WEIGHT: 243 LBS | SYSTOLIC BLOOD PRESSURE: 123 MMHG | HEIGHT: 67 IN | BODY MASS INDEX: 38.14 KG/M2 | HEART RATE: 82 BPM

## 2024-02-28 DIAGNOSIS — I50.9 EDEMA DUE TO CONGESTIVE HEART FAILURE (MULTI): Primary | ICD-10-CM

## 2024-02-28 DIAGNOSIS — K80.50 BILIARY COLIC: Primary | ICD-10-CM

## 2024-02-28 PROCEDURE — 99214 OFFICE O/P EST MOD 30 MIN: CPT | Performed by: SURGERY

## 2024-02-28 PROCEDURE — 3008F BODY MASS INDEX DOCD: CPT | Performed by: SURGERY

## 2024-02-28 PROCEDURE — 1036F TOBACCO NON-USER: CPT | Performed by: SURGERY

## 2024-02-28 RX ORDER — ACETAZOLAMIDE 125 MG/1
125 TABLET ORAL DAILY
Qty: 3 TABLET | Refills: 0 | Status: SHIPPED | OUTPATIENT
Start: 2024-02-28 | End: 2024-05-29 | Stop reason: ALTCHOICE

## 2024-02-28 RX ORDER — FUROSEMIDE 80 MG/1
80 TABLET ORAL 2 TIMES DAILY
Qty: 60 TABLET | Refills: 11 | Status: SHIPPED | OUTPATIENT
Start: 2024-02-28 | End: 2025-02-27

## 2024-02-28 NOTE — PROGRESS NOTES
"General Surgery Follow-Up Note    Referring Provider:   Tena Lee, APRN-CNP  Tena Lee, APR*      Chief Complaint:  Biliary colic      History of Present Illness:  This is a 58 y.o. female who presents for biliary colic.  She was last seen for years ago.  She had 2 or 3 episodes of biliary colic over the past couple of months.  Fortunately, there were no episodes of cholecystitis.  She is also 1 month out from another myocardial infarction.  She reports worsening epigastric pain with fatty foods.  She denies icterus or jaundice.  She denies fevers and chills.      Vitals:   Vitals:    02/28/24 0834   BP: 123/77   Pulse: 82   SpO2: 94%   Weight: 110 kg (243 lb)   Height: 1.702 m (5' 7\")         Physical Exam:  General: No acute distress. Sitting up in bed.   Neuro: Alert and oriented ×3. Follows commands.  Head: Atraumatic  Eyes: Pupils equal reactive to light. Extraocular motions intact.  Ears: Hears normal speaking voice.  Mouth, Nose, Throat: Mucous membranes moist.  Normal dentition.  Neck: Supple. No appreciable masses.  Chest: No crepitus.  No appreciable scars.  Heart: Regular rate and rhythm.  Lung: Clear to auscultation bilaterally.  Vascular: No carotid bruits.  Palpable radial pulses bilaterally.  Abdomen: Soft. Nondistended. Nontender.  N  Musculoskeletal: Moves all extremities.  Normal range of motion.  Lymphatic: No palpable lymph nodes.  Skin: No rashes or lesions.  Psychological: Normal affect    Labs:  CBC:   Lab Results   Component Value Date    WBC 5.3 02/19/2024    RBC 4.86 02/19/2024    HGB 14.5 02/19/2024    HCT 42.3 02/19/2024     02/19/2024       RFP:   Lab Results   Component Value Date     02/19/2024    K 3.9 02/19/2024     02/19/2024    CO2 32 02/19/2024    BUN 16 02/19/2024    CREATININE 1.02 02/19/2024    CALCIUM 9.3 02/19/2024    MG 2.02 02/19/2024    PHOS 2.8 01/09/2024        LFTs:   Lab Results   Component Value Date    PROT 6.9 02/19/2024    " ALBUMIN 4.4 02/19/2024    BILITOT 0.7 02/19/2024    ALKPHOS 107 02/19/2024    AST 19 02/19/2024    ALT 27 02/19/2024            Imaging: I have personally reviewed the images and the radiologist's report.  CT A/P: Gallstones    RUQ U/S: Gallstones      Assessment:  This is a 58 y.o.-year-old female who presents for biliary colic.  We discussed in general I would recommend a laparoscopic cholecystectomy, however, in her case, she has had myocardial infarction about a month ago.  We discussed that usually we would wait 6 months after a myocardial infarction before any surgery.  We discussed that she should follow-up with cardiology to specifically discuss surgical timing with her cardiac history.  We discussed that in the interim, she should avoid all fat in her diet.      Plan:  -- Follow-up with Dr. Gutierres cardiology to discuss surgical timing post myocardial infarction  -- Avoid fat in the diet until surgery  -- We will plan on a laparoscopic cholecystectomy once it is deemed as safe as possible from a cardiology standpoint we will plan preadmission testing once surgery is scheduled       Ángel Melissa MD  General Surgery  Office: [phone]  Fax: [phone]  9:00 AM  02/28/24        Past Medical History:  Past Medical History:   Diagnosis Date    Abnormal ECG     Asthma     CHF (congestive heart failure) (CMS/HCC)     Coronary artery disease     Hidradenitis suppurativa     Hypertension     Multiple sclerosis (CMS/HCC)     Myocardial infarction (CMS/HCC)     Sleep apnea         Past Surgical History:  Past Surgical History:   Procedure Laterality Date    ANKLE SURGERY Left     CARDIAC CATHETERIZATION N/A 01/03/2024    Procedure: Left Heart Cath;  Surgeon: John Gutierres MD;  Location: [facility];  Service: Cardiovascular;  Laterality: N/A;    CARDIAC CATHETERIZATION N/A 1/2/2024    Procedure: Left Heart Cath;  Surgeon: Gagan Kat MD;  Location: [facility];  Service:  Cardiovascular;  Laterality: N/A;     SECTION, CLASSIC  2014     Section    CORONARY STENT PLACEMENT      HYSTERECTOMY  2014    Hysterectomy    OTHER SURGICAL HISTORY  2014    Oophorectomy - Bilat (Removal Of Both Ovaries) Laparoscopic    OTHER SURGICAL HISTORY  2014    Laparosc Gastric Restrictive Proc By Adjustable Gastric Band    OTHER SURGICAL HISTORY  2020    Abscess incision and drainage        Medications:  Current Outpatient Medications   Medication Instructions    albuterol 90 mcg/actuation inhaler INHALE 1 TO 2 PUFFS EVERY 4 TO 6 HOURS AS NEEDED    albuterol 2.5 mg, nebulization, Every 4 hours PRN    amitriptyline (ELAVIL) 25 mg, oral, Nightly    aspirin 81 mg EC tablet 1 tablet, oral, Every morning    atorvastatin (LIPITOR) 40 mg, oral, Nightly    clonazePAM (KLONOPIN) 0.5 mg, oral, Nightly, Last OARRS fill: 23 #30 for 30 days    clopidogrel (PLAVIX) 75 mg, oral, Daily    dilTIAZem CD (CARDIZEM CD) 240 mg, oral, Daily    ergocalciferol (Vitamin D-2) 1.25 MG (57376 UT) capsule 1 capsule, oral, Weekly, On     ezetimibe (ZETIA) 10 mg, oral, Nightly    FLUoxetine (PROZAC) 40 mg, oral, Daily    gabapentin (NEURONTIN) 400 mg, oral, 3 times daily, Last OARRS fill: 23 #270 for 90 days    levothyroxine (SYNTHROID, LEVOXYL) 50 mcg, oral, Daily    meclizine (ANTIVERT) 25 mg, oral, Every 6 hours PRN    nystatin (Mycostatin) 100,000 unit/gram powder 1 Application, Topical, 2 times daily    nystatin (Mycostatin) ointment Topical, 2 times daily    nystatin-triamcinolone (Mycolog II) ointment Topical, 2 times daily, to affected area    omeprazole (PRILOSEC) 20 mg, oral, Daily, Do not crush or chew.    ranolazine (RANEXA) 500 mg, oral, 2 times daily, Do not crush, chew, or split.    tiZANidine (ZANAFLEX) 4 mg, oral, Nightly PRN, FOR SPASMS    torsemide (DEMADEX) 20 mg, oral, Daily        Allergies:  Allergies   Allergen Reactions    Gadolinium-Containing  "Contrast Media Anaphylaxis     Pt developed nausea without vomiting , SOB, after receiving the IV contrast . Pt states she always gets nauseated but this time \"was worse\"/pt/        Family History:  Family History   Problem Relation Name Age of Onset    Heart attack Mother Elaine Garcia     Bilateral breast cancer Mother Elaine Olesko     Emphysema Mother Elaine Ellissko     Blood clot Mother Elaine Garcia     Abnormal EKG Mother Elaine Olesko     Anemia Mother Elaine Olesko     Angina Mother Elaine Olesko     Depression Mother Elaine Ellissko     Heart disease Mother Elaine Olesko     Lung disease Mother Elaine Ellissko     Thyroid disease Mother Elaine Ellisskarmando     Heart attack Father Dawit Olesko     Lymphoma Father Dawit Olesko     Angina Father Dawit Olesko     Cancer Father Dawit Olesko     Diabetes type I Father Dawit Olesko     Heart disease Father Dawit Olesko     Hypertension Father Dawit Ellissko     Blood clot Sister      Ovarian cancer Maternal Grandmother Delmy Martínez     Stroke Maternal Grandmother Delmy Giffordcki     Heart attack Maternal Grandmother Delmy Giffordcki     Diabetes type I Paternal Grandmother Olive Garcia     Asthma Sister Heidi Garcia     Kidney disease Sister Kaylynn Garcia         Social History:  Social History     Socioeconomic History    Marital status:      Spouse name: Not on file    Number of children: Not on file    Years of education: Not on file    Highest education level: Not on file   Occupational History    Not on file   Tobacco Use    Smoking status: Former     Packs/day: 1.00     Years: 5.00     Additional pack years: 0.00     Total pack years: 5.00     Types: Cigarettes     Start date: 1983     Quit date: 10/1/1991     Years since quittin.4    Smokeless tobacco: Never   Vaping Use    Vaping Use: Never used   Substance and Sexual Activity    Alcohol use: Never    Drug use: Never    Sexual activity: Not Currently     Partners: Male     Birth " control/protection: Abstinence   Other Topics Concern    Not on file   Social History Narrative    Not on file     Social Determinants of Health     Financial Resource Strain: Low Risk  (1/19/2024)    Overall Financial Resource Strain (CARDIA)     Difficulty of Paying Living Expenses: Not hard at all   Food Insecurity: Not on file   Transportation Needs: No Transportation Needs (1/19/2024)    PRAPARE - Transportation     Lack of Transportation (Medical): No     Lack of Transportation (Non-Medical): No   Physical Activity: Not on file   Stress: Not on file   Social Connections: Not on file   Intimate Partner Violence: Not on file   Housing Stability: Low Risk  (1/20/2024)    Housing Stability Vital Sign     Unable to Pay for Housing in the Last Year: No     Number of Places Lived in the Last Year: 1     Unstable Housing in the Last Year: No        Review of Systems:  A complete 12 point review of systems was performed and is negative except as noted in the history of present illness.

## 2024-02-28 NOTE — PROGRESS NOTES
Called and left detailed personal voicemail message regarding prescriptions for diuretics sent in to see with detailed instructions.  Asked patient to [facility] message me with results

## 2024-03-01 ENCOUNTER — CLINICAL SUPPORT (OUTPATIENT)
Dept: CARDIAC REHAB | Facility: HOSPITAL | Age: 59
End: 2024-03-01
Payer: COMMERCIAL

## 2024-03-01 ENCOUNTER — OFFICE VISIT (OUTPATIENT)
Dept: CARDIOLOGY | Facility: HOSPITAL | Age: 59
End: 2024-03-01
Payer: COMMERCIAL

## 2024-03-01 VITALS
HEART RATE: 68 BPM | WEIGHT: 240.52 LBS | BODY MASS INDEX: 37.67 KG/M2 | DIASTOLIC BLOOD PRESSURE: 59 MMHG | SYSTOLIC BLOOD PRESSURE: 102 MMHG | OXYGEN SATURATION: 96 %

## 2024-03-01 DIAGNOSIS — F32.A DEPRESSION, UNSPECIFIED DEPRESSION TYPE: ICD-10-CM

## 2024-03-01 DIAGNOSIS — R07.9 CHEST PAIN, UNSPECIFIED TYPE: ICD-10-CM

## 2024-03-01 DIAGNOSIS — R06.02 SHORTNESS OF BREATH: ICD-10-CM

## 2024-03-01 DIAGNOSIS — R60.9 SWELLING: Primary | ICD-10-CM

## 2024-03-01 DIAGNOSIS — I21.4 ACUTE NON-ST-SEGMENT ELEVATION MYOCARDIAL INFARCTION (MULTI): ICD-10-CM

## 2024-03-01 DIAGNOSIS — Z95.820 PERIPHERAL VASCULAR ANGIOPLASTY STATUS WITH IMPLANTS AND GRAFTS: ICD-10-CM

## 2024-03-01 DIAGNOSIS — E78.5 HYPERLIPIDEMIA, UNSPECIFIED HYPERLIPIDEMIA TYPE: ICD-10-CM

## 2024-03-01 DIAGNOSIS — E03.9 HYPOTHYROIDISM, UNSPECIFIED TYPE: ICD-10-CM

## 2024-03-01 PROCEDURE — 99213 OFFICE O/P EST LOW 20 MIN: CPT | Performed by: NURSE PRACTITIONER

## 2024-03-01 PROCEDURE — 1036F TOBACCO NON-USER: CPT | Performed by: NURSE PRACTITIONER

## 2024-03-01 PROCEDURE — 93798 PHYS/QHP OP CAR RHAB W/ECG: CPT

## 2024-03-01 PROCEDURE — 3008F BODY MASS INDEX DOCD: CPT | Performed by: NURSE PRACTITIONER

## 2024-03-01 RX ORDER — EZETIMIBE 10 MG/1
10 TABLET ORAL NIGHTLY
Qty: 90 TABLET | Refills: 4 | Status: SHIPPED | OUTPATIENT
Start: 2024-03-01 | End: 2025-03-01

## 2024-03-01 ASSESSMENT — ENCOUNTER SYMPTOMS
DEPRESSION: 0
PSYCHIATRIC NEGATIVE: 1
ENDOCRINE NEGATIVE: 1
RESPIRATORY NEGATIVE: 1
NECK PAIN: 1
NEUROLOGICAL NEGATIVE: 1
GASTROINTESTINAL NEGATIVE: 1
EYES NEGATIVE: 1
MYALGIAS: 1
HEMATOLOGIC/LYMPHATIC NEGATIVE: 1

## 2024-03-01 NOTE — PROGRESS NOTES
Referred by Dr. Inge sutherland. provider found for Follow-up (Complaining of swelling)     History Of Present Illness:    Elaine Coffye is a very pleasant 58 year old female with a history of HTN, HLD, MS and CAD, she is here for a follow up visit. The patient is seen in collaboration with Dr. Gutierres. Mrs. Coffey has had increased swelling with shortness of breath. The Bumex was changed to Torsemide and then to Lasix 80 mg twice a day. States she is feeling better, swelling has improved. Denies any recurrent chest pain. Currently in cardiac rehab.        Review of Systems   Constitutional: Positive for malaise/fatigue.   HENT: Negative.     Eyes: Negative.    Cardiovascular:  Positive for chest pain.   Respiratory: Negative.     Endocrine: Negative.    Hematologic/Lymphatic: Negative.    Skin: Negative.    Musculoskeletal:  Positive for myalgias and neck pain.   Gastrointestinal: Negative.    Neurological: Negative.    Psychiatric/Behavioral: Negative.          Past Medical History:  She has a past medical history of Abnormal ECG, Asthma, CHF (congestive heart failure) (CMS/HCC), Coronary artery disease, Hidradenitis suppurativa, Hypertension, Multiple sclerosis (CMS/HCC), Myocardial infarction (CMS/HCC), and Sleep apnea.    Past Surgical History:  She has a past surgical history that includes  section, classic (2014); Other surgical history (2014); Other surgical history (2014); Hysterectomy (2014); Other surgical history (2020); Cardiac catheterization (N/A, 2024); Ankle surgery (Left); Coronary stent placement; and Cardiac catheterization (N/A, 2024).      Social History:  She reports that she quit smoking about 32 years ago. Her smoking use included cigarettes. She started smoking about 41 years ago. She has a 5.00 pack-year smoking history. She has never used smokeless tobacco. She reports that she does not drink alcohol and does not use drugs.    Family History:  Family  History   Problem Relation Name Age of Onset    Heart attack Mother Elaine Garcia     Bilateral breast cancer Mother Elaine Garcia     Emphysema Mother Elaine Garcia     Blood clot Mother Elaine Garcia     Abnormal EKG Mother Elaine Garcia     Anemia Mother Elaine Garcia     Angina Mother Elaine Garcia     Depression Mother Elaine Garcia     Heart disease Mother Elaine Garcia     Lung disease Mother Elaine Garcia     Thyroid disease Mother Elaine Garcia     Heart attack Father Dawit Garcia     Lymphoma Father Dawit Garcia     Angina Father Dawit Garcia     Cancer Father Dawit Garcia     Diabetes type I Father Dawit Garcia     Heart disease Father Dawit Garcia     Hypertension Father Dawit Garcia     Blood clot Sister      Ovarian cancer Maternal Grandmother Delmy Martínez     Stroke Maternal Grandmother Delmy Martínez     Heart attack Maternal Grandmother Delmy Martínez     Diabetes type I Paternal Grandmother Olive Garcia     Asthma Sister Heidi Garcia     Kidney disease Sister Kaylynn Garcia         Allergies:  Gadolinium-containing contrast media    Outpatient Medications:  Current Outpatient Medications   Medication Instructions    acetaZOLAMIDE (DIAMOX) 125 mg, oral, Daily    albuterol 90 mcg/actuation inhaler INHALE 1 TO 2 PUFFS EVERY 4 TO 6 HOURS AS NEEDED    albuterol 2.5 mg, nebulization, Every 4 hours PRN    amitriptyline (ELAVIL) 25 mg, oral, Nightly    aspirin 81 mg EC tablet 1 tablet, oral, Every morning    atorvastatin (LIPITOR) 40 mg, oral, Nightly    clonazePAM (KLONOPIN) 0.5 mg, oral, Nightly, Last OARRS fill: 12/4/23 #30 for 30 days    clopidogrel (PLAVIX) 75 mg, oral, Daily    dilTIAZem CD (CARDIZEM CD) 240 mg, oral, Daily    ergocalciferol (Vitamin D-2) 1.25 MG (01366 UT) capsule 1 capsule, oral, Weekly, On Saturdays    ezetimibe (ZETIA) 10 mg, oral, Nightly    FLUoxetine (PROZAC) 40 mg, oral, Daily    furosemide (LASIX) 80 mg, oral, 2 times daily    gabapentin (NEURONTIN) 400  mg, oral, 3 times daily, Last OARRS fill: 4/7/23 #270 for 90 days    levothyroxine (SYNTHROID, LEVOXYL) 50 mcg, oral, Daily    meclizine (ANTIVERT) 25 mg, oral, Every 6 hours PRN    nystatin (Mycostatin) 100,000 unit/gram powder 1 Application, Topical, 2 times daily    nystatin (Mycostatin) ointment Topical, 2 times daily    nystatin-triamcinolone (Mycolog II) ointment Topical, 2 times daily, to affected area    omeprazole (PRILOSEC) 20 mg, oral, Daily, Do not crush or chew.    ranolazine (RANEXA) 500 mg, oral, 2 times daily, Do not crush, chew, or split.    tiZANidine (ZANAFLEX) 4 mg, oral, Nightly PRN, FOR SPASMS        Last Recorded Vitals:  Vitals:    03/01/24 0826   BP: 102/59   Pulse: 68   SpO2: 96%   Weight: 109 kg (240 lb 8.4 oz)       Physical Exam:  Physical Exam  Vitals reviewed.   HENT:      Head: Normocephalic.      Nose: Nose normal.   Eyes:      Pupils: Pupils are equal, round, and reactive to light.   Cardiovascular:      Rate and Rhythm: Normal rate and regular rhythm.   Pulmonary:      Effort: Pulmonary effort is normal.      Breath sounds: Normal breath sounds.   Abdominal:      General: Abdomen is flat.      Palpations: Abdomen is soft.   Musculoskeletal:         General: Normal range of motion.      Cervical back: Normal range of motion.   Skin:     General: Skin is warm and dry.   Neurological:      General: No focal deficit present.      Mental Status: He is alert and oriented to person, place, and time.   Psychiatric:         Mood and Affect: Mood normal.          Last Labs:  CBC -  Lab Results   Component Value Date    WBC 5.3 02/19/2024    HGB 14.5 02/19/2024    HCT 42.3 02/19/2024    MCV 87 02/19/2024     02/19/2024       CMP -  Lab Results   Component Value Date    CALCIUM 9.3 02/19/2024    PHOS 2.8 01/09/2024    PROT 6.9 02/19/2024    ALBUMIN 4.4 02/19/2024    AST 19 02/19/2024    ALT 27 02/19/2024    ALKPHOS 107 02/19/2024    BILITOT 0.7 02/19/2024       LIPID PANEL -   Lab  Results   Component Value Date    CHOL 143 01/03/2024    TRIG 150 (H) 01/03/2024    HDL 38.8 01/03/2024    CHHDL 3.7 01/03/2024    LDLF 86 01/06/2018    VLDL 30 01/03/2024    NHDL 104 01/03/2024       RENAL FUNCTION PANEL -   Lab Results   Component Value Date    GLUCOSE 107 (H) 02/19/2024     02/19/2024    K 3.9 02/19/2024     02/19/2024    CO2 32 02/19/2024    ANIONGAP 11 02/19/2024    BUN 16 02/19/2024    CREATININE 1.02 02/19/2024    GFRMALE CANCELED 08/04/2023    CALCIUM 9.3 02/19/2024    PHOS 2.8 01/09/2024    ALBUMIN 4.4 02/19/2024        Lab Results   Component Value Date    BNP 22 02/19/2024    HGBA1C 5.3 01/03/2024       Last Cardiology Tests:  ECG:  ECG 12 lead 01/19/2024 (Preliminary)    Echo:  Transthoracic Echo (TTE) Complete 01/03/2024   1. Left ventricular systolic function is normal with a 55-60% estimated ejection fraction.   2. RVSP within normal limits.    Echocardiogram 8/20/2021  1. The left ventricular systolic function is normal with a 60% estimated ejection fraction.  2. Spectral Doppler shows an impaired relaxation pattern of left ventricular diastolic filling.  3. There is trace to mild mitral and tricuspid regurgitation.    echocardiogram 1/18/2018   1. The left ventricular systolic function is low normal with a 50-55% estimated  ejection fraction.   2. Spectral Doppler shows an impaired relaxation pattern of left ventricular  diastolic filling.   3. There is mild mitral and tricuspid regurgitaiton.      Echocardiogram 1/18/2018   LVEF 50-55%   mild MR   mild TR     Ejection Fractions:  LVEF 55-60%    Cath:  Cardiac catheterization - coronary   Cath 1/2/2024  . Left main: no significant angiographic disease.   2. LAD: no significant angiographic disease.   3. LCx: no significant angiographic disease.   4. RCA: patent proximal stent, no significant angiographic disease.   5. LVEDP 18mmHg, no aortic stenosis on LV-Ao gradient.    Cardiac cath 12/14/2018   1. No significant  angiographic CAD, ostial-proximal RCA patent.   2. LVEDP 31mmHg, no aortic stenosis on LV-Ao gradient.  Stress Test:  Nuclear Stress Test 05/25/2023  1. No evidence of inducible myocardial ischemia. Predominantly fixed  moderate-sized moderate to severe perfusion defects involving  inferior wall as well as inferolateral wall, likely represent prior  infarction. Similar to prior exam on 11/20/2018.  2. The left ventricle is normal in size.  3. Gated images demonstrate normal LV wall motion except hypokinesis  in the inferior wall with a post-stress LV EF estimated at 56%.      nuclear stress test 11/20/2018   1. Moderate-sized territory of largely fixed prior completed  myocardial infarction involving the basal inferior/inferolateral wall  with minimal nonsignificant flower-infarct ischemia in the  inferolateral territory.  2. There is moderate dilation of the left ventricle with an  end-diastolic volume calculated at 145 mL.  3. Wall motion or LV quantification was not assessed due to unable to  do gated imaged secondary to arrhythmia.    Cardiac Imaging:      Assessment/Plan      Mrs. Coffey is a very pleasant 58 year old female with a history of CAD, HTN, HLD and MS. She had a PCI on 1/4/2018 at [facility]. She had a repeat heart cath on 12/14/2018 showing a patent RCA and no significant CAD. Recently admitted with chest pain, and elevated troponin. Heart cath was unchanged from 12/2018. Echocardiogram showed a preserved LV function. Chest pain due to vasospasm. She has had increased swelling that has improved with the Lasix, she will continue the Lasix 80 mg twice a day, with blood work on Monday. She denies any recurrent chest pain on the Ranexa. Heart rate and blood pressure are well controlled today.      Plan   -call with any questions   -continue ASA, Atorvastatin, and Plavix   -continue Caredizem   -continue  Ranexa 500 mg twice a day  -continue  Cardiac rehab   -follow up in one month   -continue  Lasix 80 mg twice a day          Mia Edmondson, APRN-CNP

## 2024-03-04 ENCOUNTER — LAB (OUTPATIENT)
Dept: LAB | Facility: LAB | Age: 59
End: 2024-03-04
Payer: COMMERCIAL

## 2024-03-04 ENCOUNTER — TELEPHONE (OUTPATIENT)
Dept: CARDIOLOGY | Facility: HOSPITAL | Age: 59
End: 2024-03-04

## 2024-03-04 ENCOUNTER — CLINICAL SUPPORT (OUTPATIENT)
Dept: CARDIAC REHAB | Facility: HOSPITAL | Age: 59
End: 2024-03-04
Payer: COMMERCIAL

## 2024-03-04 DIAGNOSIS — R60.9 SWELLING: ICD-10-CM

## 2024-03-04 DIAGNOSIS — R07.9 CHEST PAIN, UNSPECIFIED TYPE: ICD-10-CM

## 2024-03-04 DIAGNOSIS — K80.20 GALLSTONES: ICD-10-CM

## 2024-03-04 DIAGNOSIS — I21.4 NSTEMI (NON-ST ELEVATION MYOCARDIAL INFARCTION) (MULTI): ICD-10-CM

## 2024-03-04 DIAGNOSIS — K80.20 CALCULUS OF GALLBLADDER WITHOUT CHOLECYSTITIS WITHOUT OBSTRUCTION: ICD-10-CM

## 2024-03-04 DIAGNOSIS — I21.29 MYOCARDIAL INFARCTION INVOLVING OTHER CORONARY ARTERY, UNSPECIFIED MI TYPE (MULTI): ICD-10-CM

## 2024-03-04 LAB
ANION GAP SERPL CALC-SCNC: 18 MMOL/L (ref 10–20)
BUN SERPL-MCNC: 16 MG/DL (ref 6–23)
CALCIUM SERPL-MCNC: 9.3 MG/DL (ref 8.6–10.3)
CHLORIDE SERPL-SCNC: 100 MMOL/L (ref 98–107)
CO2 SERPL-SCNC: 30 MMOL/L (ref 21–32)
CREAT SERPL-MCNC: 1.09 MG/DL (ref 0.5–1.05)
EGFRCR SERPLBLD CKD-EPI 2021: 60 ML/MIN/1.73M*2
GLUCOSE SERPL-MCNC: 88 MG/DL (ref 74–99)
POTASSIUM SERPL-SCNC: 3.8 MMOL/L (ref 3.5–5.3)
SODIUM SERPL-SCNC: 144 MMOL/L (ref 136–145)

## 2024-03-04 PROCEDURE — 93798 PHYS/QHP OP CAR RHAB W/ECG: CPT | Mod: KX | Performed by: INTERNAL MEDICINE

## 2024-03-04 PROCEDURE — 80048 BASIC METABOLIC PNL TOTAL CA: CPT

## 2024-03-04 PROCEDURE — 36415 COLL VENOUS BLD VENIPUNCTURE: CPT

## 2024-03-04 RX ORDER — FUROSEMIDE 40 MG/1
80 TABLET ORAL DAILY
Qty: 180 TABLET | Refills: 3 | Status: SHIPPED | OUTPATIENT
Start: 2024-03-04 | End: 2024-05-29 | Stop reason: ENTERED-IN-ERROR

## 2024-03-04 RX ORDER — METOPROLOL SUCCINATE 25 MG/1
25 TABLET, EXTENDED RELEASE ORAL DAILY
Qty: 90 TABLET | Refills: 3 | Status: SHIPPED | OUTPATIENT
Start: 2024-03-04

## 2024-03-05 ENCOUNTER — DOCUMENTATION (OUTPATIENT)
Dept: CARDIAC REHAB | Facility: HOSPITAL | Age: 59
End: 2024-03-05
Payer: COMMERCIAL

## 2024-03-05 DIAGNOSIS — R07.9 CHEST PAIN, UNSPECIFIED TYPE: ICD-10-CM

## 2024-03-05 RX ORDER — FLUOXETINE HYDROCHLORIDE 40 MG/1
40 CAPSULE ORAL DAILY
Qty: 90 CAPSULE | Refills: 0 | OUTPATIENT
Start: 2024-03-05

## 2024-03-05 RX ORDER — LEVOTHYROXINE SODIUM 50 UG/1
50 TABLET ORAL DAILY
Qty: 90 TABLET | Refills: 0 | OUTPATIENT
Start: 2024-03-05

## 2024-03-05 NOTE — PROGRESS NOTES
Cardiac Rehabilitation 30 Day Reassessment    Name: Elaine Coffey  Medical Record Number: [id number]  YOB: 1965  Age: 58 y.o.    Today’s Date: 3/5/2024  Primary Care Physician: Tena eLe APRN-CNP  Referring Physician: DR. JIMMIE TRIPLETT  Program Location: [facility]  Primary Diagnosis: NSTEMI I21.4  Onset/Date of Diagnosis: 01/02/2024    6 SESSIONS    AACVPR Risk Stratification:   HIGH    Falls Risk: Medium  Psychosocial Assessment         Sent PH-Q 9 to MD if score > 20: No; score < 20    Pt reported/currently experiencing stress: Yes; Stress; Severity: moderate, Anxiety; Severity: moderate, and Depression; Severity: moderate  Patient uses stress management skills: Yes   History of: depression  Currently seeing a mental health provider: No  Social Support: Yes, Whom:FAMILY  Quality of Life Survey:  FERRENS AND VALADEZ  Learning Assessment:  Learning assessment/barriers: Emotional  Preferred learning method: Reading handout  Barriers: None  Comments:    Stages of Change:Action    Psychosocial Plan    Goal Status: In progress    Psychosocial Goals: Demonstrating proper techniques for stress management, Maintain or lower PH-Q 9 score by discharge, Identify strategies for managing depression, and Identify social supports    Psychosocial Interventions/Education:   *STRESS MANAGEMENT HANDOUT  *ASK PATIENT AT LEAST ONCE EVERY 30 DAYS ABOUT STRESS     Psychosocial Reassessment: 3/5/2024 REVIEWED INITIAL PHQ9 =9 PT TAKING MEDICATION. DENIES ANY NEW OR WORSENING STRESS.    Nutrition Assessment:    Hyperlipidemia: Yes     Lipids:   Lab Results   Component Value Date    CHOL 143 01/03/2024    HDL 38.8 01/03/2024    LDLF 86 01/06/2018    TRIG 150 (H) 01/03/2024       Current Dietary Guidelines: Low fat, Low sodium, WEIGHT WATCHERS  Barriers to dietary change: no    Diet Habit Survey: Rate Your Plate  Pre:  RYP 64/69  Post: To be done at discharge.    Diabetes Assessment    Lab Results   Component  Value Date    HGBA1C 5.3 01/03/2024       History of Diabetes: No    Weight Management       Current Weight 238.9    Nutrition Plan    Goal Status: In progress    Nutrition Goals: Adapt a low-sodium, DASH diet prior to discharge, Learn how to read and interpret nutrition labels prior to discharge, and Lose 1lb/week while enrolled in program  TARGET SCORE 55-69 BY DISCHARGE    Nutrition Interventions/Education:   *NUTRITION EDUCATION HANDOUTS  *CHOLESTEROL MANAGEMENT HANDOUT     Nutrition Reassessment: 3/05/2024 REVIEWED INITIAL RYP 64/69. PATIENT FOLLOWING A HEART HEALTHY DIET/ WEIGHT WATCHERS PROGRAM.    Exercise Assessment    No  Mode: NA  Frequency: NA  Duration: NA    Exercise Prescription     Exercise Prescription based on: Pre-rehab stress test   Frequency:  2 days/week   Mode: Treadmill, NuStep, and Recumbent Cycle   Duration: 30-45 total aerobic minutes   Intensity: RPE 12-13  Target HR:     MET Level: 2.6-3.1  Patient wears supplemental O2: No     Modality Workload METs Duration (minutes)   1 Pre-Exercise      2 Treadmill 2  2.6 10 :00   3 Recumbent Bike 32@5  2.9 10 :00   4 NuStep 60@5  2.9 10 :00   5 Weights 3@15   10 :00   6 Post-Exercise        Resistance Training: Yes   Home Exercise Prescription given:  plan to enroll in phase 3    Exercise Plan    Goal Status: In progress    Exercise Goals: Increase exercise MET level by 5-10% each week, Increase total exercise duration to 30-45 minutes, and Establish a home exercise program before discharge    Exercise Interventions/Education:   *EXERCISE FUNDAMENTALS AND MAINTENANCE HANDOUT  *REVIEW THR AND INSTRUCTIONS FOR RADIAL PULSE CHECK     Exercise Reassessment: 03/05/2024 pt achieving at met level of 3.4 on recumbent bike at most recent session.  Reviewed THR with pt and pulse check.    Other Core Components/Risk Factor Assessment:    Medication adherence  Current Medications:   Medication Documentation Review Audit       Reviewed by Mia love  Capite, APRN-CNP (Nurse Practitioner) on 03/01/24 at 0835      Medication Order Taking? Sig Documenting Provider Last Dose Status   acetaZOLAMIDE (Diamox) 125 mg tablet [id number] Yes Take 1 tablet (125 mg) by mouth once daily. NAYANA Beal Taking Active   albuterol 2.5 mg /3 mL (0.083 %) nebulizer solution [id number] Yes Take 3 mL (2.5 mg) by nebulization every 4 hours if needed for wheezing. Historical Provider, MD Taking Active   albuterol 90 mcg/actuation inhaler [id number] Yes INHALE 1 TO 2 PUFFS EVERY 4 TO 6 HOURS AS NEEDED Raffy Balderrama DO Taking Active   amitriptyline (Elavil) 25 mg tablet [id number] Yes Take 1 tablet (25 mg) by mouth once daily at bedtime. Historical Provider, MD Taking Active   aspirin 81 mg EC tablet [id number] Yes Take 1 tablet (81 mg) by mouth once daily in the morning. Historical Provider, MD Taking Active   atorvastatin (Lipitor) 40 mg tablet [id number] Yes Take 1 tablet (40 mg) by mouth once daily at bedtime. Historical Provider, MD Taking Active   clonazePAM (KlonoPIN) 0.5 mg disintegrating tablet [id number] Yes Take 1 tablet (0.5 mg) by mouth once daily at bedtime. Last OARRS fill: 12/4/23 #30 for 30 days NAYANA Beal Taking Active   clopidogrel (Plavix) 75 mg tablet [id number] Yes Take 1 tablet (75 mg) by mouth once daily. Katia Reina PA-C Taking Active   dilTIAZem CD (Cardizem CD) 240 mg 24 hr capsule [id number] Yes Take 1 capsule (240 mg) by mouth once daily. Katia Reina PA-C Taking Active   ergocalciferol (Vitamin D-2) 1.25 MG (57353 UT) capsule [id number] Yes Take 1 capsule (1,250 mcg) by mouth 1 (one) time per week. On Saturdays Historical Provider, MD Taking Active   ezetimibe (Zetia) 10 mg tablet [id number] Yes TAKE 1 TABLET (10 MG) BY MOUTH ONCE DAILY AT BEDTIME. Mia C de Capite, APRN-CNP Taking Active   FLUoxetine (PROzac) 40 mg capsule [id number] Yes Take 1 capsule (40 mg) by mouth once daily.   Patient taking differently: Take 1  capsule (40 mg) by mouth once daily in the morning.    Raffy Baledrrama DO Taking Active   furosemide (Lasix) 80 mg tablet [id number] Yes Take 1 tablet (80 mg) by mouth 2 times a day. Tenatc Lee APRMANDIE-CNP Taking Active   gabapentin (Neurontin) 400 mg capsule [id number] Yes Take 1 capsule (400 mg) by mouth 3 times a day. Last OARRS fill: 4/7/23 #270 for 90 days Historical Provider, MD Taking Active   levothyroxine (Synthroid, Levoxyl) 50 mcg tablet [id number] Yes TAKE 1 TABLET BY MOUTH EVERY DAY Raffy Balderrama DO Taking Active   meclizine (Antivert) 25 mg tablet [id number] Yes Take 1 tablet (25 mg) by mouth every 6 hours if needed for dizziness. Historical Provider, MD Taking Active   nystatin (Mycostatin) 100,000 unit/gram powder [id number] Yes Apply 1 Application topically 2 times a day. SHEILA BealLathaCNP Taking Active   nystatin (Mycostatin) ointment [id number] Yes Apply topically 2 times a day. Tenatc Lee APRN-CNP Taking Active   nystatin-triamcinolone (Mycolog II) ointment [id number] Yes Apply topically 2 times a day. to affected area TenaSHEILA Buchanan-CNP Taking Active   omeprazole (PriLOSEC) 20 mg DR capsule [id number] Yes Take 1 capsule (20 mg) by mouth once daily. Do not crush or chew. TenaSHEILA Buchanan-CNP Taking Active   ranolazine (Ranexa) 500 mg 12 hr tablet [id number] Yes Take 1 tablet (500 mg) by mouth 2 times a day. Do not crush, chew, or split. Mia Lema APRN-CNP Taking Active   tiZANidine (Zanaflex) 4 mg tablet [id number] Yes Take 1 tablet (4 mg) by mouth as needed at bedtime for muscle spasms. FOR SPASMS Historical Provider, MD Taking Active     Discontinued 02/28/24 1427                                 Medication compliance: Yes   Uses pill box/organizer: Yes    Carries medication list: Yes     Blood Pressure Management  History of Hypertension: Yes   Medication Changes: No   Resting BP:  There were no vitals taken for this visit.     Heart Failure Management  Hx  of Heart Failure: Yes;   Type (selection): HFpEF  Most recent EF:       Onset of heart failure diagnosis: 2018  Last heart failure hospitalization: na  Number of HF admissions per year: na    Symptoms: Fatigue with exertion and LE edema  Is there a family Hx of HF: No   Does patient obtain daily weight Yes     KCCQ survey: Pre: na  Post: na    Heart Failure Reassessment: Reassessed every 30 days while in program.   Unplanned MD and/or ED Heart Failure visit: No  Hospitalization since starting program/last assessment: No  MD contacted regarding Heart Failure symptoms: Yes; date: Pt uses MY CHART to contact her cardiology team frequently.     Heart Failure Goals: Able to verbalize signs and symptoms of fluid retention and when to contact MD, Adhere to proper fluid restrictions, Adapt a low sodium diet and verbalize guidelines, and Obtain daily weight and verbalize proper method of obtaining weights    Smoking/Tobacco Assessment  Social History     Tobacco Use   Smoking Status Former    Packs/day: 1.00    Years: 5.00    Additional pack years: 0.00    Total pack years: 5.00    Types: Cigarettes    Start date: 1983    Quit date: 10/1/1991    Years since quittin.4   Smokeless Tobacco Never       Other Core Component Plan    Goal Status: In progress    Other Core Component Goals: Medication compliance and Achieve resting BP of < 130/80 by discharge    Other Core Component Interventions/Education:   *EDUCATION: CAD, HYPERTENSION AND STROKE, RISK FACTORS FOR HEART DISEASE, UNDERSTANDING HEART VALVE DISEASE, UNDERSTANDING CHF.  *PROVIDE MED EDUCATION TOOL  *PROVIDE MED LIST IF NEEDED     Other Core Components Reassessment: 3/05/2024 Educational Handouts  on HEART FAILURE/ HEART VALVE DISEASE/ RISK FACTORS FOR HEART DISEASE PROVIDED AND REVIEWED WITH PT.    Individual Patient Goals:    INCREASED ENERGY FOR ADLS  IMPROVE DIET HABITS AND LOSE WEIGHT/ CONTINUE WITH WW PROGRAM    Goal Status: In progress    Staff  Comments:  PT PLAN TO ATTEND 12 SESSIONS AND THEN TRANSITION TO PHASE III D/T HIGH COPAY.    Rehab Staff Signature: Millie Ramos RN

## 2024-03-07 ENCOUNTER — APPOINTMENT (OUTPATIENT)
Dept: RADIOLOGY | Facility: HOSPITAL | Age: 59
End: 2024-03-07
Payer: COMMERCIAL

## 2024-03-08 ENCOUNTER — CLINICAL SUPPORT (OUTPATIENT)
Dept: CARDIAC REHAB | Facility: HOSPITAL | Age: 59
End: 2024-03-08
Payer: COMMERCIAL

## 2024-03-08 ENCOUNTER — APPOINTMENT (OUTPATIENT)
Dept: CARDIAC REHAB | Facility: HOSPITAL | Age: 59
End: 2024-03-08
Payer: COMMERCIAL

## 2024-03-08 DIAGNOSIS — I21.4 NON-ST ELEVATION MYOCARDIAL INFARCTION (NSTEMI) (MULTI): ICD-10-CM

## 2024-03-08 PROCEDURE — 93798 PHYS/QHP OP CAR RHAB W/ECG: CPT | Mod: KX

## 2024-03-08 RX ORDER — DILTIAZEM HYDROCHLORIDE 240 MG/1
240 CAPSULE, COATED, EXTENDED RELEASE ORAL DAILY
Qty: 90 CAPSULE | Refills: 1 | Status: SHIPPED | OUTPATIENT
Start: 2024-03-08

## 2024-03-10 DIAGNOSIS — F32.A DEPRESSION, UNSPECIFIED DEPRESSION TYPE: ICD-10-CM

## 2024-03-10 RX ORDER — FLUOXETINE HYDROCHLORIDE 40 MG/1
40 CAPSULE ORAL DAILY
Qty: 90 CAPSULE | Refills: 3 | Status: SHIPPED | OUTPATIENT
Start: 2024-03-10

## 2024-03-11 ENCOUNTER — CLINICAL SUPPORT (OUTPATIENT)
Dept: CARDIAC REHAB | Facility: HOSPITAL | Age: 59
End: 2024-03-11
Payer: COMMERCIAL

## 2024-03-11 DIAGNOSIS — I21.4 ACUTE NON-ST-SEGMENT ELEVATION MYOCARDIAL INFARCTION (MULTI): ICD-10-CM

## 2024-03-11 PROCEDURE — 93798 PHYS/QHP OP CAR RHAB W/ECG: CPT | Mod: KX

## 2024-03-15 ENCOUNTER — CLINICAL SUPPORT (OUTPATIENT)
Dept: CARDIAC REHAB | Facility: HOSPITAL | Age: 59
End: 2024-03-15
Payer: COMMERCIAL

## 2024-03-15 DIAGNOSIS — I21.4 ACUTE NON-ST-SEGMENT ELEVATION MYOCARDIAL INFARCTION (MULTI): ICD-10-CM

## 2024-03-15 PROCEDURE — 93798 PHYS/QHP OP CAR RHAB W/ECG: CPT | Mod: KX

## 2024-03-18 ENCOUNTER — CLINICAL SUPPORT (OUTPATIENT)
Dept: CARDIAC REHAB | Facility: HOSPITAL | Age: 59
End: 2024-03-18
Payer: COMMERCIAL

## 2024-03-18 DIAGNOSIS — I21.4 ACUTE NON-ST-SEGMENT ELEVATION MYOCARDIAL INFARCTION (MULTI): ICD-10-CM

## 2024-03-18 PROCEDURE — 93798 PHYS/QHP OP CAR RHAB W/ECG: CPT | Mod: KX

## 2024-03-20 ENCOUNTER — LAB (OUTPATIENT)
Dept: LAB | Facility: LAB | Age: 59
End: 2024-03-20
Payer: COMMERCIAL

## 2024-03-20 ENCOUNTER — TELEPHONE (OUTPATIENT)
Dept: CARDIOLOGY | Facility: HOSPITAL | Age: 59
End: 2024-03-20

## 2024-03-20 DIAGNOSIS — E87.6 HYPOKALEMIA: Primary | ICD-10-CM

## 2024-03-20 DIAGNOSIS — R07.9 CHEST PAIN, UNSPECIFIED TYPE: ICD-10-CM

## 2024-03-20 LAB
ANION GAP SERPL CALC-SCNC: 10 MMOL/L (ref 10–20)
BUN SERPL-MCNC: 18 MG/DL (ref 6–23)
CALCIUM SERPL-MCNC: 9.4 MG/DL (ref 8.6–10.3)
CHLORIDE SERPL-SCNC: 97 MMOL/L (ref 98–107)
CO2 SERPL-SCNC: 37 MMOL/L (ref 21–32)
CREAT SERPL-MCNC: 0.96 MG/DL (ref 0.5–1.05)
EGFRCR SERPLBLD CKD-EPI 2021: 70 ML/MIN/1.73M*2
GLUCOSE SERPL-MCNC: 84 MG/DL (ref 74–99)
POTASSIUM SERPL-SCNC: 3.2 MMOL/L (ref 3.5–5.3)
SODIUM SERPL-SCNC: 141 MMOL/L (ref 136–145)

## 2024-03-20 PROCEDURE — 36415 COLL VENOUS BLD VENIPUNCTURE: CPT

## 2024-03-20 PROCEDURE — 80048 BASIC METABOLIC PNL TOTAL CA: CPT

## 2024-03-20 RX ORDER — POTASSIUM CHLORIDE 20 MEQ/1
20 TABLET, EXTENDED RELEASE ORAL DAILY
Qty: 30 TABLET | Refills: 11 | Status: SHIPPED | OUTPATIENT
Start: 2024-03-20 | End: 2025-03-20

## 2024-03-22 ENCOUNTER — CLINICAL SUPPORT (OUTPATIENT)
Dept: CARDIAC REHAB | Facility: HOSPITAL | Age: 59
End: 2024-03-22
Payer: COMMERCIAL

## 2024-03-22 DIAGNOSIS — I21.4 ACUTE NON-ST-SEGMENT ELEVATION MYOCARDIAL INFARCTION (MULTI): ICD-10-CM

## 2024-03-22 PROCEDURE — 93798 PHYS/QHP OP CAR RHAB W/ECG: CPT | Mod: KX

## 2024-03-25 ENCOUNTER — CLINICAL SUPPORT (OUTPATIENT)
Dept: CARDIAC REHAB | Facility: HOSPITAL | Age: 59
End: 2024-03-25
Payer: COMMERCIAL

## 2024-03-25 DIAGNOSIS — I21.4 ACUTE NON-ST-SEGMENT ELEVATION MYOCARDIAL INFARCTION (MULTI): ICD-10-CM

## 2024-03-25 PROCEDURE — 93798 PHYS/QHP OP CAR RHAB W/ECG: CPT

## 2024-03-26 DIAGNOSIS — J45.909 UNCOMPLICATED ASTHMA, UNSPECIFIED ASTHMA SEVERITY, UNSPECIFIED WHETHER PERSISTENT (HHS-HCC): ICD-10-CM

## 2024-03-26 RX ORDER — ALBUTEROL SULFATE 90 UG/1
AEROSOL, METERED RESPIRATORY (INHALATION)
Refills: 1 | OUTPATIENT
Start: 2024-03-26

## 2024-03-27 ENCOUNTER — HOSPITAL ENCOUNTER (OUTPATIENT)
Dept: RADIOLOGY | Facility: HOSPITAL | Age: 59
Discharge: HOME | End: 2024-03-27
Payer: COMMERCIAL

## 2024-03-27 ENCOUNTER — DOCUMENTATION (OUTPATIENT)
Dept: CARDIAC REHAB | Facility: HOSPITAL | Age: 59
End: 2024-03-27
Payer: COMMERCIAL

## 2024-03-27 DIAGNOSIS — Z13.820 ENCOUNTER FOR SCREENING FOR OSTEOPOROSIS: ICD-10-CM

## 2024-03-27 PROCEDURE — 77080 DXA BONE DENSITY AXIAL: CPT | Performed by: STUDENT IN AN ORGANIZED HEALTH CARE EDUCATION/TRAINING PROGRAM

## 2024-03-27 PROCEDURE — 77080 DXA BONE DENSITY AXIAL: CPT

## 2024-03-27 NOTE — PROGRESS NOTES
Cardiac Rehabilitation Discharge Summary    Name: Elaine Coffey  Medical Record Number: [id number]  YOB: 1965  Age: 58 y.o.    Today’s Date: 3/27/2024  Primary Care Physician: Tena Lee APRN-CNP  Referring Physician: DR. JIMMIE TRIPLETT  Program Location: [facility]  Primary Diagnosis: NSTEMI I21.4  Onset/Date of Diagnosis: 01/02/2024    12 SESSIONS    AACVPR Risk Stratification:   HIGH    Falls Risk: Medium  Psychosocial Assessment     PRE PHQ-9= 9 MILD  POST PHQ-9=5 MILD    Sent PH-Q 9 to MD if score > 20: No; score < 20    Pt reported/currently experiencing stress: Yes; Stress; Severity: moderate, Anxiety; Severity: moderate, and Depression; Severity: moderate  Patient uses stress management skills: Yes   History of: depression  Currently seeing a mental health provider: No  Social Support: Yes, Whom:FAMILY  Quality of Life Survey:  FERRENS AND VALADEZ    Quality of Life Survey:  PRE POST   GLOBAL SCORE 20.12 27.38   HEALTH/FUNCTIONING SCORE 17.60 27.00   SOCIAL/ECONOMIC SCORE 24.86 28.00   PSYCHOLOGICAL/SPIRITUAL SCORE 13.71 25.71   FAMILY SCORE 30.00 30.00   Learning Assessment:  Learning assessment/barriers: Emotional  Preferred learning method: Reading handout  Barriers: None  Comments:    Stages of Change:Action    Psychosocial Plan    Goal Status: Met    Psychosocial Goals: Demonstrating proper techniques for stress management, Maintain or lower PH-Q 9 score by discharge, Identify strategies for managing depression, and Identify social supports    Psychosocial Interventions/Education:   *STRESS MANAGEMENT HANDOUT  *ASK PATIENT AT LEAST ONCE EVERY 30 DAYS ABOUT STRESS     Psychosocial Reassessment: 3/5/2024 REVIEWED INITIAL PHQ9 =9 PT TAKING MEDICATION. DENIES ANY NEW OR WORSENING STRESS.  3/27/2024 PT COMPLETED AGREED UPON 12/12 VISITS DUE TO HIGH COPAY. ALL EDUCATIONAL HANDOUTS RECEIVED. STATES SHE FEELS SO MUCH BETTER OVERALL. REVIEWED PHQ-9 AND QOL SURVEYS.     Nutrition  Assessment:    Hyperlipidemia: Yes     Lipids:   Lab Results   Component Value Date    CHOL 143 01/03/2024    HDL 38.8 01/03/2024    LDLF 86 01/06/2018    TRIG 150 (H) 01/03/2024       Current Dietary Guidelines: Low fat, Low sodium, WEIGHT WATCHERS  Barriers to dietary change: no    Diet Habit Survey: Rate Your Plate  Pre:  RYP 64/69  Post: RYP 64/69     Diabetes Assessment    Lab Results   Component Value Date    HGBA1C 5.3 01/03/2024       History of Diabetes: No    Weight Management       Current Weight 243    Nutrition Plan    Goal Status: In progress    Nutrition Goals: Adapt a low-sodium, DASH diet prior to discharge, Learn how to read and interpret nutrition labels prior to discharge, and Lose 1lb/week while enrolled in program  TARGET SCORE 55-69 BY DISCHARGE    Nutrition Interventions/Education:   *NUTRITION EDUCATION HANDOUTS  *CHOLESTEROL MANAGEMENT HANDOUT     Nutrition Reassessment: 3/05/2024 REVIEWED INITIAL RYP 64/69. PATIENT FOLLOWING A HEART HEALTHY DIET/ WEIGHT WATCHERS PROGRAM.  3/27/2024 PT COMPLETED AGREED UPON 12/12 VISITS DUE TO HIGH COPAY. ALL EDUCATIONAL HANDOUTS RECEIVED. REVIEWED FINAL RYP AND WAYS TO CONTINUE MAKING HEALTHY DIETARY CHOICES. GOAL WEIGHT NOT MET BUT PT STATES SHE FEELS BETTER AND WILL CONTINUE WORKING TOWARDS HER GOAL.      Exercise Assessment    No  Mode: NA  Frequency: NA  Duration: NA    Exercise Prescription     Exercise Prescription based on: Pre-rehab stress test   Frequency:  2 days/week   Mode: Treadmill, NuStep, and Recumbent Cycle   Duration: 30-45 total aerobic minutes   Intensity: RPE 12-13  Target HR:     MET Level: 2.6-3.1  Patient wears supplemental O2: No     Modality Workload METs Duration (minutes)   1 Pre-Exercise      2 Treadmill 2 @0.5 2.7 12 :00   3 Recumbent Bike 40@5  3.1 12 :00   4 NuStep 91@5  3.9 12 :00   5 Weights 3@15   10 :00   6 Post-Exercise        Resistance Training: Yes   Home Exercise Prescription given:  plan to enroll in phase  3    Exercise Plan    Goal Status: In progress    Exercise Goals: Increase exercise MET level by 5-10% each week, Increase total exercise duration to 30-45 minutes, and Establish a home exercise program before discharge    Exercise Interventions/Education:   *EXERCISE FUNDAMENTALS AND MAINTENANCE HANDOUT  *REVIEW THR AND INSTRUCTIONS FOR RADIAL PULSE CHECK     Exercise Reassessment: 03/05/2024 pt achieving at met level of 3.4 on recumbent bike at most recent session.  Reviewed THR with pt and pulse check.  3/27/2024 PT COMPLETED AGREED UPON 12/12 VISITS DUE TO HIGH COPAY. ALL EDUCATIONAL HANDOUTS RECEIVED. AHCIEVED A MET LEVEL OF 3.9 ON THE NUSTEP AT LAST SESSION.      Other Core Components/Risk Factor Assessment:    Medication adherence  Current Medications:   Medication Documentation Review Audit       Reviewed by NAYANA Smiley (Nurse Practitioner) on 03/01/24 at 0835      Medication Order Taking? Sig Documenting Provider Last Dose Status   acetaZOLAMIDE (Diamox) 125 mg tablet [id number] Yes Take 1 tablet (125 mg) by mouth once daily. NAYANA Beal Taking Active   albuterol 2.5 mg /3 mL (0.083 %) nebulizer solution [id number] Yes Take 3 mL (2.5 mg) by nebulization every 4 hours if needed for wheezing. Historical Provider, MD Taking Active   albuterol 90 mcg/actuation inhaler [id number] Yes INHALE 1 TO 2 PUFFS EVERY 4 TO 6 HOURS AS NEEDED Raffy Balderrama,  Taking Active   amitriptyline (Elavil) 25 mg tablet [id number] Yes Take 1 tablet (25 mg) by mouth once daily at bedtime. Historical Provider, MD Taking Active   aspirin 81 mg EC tablet [id number] Yes Take 1 tablet (81 mg) by mouth once daily in the morning. Historical Provider, MD Taking Active   atorvastatin (Lipitor) 40 mg tablet [id number] Yes Take 1 tablet (40 mg) by mouth once daily at bedtime. Historical Provider, MD Taking Active   clonazePAM (KlonoPIN) 0.5 mg disintegrating tablet [id number] Yes Take 1 tablet (0.5 mg) by mouth once  daily at bedtime. Last OARRS fill: 12/4/23 #30 for 30 days NAYANA Beal Taking Active   clopidogrel (Plavix) 75 mg tablet [id number] Yes Take 1 tablet (75 mg) by mouth once daily. Katia Reina PA-C Taking Active   dilTIAZem CD (Cardizem CD) 240 mg 24 hr capsule [id number] Yes Take 1 capsule (240 mg) by mouth once daily. Katia Reina PA-C Taking Active   ergocalciferol (Vitamin D-2) 1.25 MG (95299 UT) capsule [id number] Yes Take 1 capsule (1,250 mcg) by mouth 1 (one) time per week. On Saturdays Historical Provider, MD Taking Active   ezetimibe (Zetia) 10 mg tablet [id number] Yes TAKE 1 TABLET (10 MG) BY MOUTH ONCE DAILY AT BEDTIME. NAYANA Smiley Taking Active   FLUoxetine (PROzac) 40 mg capsule [id number] Yes Take 1 capsule (40 mg) by mouth once daily.   Patient taking differently: Take 1 capsule (40 mg) by mouth once daily in the morning.    Raffy Balderrama DO Taking Active   furosemide (Lasix) 80 mg tablet [id number] Yes Take 1 tablet (80 mg) by mouth 2 times a day. NAYANA Beal Taking Active   gabapentin (Neurontin) 400 mg capsule [id number] Yes Take 1 capsule (400 mg) by mouth 3 times a day. Last OARRS fill: 4/7/23 #270 for 90 days Historical Provider, MD Taking Active   levothyroxine (Synthroid, Levoxyl) 50 mcg tablet [id number] Yes TAKE 1 TABLET BY MOUTH EVERY DAY Raffy Balderrama DO Taking Active   meclizine (Antivert) 25 mg tablet [id number] Yes Take 1 tablet (25 mg) by mouth every 6 hours if needed for dizziness. Historical Provider, MD Taking Active   nystatin (Mycostatin) 100,000 unit/gram powder [id number] Yes Apply 1 Application topically 2 times a day. NAYANA Beal Taking Active   nystatin (Mycostatin) ointment [id number] Yes Apply topically 2 times a day. NAYANA Beal Taking Active   nystatin-triamcinolone (Mycolog II) ointment [id number] Yes Apply topically 2 times a day. to affected area Tena Lee, NAYANA Taking  Active   omeprazole (PriLOSEC) 20 mg DR capsule [id number] Yes Take 1 capsule (20 mg) by mouth once daily. Do not crush or chew. Tena SIERRA Lee, APRN-CNP Taking Active   ranolazine (Ranexa) 500 mg 12 hr tablet [id number] Yes Take 1 tablet (500 mg) by mouth 2 times a day. Do not crush, chew, or split. Mia SIERRA kate Gross, APRN-CNP Taking Active   tiZANidine (Zanaflex) 4 mg tablet [id number] Yes Take 1 tablet (4 mg) by mouth as needed at bedtime for muscle spasms. FOR SPASMS Historical Provider, MD Taking Active     Discontinued 02/28/24 1423                                 Medication compliance: Yes   Uses pill box/organizer: Yes    Carries medication list: Yes     Blood Pressure Management  History of Hypertension: Yes   Medication Changes: No   Resting BP:  128/74    Heart Failure Management  Hx of Heart Failure: Yes;   Type (selection): HFpEF  Most recent EF:   60% 1/02/2024    Onset of heart failure diagnosis: 2018  Last heart failure hospitalization: na  Number of HF admissions per year: na    Symptoms: Fatigue with exertion and LE edema  Is there a family Hx of HF: No   Does patient obtain daily weight Yes     KCCQ survey: Pre: na  Post: na    Heart Failure Reassessment: Reassessed every 30 days while in program.   Unplanned MD and/or ED Heart Failure visit: No  Hospitalization since starting program/last assessment: No  MD contacted regarding Heart Failure symptoms: Yes; date: Pt uses MY CHART to contact her cardiology team frequently.     Heart Failure Goals: Able to verbalize signs and symptoms of fluid retention and when to contact MD, Adhere to proper fluid restrictions, Adapt a low sodium diet and verbalize guidelines, and Obtain daily weight and verbalize proper method of obtaining weights    Smoking/Tobacco Assessment  Social History     Tobacco Use   Smoking Status Former    Packs/day: 1.00    Years: 5.00    Additional pack years: 0.00    Total pack years: 5.00    Types: Cigarettes    Start date:  1983    Quit date: 10/1/1991    Years since quittin.5   Smokeless Tobacco Never       Other Core Component Plan    Goal Status: Met    Other Core Component Goals: Medication compliance and Achieve resting BP of < 130/80 by discharge    Other Core Component Interventions/Education:   *EDUCATION: CAD, HYPERTENSION AND STROKE, RISK FACTORS FOR HEART DISEASE, UNDERSTANDING HEART VALVE DISEASE, UNDERSTANDING CHF.  *PROVIDE MED EDUCATION TOOL  *PROVIDE MED LIST IF NEEDED     Other Core Components Reassessment: 3/05/2024 Educational Handouts  on HEART FAILURE/ HEART VALVE DISEASE/ RISK FACTORS FOR HEART DISEASE PROVIDED AND REVIEWED WITH PT.  3/27/2024 PT COMPLETED AGREED UPON  VISITS DUE TO HIGH COPAY. ALL EDUCATIONAL HANDOUTS RECEIVED. COMPLAINT WITH MEDICATIONS AND CARRYING A MED LIST AT ALL TIMES.      Individual Patient Goals:    INCREASED ENERGY FOR ADLS  IMPROVE DIET HABITS AND LOSE WEIGHT/ CONTINUE WITH WW PROGRAM    Goal Status: In progress    Staff Comments:  PT PLAN TO ATTEND 12 SESSIONS AND THEN TRANSITION TO PHASE III D/T HIGH COPAY.  3/27/2024 PT COMPLETED AGREED UPON 12 VISITS DUE TO HIGH COPAY. ALL EDUCATIONAL HANDOUTS RECEIVED.  Rehab Staff Signature: Millie Ramos RN

## 2024-04-05 ENCOUNTER — APPOINTMENT (OUTPATIENT)
Dept: CARDIOLOGY | Facility: HOSPITAL | Age: 59
End: 2024-04-05
Payer: COMMERCIAL

## 2024-04-17 ENCOUNTER — LAB (OUTPATIENT)
Dept: LAB | Facility: LAB | Age: 59
End: 2024-04-17
Payer: COMMERCIAL

## 2024-04-17 ENCOUNTER — OFFICE VISIT (OUTPATIENT)
Dept: CARDIOLOGY | Facility: HOSPITAL | Age: 59
End: 2024-04-17
Payer: COMMERCIAL

## 2024-04-17 VITALS
OXYGEN SATURATION: 94 % | BODY MASS INDEX: 37.95 KG/M2 | SYSTOLIC BLOOD PRESSURE: 119 MMHG | HEART RATE: 76 BPM | WEIGHT: 242.29 LBS | DIASTOLIC BLOOD PRESSURE: 76 MMHG

## 2024-04-17 DIAGNOSIS — R00.2 HEART PALPITATIONS: Primary | ICD-10-CM

## 2024-04-17 DIAGNOSIS — I25.10 CORONARY ARTERY DISEASE INVOLVING NATIVE CORONARY ARTERY OF NATIVE HEART WITHOUT ANGINA PECTORIS: ICD-10-CM

## 2024-04-17 LAB
ALBUMIN SERPL BCP-MCNC: 4.7 G/DL (ref 3.4–5)
ALP SERPL-CCNC: 125 U/L (ref 33–110)
ALT SERPL W P-5'-P-CCNC: 16 U/L (ref 7–45)
ANION GAP SERPL CALC-SCNC: 15 MMOL/L (ref 10–20)
AST SERPL W P-5'-P-CCNC: 15 U/L (ref 9–39)
BILIRUB SERPL-MCNC: 0.9 MG/DL (ref 0–1.2)
BUN SERPL-MCNC: 18 MG/DL (ref 6–23)
CALCIUM SERPL-MCNC: 9.5 MG/DL (ref 8.6–10.3)
CHLORIDE SERPL-SCNC: 101 MMOL/L (ref 98–107)
CO2 SERPL-SCNC: 30 MMOL/L (ref 21–32)
CREAT SERPL-MCNC: 1 MG/DL (ref 0.5–1.05)
EGFRCR SERPLBLD CKD-EPI 2021: 66 ML/MIN/1.73M*2
GLUCOSE SERPL-MCNC: 93 MG/DL (ref 74–99)
POTASSIUM SERPL-SCNC: 4.1 MMOL/L (ref 3.5–5.3)
PROT SERPL-MCNC: 6.6 G/DL (ref 6.4–8.2)
SODIUM SERPL-SCNC: 142 MMOL/L (ref 136–145)

## 2024-04-17 PROCEDURE — 80053 COMPREHEN METABOLIC PANEL: CPT

## 2024-04-17 PROCEDURE — 99213 OFFICE O/P EST LOW 20 MIN: CPT | Performed by: NURSE PRACTITIONER

## 2024-04-17 PROCEDURE — 36415 COLL VENOUS BLD VENIPUNCTURE: CPT

## 2024-04-17 PROCEDURE — 1036F TOBACCO NON-USER: CPT | Performed by: NURSE PRACTITIONER

## 2024-04-17 PROCEDURE — 3008F BODY MASS INDEX DOCD: CPT | Performed by: NURSE PRACTITIONER

## 2024-04-17 ASSESSMENT — ENCOUNTER SYMPTOMS
RESPIRATORY NEGATIVE: 1
DEPRESSION: 0
PSYCHIATRIC NEGATIVE: 1
HEMATOLOGIC/LYMPHATIC NEGATIVE: 1
MYALGIAS: 1
ENDOCRINE NEGATIVE: 1
NEUROLOGICAL NEGATIVE: 1
EYES NEGATIVE: 1
NECK PAIN: 1
GASTROINTESTINAL NEGATIVE: 1

## 2024-04-17 NOTE — PROGRESS NOTES
"Referred by  No ref. provider found for Follow-up (1 mth.  C/o still retaining water, \"heart pounding in chest\" while lying down at night.)     History Of Present Illness:    Elaine Coffey is a very pleasant 58 year old female with a history of HTN, HLD, MS and CAD, she is here for a follow up visit. The patient is seen in collaboration with Dr. Gutierres. Mrs. Coffey complains of peripheral edema worse at the end of the day. At night feels heart pounds hard. Complains of general fatigue. Scheduling for gallbladder surgery. Denies any recurrent chest pain. Currently in cardiac rehab, blood pressure have been low. Denies dizziness.  Has been watching sodium and fluid intake.       Review of Systems   Constitutional: Positive for malaise/fatigue.   HENT: Negative.     Eyes: Negative.    Cardiovascular:  Positive for chest pain.   Respiratory: Negative.     Endocrine: Negative.    Hematologic/Lymphatic: Negative.    Skin: Negative.    Musculoskeletal:  Positive for myalgias and neck pain.   Gastrointestinal: Negative.    Neurological: Negative.    Psychiatric/Behavioral: Negative.          Past Medical History:  She has a past medical history of Abnormal ECG, Asthma ([facility]), CHF (congestive heart failure) (Multi), Coronary artery disease, Hidradenitis suppurativa, Hypertension, Multiple sclerosis (Multi), Myocardial infarction (Multi), and Sleep apnea.    Past Surgical History:  She has a past surgical history that includes  section, classic (2014); Other surgical history (2014); Other surgical history (2014); Hysterectomy (2014); Other surgical history (2020); Cardiac catheterization (N/A, 2024); Ankle surgery (Left); Coronary stent placement; and Cardiac catheterization (N/A, 2024).      Social History:  She reports that she quit smoking about 32 years ago. Her smoking use included cigarettes. She started smoking about 41 years ago. She has a 8.7 pack-year smoking " history. She has never used smokeless tobacco. She reports that she does not drink alcohol and does not use drugs.    Family History:  Family History   Problem Relation Name Age of Onset    Heart attack Mother Elaine Garcia     Bilateral breast cancer Mother Elaine Mishrao     Emphysema Mother Elaine Mishrao     Blood clot Mother Elaine Mishrao     Abnormal EKG Mother Elaine Ellissko     Anemia Mother Elaine Ellissko     Angina Mother Elaine Mishrao     Depression Mother Elaine Ellissko     Heart disease Mother Elaine Ellissko     Lung disease Mother Elaine Mishrao     Thyroid disease Mother Elaine Ellissko     Heart attack Father Dawit Garcia     Lymphoma Father Dawit Ellissko     Angina Father Dawit Ellissko     Cancer Father Dawit Ellissko     Diabetes type I Father Dawit Ellissko     Heart disease Father Dawit Ellissko     Hypertension Father Dawit Garcia     Blood clot Sister      Ovarian cancer Maternal Grandmother Delmy Martínez     Stroke Maternal Grandmother Delmy Martínez     Heart attack Maternal Grandmother Delmy Martínez     Diabetes type I Paternal Grandmother Olive Garcia     Asthma Sister Heidi Garcia     Kidney disease Sister Kaylynn Garcia         Allergies:  Gadolinium-containing contrast media    Outpatient Medications:  Current Outpatient Medications   Medication Instructions    acetaZOLAMIDE (DIAMOX) 125 mg, oral, Daily    albuterol 90 mcg/actuation inhaler INHALE 1 TO 2 PUFFS EVERY 4 TO 6 HOURS AS NEEDED    albuterol 2.5 mg, nebulization, Every 4 hours PRN    amitriptyline (ELAVIL) 25 mg, oral, Nightly    aspirin 81 mg EC tablet 1 tablet, oral, Every morning    atorvastatin (LIPITOR) 40 mg, oral, Nightly    calcium carbonate (OS-SHANTA) 1,200 mg, oral, 2 times daily with meals, Take with food.    clonazePAM (KLONOPIN) 0.5 mg, oral, Nightly, Last OARRS fill: 12/4/23 #30 for 30 days    clopidogrel (PLAVIX) 75 mg, oral, Daily    dilTIAZem CD (CARDIZEM CD) 240 mg, oral, Daily    ergocalciferol (Vitamin  D-2) 1.25 MG (48440 UT) capsule 1 capsule, oral, Once Weekly, On Saturdays    ezetimibe (ZETIA) 10 mg, oral, Nightly    FLUoxetine (PROZAC) 40 mg, oral, Daily    furosemide (LASIX) 80 mg, oral, 2 times daily    furosemide (LASIX) 80 mg, oral, Daily    gabapentin (NEURONTIN) 400 mg, oral, 3 times daily, Last OARRS fill: 4/7/23 #270 for 90 days    levothyroxine (SYNTHROID, LEVOXYL) 50 mcg, oral, Daily    meclizine (ANTIVERT) 25 mg, oral, Every 6 hours PRN    metoprolol succinate XL (TOPROL-XL) 25 mg, oral, Daily    nystatin (Mycostatin) 100,000 unit/gram powder 1 Application, Topical, 2 times daily    nystatin (Mycostatin) ointment Topical, 2 times daily    nystatin-triamcinolone (Mycolog II) ointment Topical, 2 times daily, to affected area    omeprazole (PRILOSEC) 20 mg, oral, Daily, Do not crush or chew.    potassium chloride CR (Klor-Con M20) 20 mEq ER tablet 20 mEq, oral, Daily, Do not crush or chew.    ranolazine (RANEXA) 500 mg, oral, 2 times daily, Do not crush, chew, or split.    tiZANidine (ZANAFLEX) 4 mg, oral, Nightly PRN, FOR SPASMS        Last Recorded Vitals:  Vitals:    04/17/24 0809   BP: 119/76   Pulse: 76   SpO2: 94%   Weight: 110 kg (242 lb 4.6 oz)       Physical Exam:  Physical Exam  Vitals reviewed.   HENT:      Head: Normocephalic.      Nose: Nose normal.   Eyes:      Pupils: Pupils are equal, round, and reactive to light.   Cardiovascular:      Rate and Rhythm: Normal rate and regular rhythm.   Pulmonary:      Effort: Pulmonary effort is normal.      Breath sounds: Normal breath sounds.   Abdominal:      General: Abdomen is flat.      Palpations: Abdomen is soft.   Musculoskeletal:         General: Normal range of motion.      Cervical back: Normal range of motion.   Skin:     General: Skin is warm and dry.   Neurological:      General: No focal deficit present.      Mental Status: He is alert and oriented to person, place, and time.   Psychiatric:         Mood and Affect: Mood normal.           Last Labs:  CBC -  Lab Results   Component Value Date    WBC 5.3 02/19/2024    HGB 14.5 02/19/2024    HCT 42.3 02/19/2024    MCV 87 02/19/2024     02/19/2024       CMP -  Lab Results   Component Value Date    CALCIUM 9.4 03/20/2024    PHOS 2.8 01/09/2024    PROT 6.9 02/19/2024    ALBUMIN 4.4 02/19/2024    AST 19 02/19/2024    ALT 27 02/19/2024    ALKPHOS 107 02/19/2024    BILITOT 0.7 02/19/2024       LIPID PANEL -   Lab Results   Component Value Date    CHOL 143 01/03/2024    TRIG 150 (H) 01/03/2024    HDL 38.8 01/03/2024    CHHDL 3.7 01/03/2024    LDLF 86 01/06/2018    VLDL 30 01/03/2024    NHDL 104 01/03/2024       RENAL FUNCTION PANEL -   Lab Results   Component Value Date    GLUCOSE 84 03/20/2024     03/20/2024    K 3.2 (L) 03/20/2024    CL 97 (L) 03/20/2024    CO2 37 (H) 03/20/2024    ANIONGAP 10 03/20/2024    BUN 18 03/20/2024    CREATININE 0.96 03/20/2024    GFRMALE CANCELED 08/04/2023    CALCIUM 9.4 03/20/2024    PHOS 2.8 01/09/2024    ALBUMIN 4.4 02/19/2024        Lab Results   Component Value Date    BNP 22 02/19/2024    HGBA1C 5.3 01/03/2024       Last Cardiology Tests:  ECG:  ECG 12 lead 01/19/2024 (Preliminary)    Echo:  Transthoracic Echo (TTE) Complete 01/03/2024   1. Left ventricular systolic function is normal with a 55-60% estimated ejection fraction.   2. RVSP within normal limits.    Echocardiogram 8/20/2021  1. The left ventricular systolic function is normal with a 60% estimated ejection fraction.  2. Spectral Doppler shows an impaired relaxation pattern of left ventricular diastolic filling.  3. There is trace to mild mitral and tricuspid regurgitation.    echocardiogram 1/18/2018   1. The left ventricular systolic function is low normal with a 50-55% estimated  ejection fraction.   2. Spectral Doppler shows an impaired relaxation pattern of left ventricular  diastolic filling.   3. There is mild mitral and tricuspid regurgitaiton.      Echocardiogram 1/18/2018   LVEF 50-55%    mild MR   mild TR     Ejection Fractions:  LVEF 55-60%    Cath:  Cardiac catheterization - coronary   Cath 1/2/2024  . Left main: no significant angiographic disease.   2. LAD: no significant angiographic disease.   3. LCx: no significant angiographic disease.   4. RCA: patent proximal stent, no significant angiographic disease.   5. LVEDP 18mmHg, no aortic stenosis on LV-Ao gradient.    Cardiac cath 12/14/2018   1. No significant angiographic CAD, ostial-proximal RCA patent.   2. LVEDP 31mmHg, no aortic stenosis on LV-Ao gradient.  Stress Test:  Nuclear Stress Test 05/25/2023  1. No evidence of inducible myocardial ischemia. Predominantly fixed  moderate-sized moderate to severe perfusion defects involving  inferior wall as well as inferolateral wall, likely represent prior  infarction. Similar to prior exam on 11/20/2018.  2. The left ventricle is normal in size.  3. Gated images demonstrate normal LV wall motion except hypokinesis  in the inferior wall with a post-stress LV EF estimated at 56%.      nuclear stress test 11/20/2018   1. Moderate-sized territory of largely fixed prior completed  myocardial infarction involving the basal inferior/inferolateral wall  with minimal nonsignificant flower-infarct ischemia in the  inferolateral territory.  2. There is moderate dilation of the left ventricle with an  end-diastolic volume calculated at 145 mL.  3. Wall motion or LV quantification was not assessed due to unable to  do gated imaged secondary to arrhythmia.    Cardiac Imaging:      Assessment/Plan      Mrs. Coffey is a very pleasant 58 year old female with a history of CAD, HTN, HLD and MS. She had a PCI on 1/4/2018 at [facility], [place], operated by [facility]. She had a repeat heart cath on 12/14/2018 showing a patent RCA and no significant CAD. Recently admitted with chest pain, and elevated troponin. Heart cath was unchanged from 12/2018. Echocardiogram showed a preserved LV function. Chest pain due to vasospasm. She has had  increased swelling that has improved with the Lasix, she will continue the Lasix 80 mg twice a day, blood work today. She complains of heart palpitations, she will have a heart monitor to evaluate for an arrhythmia. Heart rate and blood pressure are well controlled today.      Plan   -call with any questions   -continue ASA, Atorvastatin, and Plavix   -continue Caredizem   -continue  Ranexa 500 mg twice a day  -continue  Cardiac rehab   -follow up in two months   -continue Lasix 80 mg twice a day   -blood work   -heart monitor for two weeks            Mia Edmondson, APRN-CNP

## 2024-04-17 NOTE — PATIENT INSTRUCTIONS
CALL WITH ANY QUESTIONS   NO MEDICATION CHANGES   BLOOD WORK   WILL CALL TO REVIEW THE RESULTS   HEART MONITOR FOR TWO WEEKS   FOLLOW UP IN TWO MONTHS

## 2024-04-18 ENCOUNTER — APPOINTMENT (OUTPATIENT)
Dept: NEUROLOGY | Facility: CLINIC | Age: 59
End: 2024-04-18
Payer: COMMERCIAL

## 2024-04-19 ENCOUNTER — OFFICE VISIT (OUTPATIENT)
Dept: PULMONOLOGY | Facility: CLINIC | Age: 59
End: 2024-04-19
Payer: COMMERCIAL

## 2024-04-19 VITALS
BODY MASS INDEX: 42.91 KG/M2 | HEART RATE: 68 BPM | WEIGHT: 274 LBS | SYSTOLIC BLOOD PRESSURE: 130 MMHG | OXYGEN SATURATION: 96 % | DIASTOLIC BLOOD PRESSURE: 79 MMHG | RESPIRATION RATE: 16 BRPM

## 2024-04-19 DIAGNOSIS — R06.2 WHEEZING: ICD-10-CM

## 2024-04-19 DIAGNOSIS — G47.33 OSA (OBSTRUCTIVE SLEEP APNEA): Primary | ICD-10-CM

## 2024-04-19 DIAGNOSIS — R91.1 PULMONARY NODULE: ICD-10-CM

## 2024-04-19 DIAGNOSIS — J98.4 LUNG DISEASE: ICD-10-CM

## 2024-04-19 DIAGNOSIS — R06.83 SNORES: Primary | ICD-10-CM

## 2024-04-19 PROBLEM — J18.9 PNEUMONIA: Status: RESOLVED | Noted: 2023-08-01 | Resolved: 2024-04-19

## 2024-04-19 PROCEDURE — 99215 OFFICE O/P EST HI 40 MIN: CPT | Performed by: INTERNAL MEDICINE

## 2024-04-19 PROCEDURE — 99205 OFFICE O/P NEW HI 60 MIN: CPT | Performed by: INTERNAL MEDICINE

## 2024-04-19 PROCEDURE — 1036F TOBACCO NON-USER: CPT | Performed by: INTERNAL MEDICINE

## 2024-04-19 PROCEDURE — 3008F BODY MASS INDEX DOCD: CPT | Performed by: INTERNAL MEDICINE

## 2024-04-19 ASSESSMENT — ENCOUNTER SYMPTOMS
FEVER: 0
PSYCHIATRIC NEGATIVE: 1
SHORTNESS OF BREATH: 1
NEUROLOGICAL NEGATIVE: 1
CARDIOVASCULAR NEGATIVE: 1
GASTROINTESTINAL NEGATIVE: 1
FATIGUE: 1
DEPRESSION: 0
COUGH: 1
CHILLS: 0

## 2024-04-19 ASSESSMENT — PATIENT HEALTH QUESTIONNAIRE - PHQ9
SUM OF ALL RESPONSES TO PHQ9 QUESTIONS 1 AND 2: 0
2. FEELING DOWN, DEPRESSED OR HOPELESS: NOT AT ALL
1. LITTLE INTEREST OR PLEASURE IN DOING THINGS: NOT AT ALL

## 2024-04-19 ASSESSMENT — PAIN SCALES - GENERAL: PAINLEVEL: 0-NO PAIN

## 2024-04-19 NOTE — Clinical Note
April 19, 2024     Tena Lee, APRN-CNP  [address]  [address]  [facility] [address]    Patient: Elaine Coffey   YOB: 1965   Date of Visit: 4/19/2024       Dear Dr. Tena Lee, APRN-CNP:    Thank you for referring Elaine Coffey to me for evaluation. Below are my notes for this consultation.  If you have questions, please do not hesitate to call me. I look forward to following your patient along with you.       Sincerely,     Geovany Antunez MD      CC: No Recipients  ______________________________________________________________________________________    Subjective  Patient ID: Elaine Coffey is a 58 y.o. female who presents for Lung Eval.  H/o MS,  FARZANA here to establish care.  Had COVID 2x last year.  Had a CPAP but hasn't been using it.  Dx w/ asthma 25 years ago.  Can't remember when she last had PFT.  Uses rescue 1-2x/day.  Previously used Advair.  No fevers, chills.  Has cough in the mornings.  ET is about 150 feet.  Currently doing cardiac rehab.  Reports cat sensitivity.  Reports significant sinus congestion.  Used Flonase in the past.    Last used CPAP 18 years ago.  Patient reports snoring, daytime napping, falling asleep watching TV, falling asleep riding in a car, falling asleep reading, falling asleep in a waiting room, morning headache, and morning sore throat.  Patient denies apneic episodes or gasping awakenings.  Patient does not feel refreshed in the mornings.      Former 2 pack year smoker who quit in 1991.  Works as an  as a senior living.  Has dogs, a cat, and a parakeet (kept in her bedroom).  Sister - asthma.  GM - lung Ca.  GF - lung Ca.          Review of Systems   Constitutional:  Positive for fatigue. Negative for chills and fever.   Respiratory:  Positive for cough and shortness of breath.    Cardiovascular: Negative.    Gastrointestinal: Negative.    Neurological: Negative.    Psychiatric/Behavioral: Negative.     All other systems reviewed and  are negative.      Objective  Physical Exam  Vitals reviewed.   Constitutional:       Appearance: Normal appearance.   HENT:      Head: Normocephalic and atraumatic.   Eyes:      Extraocular Movements: Extraocular movements intact.   Cardiovascular:      Rate and Rhythm: Normal rate and regular rhythm.      Heart sounds: Normal heart sounds.   Pulmonary:      Effort: Pulmonary effort is normal.      Breath sounds: Normal breath sounds.   Abdominal:      Palpations: Abdomen is soft.      Tenderness: There is no abdominal tenderness.   Musculoskeletal:      Cervical back: Normal range of motion.   Skin:     General: Skin is warm.   Neurological:      General: No focal deficit present.      Mental Status: She is alert and oriented to person, place, and time. Mental status is at baseline.   Psychiatric:         Mood and Affect: Mood normal.         Behavior: Behavior normal.         Assessment/Plan  Problem List Items Addressed This Visit       FARZANA (obstructive sleep apnea) - Primary     Likely FARZANA.  Will get PSG split night to further evaluate.         Wheezing     Suspect asthma.  Will get PFTs to start evaluation.  Currently only on albuterol.         Pulmonary nodule     Stable since 2018.  Likely benign.  No further workup necessary.          RTC in 3 months    Time Spent  Prep time on day of patient encounter: 10 minutes  Time spent directly with patient, family or caregiver: 25 minutes  Additional Time Spent on Patient Care Activities: 0 minutes  Documentation Time: 10 minutes  Other Time Spent: 0 minutes  Total: 45 minutes        Geovany Antunez MD 04/19/24 12:45 PM

## 2024-04-19 NOTE — PROGRESS NOTES
Subjective   Patient ID: Elaine Coffey is a 58 y.o. female who presents for Lung Eval.  H/o MS,  FARZANA here to establish care.  Had COVID 2x last year.  Had a CPAP but hasn't been using it.  Dx w/ asthma 25 years ago.  Can't remember when she last had PFT.  Uses rescue 1-2x/day.  Previously used Advair.  No fevers, chills.  Has cough in the mornings.  ET is about 150 feet.  Currently doing cardiac rehab.  Reports cat sensitivity.  Reports significant sinus congestion.  Used Flonase in the past.    Last used CPAP 18 years ago.  Patient reports snoring, daytime napping, falling asleep watching TV, falling asleep riding in a car, falling asleep reading, falling asleep in a waiting room, morning headache, and morning sore throat.  Patient denies apneic episodes or gasping awakenings.  Patient does not feel refreshed in the mornings.      Former 2 pack year smoker who quit in 1991.  Works as an  as a senior living.  Has dogs, a cat, and a parakeet (kept in her bedroom).  Sister - asthma.  GM - lung Ca.  GF - lung Ca.          Review of Systems   Constitutional:  Positive for fatigue. Negative for chills and fever.   Respiratory:  Positive for cough and shortness of breath.    Cardiovascular: Negative.    Gastrointestinal: Negative.    Neurological: Negative.    Psychiatric/Behavioral: Negative.     All other systems reviewed and are negative.      Objective   Physical Exam  Vitals reviewed.   Constitutional:       Appearance: Normal appearance.   HENT:      Head: Normocephalic and atraumatic.   Eyes:      Extraocular Movements: Extraocular movements intact.   Cardiovascular:      Rate and Rhythm: Normal rate and regular rhythm.      Heart sounds: Normal heart sounds.   Pulmonary:      Effort: Pulmonary effort is normal.      Breath sounds: Normal breath sounds.   Abdominal:      Palpations: Abdomen is soft.      Tenderness: There is no abdominal tenderness.   Musculoskeletal:      Cervical back: Normal  range of motion.   Skin:     General: Skin is warm.   Neurological:      General: No focal deficit present.      Mental Status: She is alert and oriented to person, place, and time. Mental status is at baseline.   Psychiatric:         Mood and Affect: Mood normal.         Behavior: Behavior normal.         Assessment/Plan   Problem List Items Addressed This Visit       FARZANA (obstructive sleep apnea) - Primary     Likely FARZANA.  Will get PSG split night to further evaluate.         Wheezing     Suspect asthma.  Will get PFTs to start evaluation.  Currently only on albuterol.  Will also get MIP/MEP given h/o MS.         Pulmonary nodule     Stable since 2018.  Likely benign.  No further workup necessary.          Other Visit Diagnoses       Lung disease        Relevant Orders    Complete Pulmonary Function Test (Spirometry/DLCO/Lung Volumes)    Respiratory Muscle Force (MIP/MEP)          RTC in 3 months    Time Spent  Prep time on day of patient encounter: 15 minutes  Time spent directly with patient, family or caregiver: 30 minutes  Additional Time Spent on Patient Care Activities: 0 minutes  Documentation Time: 10 minutes  Other Time Spent: 0 minutes  Total: 55 minutes        Geovany Antunez MD 04/19/24 12:48 PM

## 2024-04-19 NOTE — ASSESSMENT & PLAN NOTE
Suspect asthma.  Will get PFTs to start evaluation.  Currently only on albuterol.  Will also get MIP/MEP given h/o MS.

## 2024-04-19 NOTE — LETTER
April 19, 2024     Tena Lee, APRN-CNP  [address]  [address]  [facility] [address]    Patient: Elaine Coffey   YOB: 1965   Date of Visit: 4/19/2024       Dear Dr. Tena Lee, APRN-CNP:    Thank you for referring Elaine Coffey to me for evaluation. Below are my notes for this consultation.  If you have questions, please do not hesitate to call me. I look forward to following your patient along with you.       Sincerely,     Geovany Antunez MD      CC: No Recipients  ______________________________________________________________________________________    Subjective  Patient ID: Elaine Coffey is a 58 y.o. female who presents for Lung Eval.  H/o MS,  FARZANA here to establish care.  Had COVID 2x last year.  Had a CPAP but hasn't been using it.  Dx w/ asthma 25 years ago.  Can't remember when she last had PFT.  Uses rescue 1-2x/day.  Previously used Advair.  No fevers, chills.  Has cough in the mornings.  ET is about 150 feet.  Currently doing cardiac rehab.  Reports cat sensitivity.  Reports significant sinus congestion.  Used Flonase in the past.    Last used CPAP 18 years ago.  Patient reports snoring, daytime napping, falling asleep watching TV, falling asleep riding in a car, falling asleep reading, falling asleep in a waiting room, morning headache, and morning sore throat.  Patient denies apneic episodes or gasping awakenings.  Patient does not feel refreshed in the mornings.      Former 2 pack year smoker who quit in 1991.  Works as an  as a senior living.  Has dogs, a cat, and a parakeet (kept in her bedroom).  Sister - asthma.  GM - lung Ca.  GF - lung Ca.          Review of Systems   Constitutional:  Positive for fatigue. Negative for chills and fever.   Respiratory:  Positive for cough and shortness of breath.    Cardiovascular: Negative.    Gastrointestinal: Negative.    Neurological: Negative.    Psychiatric/Behavioral: Negative.     All other systems reviewed and  are negative.      Objective  Physical Exam  Vitals reviewed.   Constitutional:       Appearance: Normal appearance.   HENT:      Head: Normocephalic and atraumatic.   Eyes:      Extraocular Movements: Extraocular movements intact.   Cardiovascular:      Rate and Rhythm: Normal rate and regular rhythm.      Heart sounds: Normal heart sounds.   Pulmonary:      Effort: Pulmonary effort is normal.      Breath sounds: Normal breath sounds.   Abdominal:      Palpations: Abdomen is soft.      Tenderness: There is no abdominal tenderness.   Musculoskeletal:      Cervical back: Normal range of motion.   Skin:     General: Skin is warm.   Neurological:      General: No focal deficit present.      Mental Status: She is alert and oriented to person, place, and time. Mental status is at baseline.   Psychiatric:         Mood and Affect: Mood normal.         Behavior: Behavior normal.         Assessment/Plan  Problem List Items Addressed This Visit       FARZANA (obstructive sleep apnea) - Primary     Likely FARZANA.  Will get PSG split night to further evaluate.         Wheezing     Suspect asthma.  Will get PFTs to start evaluation.  Currently only on albuterol.         Pulmonary nodule     Stable since 2018.  Likely benign.  No further workup necessary.          RTC in 3 months    Time Spent  Prep time on day of patient encounter: 10 minutes  Time spent directly with patient, family or caregiver: 25 minutes  Additional Time Spent on Patient Care Activities: 0 minutes  Documentation Time: 10 minutes  Other Time Spent: 0 minutes  Total: 45 minutes        Geovany Antunez MD 04/19/24 12:45 PM

## 2024-04-19 NOTE — LETTER
April 19, 2024     Tena Lee, APRN-CNP  [address]  [place] [address]  [facility] [address]    Patient: Elaine Coffey   YOB: 1965   Date of Visit: 4/19/2024       Dear Dr. Tena Lee, APRN-CNP:    Thank you for referring Elaine Coffey to me for evaluation. Below are my notes for this consultation.  If you have questions, please do not hesitate to call me. I look forward to following your patient along with you.       Sincerely,     Geovany Antunez MD      CC: No Recipients  ______________________________________________________________________________________    Subjective  Patient ID: Elaine Coffey is a 58 y.o. female who presents for Lung Eval.  H/o MS,  FARZANA here to establish care.  Had COVID 2x last year.  Had a CPAP but hasn't been using it.  Dx w/ asthma 25 years ago.  Can't remember when she last had PFT.  Uses rescue 1-2x/day.  Previously used Advair.  No fevers, chills.  Has cough in the mornings.  ET is about 150 feet.  Currently doing cardiac rehab.  Reports cat sensitivity.  Reports significant sinus congestion.  Used Flonase in the past.    Last used CPAP 18 years ago.  Patient reports snoring, daytime napping, falling asleep watching TV, falling asleep riding in a car, falling asleep reading, falling asleep in a waiting room, morning headache, and morning sore throat.  Patient denies apneic episodes or gasping awakenings.  Patient does not feel refreshed in the mornings.      Former 2 pack year smoker who quit in 1991.  Works as an  as a senior living.  Has dogs, a cat, and a parakeet (kept in her bedroom).  Sister - asthma.  GM - lung Ca.  GF - lung Ca.          Review of Systems   Constitutional:  Positive for fatigue. Negative for chills and fever.   Respiratory:  Positive for cough and shortness of breath.    Cardiovascular: Negative.    Gastrointestinal: Negative.    Neurological: Negative.    Psychiatric/Behavioral: Negative.     All other systems reviewed and  are negative.      Objective  Physical Exam  Vitals reviewed.   Constitutional:       Appearance: Normal appearance.   HENT:      Head: Normocephalic and atraumatic.   Eyes:      Extraocular Movements: Extraocular movements intact.   Cardiovascular:      Rate and Rhythm: Normal rate and regular rhythm.      Heart sounds: Normal heart sounds.   Pulmonary:      Effort: Pulmonary effort is normal.      Breath sounds: Normal breath sounds.   Abdominal:      Palpations: Abdomen is soft.      Tenderness: There is no abdominal tenderness.   Musculoskeletal:      Cervical back: Normal range of motion.   Skin:     General: Skin is warm.   Neurological:      General: No focal deficit present.      Mental Status: She is alert and oriented to person, place, and time. Mental status is at baseline.   Psychiatric:         Mood and Affect: Mood normal.         Behavior: Behavior normal.         Assessment/Plan  Problem List Items Addressed This Visit       FARZANA (obstructive sleep apnea) - Primary     Likely FARZANA.  Will get PSG split night to further evaluate.         Wheezing     Suspect asthma.  Will get PFTs to start evaluation.  Currently only on albuterol.         Pulmonary nodule     Stable since 2018.  Likely benign.  No further workup necessary.          RTC in 3 months    Time Spent  Prep time on day of patient encounter: 15 minutes  Time spent directly with patient, family or caregiver: 30 minutes  Additional Time Spent on Patient Care Activities: 0 minutes  Documentation Time: 10 minutes  Other Time Spent: 0 minutes  Total: 55 minutes        Geovany Antunez MD 04/19/24 12:46 PM

## 2024-04-22 ENCOUNTER — HOSPITAL ENCOUNTER (OUTPATIENT)
Dept: RESPIRATORY THERAPY | Facility: HOSPITAL | Age: 59
Discharge: HOME | End: 2024-04-22
Payer: COMMERCIAL

## 2024-04-22 ENCOUNTER — APPOINTMENT (OUTPATIENT)
Dept: CARDIAC REHAB | Facility: HOSPITAL | Age: 59
End: 2024-04-22

## 2024-04-22 DIAGNOSIS — J98.4 LUNG DISEASE: ICD-10-CM

## 2024-04-22 PROCEDURE — 94060 EVALUATION OF WHEEZING: CPT | Performed by: INTERNAL MEDICINE

## 2024-04-22 PROCEDURE — 94760 N-INVAS EAR/PLS OXIMETRY 1: CPT

## 2024-04-22 PROCEDURE — 94060 EVALUATION OF WHEEZING: CPT

## 2024-04-22 PROCEDURE — 94664 DEMO&/EVAL PT USE INHALER: CPT | Mod: 59

## 2024-04-22 PROCEDURE — 98960 EDU&TRN PT SELF-MGMT NQHP 1: CPT

## 2024-04-22 PROCEDURE — 94726 PLETHYSMOGRAPHY LUNG VOLUMES: CPT

## 2024-04-22 PROCEDURE — 94729 DIFFUSING CAPACITY: CPT | Performed by: INTERNAL MEDICINE

## 2024-04-22 PROCEDURE — 94799 UNLISTED PULMONARY SVC/PX: CPT

## 2024-04-22 PROCEDURE — 94726 PLETHYSMOGRAPHY LUNG VOLUMES: CPT | Performed by: INTERNAL MEDICINE

## 2024-04-22 PROCEDURE — 9430000001 HC PHASE III CARDIAC REHAB MONTHLY FEE

## 2024-04-22 PROCEDURE — 94799 UNLISTED PULMONARY SVC/PX: CPT | Performed by: INTERNAL MEDICINE

## 2024-04-22 PROCEDURE — 94729 DIFFUSING CAPACITY: CPT

## 2024-04-23 DIAGNOSIS — B37.9 CANDIDIASIS: ICD-10-CM

## 2024-04-23 RX ORDER — NYSTATIN 100000 U/G
OINTMENT TOPICAL 2 TIMES DAILY
Qty: 30 G | Refills: 3 | Status: SHIPPED | OUTPATIENT
Start: 2024-04-23

## 2024-04-25 ENCOUNTER — HOSPITAL ENCOUNTER (OUTPATIENT)
Dept: CARDIOLOGY | Facility: HOSPITAL | Age: 59
Discharge: HOME | End: 2024-04-25
Payer: COMMERCIAL

## 2024-04-25 DIAGNOSIS — R00.2 HEART PALPITATIONS: ICD-10-CM

## 2024-04-25 PROCEDURE — 93246 EXT ECG>7D<15D RECORDING: CPT

## 2024-04-26 ENCOUNTER — OFFICE VISIT (OUTPATIENT)
Dept: SURGERY | Facility: CLINIC | Age: 59
End: 2024-04-26
Payer: COMMERCIAL

## 2024-04-26 DIAGNOSIS — L73.2 HIDRADENITIS SUPPURATIVA: Primary | ICD-10-CM

## 2024-04-26 PROCEDURE — 1036F TOBACCO NON-USER: CPT | Performed by: PHYSICIAN ASSISTANT

## 2024-04-26 PROCEDURE — 3008F BODY MASS INDEX DOCD: CPT | Performed by: PHYSICIAN ASSISTANT

## 2024-04-26 RX ORDER — DOXYCYCLINE 100 MG/1
100 CAPSULE ORAL 2 TIMES DAILY
Qty: 10 CAPSULE | Refills: 0 | Status: SHIPPED | OUTPATIENT
Start: 2024-04-26 | End: 2024-05-01

## 2024-04-26 NOTE — PROGRESS NOTES
General Surgery Established Visit    Subjective:   This is a 58 y.o. year old female who presents for evaluation of swelling and pain in her left axilla for the past week. She reports a history of hidradenitis suppurativa although does not recall where or when she was diagnosed with this. She has had a scalp abscess I&D and an abdominal wall I&D in the past with us. Over the past week, she noticed increased swelling and pain in the left axilla. She typically has lumps in her axilla and will occasionally become painful. This time, she noticed more of a white head and did try to pop it a few days ago with a small amount of pus that came out. Denies fevers or chills. Denies swelling or pain in the groin, right axilla or mammary folds.       ROS:   A complete 10 point review of systems was performed and is negative except as noted in the history of present illness.     Objective:   There were no vitals filed for this visit.     Physical Exam:   Constitutional: No acute distress, sitting up in bedside chair.  Neuro: Alert, oriented. Follows commands.   Eyes: EOMI. No scleral icterus. Conjunctiva pink.  ENT: MMM.   Heart: Regular rate.  Respiratory: No increased work of breathing or audible wheeze.  Abdomen: Soft, nondistended.   MSK: Moves all extremities.  Vascular: Palpable pulses throughout, no pitting edema.  Skin: Left axilla with 1-2cm soft tissue nodule with erythema and overlying white punctum. Multiple firm nodules without overlying induration.  Psychological: Appropriate mood and behavior.         Laboratory Results:  CBC:   Lab Results   Component Value Date    WBC 5.3 02/19/2024    RBC 4.86 02/19/2024    HGB 14.5 02/19/2024    HCT 42.3 02/19/2024     02/19/2024       RFP:   Lab Results   Component Value Date     04/17/2024    K 4.1 04/17/2024     04/17/2024    CO2 30 04/17/2024    BUN 18 04/17/2024    CREATININE 1.00 04/17/2024    CALCIUM 9.5 04/17/2024    MG 2.02 02/19/2024    PHOS 2.8  01/09/2024        LFTs:   Lab Results   Component Value Date    PROT 6.6 04/17/2024    ALBUMIN 4.7 04/17/2024    BILITOT 0.9 04/17/2024    ALKPHOS 125 (H) 04/17/2024    AST 15 04/17/2024    ALT 16 04/17/2024        Imaging Results:   none    Assessment/Plan:   This is a 58 y.o. year old female presenting for evaluation of a left axillary induration. Exam is consistent with hidradenitis suppurativa and has a small indurated pustule. I discussed there is not much fluctuance and chance that I&D may not offer much benefit, although patient wishes to still proceed with I&D which did reveal a minimal amount of purulence and slight pocket.     Discussed with patient that given the nodularity in the axilla and reports of induration in the groin in the past, she would benefit from a dermatology evaluation for possible HS.       Plan:   -- Keep I&D site packed x 1 day for hemostasis, then remove packing tomorrow and keep gauze in area as needed until sealed  -- Doxycycline x 5 days  -- Dermatology referral  -- Tylenol and motrin for anti-inflammatory pain relief  -- Warm compresses or ice as needed for pain  -- Avoid shaving area until area has healed        Discussed with Dr. Melissa who is in agreement with plan.       Odette Brown PA-C  General and Trauma Surgery  Doc halo with questions        Procedure: Abscess I&D    Indications:  left axillary pustule with induration    Procedure Details   The area was prepped using a betadine solution and locally anesthetized using 1% lidocaine without epinephrine and 1cc bicarb. The area was draped appropriately and a small stab incision was made over the area of fluctuance using an 11 blade. A small amount of purulence returned followed by blood. Hemostats were used to probe the pocket and break up any loculations. I did not find any obvious sinuses with gentle probing. The area was therefore packed with 1/4 inch plain packing for hemostasis controlled as patient is on baby aspirin and  plavix.      Tolerance:  Patient tolerated procedure well.     Recommendations:  Keep area packed x 1 day, then remove packing and keep covered by gauze until drainage ceases. Follow up with dermatology.        Odette Brown PA-C  General and Trauma Surgery  Secure chat with questions

## 2024-04-30 ENCOUNTER — CLINICAL SUPPORT (OUTPATIENT)
Dept: CARDIAC REHAB | Facility: HOSPITAL | Age: 59
End: 2024-04-30

## 2024-04-30 DIAGNOSIS — I21.9: ICD-10-CM

## 2024-04-30 PROCEDURE — 9430000001 HC PHASE III CARDIAC REHAB MONTHLY FEE

## 2024-05-05 LAB
MGC ASCENT PFT - FEV1 - POST: 1.88
MGC ASCENT PFT - FEV1 - POST: 1.88
MGC ASCENT PFT - FEV1 - PRE: 1.75
MGC ASCENT PFT - FEV1 - PRE: 1.75
MGC ASCENT PFT - FEV1 - PREDICTED: 2.75
MGC ASCENT PFT - FEV1 - PREDICTED: 2.75
MGC ASCENT PFT - FVC - POST: 2.69
MGC ASCENT PFT - FVC - POST: 2.69
MGC ASCENT PFT - FVC - PRE: 2.36
MGC ASCENT PFT - FVC - PRE: 2.36
MGC ASCENT PFT - FVC - PREDICTED: 3.45
MGC ASCENT PFT - FVC - PREDICTED: 3.45

## 2024-05-15 ENCOUNTER — APPOINTMENT (OUTPATIENT)
Dept: SLEEP MEDICINE | Facility: CLINIC | Age: 59
End: 2024-05-15
Payer: COMMERCIAL

## 2024-05-16 ENCOUNTER — APPOINTMENT (OUTPATIENT)
Dept: PRIMARY CARE | Facility: CLINIC | Age: 59
End: 2024-05-16
Payer: COMMERCIAL

## 2024-05-29 ENCOUNTER — PRE-ADMISSION TESTING (OUTPATIENT)
Dept: PREADMISSION TESTING | Facility: HOSPITAL | Age: 59
End: 2024-05-29
Payer: COMMERCIAL

## 2024-05-29 ENCOUNTER — CLINICAL SUPPORT (OUTPATIENT)
Dept: CARDIAC REHAB | Facility: HOSPITAL | Age: 59
End: 2024-05-29

## 2024-05-29 VITALS
HEIGHT: 67 IN | DIASTOLIC BLOOD PRESSURE: 68 MMHG | WEIGHT: 242.51 LBS | BODY MASS INDEX: 38.06 KG/M2 | TEMPERATURE: 97.9 F | HEART RATE: 71 BPM | SYSTOLIC BLOOD PRESSURE: 105 MMHG | OXYGEN SATURATION: 95 % | RESPIRATION RATE: 16 BRPM

## 2024-05-29 DIAGNOSIS — K80.20 CALCULUS OF GALLBLADDER WITHOUT CHOLECYSTITIS WITHOUT OBSTRUCTION: ICD-10-CM

## 2024-05-29 DIAGNOSIS — I21.29 MYOCARDIAL INFARCTION INVOLVING OTHER CORONARY ARTERY, UNSPECIFIED MI TYPE (MULTI): ICD-10-CM

## 2024-05-29 DIAGNOSIS — Z01.818 PREOPERATIVE EXAMINATION: Primary | ICD-10-CM

## 2024-05-29 LAB
ALBUMIN SERPL BCP-MCNC: 4.3 G/DL (ref 3.4–5)
ALP SERPL-CCNC: 131 U/L (ref 33–110)
ALT SERPL W P-5'-P-CCNC: 17 U/L (ref 7–45)
ANION GAP SERPL CALC-SCNC: 13 MMOL/L (ref 10–20)
AST SERPL W P-5'-P-CCNC: 15 U/L (ref 9–39)
BASOPHILS # BLD AUTO: 0.01 X10*3/UL (ref 0–0.1)
BASOPHILS NFR BLD AUTO: 0.2 %
BILIRUB SERPL-MCNC: 0.8 MG/DL (ref 0–1.2)
BUN SERPL-MCNC: 15 MG/DL (ref 6–23)
CALCIUM SERPL-MCNC: 9.1 MG/DL (ref 8.6–10.3)
CHLORIDE SERPL-SCNC: 100 MMOL/L (ref 98–107)
CO2 SERPL-SCNC: 32 MMOL/L (ref 21–32)
CREAT SERPL-MCNC: 0.98 MG/DL (ref 0.5–1.05)
EGFRCR SERPLBLD CKD-EPI 2021: 68 ML/MIN/1.73M*2
EOSINOPHIL # BLD AUTO: 0.16 X10*3/UL (ref 0–0.7)
EOSINOPHIL NFR BLD AUTO: 3.4 %
ERYTHROCYTE [DISTWIDTH] IN BLOOD BY AUTOMATED COUNT: 12.3 % (ref 11.5–14.5)
GLUCOSE SERPL-MCNC: 98 MG/DL (ref 74–99)
HCT VFR BLD AUTO: 39.9 % (ref 36–46)
HGB BLD-MCNC: 13.6 G/DL (ref 12–16)
IMM GRANULOCYTES # BLD AUTO: 0.02 X10*3/UL (ref 0–0.7)
IMM GRANULOCYTES NFR BLD AUTO: 0.4 % (ref 0–0.9)
LYMPHOCYTES # BLD AUTO: 1.18 X10*3/UL (ref 1.2–4.8)
LYMPHOCYTES NFR BLD AUTO: 24.7 %
MCH RBC QN AUTO: 30.1 PG (ref 26–34)
MCHC RBC AUTO-ENTMCNC: 34.1 G/DL (ref 32–36)
MCV RBC AUTO: 88 FL (ref 80–100)
MONOCYTES # BLD AUTO: 0.48 X10*3/UL (ref 0.1–1)
MONOCYTES NFR BLD AUTO: 10.1 %
NEUTROPHILS # BLD AUTO: 2.92 X10*3/UL (ref 1.2–7.7)
NEUTROPHILS NFR BLD AUTO: 61.2 %
NRBC BLD-RTO: 0 /100 WBCS (ref 0–0)
PLATELET # BLD AUTO: 200 X10*3/UL (ref 150–450)
POTASSIUM SERPL-SCNC: 3.5 MMOL/L (ref 3.5–5.3)
PROT SERPL-MCNC: 6.3 G/DL (ref 6.4–8.2)
RBC # BLD AUTO: 4.52 X10*6/UL (ref 4–5.2)
SODIUM SERPL-SCNC: 141 MMOL/L (ref 136–145)
WBC # BLD AUTO: 4.8 X10*3/UL (ref 4.4–11.3)

## 2024-05-29 PROCEDURE — 80053 COMPREHEN METABOLIC PANEL: CPT

## 2024-05-29 PROCEDURE — 85025 COMPLETE CBC W/AUTO DIFF WBC: CPT

## 2024-05-29 PROCEDURE — 36415 COLL VENOUS BLD VENIPUNCTURE: CPT

## 2024-05-29 PROCEDURE — 99203 OFFICE O/P NEW LOW 30 MIN: CPT | Performed by: NURSE PRACTITIONER

## 2024-05-29 PROCEDURE — 9430000001 HC PHASE III CARDIAC REHAB MONTHLY FEE

## 2024-05-29 RX ORDER — FUROSEMIDE 80 MG/1
80 TABLET ORAL
COMMUNITY

## 2024-05-29 ASSESSMENT — ENCOUNTER SYMPTOMS
RESPIRATORY NEGATIVE: 1
CARDIOVASCULAR NEGATIVE: 1
CONSTIPATION: 1
EYES NEGATIVE: 1
CONSTITUTIONAL NEGATIVE: 1
ABDOMINAL PAIN: 1
NECK NEGATIVE: 1
NEUROLOGICAL NEGATIVE: 1

## 2024-05-29 ASSESSMENT — DUKE ACTIVITY SCORE INDEX (DASI)
CAN YOU DO MODERATE WORK AROUND THE HOUSE LIKE VACUUMING, SWEEPING FLOORS OR CARRYING GROCERIES: YES
CAN YOU TAKE CARE OF YOURSELF (EAT, DRESS, BATHE, OR USE TOILET): YES
CAN YOU CLIMB A FLIGHT OF STAIRS OR WALK UP A HILL: YES
CAN YOU DO HEAVY WORK AROUND THE HOUSE LIKE SCRUBBING FLOORS OR LIFTING AND MOVING HEAVY FURNITURE: YES
CAN YOU WALK INDOORS, SUCH AS AROUND YOUR HOUSE: YES
TOTAL_SCORE: 58.2
CAN YOU WALK A BLOCK OR TWO ON LEVEL GROUND: YES
CAN YOU HAVE SEXUAL RELATIONS: YES
CAN YOU RUN A SHORT DISTANCE: YES
CAN YOU PARTICIPATE IN STRENOUS SPORTS LIKE SWIMMING, SINGLES TENNIS, FOOTBALL, BASKETBALL, OR SKIING: YES
CAN YOU DO LIGHT WORK AROUND THE HOUSE LIKE DUSTING OR WASHING DISHES: YES
CAN YOU PARTICIPATE IN MODERATE RECREATIONAL ACTIVITIES LIKE GOLF, BOWLING, DANCING, DOUBLES TENNIS OR THROWING A BASEBALL OR FOOTBALL: YES
DASI METS SCORE: 9.9
CAN YOU DO YARD WORK LIKE RAKING LEAVES, WEEDING OR PUSHING A MOWER: YES

## 2024-05-29 ASSESSMENT — PAIN SCALES - GENERAL: PAINLEVEL_OUTOF10: 0 - NO PAIN

## 2024-05-29 ASSESSMENT — ACTIVITIES OF DAILY LIVING (ADL): ADL_SCORE: 0

## 2024-05-29 ASSESSMENT — PAIN - FUNCTIONAL ASSESSMENT: PAIN_FUNCTIONAL_ASSESSMENT: 0-10

## 2024-05-29 ASSESSMENT — LIFESTYLE VARIABLES: SMOKING_STATUS: NONSMOKER

## 2024-05-29 NOTE — PREPROCEDURE INSTRUCTIONS
Thank you for visiting [facility] (PAT) today for your pre-procedure evaluation, you were seen by     Rebeka Lau CNP  Pre Admission Testing  [facility]  [phone]      This summary includes instructions and information to aid you during your perioperative period.  Please read carefully. If you have any questions about your visit today, please call the number listed above.  If you become ill or have any changes to your health before your surgery, please contact your primary care provider and alert your surgeon.    Preparing for your Surgery       Exercises  Preoperative Deep Breathing Exercises  Why it is important to do deep breathing exercises before my surgery?  Deep breathing exercises strengthen your breathing muscles.  This helps you to recover after your surgery and decreases the chance of breathing complications.  How are the deep breathing exercises done?  Sit straight with your back supported.  Breathe in deeply and slowly through your nose. Your lower rib cage should expand and your abdomen may move forward.  Hold that breath for 3 to 5 seconds.  Breathe out through pursed lips, slowly and completely.  Rest and repeat 10 times every hour while awake.  Rest longer if you become dizzy or lightheaded.       Preoperative Brain Exercises    What are brain exercises?  A brain exercise is any activity that engages your thinking (cognitive) skills.    What types of activities are considered brain exercises?  Jigsaw puzzles, crossword puzzles, word jumble, memory games, word search, and many more.  Many can be found free online or on your phone via a mobile prudencio.    Why should I do brain exercises before my surgery?  More recent research has shown brain exercise before surgery can lower the risk of postoperative delirium (confusion) which can be especially important for older adults.  Patients who did brain exercises for 5 to 10 hours the days before surgery, cut their risk  of postoperative delirium in half up to 1 week after surgery.    Sit-to-Stand Exercise    What is the sit-to-stand exercise?  The sit-to-stand exercise strengthens the muscles of your lower body and muscles in the center of your body (core muscles for stability) helping to maintain and improve your strength and mobility.  How do I do the sit-to-stand exercise?  The goal is to do this exercise without using your arms or hands.  If this is too difficult, use your arms and hands or a chair with armrests to help slowly push yourself to the standing position and lower yourself back to the sitting position. As the movement becomes easier use your arms and hands less.    Steps to the sit-to-stand exercise  Sit up tall in a sturdy chair, knees bent, feet flat on the floor shoulder-width apart.  Shift your hips/pelvis forward in the chair to correctly position yourself for the next movement.  Lean forward at your hips.  Stand up straight putting equal weight on both feet.  Check to be sure you are properly aligned with the chair, in a slow controlled movement sit back down.  Repeat this exercise 10-15 times.  If needed you can do it fewer times until your strength improves.  Rest for 1 minute.  Do another 10-15 sit-to-stand exercises.  Try to do this in the morning and evening.        Instructions    Preoperative Fasting Guidelines    Why must I stop eating and drinking near surgery time?  With sedation, food or liquid in your stomach can enter your lungs causing serious complications  Food can increase nausea and vomiting  When do I need to stop eating and drinking before my surgery?      Do not eat any food or drink any liquids after midnight the night before your surgery/procedure.  You may have sips of water to take medications.            Simple things you can do to help prevent blood clots     Blood clots are blockages that can form in the body's veins. When a blood clot forms in your deep veins, it may be called a  deep vein thrombosis, or DVT for short. Blood clots can happen in any part of the body where blood flows, but they are most common in the arms and legs. If a piece of a blood clot breaks free and travels to the lungs, it is called a pulmonary embolus (PE). A PE can be a very serious problem.         Being in the hospital or having surgery can raise your chances of getting a blood clot because you may not be well enough to move around as much as you normally do.         Ways you can help prevent blood clots in the hospital       Wearing SCDs  SCDs stands for Sequential Compression Devices.   SCDs are special sleeves that wrap around your legs. They attach to a pump that fills them with air to gently squeeze your legs every few minutes.  This helps return the blood in your legs to your heart.   SCDs should only be taken off when walking or bathing. SCDs may not be comfortable, but they can help save your life.              Pump SCD leg sleeves  Wearing compression stockings - if your doctor orders them. These special snug-fitting stockings gently squeeze your legs to help blood flow.       Walking. Walking helps move the blood in your legs.   If your doctor says it is ok, try walking the halls at least   5 times a day. Ask us to help you get up, so you don't fall.      Taking any blood-thinning medicines your doctor orders.              Ways you can help prevent blood clots at home         Wearing compression stockings - if your doctor orders them.   Walking - to help move the blood in your legs.    Taking any blood-thinning medicines your doctor orders.      Signs of a blood clot or PE    Tell your doctor or nurse right away if you have any of the problems listed below.         If you are at home, seek medical care right away. Call 911 for chest pain or problems breathing.            Signs of a blood clot (DVT) - such as pain, swelling, redness, or warmth in your arm or legs.  Signs of a pulmonary embolism (PE) -  such as chest pain or feeling short of breath      Tobacco and Alcohol;  Do not drink alcohol or smoke within 24 hours of surgery.  It is best to quit smoking for as long as possible before any surgery or procedure.      The Week before Surgery        Seven days before Surgery  Check your PAT medication instructions  Do the exercises provided to you by PAT  Arrange for a responsible, adult licensed  to take you home after surgery and stay with you for 24 hours.  You will not be permitted to drive yourself home if you have received any anesthetic/sedation  Six days before surgery  Check your PAT medication instructions  Do the exercises provided to you by PAT  Start using Chlorhexidene (CHG) body wash if prescribed  Five days before surgery  Check your PAT medication instructions  Do the exercises provided to you by PAT  Continue to use CHG body wash if prescribed  Three days before surgery  Check your PAT medication instructions  Do the exercises provided to you by PAT  Continue to use CHG body wash if prescribed  Two days before surgery  Check your PAT medication instructions  Do the exercises provided to you by PAT  Continue to use CHG body wash if prescribed    The Day before Surgery       Check your PAT medication and all other PAT instructions including when to stop eating and drinking  You will be called with your arrival time for surgery in the late afternoon.  If you do not receive a call please reach out to your surgeon's office.  Do not smoke or drink 24 hours before surgery  Prepare items to bring with you to the hospital  Shower with your chlorhexidine wash if prescribed  Brush your teeth and use your chlorhexidine dental rinse if prescribed    The Day of Surgery       Check your PAT medication instructions  Ensure you follow the instructions for when to stop eating and drinking  Shower, if prescribed use CHG.  Do not apply any lotions, creams, moisturizers, perfume or deodorant  Brush your teeth  and use your CHG dental rinse if prescribed  Wear loose comfortable clothing  Avoid make-up  Remove  jewelry and piercings, consider professional piercing removal with a plastic spacer if needed  Bring photo ID and Insurance card  Bring an accurate medication list that includes medication dose, frequency and allergies  Bring a copy of your advanced directives (will, health care power of )  Bring any devices and controllers as well as medical devices you have been provided with for surgery (CPAP, slings, braces, etc.)  Dentures, eyeglasses, and contacts will be removed before surgery, please bring cases for contacts or glasses

## 2024-05-29 NOTE — CPM/PAT H&P
CPM/PAT Evaluation       Name: Elaine Coffey (Elaine Coffey)  /Age: 1965/58 y.o.     Visit Type:   In-Person       Chief Complaint: biliary coli    HPI  57 y/o female scheduled for laparoscopy cholecystectomy on 2024 with  Dr. Melissa secondary to biliary coli.  PMHX includes CAD, NSTEMI  (2018) , HTN, HLD, FARZANA, MS .  PAT is consulted today for perioperative risk stratification and optimization.      Past Medical History:   Diagnosis Date    Abnormal ECG     Asthma (HHS-HCC)     CHF (congestive heart failure) (Multi)     Coronary artery disease     1 cardiac stent placed 2018    GERD (gastroesophageal reflux disease)     Hidradenitis suppurativa     Hypertension     Hypothyroidism     Multiple sclerosis (Multi)     Myocardial infarction (Multi)     [facility] #1, #2024 at [facility],  #3 also here at [facility] with gb attack    Obesity     Sleep apnea     Wears eyeglasses        Past Surgical History:   Procedure Laterality Date    ANKLE SURGERY Left     removed floating bone    CARDIAC CATHETERIZATION N/A 2024    Procedure: Left Heart Cath;  Surgeon: John Gutierres MD;  Location: [facility] Cardiac Cath Lab;  Service: Cardiovascular;  Laterality: N/A;    CARDIAC CATHETERIZATION N/A 2024    Procedure: Left Heart Cath;  Surgeon: Gagan Kat MD;  Location: [facility] Cardiac Cath Lab;  Service: Cardiovascular;  Laterality: N/A;     SECTION, CLASSIC       Section x4    CORONARY STENT PLACEMENT      HYSTERECTOMY  2014    Hysterectomy    OTHER SURGICAL HISTORY  2014    Oophorectomy - Bilat (Removal Of Both Ovaries) Laparoscopic    OTHER SURGICAL HISTORY      Laparosc Gastric Restrictive Proc By Adjustable Gastric Band    OTHER SURGICAL HISTORY      Abscess incision and drainage x4       Patient  reports that she is not currently sexually active and has had partner(s) who are male. She reports using the following method of birth control/protection: Abstinence.    Family  "History   Problem Relation Name Age of Onset    Heart attack Mother Elaine Garcia     Bilateral breast cancer Mother Elaine Garcia     Emphysema Mother Elaine Garcia     Blood clot Mother Elaine Garcia     Abnormal EKG Mother Elaine Garcia     Anemia Mother Elaine Ellissko     Angina Mother Elaine Ellissko     Depression Mother Elaine Mishrao     Heart disease Mother Elaine Garcia     Lung disease Mother Elaine Garcia     Thyroid disease Mother Elaine Garcia     Heart attack Father Dawit Garcia     Lymphoma Father Dawit Ellisskarmando     Angina Father Dawit Ellissko     Cancer Father Dawit Ellissko     Diabetes type I Father Dawit Ellisskarmando     Heart disease Father Dawit Ellisskarmando     Hypertension Father Dawit Garcia     Blood clot Sister      Ovarian cancer Maternal Grandmother Delmy Martínez     Stroke Maternal Grandmother Delmy Martínez     Heart attack Maternal Grandmother Delmy Martínez     Diabetes type I Paternal Grandmother Olive Garcia     Asthma Sister Heidi Garcia     Kidney disease Sister Kaylynn Garcia        Allergies   Allergen Reactions    Gadolinium-Containing Contrast Media Anaphylaxis     Pt developed nausea without vomiting , SOB, after receiving the IV contrast . Pt states she always gets nauseated but this time \"was worse\"/pt/       Prior to Admission medications    Medication Sig Start Date End Date Taking? Authorizing Provider   acetaZOLAMIDE (Diamox) 125 mg tablet Take 1 tablet (125 mg) by mouth once daily. 2/28/24   Tena Lee, APRN-CNP   albuterol 2.5 mg /3 mL (0.083 %) nebulizer solution Take 3 mL (2.5 mg) by nebulization every 4 hours if needed for wheezing.    Historical Provider, MD   albuterol 90 mcg/actuation inhaler INHALE 1 TO 2 PUFFS EVERY 4 TO 6 HOURS AS NEEDED 1/12/24   Raffy Balderrama DO   amitriptyline (Elavil) 25 mg tablet Take 1 tablet (25 mg) by mouth once daily at bedtime. 9/21/23   Historical Provider, MD   aspirin 81 mg EC tablet Take 1 tablet (81 mg) by mouth once " daily in the morning. 10/27/14   Historical Provider, MD   atorvastatin (Lipitor) 40 mg tablet Take 1 tablet (40 mg) by mouth once daily at bedtime.    Historical Provider, MD   calcium carbonate (OS-SHANTA) 500 mg calcium (1,250 mg) capsule Take 1,200 mg by mouth 2 times a day with meals. Take with food.    Historical Provider, MD   clonazePAM (KlonoPIN) 0.5 mg disintegrating tablet Take 1 tablet (0.5 mg) by mouth once daily at bedtime. Last OARRS fill: 12/4/23 #30 for 30 days 2/22/24   NAYANA Beal   clopidogrel (Plavix) 75 mg tablet Take 1 tablet (75 mg) by mouth once daily. 1/9/24 1/8/25  Katia Reina PA-C   dilTIAZem CD (Cardizem CD) 240 mg 24 hr capsule TAKE 1 CAPSULE BY MOUTH ONCE DAILY. 3/8/24   NAYANA Smiley   ergocalciferol (Vitamin D-2) 1.25 MG (93300 UT) capsule Take 1 capsule (1,250 mcg) by mouth 1 (one) time per week. On Saturdays    Historical Provider, MD   ezetimibe (Zetia) 10 mg tablet TAKE 1 TABLET (10 MG) BY MOUTH ONCE DAILY AT BEDTIME. 3/1/24 3/1/25  NAYANA Smiley   FLUoxetine (PROzac) 40 mg capsule Take 1 capsule (40 mg) by mouth once daily. 3/10/24   NAYANA Bael   furosemide (Lasix) 40 mg tablet TAKE 2 TABLETS BY MOUTH EVERY DAY  Patient not taking: Reported on 4/19/2024 3/4/24   NAYANA Smiley   furosemide (Lasix) 80 mg tablet Take 1 tablet (80 mg) by mouth 2 times a day. 2/28/24 2/27/25  NAYANA Beal   gabapentin (Neurontin) 400 mg capsule Take 1 capsule (400 mg) by mouth 3 times a day. Last OARRS fill: 4/7/23 #270 for 90 days    Historical Provider, MD   levothyroxine (Synthroid, Levoxyl) 50 mcg tablet TAKE 1 TABLET BY MOUTH EVERY DAY 2/2/24   Raffy Balderrama DO   meclizine (Antivert) 25 mg tablet Take 1 tablet (25 mg) by mouth every 6 hours if needed for dizziness.    Historical Provider, MD   metoprolol succinate XL (Toprol-XL) 25 mg 24 hr tablet TAKE 1 TABLET BY MOUTH EVERY DAY 3/4/24   Mia ESQUIVEL  NAYANA Lema   nystatin (Mycostatin) 100,000 unit/gram powder Apply 1 Application topically 2 times a day. 12/14/23 12/13/24  Tena Lee, APRN-CNP   nystatin (Mycostatin) ointment APPLY TO AFFECTED AREA TWICE A DAY 4/23/24   Tena LeeNAYANA   nystatin-triamcinolone (Mycolog II) ointment Apply topically 2 times a day. to affected area 2/22/24   Tenatc Lee, APRN-CNP   omeprazole (PriLOSEC) 20 mg DR capsule Take 1 capsule (20 mg) by mouth once daily. Do not crush or chew. 2/22/24 8/20/24  Tena C Annialaura, APRN-CNP   potassium chloride CR (Klor-Con M20) 20 mEq ER tablet Take 1 tablet (20 mEq) by mouth once daily. Do not crush or chew. 3/20/24 3/20/25  NAYANA Smiley   ranolazine (Ranexa) 500 mg 12 hr tablet Take 1 tablet (500 mg) by mouth 2 times a day. Do not crush, chew, or split. 1/26/24 1/25/25  NAYANA Smiley   tiZANidine (Zanaflex) 4 mg tablet Take 1 tablet (4 mg) by mouth as needed at bedtime for muscle spasms. FOR SPASMS    Historical Provider, MD ENGEL ROS:   Constitutional:   neg    Neuro/Psych:   neg    Eyes:   neg    Ears:   neg    Nose:   neg    Mouth:   neg    Throat:   neg    Neck:   neg    Cardio:   neg    Respiratory:   neg    Endocrine:   GI:    abdominal pain   constipation  :   neg    Musculoskeletal:   Hematologic:   neg    Skin:  neg        Physical Exam  Constitutional:       Appearance: Normal appearance.   HENT:      Head: Normocephalic and atraumatic.      Mouth/Throat:      Mouth: Mucous membranes are dry.      Pharynx: Oropharynx is clear.   Eyes:      Extraocular Movements: Extraocular movements intact.      Pupils: Pupils are equal, round, and reactive to light.   Cardiovascular:      Rate and Rhythm: Normal rate and regular rhythm.   Pulmonary:      Effort: Pulmonary effort is normal.      Breath sounds: Normal breath sounds.   Abdominal:      General: Abdomen is flat.      Palpations: Abdomen is soft.   Musculoskeletal:          General: Normal range of motion.      Cervical back: Normal range of motion and neck supple.   Skin:     General: Skin is warm and dry.   Neurological:      General: No focal deficit present.      Mental Status: She is alert and oriented to person, place, and time.   Psychiatric:         Mood and Affect: Mood normal.         Behavior: Behavior normal.          PAT AIRWAY:   Airway:     Mallampati::  II    Neck ROM::  Full  normal        Visit Vitals  /68   Pulse 71   Temp 36.6 °C (97.9 °F) (Tympanic)   Resp 16       DASI Risk Score      Flowsheet Row Most Recent Value   DASI SCORE 58.2   METS Score (Will be calculated only when all the questions are answered) 9.9          Caprini DVT Assessment      Flowsheet Row Most Recent Value   DVT Score 7   Current Status Major surgery planned, including arthroscopic and laproscopic (1-2 hours)   History Acute myocardial infarction   Age 40-59 years   BMI 31-40 (Obesity)          Modified Frailty Index    No data to display       CHADS2 Stroke Risk  Current as of 19 minutes ago        N/A 3 to 100%: High Risk   2 to < 3%: Medium Risk   0 to < 2%: Low Risk     Last Change: N/A          This score determines the patient's risk of having a stroke if the patient has atrial fibrillation.        This score is not applicable to this patient. Components are not calculated.          Revised Cardiac Risk Index      Flowsheet Row Most Recent Value   Revised Cardiac Risk Calculator 2          Apfel Simplified Score      Flowsheet Row Most Recent Value   Apfel Simplified Score Calculator 3          Risk Analysis Index Results This Encounter         5/29/2024  0820             AZAR Cancer History: Patient does not indicate history of cancer    Total Risk Analysis Index Score Without Cancer: 21    Total Risk Analysis Index Score: 21          Stop Bang Score      Flowsheet Row Most Recent Value   Do you snore loudly? 1   Do you often feel tired or fatigued after your sleep? 0    Has anyone ever observed you stop breathing in your sleep? 0   Do you have or are you being treated for high blood pressure? 1   Recent BMI (Calculated) 38   Is BMI greater than 35 kg/m2? 1=Yes   Age older than 50 years old? 1=Yes   Is your neck circumference greater than 17 inches (Male) or 16 inches (Female)? 0   Gender - Male 0=No                Assessment and Plan:     Neuro:  No neurologic diagnosis, however, the patient is at increased risk for perioperative delirium secondary to  polypharmacy  Patient is at increased risk for perioperative CVA secondary to  perioperative interruption of antithrombotic, HTN    HEENT:  No HEENT diagnosis or significant findings on chart review or clinical presentation and evaluation. No further preoperative testing/intervention indicated at this time.    Cardiovascular:  Follows with Cardiology and was last seen 4/17/2024.    ECHO 1/2024:  60% EF, DD, trace mild MR/TR  Cardiac Cath 1/2/2024: maximize medical therapy.  RCA proximal stent(2018) with no significant angiographic disease.   Cardiac Rehab 3/27/2024:  Transition to Phase III   Recent Zio for palpations - results pending   Per Cardiology: Will continue ASA; hold Plavix starting on 5/29/2024  Continue Metoprolol, Diltiazem, Lasix and Potassium   Holding Ezetimibe - 6/5/2024    METS: 9.9  RCRI: 2 points, 10.1% risk for postoperative MACE   TOMMY: 0.1% risk for 30 day postoperative MACE  EKG - as above    Pulmonary:  Pt follows with Pulmonology Dr. Tulio Joshi seen 4/19/2024  FARZANA - pt to scheduled sleep study.  Not compliant on a nightly basis with CPAP  No issues with lying flat   Asthma - Will continue albuterol  Pulmonary nodule - stable since 2018  Stop Bang score is 4 placing patient at intermediate risk for FARZANA  ARISCAT: <26 points, 1.6% risk of in-hospital postoperative pulmonary complication  PRODIGY: Low risk for opioid induced respiratory depression  Pumonary education discussed, patient also provided deep  breathing exercises and educational handout    Renal:   No renal diagnosis, however patient is at increase risk for perioperative renal complications secondary to  Age equal to or greater than 56, BMI equal to or greater than 30, HTN    Endocrine:  Hypothyroidism - TSH 12/2023: 2.27  Continue Levothyroxine     Hematologic:  No hematologic diagnosis, however patient is at an increased risk for DVT  Caprini Score 7, patient at High risk for perioperative DVT.  Patient provided with VTE education/handout.    Gastrointestinal:   Follows with Dr. Melissa.  Last seen 2/28/2024 for biliary coli.  Plan was to follow up cardiology for surgical timing post MI.   See Cardiology above.   Will plan for Laparoscopy cholecystectomy on 6/6/2024  Apfel 3    Infectious disease:   No infectious diagnosis or significant findings on chart review or clinical presentation and evaluation.     Musculoskeletal:   Multiple Sclerosis - Last steroid infusion >6 months ago  Continue gabapentin and tizanidine  Advised pt to stop vitamins 1 week prior to surgery     Anesthesia/Airway:  No anesthesia complications      Medication instructions and NPO guidelines reviewed with the patient.  All questions or concerns discussed and addressed.      Labs and EKG ordered   BMP and CBC w/diff

## 2024-06-03 ENCOUNTER — OFFICE VISIT (OUTPATIENT)
Dept: CARDIOLOGY | Facility: HOSPITAL | Age: 59
End: 2024-06-03
Payer: COMMERCIAL

## 2024-06-03 VITALS
OXYGEN SATURATION: 97 % | DIASTOLIC BLOOD PRESSURE: 82 MMHG | HEART RATE: 70 BPM | SYSTOLIC BLOOD PRESSURE: 130 MMHG | BODY MASS INDEX: 37.74 KG/M2 | WEIGHT: 240.96 LBS

## 2024-06-03 DIAGNOSIS — E78.5 HYPERLIPIDEMIA, UNSPECIFIED HYPERLIPIDEMIA TYPE: ICD-10-CM

## 2024-06-03 DIAGNOSIS — I10 HYPERTENSION, UNSPECIFIED TYPE: ICD-10-CM

## 2024-06-03 DIAGNOSIS — Z01.810 PREOP CARDIOVASCULAR EXAM: Primary | ICD-10-CM

## 2024-06-03 PROCEDURE — 1036F TOBACCO NON-USER: CPT | Performed by: NURSE PRACTITIONER

## 2024-06-03 PROCEDURE — 93010 ELECTROCARDIOGRAM REPORT: CPT | Performed by: INTERNAL MEDICINE

## 2024-06-03 PROCEDURE — 3079F DIAST BP 80-89 MM HG: CPT | Performed by: NURSE PRACTITIONER

## 2024-06-03 PROCEDURE — 3075F SYST BP GE 130 - 139MM HG: CPT | Performed by: NURSE PRACTITIONER

## 2024-06-03 PROCEDURE — 93005 ELECTROCARDIOGRAM TRACING: CPT | Performed by: NURSE PRACTITIONER

## 2024-06-03 PROCEDURE — 99213 OFFICE O/P EST LOW 20 MIN: CPT | Performed by: NURSE PRACTITIONER

## 2024-06-03 PROCEDURE — 3008F BODY MASS INDEX DOCD: CPT | Performed by: NURSE PRACTITIONER

## 2024-06-03 ASSESSMENT — ENCOUNTER SYMPTOMS
HEMATOLOGIC/LYMPHATIC NEGATIVE: 1
ENDOCRINE NEGATIVE: 1
GASTROINTESTINAL NEGATIVE: 1
PSYCHIATRIC NEGATIVE: 1
MYALGIAS: 1
NECK PAIN: 1
EYES NEGATIVE: 1
RESPIRATORY NEGATIVE: 1
NEUROLOGICAL NEGATIVE: 1

## 2024-06-03 NOTE — H&P (VIEW-ONLY)
Referred by Dr. Inge sutherland. provider found for Follow-up (2 mth.)     History Of Present Illness:    Elaine Coffey is a very pleasant 58 year old female with a history of HTN, HLD, MS and CAD, she is here for a follow up visit. The patient is seen in collaboration with Dr. Gutierres. Mrs. Coffey is going to gallbladder surgery. Will notice increased swelling later in the day.  Complains of general fatigue.  Denies any recurrent chest pain. Currently in cardiac rehab, twice a week. Denies dizziness.  Has been watching sodium and fluid intake.       Review of Systems   Constitutional: Positive for malaise/fatigue.   HENT: Negative.     Eyes: Negative.    Cardiovascular:  Positive for chest pain.   Respiratory: Negative.     Endocrine: Negative.    Hematologic/Lymphatic: Negative.    Skin: Negative.    Musculoskeletal:  Positive for myalgias and neck pain.   Gastrointestinal: Negative.    Neurological: Negative.    Psychiatric/Behavioral: Negative.          Past Medical History:  She has a past medical history of Abnormal ECG, Asthma ([facility]), CHF (congestive heart failure) (Multi), Coronary artery disease, GERD (gastroesophageal reflux disease), Hidradenitis suppurativa, Hypertension, Hypothyroidism, Multiple sclerosis (Multi), Myocardial infarction (Multi), Obesity, Sleep apnea, and Wears eyeglasses.    Past Surgical History:  She has a past surgical history that includes  section, classic; Other surgical history (2014); Other surgical history; Hysterectomy (2014); Other surgical history; Cardiac catheterization (N/A, 2024); Ankle surgery (Left); Coronary stent placement; and Cardiac catheterization (N/A, 2024).      Social History:  She reports that she quit smoking about 32 years ago. Her smoking use included cigarettes. She started smoking about 41 years ago. She has a 8.7 pack-year smoking history. She has never used smokeless tobacco. She reports current alcohol use. She reports that she  does not use drugs.    Family History:  Family History   Problem Relation Name Age of Onset    Heart attack Mother Elaine Garcia     Bilateral breast cancer Mother Elaine Garcia     Emphysema Mother Elaine Garcia     Blood clot Mother Elaine Garcia     Abnormal EKG Mother Elaine Garcia     Anemia Mother Elaine Garcia     Angina Mother Elaine Garcia     Depression Mother Elaine Garcia     Heart disease Mother Elaine Garcia     Lung disease Mother Elaine Garcia     Thyroid disease Mother Elaine Garcia     Heart attack Father Dawit Garcia     Lymphoma Father Dawit Garcia     Angina Father Dawit Ellissko     Cancer Father Dawit Garcia     Diabetes type I Father Dawit Garcia     Heart disease Father Dawit Garcia     Hypertension Father Dawit Garcia     Blood clot Sister      Ovarian cancer Maternal Grandmother Delmy Martínez     Stroke Maternal Grandmother Delmy Martínez     Heart attack Maternal Grandmother Delmy Martínez     Diabetes type I Paternal Grandmother Olive Garcia     Asthma Sister Heidi Garcia     Kidney disease Sister Kaylynn Garcia         Allergies:  Gadolinium-containing contrast media    Outpatient Medications:  Current Outpatient Medications   Medication Instructions    albuterol 90 mcg/actuation inhaler INHALE 1 TO 2 PUFFS EVERY 4 TO 6 HOURS AS NEEDED    albuterol 2.5 mg, nebulization, Every 4 hours PRN    amitriptyline (ELAVIL) 25 mg, oral, Nightly    aspirin 81 mg EC tablet 1 tablet, oral, Every morning    atorvastatin (LIPITOR) 40 mg, oral, Nightly    calcium carbonate (OS-SHANTA) 1,200 mg, oral, 2 times daily (morning and late afternoon), Take with food.    clonazePAM (KLONOPIN) 0.5 mg, oral, Nightly, Last OARRS fill: 12/4/23 #30 for 30 days    clopidogrel (PLAVIX) 75 mg, oral, Daily    dilTIAZem CD (CARDIZEM CD) 240 mg, oral, Daily    ergocalciferol (Vitamin D-2) 1.25 MG (24442 UT) capsule 1 capsule, oral, Once Weekly, On Saturdays    ezetimibe (ZETIA) 10 mg, oral, Nightly     FLUoxetine (PROZAC) 40 mg, oral, Daily    furosemide (LASIX) 80 mg, oral, 2 times daily    furosemide (LASIX) 80 mg, oral, 2 times daily (morning and late afternoon)    gabapentin (NEURONTIN) 400 mg, oral, 3 times daily, Last OARRS fill: 4/7/23 #270 for 90 days    levothyroxine (SYNTHROID, LEVOXYL) 50 mcg, oral, Daily    meclizine (ANTIVERT) 25 mg, oral, Every 6 hours PRN    metoprolol succinate XL (TOPROL-XL) 25 mg, oral, Daily    nystatin (Mycostatin) 100,000 unit/gram powder 1 Application, Topical, 2 times daily    nystatin (Mycostatin) ointment Topical, 2 times daily, to affected area    nystatin-triamcinolone (Mycolog II) ointment Topical, 2 times daily, to affected area    omeprazole (PRILOSEC) 20 mg, oral, Daily, Do not crush or chew.    potassium chloride CR (Klor-Con M20) 20 mEq ER tablet 20 mEq, oral, Daily, Do not crush or chew.    ranolazine (RANEXA) 500 mg, oral, 2 times daily, Do not crush, chew, or split.    tiZANidine (ZANAFLEX) 4 mg, oral, Nightly PRN, FOR SPASMS        Last Recorded Vitals:  Vitals:    06/03/24 0809   BP: 130/82   Pulse: 70   SpO2: 97%   Weight: 109 kg (240 lb 15.4 oz)       Physical Exam:  Physical Exam  Vitals reviewed.   HENT:      Head: Normocephalic.      Nose: Nose normal.   Eyes:      Pupils: Pupils are equal, round, and reactive to light.   Cardiovascular:      Rate and Rhythm: Normal rate and regular rhythm.   Pulmonary:      Effort: Pulmonary effort is normal.      Breath sounds: Normal breath sounds.   Abdominal:      General: Abdomen is flat.      Palpations: Abdomen is soft.   Musculoskeletal:         General: Normal range of motion.      Cervical back: Normal range of motion.   Skin:     General: Skin is warm and dry.   Neurological:      General: No focal deficit present.      Mental Status: He is alert and oriented to person, place, and time.   Psychiatric:         Mood and Affect: Mood normal.          Last Labs:  CBC -  Lab Results   Component Value Date    WBC  4.8 05/29/2024    HGB 13.6 05/29/2024    HCT 39.9 05/29/2024    MCV 88 05/29/2024     05/29/2024       CMP -  Lab Results   Component Value Date    CALCIUM 9.1 05/29/2024    PHOS 2.8 01/09/2024    PROT 6.3 (L) 05/29/2024    ALBUMIN 4.3 05/29/2024    AST 15 05/29/2024    ALT 17 05/29/2024    ALKPHOS 131 (H) 05/29/2024    BILITOT 0.8 05/29/2024       LIPID PANEL -   Lab Results   Component Value Date    CHOL 143 01/03/2024    TRIG 150 (H) 01/03/2024    HDL 38.8 01/03/2024    CHHDL 3.7 01/03/2024    LDLF 86 01/06/2018    VLDL 30 01/03/2024    NHDL 104 01/03/2024       RENAL FUNCTION PANEL -   Lab Results   Component Value Date    GLUCOSE 98 05/29/2024     05/29/2024    K 3.5 05/29/2024     05/29/2024    CO2 32 05/29/2024    ANIONGAP 13 05/29/2024    BUN 15 05/29/2024    CREATININE 0.98 05/29/2024    GFRMALE CANCELED 08/04/2023    CALCIUM 9.1 05/29/2024    PHOS 2.8 01/09/2024    ALBUMIN 4.3 05/29/2024        Lab Results   Component Value Date    BNP 22 02/19/2024    HGBA1C 5.3 01/03/2024       Last Cardiology Tests:  ECG:  ECG independently reviewed from today showed sinus rhythm heart rate 66 bpm     Echo:  Transthoracic Echo (TTE) Complete 01/03/2024   1. Left ventricular systolic function is normal with a 55-60% estimated ejection fraction.   2. RVSP within normal limits.    Echocardiogram 8/20/2021  1. The left ventricular systolic function is normal with a 60% estimated ejection fraction.  2. Spectral Doppler shows an impaired relaxation pattern of left ventricular diastolic filling.  3. There is trace to mild mitral and tricuspid regurgitation.    echocardiogram 1/18/2018   1. The left ventricular systolic function is low normal with a 50-55% estimated  ejection fraction.   2. Spectral Doppler shows an impaired relaxation pattern of left ventricular  diastolic filling.   3. There is mild mitral and tricuspid regurgitaiton.      Echocardiogram 1/18/2018   LVEF 50-55%   mild MR   mild TR      Ejection Fractions:  LVEF 55-60%    Cath:  Cardiac catheterization - coronary   Cath 1/2/2024  . Left main: no significant angiographic disease.   2. LAD: no significant angiographic disease.   3. LCx: no significant angiographic disease.   4. RCA: patent proximal stent, no significant angiographic disease.   5. LVEDP 18mmHg, no aortic stenosis on LV-Ao gradient.    Cardiac cath 12/14/2018   1. No significant angiographic CAD, ostial-proximal RCA patent.   2. LVEDP 31mmHg, no aortic stenosis on LV-Ao gradient.  Stress Test:  Nuclear Stress Test 05/25/2023  1. No evidence of inducible myocardial ischemia. Predominantly fixed  moderate-sized moderate to severe perfusion defects involving  inferior wall as well as inferolateral wall, likely represent prior  infarction. Similar to prior exam on 11/20/2018.  2. The left ventricle is normal in size.  3. Gated images demonstrate normal LV wall motion except hypokinesis  in the inferior wall with a post-stress LV EF estimated at 56%.      nuclear stress test 11/20/2018   1. Moderate-sized territory of largely fixed prior completed  myocardial infarction involving the basal inferior/inferolateral wall  with minimal nonsignificant flower-infarct ischemia in the  inferolateral territory.  2. There is moderate dilation of the left ventricle with an  end-diastolic volume calculated at 145 mL.  3. Wall motion or LV quantification was not assessed due to unable to  do gated imaged secondary to arrhythmia.    Cardiac Imaging:      Assessment/Plan      Mrs. Coffey is a very pleasant 58 year old female with a history of CAD, HTN, HLD and MS. She had a PCI on 1/4/2018 at [facility]. She had a repeat heart cath on 12/14/2018 showing a patent RCA and no significant CAD. Heart cath in 1/2024 that showed patent stent, echo showed a preserved LV function. She is scheduled for Cholecystomy. She is low cardiac risk for surgery, she may proceed to the OR without further cardiac  testing. Heart rate and blood pressure is well controlled today.      Plan   -call with any questions   -continue ASA, Atorvastatin, and Plavix   -may not stop Aspirin   -hold Plavix 5-7 days prior to surgery   -low cardiac risk for surgery   -continue Caredizem   -continue  Ranexa 500 mg twice a day  -follow up in two months   -continue Lasix 80 mg twice a day              Mia Edmondson, APRN-CNP

## 2024-06-03 NOTE — PROGRESS NOTES
Referred by Dr. Inge sutherland. provider found for Follow-up (2 mth.)     History Of Present Illness:    Elaine Coffey is a very pleasant 58 year old female with a history of HTN, HLD, MS and CAD, she is here for a follow up visit. The patient is seen in collaboration with Dr. Gutierres. Mrs. Coffey is going to gallbladder surgery. Will notice increased swelling later in the day.  Complains of general fatigue.  Denies any recurrent chest pain. Currently in cardiac rehab, twice a week. Denies dizziness.  Has been watching sodium and fluid intake.       Review of Systems   Constitutional: Positive for malaise/fatigue.   HENT: Negative.     Eyes: Negative.    Cardiovascular:  Positive for chest pain.   Respiratory: Negative.     Endocrine: Negative.    Hematologic/Lymphatic: Negative.    Skin: Negative.    Musculoskeletal:  Positive for myalgias and neck pain.   Gastrointestinal: Negative.    Neurological: Negative.    Psychiatric/Behavioral: Negative.          Past Medical History:  She has a past medical history of Abnormal ECG, Asthma ([facility]), CHF (congestive heart failure) (Multi), Coronary artery disease, GERD (gastroesophageal reflux disease), Hidradenitis suppurativa, Hypertension, Hypothyroidism, Multiple sclerosis (Multi), Myocardial infarction (Multi), Obesity, Sleep apnea, and Wears eyeglasses.    Past Surgical History:  She has a past surgical history that includes  section, classic; Other surgical history (2014); Other surgical history; Hysterectomy (2014); Other surgical history; Cardiac catheterization (N/A, 2024); Ankle surgery (Left); Coronary stent placement; and Cardiac catheterization (N/A, 2024).      Social History:  She reports that she quit smoking about 32 years ago. Her smoking use included cigarettes. She started smoking about 41 years ago. She has a 8.7 pack-year smoking history. She has never used smokeless tobacco. She reports current alcohol use. She reports that she  does not use drugs.    Family History:  Family History   Problem Relation Name Age of Onset    Heart attack Mother Elaine Garcia     Bilateral breast cancer Mother Elaine Garcia     Emphysema Mother Elaine Garcia     Blood clot Mother Elaine Garcia     Abnormal EKG Mother Elaine Garcia     Anemia Mother Elaine Garcia     Angina Mother Elaine Garcia     Depression Mother Elaine Garcia     Heart disease Mother Elaine Garcia     Lung disease Mother Elaine Garcia     Thyroid disease Mother Elaine Garcia     Heart attack Father Dawit Garcia     Lymphoma Father Dawit Garcia     Angina Father Dawit Ellissko     Cancer Father Dawit Garcia     Diabetes type I Father Dawit Garcia     Heart disease Father Dawit Garcia     Hypertension Father Dawit Garcia     Blood clot Sister      Ovarian cancer Maternal Grandmother Delmy Martínez     Stroke Maternal Grandmother Delmy Martínez     Heart attack Maternal Grandmother Delmy Martínez     Diabetes type I Paternal Grandmother Olive Garcia     Asthma Sister Heidi Garcia     Kidney disease Sister Kaylynn Garcia         Allergies:  Gadolinium-containing contrast media    Outpatient Medications:  Current Outpatient Medications   Medication Instructions    albuterol 90 mcg/actuation inhaler INHALE 1 TO 2 PUFFS EVERY 4 TO 6 HOURS AS NEEDED    albuterol 2.5 mg, nebulization, Every 4 hours PRN    amitriptyline (ELAVIL) 25 mg, oral, Nightly    aspirin 81 mg EC tablet 1 tablet, oral, Every morning    atorvastatin (LIPITOR) 40 mg, oral, Nightly    calcium carbonate (OS-SHANTA) 1,200 mg, oral, 2 times daily (morning and late afternoon), Take with food.    clonazePAM (KLONOPIN) 0.5 mg, oral, Nightly, Last OARRS fill: 12/4/23 #30 for 30 days    clopidogrel (PLAVIX) 75 mg, oral, Daily    dilTIAZem CD (CARDIZEM CD) 240 mg, oral, Daily    ergocalciferol (Vitamin D-2) 1.25 MG (88495 UT) capsule 1 capsule, oral, Once Weekly, On Saturdays    ezetimibe (ZETIA) 10 mg, oral, Nightly     FLUoxetine (PROZAC) 40 mg, oral, Daily    furosemide (LASIX) 80 mg, oral, 2 times daily    furosemide (LASIX) 80 mg, oral, 2 times daily (morning and late afternoon)    gabapentin (NEURONTIN) 400 mg, oral, 3 times daily, Last OARRS fill: 4/7/23 #270 for 90 days    levothyroxine (SYNTHROID, LEVOXYL) 50 mcg, oral, Daily    meclizine (ANTIVERT) 25 mg, oral, Every 6 hours PRN    metoprolol succinate XL (TOPROL-XL) 25 mg, oral, Daily    nystatin (Mycostatin) 100,000 unit/gram powder 1 Application, Topical, 2 times daily    nystatin (Mycostatin) ointment Topical, 2 times daily, to affected area    nystatin-triamcinolone (Mycolog II) ointment Topical, 2 times daily, to affected area    omeprazole (PRILOSEC) 20 mg, oral, Daily, Do not crush or chew.    potassium chloride CR (Klor-Con M20) 20 mEq ER tablet 20 mEq, oral, Daily, Do not crush or chew.    ranolazine (RANEXA) 500 mg, oral, 2 times daily, Do not crush, chew, or split.    tiZANidine (ZANAFLEX) 4 mg, oral, Nightly PRN, FOR SPASMS        Last Recorded Vitals:  Vitals:    06/03/24 0809   BP: 130/82   Pulse: 70   SpO2: 97%   Weight: 109 kg (240 lb 15.4 oz)       Physical Exam:  Physical Exam  Vitals reviewed.   HENT:      Head: Normocephalic.      Nose: Nose normal.   Eyes:      Pupils: Pupils are equal, round, and reactive to light.   Cardiovascular:      Rate and Rhythm: Normal rate and regular rhythm.   Pulmonary:      Effort: Pulmonary effort is normal.      Breath sounds: Normal breath sounds.   Abdominal:      General: Abdomen is flat.      Palpations: Abdomen is soft.   Musculoskeletal:         General: Normal range of motion.      Cervical back: Normal range of motion.   Skin:     General: Skin is warm and dry.   Neurological:      General: No focal deficit present.      Mental Status: He is alert and oriented to person, place, and time.   Psychiatric:         Mood and Affect: Mood normal.          Last Labs:  CBC -  Lab Results   Component Value Date    WBC  4.8 05/29/2024    HGB 13.6 05/29/2024    HCT 39.9 05/29/2024    MCV 88 05/29/2024     05/29/2024       CMP -  Lab Results   Component Value Date    CALCIUM 9.1 05/29/2024    PHOS 2.8 01/09/2024    PROT 6.3 (L) 05/29/2024    ALBUMIN 4.3 05/29/2024    AST 15 05/29/2024    ALT 17 05/29/2024    ALKPHOS 131 (H) 05/29/2024    BILITOT 0.8 05/29/2024       LIPID PANEL -   Lab Results   Component Value Date    CHOL 143 01/03/2024    TRIG 150 (H) 01/03/2024    HDL 38.8 01/03/2024    CHHDL 3.7 01/03/2024    LDLF 86 01/06/2018    VLDL 30 01/03/2024    NHDL 104 01/03/2024       RENAL FUNCTION PANEL -   Lab Results   Component Value Date    GLUCOSE 98 05/29/2024     05/29/2024    K 3.5 05/29/2024     05/29/2024    CO2 32 05/29/2024    ANIONGAP 13 05/29/2024    BUN 15 05/29/2024    CREATININE 0.98 05/29/2024    GFRMALE CANCELED 08/04/2023    CALCIUM 9.1 05/29/2024    PHOS 2.8 01/09/2024    ALBUMIN 4.3 05/29/2024        Lab Results   Component Value Date    BNP 22 02/19/2024    HGBA1C 5.3 01/03/2024       Last Cardiology Tests:  ECG:  ECG independently reviewed from today showed sinus rhythm heart rate 66 bpm     Echo:  Transthoracic Echo (TTE) Complete 01/03/2024   1. Left ventricular systolic function is normal with a 55-60% estimated ejection fraction.   2. RVSP within normal limits.    Echocardiogram 8/20/2021  1. The left ventricular systolic function is normal with a 60% estimated ejection fraction.  2. Spectral Doppler shows an impaired relaxation pattern of left ventricular diastolic filling.  3. There is trace to mild mitral and tricuspid regurgitation.    echocardiogram 1/18/2018   1. The left ventricular systolic function is low normal with a 50-55% estimated  ejection fraction.   2. Spectral Doppler shows an impaired relaxation pattern of left ventricular  diastolic filling.   3. There is mild mitral and tricuspid regurgitaiton.      Echocardiogram 1/18/2018   LVEF 50-55%   mild MR   mild TR      Ejection Fractions:  LVEF 55-60%    Cath:  Cardiac catheterization - coronary   Cath 1/2/2024  . Left main: no significant angiographic disease.   2. LAD: no significant angiographic disease.   3. LCx: no significant angiographic disease.   4. RCA: patent proximal stent, no significant angiographic disease.   5. LVEDP 18mmHg, no aortic stenosis on LV-Ao gradient.    Cardiac cath 12/14/2018   1. No significant angiographic CAD, ostial-proximal RCA patent.   2. LVEDP 31mmHg, no aortic stenosis on LV-Ao gradient.  Stress Test:  Nuclear Stress Test 05/25/2023  1. No evidence of inducible myocardial ischemia. Predominantly fixed  moderate-sized moderate to severe perfusion defects involving  inferior wall as well as inferolateral wall, likely represent prior  infarction. Similar to prior exam on 11/20/2018.  2. The left ventricle is normal in size.  3. Gated images demonstrate normal LV wall motion except hypokinesis  in the inferior wall with a post-stress LV EF estimated at 56%.      nuclear stress test 11/20/2018   1. Moderate-sized territory of largely fixed prior completed  myocardial infarction involving the basal inferior/inferolateral wall  with minimal nonsignificant flower-infarct ischemia in the  inferolateral territory.  2. There is moderate dilation of the left ventricle with an  end-diastolic volume calculated at 145 mL.  3. Wall motion or LV quantification was not assessed due to unable to  do gated imaged secondary to arrhythmia.    Cardiac Imaging:      Assessment/Plan      Mrs. Coffey is a very pleasant 58 year old female with a history of CAD, HTN, HLD and MS. She had a PCI on 1/4/2018 at [facility]. She had a repeat heart cath on 12/14/2018 showing a patent RCA and no significant CAD. Heart cath in 1/2024 that showed patent stent, echo showed a preserved LV function. She is scheduled for Cholecystomy. She is low cardiac risk for surgery, she may proceed to the OR without further cardiac  testing. Heart rate and blood pressure is well controlled today.      Plan   -call with any questions   -continue ASA, Atorvastatin, and Plavix   -may not stop Aspirin   -hold Plavix 5-7 days prior to surgery   -low cardiac risk for surgery   -continue Caredizem   -continue  Ranexa 500 mg twice a day  -follow up in two months   -continue Lasix 80 mg twice a day              Mia Edmondson, APRN-CNP

## 2024-06-04 ENCOUNTER — ANESTHESIA EVENT (OUTPATIENT)
Dept: OPERATING ROOM | Facility: HOSPITAL | Age: 59
End: 2024-06-04
Payer: COMMERCIAL

## 2024-06-06 ENCOUNTER — HOSPITAL ENCOUNTER (OUTPATIENT)
Facility: HOSPITAL | Age: 59
Setting detail: OUTPATIENT SURGERY
Discharge: HOME | End: 2024-06-06
Attending: SURGERY | Admitting: SURGERY
Payer: COMMERCIAL

## 2024-06-06 ENCOUNTER — PHARMACY VISIT (OUTPATIENT)
Dept: PHARMACY | Facility: CLINIC | Age: 59
End: 2024-06-06
Payer: MEDICARE

## 2024-06-06 ENCOUNTER — ANESTHESIA (OUTPATIENT)
Dept: OPERATING ROOM | Facility: HOSPITAL | Age: 59
End: 2024-06-06
Payer: COMMERCIAL

## 2024-06-06 VITALS
TEMPERATURE: 96.8 F | OXYGEN SATURATION: 94 % | SYSTOLIC BLOOD PRESSURE: 113 MMHG | BODY MASS INDEX: 37.37 KG/M2 | WEIGHT: 238.1 LBS | HEIGHT: 67 IN | HEART RATE: 68 BPM | DIASTOLIC BLOOD PRESSURE: 43 MMHG | RESPIRATION RATE: 16 BRPM

## 2024-06-06 DIAGNOSIS — K80.20 GALLSTONES: Primary | ICD-10-CM

## 2024-06-06 DIAGNOSIS — K80.20 CALCULUS OF GALLBLADDER WITHOUT CHOLECYSTITIS WITHOUT OBSTRUCTION: ICD-10-CM

## 2024-06-06 PROCEDURE — 3700000001 HC GENERAL ANESTHESIA TIME - INITIAL BASE CHARGE: Performed by: SURGERY

## 2024-06-06 PROCEDURE — 96372 THER/PROPH/DIAG INJ SC/IM: CPT | Performed by: SURGERY

## 2024-06-06 PROCEDURE — 2500000004 HC RX 250 GENERAL PHARMACY W/ HCPCS (ALT 636 FOR OP/ED): Performed by: ANESTHESIOLOGY

## 2024-06-06 PROCEDURE — 2720000007 HC OR 272 NO HCPCS: Performed by: SURGERY

## 2024-06-06 PROCEDURE — 3700000002 HC GENERAL ANESTHESIA TIME - EACH INCREMENTAL 1 MINUTE: Performed by: SURGERY

## 2024-06-06 PROCEDURE — 2500000005 HC RX 250 GENERAL PHARMACY W/O HCPCS: Performed by: LICENSED PRACTICAL NURSE

## 2024-06-06 PROCEDURE — 7100000001 HC RECOVERY ROOM TIME - INITIAL BASE CHARGE: Performed by: SURGERY

## 2024-06-06 PROCEDURE — 2500000005 HC RX 250 GENERAL PHARMACY W/O HCPCS: Performed by: ANESTHESIOLOGY

## 2024-06-06 PROCEDURE — 7100000002 HC RECOVERY ROOM TIME - EACH INCREMENTAL 1 MINUTE: Performed by: SURGERY

## 2024-06-06 PROCEDURE — 3600000008 HC OR TIME - EACH INCREMENTAL 1 MINUTE - PROCEDURE LEVEL THREE: Performed by: SURGERY

## 2024-06-06 PROCEDURE — 47563 LAPARO CHOLECYSTECTOMY/GRAPH: CPT | Performed by: SURGERY

## 2024-06-06 PROCEDURE — C1889 IMPLANT/INSERT DEVICE, NOC: HCPCS | Performed by: SURGERY

## 2024-06-06 PROCEDURE — RXMED WILLOW AMBULATORY MEDICATION CHARGE

## 2024-06-06 PROCEDURE — 2500000004 HC RX 250 GENERAL PHARMACY W/ HCPCS (ALT 636 FOR OP/ED): Performed by: LICENSED PRACTICAL NURSE

## 2024-06-06 PROCEDURE — 2500000001 HC RX 250 WO HCPCS SELF ADMINISTERED DRUGS (ALT 637 FOR MEDICARE OP): Performed by: ANESTHESIOLOGY

## 2024-06-06 PROCEDURE — 3600000003 HC OR TIME - INITIAL BASE CHARGE - PROCEDURE LEVEL THREE: Performed by: SURGERY

## 2024-06-06 PROCEDURE — 2500000004 HC RX 250 GENERAL PHARMACY W/ HCPCS (ALT 636 FOR OP/ED): Performed by: SURGERY

## 2024-06-06 PROCEDURE — 7100000010 HC PHASE TWO TIME - EACH INCREMENTAL 1 MINUTE: Performed by: SURGERY

## 2024-06-06 PROCEDURE — 7100000009 HC PHASE TWO TIME - INITIAL BASE CHARGE: Performed by: SURGERY

## 2024-06-06 PROCEDURE — 88304 TISSUE EXAM BY PATHOLOGIST: CPT | Mod: TC,GEALAB,PARLAB | Performed by: SURGERY

## 2024-06-06 PROCEDURE — 2500000002 HC RX 250 W HCPCS SELF ADMINISTERED DRUGS (ALT 637 FOR MEDICARE OP, ALT 636 FOR OP/ED): Performed by: ANESTHESIOLOGY

## 2024-06-06 PROCEDURE — 2780000003 HC OR 278 NO HCPCS: Performed by: SURGERY

## 2024-06-06 PROCEDURE — 2500000005 HC RX 250 GENERAL PHARMACY W/O HCPCS: Performed by: SURGERY

## 2024-06-06 RX ORDER — IBUPROFEN 600 MG/1
600 TABLET ORAL EVERY 6 HOURS
Qty: 10 TABLET | Refills: 0 | Status: SHIPPED | OUTPATIENT
Start: 2024-06-06 | End: 2024-06-09

## 2024-06-06 RX ORDER — ENOXAPARIN SODIUM 100 MG/ML
30 INJECTION SUBCUTANEOUS ONCE
Status: COMPLETED | OUTPATIENT
Start: 2024-06-06 | End: 2024-06-06

## 2024-06-06 RX ORDER — ONDANSETRON 4 MG/1
4 TABLET, ORALLY DISINTEGRATING ORAL EVERY 6 HOURS PRN
Qty: 15 TABLET | Refills: 0 | Status: SHIPPED | OUTPATIENT
Start: 2024-06-06

## 2024-06-06 RX ORDER — SODIUM CHLORIDE, SODIUM LACTATE, POTASSIUM CHLORIDE, CALCIUM CHLORIDE 600; 310; 30; 20 MG/100ML; MG/100ML; MG/100ML; MG/100ML
100 INJECTION, SOLUTION INTRAVENOUS CONTINUOUS
Status: DISCONTINUED | OUTPATIENT
Start: 2024-06-06 | End: 2024-06-06 | Stop reason: HOSPADM

## 2024-06-06 RX ORDER — IPRATROPIUM BROMIDE AND ALBUTEROL SULFATE 2.5; .5 MG/3ML; MG/3ML
3 SOLUTION RESPIRATORY (INHALATION) ONCE
Status: COMPLETED | OUTPATIENT
Start: 2024-06-06 | End: 2024-06-06

## 2024-06-06 RX ORDER — POLYETHYLENE GLYCOL 3350 17 G/17G
17 POWDER, FOR SOLUTION ORAL DAILY
Qty: 238 G | Refills: 0 | Status: SHIPPED | OUTPATIENT
Start: 2024-06-06 | End: 2024-06-20

## 2024-06-06 RX ORDER — NORETHINDRONE AND ETHINYL ESTRADIOL 0.5-0.035
KIT ORAL AS NEEDED
Status: DISCONTINUED | OUTPATIENT
Start: 2024-06-06 | End: 2024-06-06

## 2024-06-06 RX ORDER — ACETAMINOPHEN 325 MG/1
650 TABLET ORAL ONCE
Status: COMPLETED | OUTPATIENT
Start: 2024-06-06 | End: 2024-06-06

## 2024-06-06 RX ORDER — ROCURONIUM BROMIDE 10 MG/ML
INJECTION, SOLUTION INTRAVENOUS AS NEEDED
Status: DISCONTINUED | OUTPATIENT
Start: 2024-06-06 | End: 2024-06-06

## 2024-06-06 RX ORDER — KETOROLAC TROMETHAMINE 30 MG/ML
INJECTION, SOLUTION INTRAMUSCULAR; INTRAVENOUS AS NEEDED
Status: DISCONTINUED | OUTPATIENT
Start: 2024-06-06 | End: 2024-06-06

## 2024-06-06 RX ORDER — LIDOCAINE HYDROCHLORIDE 10 MG/ML
INJECTION, SOLUTION EPIDURAL; INFILTRATION; INTRACAUDAL; PERINEURAL AS NEEDED
Status: DISCONTINUED | OUTPATIENT
Start: 2024-06-06 | End: 2024-06-06

## 2024-06-06 RX ORDER — ONDANSETRON HYDROCHLORIDE 2 MG/ML
4 INJECTION, SOLUTION INTRAVENOUS ONCE AS NEEDED
Status: COMPLETED | OUTPATIENT
Start: 2024-06-06 | End: 2024-06-06

## 2024-06-06 RX ORDER — BUPIVACAINE HYDROCHLORIDE 5 MG/ML
INJECTION, SOLUTION EPIDURAL; INTRACAUDAL AS NEEDED
Status: DISCONTINUED | OUTPATIENT
Start: 2024-06-06 | End: 2024-06-06 | Stop reason: HOSPADM

## 2024-06-06 RX ORDER — CEFAZOLIN 1 G/1
INJECTION, POWDER, FOR SOLUTION INTRAVENOUS AS NEEDED
Status: DISCONTINUED | OUTPATIENT
Start: 2024-06-06 | End: 2024-06-06

## 2024-06-06 RX ORDER — FENTANYL CITRATE 50 UG/ML
INJECTION, SOLUTION INTRAMUSCULAR; INTRAVENOUS AS NEEDED
Status: DISCONTINUED | OUTPATIENT
Start: 2024-06-06 | End: 2024-06-06

## 2024-06-06 RX ORDER — GABAPENTIN 300 MG/1
300 CAPSULE ORAL ONCE
Status: DISCONTINUED | OUTPATIENT
Start: 2024-06-06 | End: 2024-06-06 | Stop reason: HOSPADM

## 2024-06-06 RX ORDER — OXYCODONE HYDROCHLORIDE 5 MG/1
5 TABLET ORAL EVERY 4 HOURS PRN
Status: DISCONTINUED | OUTPATIENT
Start: 2024-06-06 | End: 2024-06-06 | Stop reason: HOSPADM

## 2024-06-06 RX ORDER — DROPERIDOL 2.5 MG/ML
0.62 INJECTION, SOLUTION INTRAMUSCULAR; INTRAVENOUS ONCE AS NEEDED
Status: DISCONTINUED | OUTPATIENT
Start: 2024-06-06 | End: 2024-06-06 | Stop reason: HOSPADM

## 2024-06-06 RX ORDER — MIDAZOLAM HYDROCHLORIDE 1 MG/ML
INJECTION INTRAMUSCULAR; INTRAVENOUS AS NEEDED
Status: DISCONTINUED | OUTPATIENT
Start: 2024-06-06 | End: 2024-06-06

## 2024-06-06 RX ORDER — OXYCODONE HYDROCHLORIDE 5 MG/1
5 TABLET ORAL EVERY 6 HOURS PRN
Qty: 5 TABLET | Refills: 0 | Status: SHIPPED | OUTPATIENT
Start: 2024-06-06

## 2024-06-06 RX ORDER — PROPOFOL 10 MG/ML
INJECTION, EMULSION INTRAVENOUS AS NEEDED
Status: DISCONTINUED | OUTPATIENT
Start: 2024-06-06 | End: 2024-06-06

## 2024-06-06 RX ORDER — ACETAMINOPHEN 325 MG/1
650 TABLET ORAL EVERY 6 HOURS
Qty: 20 TABLET | Refills: 0 | Status: SHIPPED | OUTPATIENT
Start: 2024-06-06 | End: 2024-06-09

## 2024-06-06 RX ORDER — INDOCYANINE GREEN AND WATER 25 MG
2.5 KIT INJECTION ONCE
Status: COMPLETED | OUTPATIENT
Start: 2024-06-06 | End: 2024-06-06

## 2024-06-06 RX ADMIN — EPHEDRINE SULFATE 5 MG: 50 INJECTION, SOLUTION INTRAVENOUS at 15:51

## 2024-06-06 RX ADMIN — HYDROMORPHONE HYDROCHLORIDE 0.5 MG: 1 INJECTION, SOLUTION INTRAMUSCULAR; INTRAVENOUS; SUBCUTANEOUS at 17:37

## 2024-06-06 RX ADMIN — HYDROMORPHONE HYDROCHLORIDE 0.5 MG: 1 INJECTION, SOLUTION INTRAMUSCULAR; INTRAVENOUS; SUBCUTANEOUS at 16:52

## 2024-06-06 RX ADMIN — OXYCODONE HYDROCHLORIDE 5 MG: 5 TABLET ORAL at 18:12

## 2024-06-06 RX ADMIN — ACETAMINOPHEN 650 MG: 325 TABLET ORAL at 12:05

## 2024-06-06 RX ADMIN — MIDAZOLAM HYDROCHLORIDE 1 MG: 1 INJECTION, SOLUTION INTRAMUSCULAR; INTRAVENOUS at 15:13

## 2024-06-06 RX ADMIN — ROCURONIUM BROMIDE 10 MG: 10 INJECTION, SOLUTION INTRAVENOUS at 15:55

## 2024-06-06 RX ADMIN — ONDANSETRON 4 MG: 2 INJECTION INTRAMUSCULAR; INTRAVENOUS at 16:40

## 2024-06-06 RX ADMIN — ROCURONIUM BROMIDE 40 MG: 10 INJECTION, SOLUTION INTRAVENOUS at 15:22

## 2024-06-06 RX ADMIN — INDOCYANINE GREEN AND WATER 2.5 MG: KIT at 12:18

## 2024-06-06 RX ADMIN — FENTANYL CITRATE 50 MCG: 50 INJECTION, SOLUTION INTRAMUSCULAR; INTRAVENOUS at 15:22

## 2024-06-06 RX ADMIN — HYDROMORPHONE HYDROCHLORIDE 0.5 MG: 1 INJECTION, SOLUTION INTRAMUSCULAR; INTRAVENOUS; SUBCUTANEOUS at 16:40

## 2024-06-06 RX ADMIN — CEFAZOLIN 2 G: 330 INJECTION, POWDER, FOR SOLUTION INTRAMUSCULAR; INTRAVENOUS at 15:12

## 2024-06-06 RX ADMIN — IPRATROPIUM BROMIDE AND ALBUTEROL SULFATE 3 ML: 2.5; .5 SOLUTION RESPIRATORY (INHALATION) at 17:25

## 2024-06-06 RX ADMIN — KETOROLAC TROMETHAMINE 30 MG: 30 INJECTION, SOLUTION INTRAMUSCULAR; INTRAVENOUS at 16:14

## 2024-06-06 RX ADMIN — Medication 6 L/MIN: at 16:28

## 2024-06-06 RX ADMIN — SUGAMMADEX 200 MG: 100 INJECTION, SOLUTION INTRAVENOUS at 16:14

## 2024-06-06 RX ADMIN — EPHEDRINE SULFATE 5 MG: 50 INJECTION, SOLUTION INTRAVENOUS at 15:47

## 2024-06-06 RX ADMIN — SODIUM CHLORIDE, POTASSIUM CHLORIDE, SODIUM LACTATE AND CALCIUM CHLORIDE 100 ML/HR: 600; 310; 30; 20 INJECTION, SOLUTION INTRAVENOUS at 12:04

## 2024-06-06 RX ADMIN — LIDOCAINE HYDROCHLORIDE 50 MG: 10 INJECTION, SOLUTION EPIDURAL; INFILTRATION; INTRACAUDAL; PERINEURAL at 15:22

## 2024-06-06 RX ADMIN — ENOXAPARIN SODIUM 30 MG: 30 INJECTION SUBCUTANEOUS at 12:05

## 2024-06-06 RX ADMIN — PROPOFOL 200 MG: 10 INJECTION, EMULSION INTRAVENOUS at 15:22

## 2024-06-06 RX ADMIN — DEXAMETHASONE SODIUM PHOSPHATE 8 MG: 4 INJECTION INTRA-ARTICULAR; INTRALESIONAL; INTRAMUSCULAR; INTRAVENOUS; SOFT TISSUE at 15:30

## 2024-06-06 RX ADMIN — HYDROMORPHONE HYDROCHLORIDE 0.5 MG: 1 INJECTION, SOLUTION INTRAMUSCULAR; INTRAVENOUS; SUBCUTANEOUS at 17:12

## 2024-06-06 SDOH — HEALTH STABILITY: MENTAL HEALTH: CURRENT SMOKER: 0

## 2024-06-06 ASSESSMENT — PAIN SCALES - GENERAL
PAINLEVEL_OUTOF10: 7
PAIN_LEVEL: 2
PAINLEVEL_OUTOF10: 7
PAINLEVEL_OUTOF10: 7
PAINLEVEL_OUTOF10: 8
PAINLEVEL_OUTOF10: 3
PAINLEVEL_OUTOF10: 8
PAINLEVEL_OUTOF10: 3
PAINLEVEL_OUTOF10: 3
PAINLEVEL_OUTOF10: 5 - MODERATE PAIN

## 2024-06-06 ASSESSMENT — PAIN DESCRIPTION - LOCATION
LOCATION: ABDOMEN

## 2024-06-06 ASSESSMENT — PAIN - FUNCTIONAL ASSESSMENT
PAIN_FUNCTIONAL_ASSESSMENT: 0-10
PAIN_FUNCTIONAL_ASSESSMENT: 0-10

## 2024-06-06 NOTE — OP NOTE
LAPAROSCOPY CHOLECYSTECTOMY     Operative Date: 06/06/24  Patient's Name: Elaine Coffey  Patient's YOB: 1965  Patient's MRN: [id number]  Patient's Age: 58 y.o.  Operating Room Location: [facility]  Patient's ASA: III  Patient's Estimated Blood Loss: 5 mL        PREOPERATIVE DIAGNOSIS:   Chronic cholecystitis        POSTOPERATIVE DIAGNOSIS:   Chronic cholecystitis        OPERATION/PROCEDURE:   Laparoscopic cholecystectomy with firefly cholangiography        SURGEON:   Hayder Melissa MD.         ASSISTANT:   Scrub assist.           INDICATIONS:   This is a 58 y.o. female who presented with chronic cholecystitis.        OPERATION:   The reasons for, benefits to, and risks of surgery were discussed with the patient.  The risks included, but not limited to, bleeding, infection, persistent pain, injury to surrounding structures, and postoperative abscess.  The patient appeared to understand and consented for surgery.  She was therefore brought back to the operating room and placed on the operating room table in the supine position.  Her abdomen was prepped and draped in a standard fashion.       We accessed the abdomen in the left upper quadrant with a Veress needle.  We then insufflated the pressure.  After insufflating to pressure, we made a 5 mm incision periumbilically.  We then entered the abdomen with a 5 mm optical view port.  We then removed the Veress needle.  We then performed our bilateral transversus abdominis plane block, instilling 15 mL of half percent Marcaine into each transversus abdominis plane under direct visualization.  We then placed our three 5 mm working ports in the standard right subcostal location.  We then placed the patient in reverse Trendelenburg positioning with right side up.  We then grasped the dome of the gallbladder and lifted it over the liver in the standard fashion.  We then inspected the infundibulum and opened the peritoneum over  the infundibulum extending it medially and laterally to the reflective edges of the liver.  This gave us more mobility on the infundibulum.  We then began dissecting on the triangle of Calot.  We eventually elevated the infundibulum off of the cystic plate of the liver.  We had two tubular structures entering the infundibulum with nothing returning to the liver.  We had a window between these structures and could see nothing but liver on the other side.  We then activated our firefly camera.  She had previously received indocyanine green in the preoperative area.  We could then see the liver lighting up green, liquid trace the common bile duct lighting up drain from the liver down to the duodenum.  We could then see 1 tubular structure tracking from the common bile duct up to the infundibulum lighting up green.  The other tubular structure did not light up green.  We thus obtained our critical view of safety, identifying our cystic duct and cystic artery.  We then clipped across the artery with two clips down and one clip up.  We then clipped across the duct with three clips down and one clip up.  We then transected each structure between our clips.  We then dissected the gallbladder off of the gallbladder fossa in the standard bottom-up technique.  We then used an Endo Catch bag to extract the gallbladder through the epigastric 5 mm port site after upsizing.  We then closed the fascia of this port site with an interrupted figure-of-eight 0 Vicryl suture on a Jose-Shaila suture passer.  We then removed the remaining ports under direct visualization.  We desufflated the abdomen.  We closed the skin of all of our port sites with subcuticular 4-0 Vicryl suture.  Dermabond was applied over top.         DISPOSITION:   The patient tolerated the procedure well.  There were no immediate complications.  She will be brought to the postanesthesia care unit. From there, she will be discharged home with outpatient followup  with me.      I was present and scrubbed in for the entire procedure.      CPT Code:  17492       Operating Room Staff:  Anesthesiologist: Mark Birch MD  CRNA: WALESKA Rollins  Circulator: Mimi Mitchell RN  Scrub Person: Risa Melissa MD  General Surgery  Office: [phone]  Fax:     [phone]  4:23 PM  06/06/24

## 2024-06-06 NOTE — ANESTHESIA PREPROCEDURE EVALUATION
Patient: Elaine Coffey    Procedure Information       Date/Time: 24 1330    Procedure: LAPAROSCOPY CHOLECYSTECTOMY - Laparoscopy Cholecystectomy    [facility]    Surgeons: Hayder Melissa MD     Vitals:    24 1113   BP: 132/67   Pulse: 72   Resp: 16   Temp: 37.4 °C (99.3 °F)   SpO2: 95%       Past Surgical History:   Procedure Laterality Date   • ANKLE SURGERY Left     removed floating bone   • CARDIAC CATHETERIZATION N/A 2024    Procedure: Left Heart Cath;  Surgeon: John Gutierres MD;  Location: [facility] Cardiac Cath Lab;  Service: Cardiovascular;  Laterality: N/A;   • CARDIAC CATHETERIZATION N/A 2024    Procedure: Left Heart Cath;  Surgeon: Gagan Kat MD;  Location: [facility] Cardiac Cath Lab;  Service: Cardiovascular;  Laterality: N/A;   •  SECTION, CLASSIC       Section x4   • CORONARY STENT PLACEMENT     • HYSTERECTOMY  2014    Hysterectomy   • OTHER SURGICAL HISTORY  2014    Oophorectomy - Bilat (Removal Of Both Ovaries) Laparoscopic   • OTHER SURGICAL HISTORY      Laparosc Gastric Restrictive Proc By Adjustable Gastric Band   • OTHER SURGICAL HISTORY      Abscess incision and drainage x4     Past Medical History:   Diagnosis Date   • Abnormal ECG    • Antiplatelet or antithrombotic long-term use     PLAVIX   • Asthma ([facility])    • Calculus of gallbladder without cholecystitis without obstruction    • CHF (congestive heart failure) (Multi)    • Class 2 obesity with body mass index (BMI) of 37.0 to 37.9 in adult 2024    37.74 kg/m²   • Coronary artery disease     1 cardiac stent placed 2018   • Encounter for pre-operative cardiovascular clearance 2024    Mia Edmondson, APRN-CN for Cholecystectomy   • Gallstones    • GERD (gastroesophageal reflux disease)    • Hidradenitis suppurativa    • Hypertension    • Hypothyroidism    • Multiple sclerosis (Multi)    • Myocardial infarction (Multi)     [facility] #1, #2   2024 at [facility],  #3 also here at [facility] with gb attack   • Obesity    • Sleep apnea    • Wears eyeglasses        Current Facility-Administered Medications:   •  gabapentin (Neurontin) capsule 300 mg, 300 mg, oral, Once, Hayder Melissa MD  •  lactated Ringer's infusion, 100 mL/hr, intravenous, Continuous, Ryan Santos MD, Last Rate: 100 mL/hr at 06/06/24 1204, 100 mL/hr at 06/06/24 1204  Prior to Admission medications    Medication Sig Start Date End Date Taking? Authorizing Provider   albuterol 90 mcg/actuation inhaler INHALE 1 TO 2 PUFFS EVERY 4 TO 6 HOURS AS NEEDED 1/12/24  Yes Raffy Balderrama,    amitriptyline (Elavil) 25 mg tablet Take 1 tablet (25 mg) by mouth once daily at bedtime. 9/21/23  Yes Historical Provider, MD   aspirin 81 mg EC tablet Take 1 tablet (81 mg) by mouth once daily in the morning. 10/27/14  Yes Historical Provider, MD   atorvastatin (Lipitor) 40 mg tablet Take 1 tablet (40 mg) by mouth once daily at bedtime.   Yes Historical Provider, MD   calcium carbonate (OS-SHANTA) 500 mg calcium (1,250 mg) capsule Take 1,200 mg by mouth 2 times a day with meals. Take with food.   Yes Historical Provider, MD   clonazePAM (KlonoPIN) 0.5 mg disintegrating tablet Take 1 tablet (0.5 mg) by mouth once daily at bedtime. Last OARRS fill: 12/4/23 #30 for 30 days 2/22/24  Yes SHEILA Beal-CNP   dilTIAZem CD (Cardizem CD) 240 mg 24 hr capsule TAKE 1 CAPSULE BY MOUTH ONCE DAILY. 3/8/24  Yes SHEILA Smiley-CNP   ergocalciferol (Vitamin D-2) 1.25 MG (81761 UT) capsule Take 1 capsule (1,250 mcg) by mouth 1 (one) time per week. On Saturdays   Yes Historical Provider, MD   ezetimibe (Zetia) 10 mg tablet TAKE 1 TABLET (10 MG) BY MOUTH ONCE DAILY AT BEDTIME. 3/1/24 3/1/25 Yes Mia C de Capite, APRN-CNP   FLUoxetine (PROzac) 40 mg capsule Take 1 capsule (40 mg) by mouth once daily. 3/10/24  Yes SHEILA Beal-CNP   furosemide (Lasix) 80 mg tablet Take 1 tablet (80 mg) by mouth 2  times a day. 2/28/24 2/27/25 Yes NAYANA Beal   furosemide (Lasix) 80 mg tablet Take 1 tablet (80 mg) by mouth 2 times daily (morning and late afternoon).   Yes Historical Provider, MD   gabapentin (Neurontin) 400 mg capsule Take 1 capsule (400 mg) by mouth 3 times a day. Last OARRS fill: 4/7/23 #270 for 90 days   Yes Historical Provider, MD   levothyroxine (Synthroid, Levoxyl) 50 mcg tablet TAKE 1 TABLET BY MOUTH EVERY DAY 2/2/24  Yes Raffy Balderrama DO   meclizine (Antivert) 25 mg tablet Take 1 tablet (25 mg) by mouth every 6 hours if needed for dizziness.   Yes Historical Provider, MD   metoprolol succinate XL (Toprol-XL) 25 mg 24 hr tablet TAKE 1 TABLET BY MOUTH EVERY DAY 3/4/24  Yes NAYANA Smiley   nystatin (Mycostatin) 100,000 unit/gram powder Apply 1 Application topically 2 times a day. 12/14/23 12/13/24 Yes NAYANA Beal   nystatin-triamcinolone (Mycolog II) ointment Apply topically 2 times a day. to affected area 2/22/24  Yes NAYANA Beal   potassium chloride CR (Klor-Con M20) 20 mEq ER tablet Take 1 tablet (20 mEq) by mouth once daily. Do not crush or chew. 3/20/24 3/20/25 Yes NAYANA Smiley   ranolazine (Ranexa) 500 mg 12 hr tablet Take 1 tablet (500 mg) by mouth 2 times a day. Do not crush, chew, or split. 1/26/24 1/25/25 Yes NAYANA Smiley   tiZANidine (Zanaflex) 4 mg tablet Take 1 tablet (4 mg) by mouth as needed at bedtime for muscle spasms. FOR SPASMS   Yes Historical Provider, MD   albuterol 2.5 mg /3 mL (0.083 %) nebulizer solution Take 3 mL (2.5 mg) by nebulization every 4 hours if needed for wheezing.    Historical Provider, MD   clopidogrel (Plavix) 75 mg tablet Take 1 tablet (75 mg) by mouth once daily. 1/9/24 1/8/25  Katia Reina PA-C   nystatin (Mycostatin) ointment APPLY TO AFFECTED AREA TWICE A DAY 4/23/24   SHEILA Beal-CNP   omeprazole (PriLOSEC) 20 mg DR capsule Take 1 capsule (20 mg)  "by mouth once daily. Do not crush or chew. 24  Tena Lee, APRN-CNP     Allergies   Allergen Reactions   • Gadolinium-Containing Contrast Media Anaphylaxis     Pt developed nausea without vomiting , SOB, after receiving the IV contrast . Pt states she always gets nauseated but this time \"was worse\"/pt/     Social History     Tobacco Use   • Smoking status: Former     Current packs/day: 0.00     Average packs/day: 1 pack/day for 8.7 years (8.7 ttl pk-yrs)     Types: Cigarettes     Start date: 1983     Quit date: 10/1/1991     Years since quittin.7   • Smokeless tobacco: Never   Substance Use Topics   • Alcohol use: Yes     Comment: seldom         Chemistry    Lab Results   Component Value Date/Time     2024 0907    K 3.5 2024 0907     2024 0907    CO2 32 2024 0907    BUN 15 2024 0907    CREATININE 0.98 2024 0907    Lab Results   Component Value Date/Time    CALCIUM 9.1 2024 0907    ALKPHOS 131 (H) 2024 0907    AST 15 2024 0907    ALT 17 2024 0907    BILITOT 0.8 2024 0907          Lab Results   Component Value Date/Time    WBC 4.8 2024 0907    HGB 13.6 2024 0907    HCT 39.9 2024 0907     2024 0907     Lab Results   Component Value Date/Time    PROTIME 11.1 2024 0912    INR 1.0 2024 0912     Encounter Date: 24   ECG 12 lead (Clinic Performed)   Result Value    Ventricular Rate 66    Atrial Rate 66    MI Interval 146    QRS Duration 104    QT Interval 424    QTC Calculation(Bazett) 444    P Axis 43    R Axis -7    T Axis 24    QRS Count 11    Q Onset 209    P Onset 136    P Offset 184    T Offset 421    QTC Fredericia 438    Narrative    Normal sinus rhythm  Cannot rule out Anterior infarct , age undetermined  Abnormal ECG  When compared with ECG of 2024 09:06,  No significant change was found     No results found for this or any previous visit from the past " 1095 days.        Relevant Problems   Cardiac   (+) Chest pain   (+) Chest pain, unspecified type   (+) Myocardial infarction (Multi)      Pulmonary   (+) FARZANA (obstructive sleep apnea)      Neuro   (+) Anxiety   (+) Depression   (+) Multiple sclerosis (Multi)      GI   (+) Gastroesophageal reflux disease without esophagitis      Liver   (+) Calculus of gallbladder without cholecystitis without obstruction   (+) Gallstones      ID   (+) Candidiasis      Skin   (+) Skin rash       Clinical information reviewed:    Allergies  Meds   Med Hx             NPO Detail:  NPO/Void Status  Date of Last Liquid: 06/06/24  Time of Last Liquid: 0800  Date of Last Solid: 06/05/24  Time of Last Solid: 1800         Physical Exam    Airway  Mallampati: II  TM distance: >3 FB  Neck ROM: full     Cardiovascular - normal exam     Dental    Pulmonary - normal exam     Abdominal - normal exam  (+) obese         Anesthesia Plan    History of general anesthesia?: yes  History of complications of general anesthesia?: no    ASA 3     general     The patient is not a current smoker.    intravenous induction   Postoperative administration of opioids is intended.  Trial extubation is planned.  Anesthetic plan and risks discussed with patient.  Use of blood products discussed with patient who.    Plan discussed with CRNA and attending.

## 2024-06-06 NOTE — DISCHARGE INSTRUCTIONS
PATIENT INSTRUCTIONS  Cholecystectomy    FOLLOW-UP: Please call the office after being discharged to make a follow up appointment in 2-3 weeks. Call your physician immediately if you have any fevers greater than 103 degrees Fahrenheit, drainage from your wound that is not clear or looks infected, persistent bleeding, increasing abdominal pain,  or persistent nausea/vomiting.     WOUND CARE INSTRUCTIONS: Incisions are closed with absorbable sutures and covered with glue. Glue will fall off in about 2 weeks. If the wound becomes bright red and painful or starts to drain infected material that is not clear, please contact your physician immediately. If the wound is mildly pink and has a thick firm ridge underneath it, this is normal and is referred to as a healing ridge. This will resolve over the next 4-6 weeks. You are welcome to shower the day after surgery. Wash abdomen with warm, soapy water using your hand or gentle wash cloth. Pat dry. Do not submerge incisions in a bath tub or swimming pool for 2 weeks following surgery.    DRAIN: If your surgeon discharges you with a drain, you will need to empty it when it becomes 1/3 full. Empty it into a graduated specimen cup and record total outputs for the day. Bring these recordings to your follow up appointment. Strip your drain once a day to prevent clogging. Your drain will likely be removed in 1 week in office. The drain fluid should turn from a pink, watery fluid to a straw yellow, watery fluid. If it turns green, brown, dark red and thick or develops a bad odor, call our office immediately.     DIET: You may eat any foods that you can tolerate. We recommend following a bland, easily digestible diet for the first few days after surgery until your appetite improves. It is a good idea to eat a high fiber diet, and take in plenty of fluids to prevent constipation. Take Miralax daily if experiencing constipation after surgery until stools are a pudding like consistency  and easy to pass.     ACTIVITY: You are encouraged to cough and take deep breaths or use the incentive spirometry that was provided. This will help prevent respiratory complications and low grade fevers post-operatively. You may want to hug a pillow when coughing and sneezing to add additional support to the surgical area which will decrease pain during these times. You should not lift more than 10 pounds for 4 weeks after surgery as it could put you at increased risk for a post-operative hernia. We encourage frequent ambulation and getting out of bed as much as possible while you recover to improve your recovery process. Avoid strenuous activity for 4 weeks, which includes exercise that increases the heart rate, breathing rate, or makes you break a sweat.     MEDICATIONS: Plan to take Tylenol and Ibuprofen (if your physician approves) every 6 hours for the first week after surgery. Alternatively, you can alternate these two medications every three hours for the first week. You will be provided a short course of a narcotic medication to take for breakthrough pain as needed. You should not drive, make important decisions, or operate machinery when taking narcotic pain medication.    QUESTIONS: Please feel free to call your physician's office for any questions or concerns following surgery. Our office number is [phone].

## 2024-06-06 NOTE — ANESTHESIA POSTPROCEDURE EVALUATION
Patient: Elaine Coffey    Procedure Summary       Date: 06/06/24 Room / Location: [facility] 08 / [facility]    Anesthesia Start: 1512 Anesthesia Stop: 1642    Procedure: LAPAROSCOPY CHOLECYSTECTOMY Diagnosis:       Calculus of gallbladder without cholecystitis without obstruction      Gallstones      (Calculus of gallbladder without cholecystitis without obstruction [K80.20])      (Gallstones [K80.20])    Surgeons: Hayder Melissa MD Responsible Provider: Mark Birch MD    Anesthesia Type: general ASA Status: 3            Anesthesia Type: general    Vitals Value Taken Time   /47 06/06/24 1757   Temp 36 °C (96.8 °F) 06/06/24 1628   Pulse 67 06/06/24 1809   Resp 14 06/06/24 1743   SpO2 95 % 06/06/24 1809   Vitals shown include unfiled device data.    Anesthesia Post Evaluation    Patient location during evaluation: PACU  Patient participation: complete - patient participated  Level of consciousness: awake  Pain score: 2  Pain management: adequate  Multimodal analgesia pain management approach  Airway patency: patent  Two or more strategies used to mitigate risk of obstructive sleep apnea  Cardiovascular status: acceptable  Respiratory status: acceptable  Hydration status: acceptable  Postoperative Nausea and Vomiting: none    There were no known notable events for this encounter.

## 2024-06-06 NOTE — ANESTHESIA PROCEDURE NOTES
Airway  Date/Time: 6/6/2024 3:23 PM  Urgency: elective    Airway not difficult    Staffing  Performed: CRNA   Authorized by: Mark Birch MD    Performed by: SHEILA Rollins-ELBERT  Patient location during procedure: OR    Indications and Patient Condition  Indications for airway management: anesthesia  Spontaneous ventilation: present  Sedation level: deep  Preoxygenated: yes  Patient position: sniffing  Mask difficulty assessment: 1 - vent by mask  Planned trial extubation    Final Airway Details  Final airway type: endotracheal airway      Successful airway: ETT  Cuffed: yes   Successful intubation technique: video laryngoscopy  Facilitating devices/methods: intubating stylet  Endotracheal tube insertion site: oral  Blade: Tyrone  Blade size: #3  ETT size (mm): 7.0  Cormack-Lehane Classification: grade I - full view of glottis  Placement verified by: chest auscultation and capnometry   Measured from: lips  ETT to lips (cm): 21  Number of attempts at approach: 1    Additional Comments  Atraumatic, dentition and lips sane as pre-induction status.

## 2024-06-10 LAB
ATRIAL RATE: 66 BPM
P AXIS: 43 DEGREES
P OFFSET: 184 MS
P ONSET: 136 MS
PR INTERVAL: 146 MS
Q ONSET: 209 MS
QRS COUNT: 11 BEATS
QRS DURATION: 104 MS
QT INTERVAL: 424 MS
QTC CALCULATION(BAZETT): 444 MS
QTC FREDERICIA: 438 MS
R AXIS: -7 DEGREES
T AXIS: 24 DEGREES
T OFFSET: 421 MS
VENTRICULAR RATE: 66 BPM

## 2024-06-11 DIAGNOSIS — R07.9 CHEST PAIN, UNSPECIFIED: ICD-10-CM

## 2024-06-11 DIAGNOSIS — B37.9 CANDIDIASIS: ICD-10-CM

## 2024-06-12 RX ORDER — FLUCONAZOLE 150 MG/1
150 TABLET ORAL ONCE
Qty: 1 TABLET | Refills: 0 | Status: SHIPPED | OUTPATIENT
Start: 2024-06-12 | End: 2024-06-12

## 2024-06-12 RX ORDER — NYSTATIN AND TRIAMCINOLONE ACETONIDE 100000; 1 [USP'U]/G; MG/G
OINTMENT TOPICAL 2 TIMES DAILY
Qty: 60 G | Refills: 1 | Status: SHIPPED | OUTPATIENT
Start: 2024-06-12

## 2024-06-12 RX ORDER — NITROGLYCERIN 0.4 MG/1
TABLET SUBLINGUAL
Qty: 25 TABLET | Refills: 3 | Status: SHIPPED | OUTPATIENT
Start: 2024-06-12

## 2024-06-13 DIAGNOSIS — K21.9 LARYNGITIS FROM REFLUX OF STOMACH ACID: Primary | ICD-10-CM

## 2024-06-13 DIAGNOSIS — J04.0 LARYNGITIS FROM REFLUX OF STOMACH ACID: Primary | ICD-10-CM

## 2024-06-13 RX ORDER — OMEPRAZOLE 40 MG/1
40 CAPSULE, DELAYED RELEASE ORAL
Qty: 60 CAPSULE | Refills: 0 | Status: SHIPPED | OUTPATIENT
Start: 2024-06-13 | End: 2024-07-13

## 2024-06-17 LAB
LABORATORY COMMENT REPORT: NORMAL
PATH REPORT.FINAL DX SPEC: NORMAL
PATH REPORT.GROSS SPEC: NORMAL
PATH REPORT.TOTAL CANCER: NORMAL

## 2024-06-21 ENCOUNTER — APPOINTMENT (OUTPATIENT)
Dept: SURGERY | Facility: CLINIC | Age: 59
End: 2024-06-21
Payer: COMMERCIAL

## 2024-06-21 VITALS — SYSTOLIC BLOOD PRESSURE: 126 MMHG | HEART RATE: 75 BPM | OXYGEN SATURATION: 93 % | DIASTOLIC BLOOD PRESSURE: 80 MMHG

## 2024-06-21 DIAGNOSIS — K81.9 CHOLECYSTITIS: Primary | ICD-10-CM

## 2024-06-21 PROCEDURE — 1036F TOBACCO NON-USER: CPT | Performed by: SURGERY

## 2024-06-21 PROCEDURE — 99024 POSTOP FOLLOW-UP VISIT: CPT | Performed by: SURGERY

## 2024-06-21 PROCEDURE — 3008F BODY MASS INDEX DOCD: CPT | Performed by: SURGERY

## 2024-06-21 NOTE — PROGRESS NOTES
Umbilical Post Op Visit      Referring Provider:   Tena Lee, APRN-CNP  No ref. provider found       Chief Complaint:  Follow up from laparoscopic cholecystectomy      History of Present Illness:  This is a 58 y.o.-year-old female who presents for follow up of a laparoscopic cholecystectomy.  She was last seen about 2 weeks ago.  She has been doing well since surgery.  She has no specific complaints.  She is tolerating a diet.  She is having no issues with bowel movements.  She has no significant pain.      Vitals:   Vitals:    06/21/24 0834   BP: 126/80   Pulse: 75   SpO2: 93%         Physical Exam:   General: No acute distress. Sitting up in bed.   Neuro: Alert and oriented ×3. Follows commands.  Head: Atraumatic  Eyes: Pupils equal reactive to light. Extraocular motions intact.  Ears: Hears normal speaking voice.  Mouth, Nose, Throat: Mucous membranes moist.  Normal dentition.  Neck: Supple. No appreciable masses.  Chest: No crepitus.    Heart: Regular rate and rhythm.  Lung: Clear to auscultation bilaterally.  Vascular: No carotid bruits.  Palpable radial pulses bilaterally.  Abdomen: Soft. Nondistended. Nontender.  Well-healed laparoscopic incisions  Musculoskeletal: Moves all extremities.  Normal range of motion.  Lymphatic: No palpable lymph nodes.  Skin: No rashes or lesions.  Psychological: Normal affect      Labs:  Pathology: Mild chronic cholecystitis      Imaging:   None      Assessment:  This is a 58 y.o.-year-old male who presents for follow up of a laparoscopic cholecystectomy.  She has been doing very well since surgery.  She has no complaints.  There is no evidence of any obvious complications.       Plan:  -- OK to swim.  -- No lifting anything heavier than 10 pounds for another 2 weeks, which will be 4 weeks postoperative.  -- At this point, there is no specific need to follow up with me.  If she has any new questions or concerns, she may return at any time.      Ángel Melissa MD  General  Surgery  Office: [phone]  Fax: [phone]  8:50 AM  24          Past Medical History:  Past Medical History:   Diagnosis Date    Abnormal ECG     Antiplatelet or antithrombotic long-term use     PLAVIX    Asthma (HHS-HCC)     Calculus of gallbladder without cholecystitis without obstruction     CHF (congestive heart failure) (Multi)     Class 2 obesity with body mass index (BMI) of 37.0 to 37.9 in adult 2024    37.74 kg/m²    Coronary artery disease     1 cardiac stent placed 2018    Encounter for pre-operative cardiovascular clearance 2024    Mia Edmondson, APRN-CN for Cholecystectomy    Gallstones     GERD (gastroesophageal reflux disease)     Hidradenitis suppurativa     Hypertension     Hypothyroidism     Multiple sclerosis (Multi)     Myocardial infarction (Multi)     [facility] #1, #2024 at [facility],  #3 also here at [facility] with gb attack    Obesity     Sleep apnea     Wears eyeglasses         Past Surgical History:  Past Surgical History:   Procedure Laterality Date    ANKLE SURGERY Left     removed floating bone    CARDIAC CATHETERIZATION N/A 2024    Procedure: Left Heart Cath;  Surgeon: John Gutierres MD;  Location: [facility] Cardiac [facility] Lab;  Service: Cardiovascular;  Laterality: N/A;    CARDIAC CATHETERIZATION N/A 2024    Procedure: Left Heart Cath;  Surgeon: Gagan Kat MD;  Location: [facility] Cardiac Cath Lab;  Service: Cardiovascular;  Laterality: N/A;     SECTION, CLASSIC       Section x4    CHOLECYSTECTOMY  2024    Laparoscopic cholecystectomy with firefly cholangiography    CORONARY STENT PLACEMENT      HYSTERECTOMY  2014    Hysterectomy    OTHER SURGICAL HISTORY  2014    Oophorectomy - Bilat (Removal Of Both Ovaries) Laparoscopic    OTHER SURGICAL HISTORY      Laparosc Gastric Restrictive Proc By Adjustable Gastric Band    OTHER SURGICAL HISTORY      Abscess incision and drainage x4        Medications:  Current  Outpatient Medications   Medication Instructions    albuterol 90 mcg/actuation inhaler INHALE 1 TO 2 PUFFS EVERY 4 TO 6 HOURS AS NEEDED    albuterol 2.5 mg, nebulization, Every 4 hours PRN    amitriptyline (ELAVIL) 25 mg, oral, Nightly    aspirin 81 mg EC tablet 1 tablet, oral, Every morning    atorvastatin (LIPITOR) 40 mg, oral, Nightly    calcium carbonate (OS-SHANTA) 1,200 mg, oral, 2 times daily (morning and late afternoon), Take with food.    clonazePAM (KLONOPIN) 0.5 mg, oral, Nightly, Last OARRS fill: 12/4/23 #30 for 30 days    clopidogrel (PLAVIX) 75 mg, oral, Daily    dilTIAZem CD (CARDIZEM CD) 240 mg, oral, Daily    ergocalciferol (Vitamin D-2) 1.25 MG (09143 UT) capsule 1 capsule, oral, Once Weekly, On Saturdays    ezetimibe (ZETIA) 10 mg, oral, Nightly    FLUoxetine (PROZAC) 40 mg, oral, Daily    furosemide (LASIX) 80 mg, oral, 2 times daily    furosemide (LASIX) 80 mg, oral, 2 times daily (morning and late afternoon)    gabapentin (NEURONTIN) 400 mg, oral, 3 times daily, Last OARRS fill: 4/7/23 #270 for 90 days    levothyroxine (SYNTHROID, LEVOXYL) 50 mcg, oral, Daily    meclizine (ANTIVERT) 25 mg, oral, Every 6 hours PRN    metoprolol succinate XL (TOPROL-XL) 25 mg, oral, Daily    nitroglycerin (Nitrostat) 0.4 mg SL tablet DISSOLVE 1 TABLET UNDER THE TONGUE AS NEEDED FOR CHEST PAIN.    nystatin (Mycostatin) 100,000 unit/gram powder 1 Application, Topical, 2 times daily    nystatin (Mycostatin) ointment Topical, 2 times daily, to affected area    nystatin-triamcinolone (Mycolog II) ointment Topical, 2 times daily, to affected area    omeprazole (PRILOSEC) 20 mg, oral, Daily, Do not crush or chew.    omeprazole (PRILOSEC) 40 mg, oral, 2 times daily before meals, Do not crush or chew.    ondansetron ODT (Zofran-ODT) 4 mg disintegrating tablet Take 1 tablet (4 mg) by mouth every 6 hours if needed for nausea or vomiting    oxyCODONE (ROXICODONE) 5 mg, oral, Every 6 hours PRN    potassium chloride CR (Klor-Con  "M20) 20 mEq ER tablet 20 mEq, oral, Daily, Do not crush or chew.    ranolazine (RANEXA) 500 mg, oral, 2 times daily, Do not crush, chew, or split.    tiZANidine (ZANAFLEX) 4 mg, oral, Nightly PRN, FOR SPASMS        Allergies:  Allergies   Allergen Reactions    Gadolinium-Containing Contrast Media Anaphylaxis     Pt developed nausea without vomiting , SOB, after receiving the IV contrast . Pt states she always gets nauseated but this time \"was worse\"/pt/        Family History:  Family History   Problem Relation Name Age of Onset    Heart attack Mother Elaine Garcia     Bilateral breast cancer Mother Elaine Mishrao     Emphysema Mother Elaine Ellissko     Blood clot Mother Elaine Garcia     Abnormal EKG Mother Elaine Ellissko     Anemia Mother Elaine Ellissko     Angina Mother Elaine Ellissko     Depression Mother Elaine Ellissko     Heart disease Mother Elaine Ellissko     Lung disease Mother Elaine Ellissko     Thyroid disease Mother Elaine Ellissko     Heart attack Father Dawit Ellissko     Lymphoma Father Dawit Olesko     Angina Father Dawit Olesko     Cancer Father Dawit Olesko     Diabetes type I Father Dawit Olesko     Heart disease Father Dawit Olesko     Hypertension Father Dawit Ellissko     Blood clot Sister      Ovarian cancer Maternal Grandmother Delmy Giffordcali     Stroke Maternal Grandmother Delmy Giffordcki     Heart attack Maternal Grandmother Delmy Giffordcki     Diabetes type I Paternal Grandmother Olive Ellisskarmando     Asthma Sister Heidi Garcia     Kidney disease Sister Kaylynn Garcia         Social History:  Social History     Socioeconomic History    Marital status:      Spouse name: Not on file    Number of children: Not on file    Years of education: Not on file    Highest education level: Not on file   Occupational History    Not on file   Tobacco Use    Smoking status: Former     Current packs/day: 0.00     Average packs/day: 1 pack/day for 8.7 years (8.7 ttl pk-yrs)     Types: Cigarettes     " Start date: 1983     Quit date: 10/1/1991     Years since quittin.7    Smokeless tobacco: Never   Vaping Use    Vaping status: Never Used   Substance and Sexual Activity    Alcohol use: Yes     Comment: seldom    Drug use: Never    Sexual activity: Not Currently     Partners: Male     Birth control/protection: Abstinence   Other Topics Concern    Not on file   Social History Narrative    Not on file     Social Determinants of Health     Financial Resource Strain: Low Risk  (2024)    Overall Financial Resource Strain (CARDIA)     Difficulty of Paying Living Expenses: Not hard at all   Food Insecurity: Not on file   Transportation Needs: No Transportation Needs (2024)    PRAPARE - Transportation     Lack of Transportation (Medical): No     Lack of Transportation (Non-Medical): No   Physical Activity: Not on file   Stress: Not on file   Social Connections: Not on file   Intimate Partner Violence: Not on file   Housing Stability: Low Risk  (2024)    Housing Stability Vital Sign     Unable to Pay for Housing in the Last Year: No     Number of Places Lived in the Last Year: 1     Unstable Housing in the Last Year: No        Review of Systems:  A complete 12 point review of systems was performed and is negative except as noted in the history of present illness.

## 2024-06-21 NOTE — LETTER
June 21, 2024     Patient: Elaine Coffey   YOB: 1965   Date of Visit: 6/21/2024       To Whom It May Concern:    It is my medical opinion that Elaine Coffey can go back to cardiac rehabilitation on Monday June 24 2024.    If you have any questions or concerns, please don't hesitate to call.         Sincerely,        Hayder Melissa MD    CC: No Recipients

## 2024-06-24 ENCOUNTER — APPOINTMENT (OUTPATIENT)
Dept: ORTHOPEDIC SURGERY | Facility: CLINIC | Age: 59
End: 2024-06-24
Payer: COMMERCIAL

## 2024-06-24 ENCOUNTER — HOSPITAL ENCOUNTER (OUTPATIENT)
Dept: RADIOLOGY | Facility: HOSPITAL | Age: 59
Discharge: HOME | End: 2024-06-24
Payer: COMMERCIAL

## 2024-06-24 DIAGNOSIS — M25.642 JOINT STIFFNESS OF HAND, LEFT: ICD-10-CM

## 2024-06-24 DIAGNOSIS — R22.31 FINGER MASS, RIGHT: Primary | ICD-10-CM

## 2024-06-24 DIAGNOSIS — M19.049 CMC ARTHRITIS: ICD-10-CM

## 2024-06-24 DIAGNOSIS — G56.03 CARPAL TUNNEL SYNDROME ON BOTH SIDES: ICD-10-CM

## 2024-06-24 DIAGNOSIS — R22.31 FINGER MASS, RIGHT: ICD-10-CM

## 2024-06-24 PROCEDURE — L3924 HFO WITHOUT JOINTS PRE OTS: HCPCS | Performed by: ORTHOPAEDIC SURGERY

## 2024-06-24 PROCEDURE — 73130 X-RAY EXAM OF HAND: CPT | Mod: 50

## 2024-06-24 PROCEDURE — 73130 X-RAY EXAM OF HAND: CPT | Mod: BILATERAL PROCEDURE | Performed by: RADIOLOGY

## 2024-06-24 PROCEDURE — 99203 OFFICE O/P NEW LOW 30 MIN: CPT | Performed by: ORTHOPAEDIC SURGERY

## 2024-06-24 PROCEDURE — 1036F TOBACCO NON-USER: CPT | Performed by: ORTHOPAEDIC SURGERY

## 2024-06-24 PROCEDURE — 3008F BODY MASS INDEX DOCD: CPT | Performed by: ORTHOPAEDIC SURGERY

## 2024-06-24 ASSESSMENT — PAIN SCALES - GENERAL: PAINLEVEL_OUTOF10: 2

## 2024-06-24 ASSESSMENT — PAIN - FUNCTIONAL ASSESSMENT: PAIN_FUNCTIONAL_ASSESSMENT: 0-10

## 2024-06-25 NOTE — PROGRESS NOTES
History of Present Illness:  Chief Complaint   Patient presents with    Right Hand - Mass     Right middle finger mass and thumb stiffness     Left Hand - Pain     stiffness       Patient presents today for evaluation of right middle finger mass, bilateral basilar thumb pain as well as intermittent numbness and tingling into both hands.  She first noticed the middle finger mass about 3 years ago.  She does feel like it has slightly increased in size since when she first noticed it.  No associated pain.  The bilateral basilar thumb pain is worse with gripping and pinching.  She describes an aching pain that is sometimes sharp.  No locking/catching.  She does also describe intermittent numbness and tingling into both of her hands that is worse overnight and with elevated hand activities.  Patient's past medical history is notable for multiple sclerosis.    Past Medical History:   Diagnosis Date    Abnormal ECG     Antiplatelet or antithrombotic long-term use     PLAVIX    Asthma ([facility])     Calculus of gallbladder without cholecystitis without obstruction     CHF (congestive heart failure) (Multi)     Class 2 obesity with body mass index (BMI) of 37.0 to 37.9 in adult 05/29/2024    37.74 kg/m²    Coronary artery disease     1 cardiac stent placed jan 2018    Encounter for pre-operative cardiovascular clearance 06/03/2024    Mia Edmondson, SHEILA-CN for Cholecystectomy    Gallstones     GERD (gastroesophageal reflux disease)     Hidradenitis suppurativa     Hypertension     Hypothyroidism     Multiple sclerosis (Multi)     Myocardial infarction (Multi)     [facility] #1, #2 jan 2024 at [facility],  #3 also here at [facility] with gb attack    Obesity     Sleep apnea     Wears eyeglasses        Medication Documentation Review Audit       Reviewed by Sophia Gill CMA (Medical Assistant) on 06/24/24 at 1010      Medication Order Taking? Sig Documenting Provider Last Dose Status   albuterol 2.5 mg /3 mL (0.083 %) nebulizer  solution [id number] No Take 3 mL (2.5 mg) by nebulization every 4 hours if needed for wheezing. Historical Provider, MD Unknown Active   albuterol 90 mcg/actuation inhaler [id number] No INHALE 1 TO 2 PUFFS EVERY 4 TO 6 HOURS AS NEEDED Raffy Balderrama DO Past Week Active   amitriptyline (Elavil) 25 mg tablet [id number] No Take 1 tablet (25 mg) by mouth once daily at bedtime. Historical Provider, MD 6/5/2024 Active   aspirin 81 mg EC tablet [id number] No Take 1 tablet (81 mg) by mouth once daily in the morning. Historical Provider, MD 6/6/2024 Active   atorvastatin (Lipitor) 40 mg tablet [id number] No Take 1 tablet (40 mg) by mouth once daily at bedtime. Historical Provider, MD 6/6/2024 Active   calcium carbonate (OS-SHANTA) 500 mg calcium (1,250 mg) capsule [id number] No Take 1,200 mg by mouth 2 times a day with meals. Take with food. Historical Provider, MD Past Week Active   clonazePAM (KlonoPIN) 0.5 mg disintegrating tablet [id number] No Take 1 tablet (0.5 mg) by mouth once daily at bedtime. Last OARRS fill: 12/4/23 #30 for 30 days Tena Lee APRN-CNP 6/5/2024 Active   clopidogrel (Plavix) 75 mg tablet [id number] No Take 1 tablet (75 mg) by mouth once daily. Katia Reina PA-C 5/29/2024 Active   dilTIAZem CD (Cardizem CD) 240 mg 24 hr capsule [id number] No TAKE 1 CAPSULE BY MOUTH ONCE DAILY. Mia Lema, APRN-CNP 6/6/2024 Active   ergocalciferol (Vitamin D-2) 1.25 MG (46098 UT) capsule [id number] No Take 1 capsule (1,250 mcg) by mouth 1 (one) time per week. On Saturdays Ema Ruano MD Past Week Active   ezetimibe (Zetia) 10 mg tablet [id number] No TAKE 1 TABLET (10 MG) BY MOUTH ONCE DAILY AT BEDTIME. Mia Lema, APRN-CNP Past Week Active   FLUoxetine (PROzac) 40 mg capsule [id number] No Take 1 capsule (40 mg) by mouth once daily. Tena Lee, APRN-CNP 6/6/2024 Active   furosemide (Lasix) 80 mg tablet [id number] No Take 1 tablet (80 mg) by mouth 2 times a day. Tena Lee, APRN-CNP  6/6/2024 Active   furosemide (Lasix) 80 mg tablet [id number] No Take 1 tablet (80 mg) by mouth 2 times daily (morning and late afternoon). Historical Provider, MD 6/5/2024 Active   gabapentin (Neurontin) 400 mg capsule [id number] No Take 1 capsule (400 mg) by mouth 3 times a day. Last OARRS fill: 4/7/23 #270 for 90 days Historical Provider, MD 6/6/2024 Active   levothyroxine (Synthroid, Levoxyl) 50 mcg tablet [id number] No TAKE 1 TABLET BY MOUTH EVERY DAY Raffy Balderrama DO 6/6/2024 Active   meclizine (Antivert) 25 mg tablet [id number] No Take 1 tablet (25 mg) by mouth every 6 hours if needed for dizziness. Historical Provider, MD 6/5/2024 Active   metoprolol succinate XL (Toprol-XL) 25 mg 24 hr tablet [id number] No TAKE 1 TABLET BY MOUTH EVERY DAY NAYANA Smiley 6/6/2024 Active   nitroglycerin (Nitrostat) 0.4 mg SL tablet [id number]  DISSOLVE 1 TABLET UNDER THE TONGUE AS NEEDED FOR CHEST PAIN. NAYANA Smilye  Active   nystatin (Mycostatin) 100,000 unit/gram powder [id number] No Apply 1 Application topically 2 times a day. NAYANA Beal Past Week Active   nystatin (Mycostatin) ointment [id number] No APPLY TO AFFECTED AREA TWICE A DAY NAYANA Beal Unknown Active   nystatin-triamcinolone (Mycolog II) ointment [id number]  APPLY TOPICALLY 2 TIMES A DAY. TO AFFECTED AREA Devan Newell, DO  Active   omeprazole (PriLOSEC) 20 mg DR capsule [id number] No Take 1 capsule (20 mg) by mouth once daily. Do not crush or chew. NAYANA Beal Unknown Active   omeprazole (PriLOSEC) 40 mg DR capsule [id number]  Take 1 capsule (40 mg) by mouth 2 times a day before meals. Do not crush or chew. Hayder Melissa MD  Active   ondansetron ODT (Zofran-ODT) 4 mg disintegrating tablet [id number]  Take 1 tablet (4 mg) by mouth every 6 hours if needed for nausea or vomiting Hayder Melissa MD  Active   oxyCODONE (Roxicodone) 5 mg immediate release tablet [id number]  Take 1 tablet (5  "mg) by mouth every 6 hours if needed for severe pain (7 - 10) for up to 5 doses. Hayder Melissa MD  Active   polyethylene glycol (Glycolax, Miralax) 17 gram/dose powder [id number]  Take 17 g by mouth once daily for 10 days. Hayder Melissa MD   24 2359   potassium chloride CR (Klor-Con M20) 20 mEq ER tablet [id number] No Take 1 tablet (20 mEq) by mouth once daily. Do not crush or chew. Mia Lema, APRN-CNP 2024 Active   ranolazine (Ranexa) 500 mg 12 hr tablet [id number] No Take 1 tablet (500 mg) by mouth 2 times a day. Do not crush, chew, or split. Mia Lema, APRN-CNP 2024 Active   tiZANidine (Zanaflex) 4 mg tablet [id number] No Take 1 tablet (4 mg) by mouth as needed at bedtime for muscle spasms. FOR SPASMS Historical Provider, MD Past Week Active                    Allergies   Allergen Reactions    Gadolinium-Containing Contrast Media Anaphylaxis     Pt developed nausea without vomiting , SOB, after receiving the IV contrast . Pt states she always gets nauseated but this time \"was worse\"/pt/       Social History     Socioeconomic History    Marital status:      Spouse name: Not on file    Number of children: Not on file    Years of education: Not on file    Highest education level: Not on file   Occupational History    Not on file   Tobacco Use    Smoking status: Former     Current packs/day: 0.00     Average packs/day: 1 pack/day for 8.7 years (8.7 ttl pk-yrs)     Types: Cigarettes     Start date: 1983     Quit date: 10/1/1991     Years since quittin.7    Smokeless tobacco: Never   Vaping Use    Vaping status: Never Used   Substance and Sexual Activity    Alcohol use: Yes     Comment: seldom    Drug use: Never    Sexual activity: Not Currently     Partners: Male     Birth control/protection: Abstinence   Other Topics Concern    Not on file   Social History Narrative    Not on file     Social Determinants of Health     Financial Resource Strain: Low Risk "  (2024)    Overall Financial Resource Strain (CARDIA)     Difficulty of Paying Living Expenses: Not hard at all   Food Insecurity: Not on file   Transportation Needs: No Transportation Needs (2024)    PRAPARE - Transportation     Lack of Transportation (Medical): No     Lack of Transportation (Non-Medical): No   Physical Activity: Not on file   Stress: Not on file   Social Connections: Not on file   Intimate Partner Violence: Not on file   Housing Stability: Low Risk  (2024)    Housing Stability Vital Sign     Unable to Pay for Housing in the Last Year: No     Number of Places Lived in the Last Year: 1     Unstable Housing in the Last Year: No       Past Surgical History:   Procedure Laterality Date    ANKLE SURGERY Left     removed floating bone    CARDIAC CATHETERIZATION N/A 2024    Procedure: Left Heart Cath;  Surgeon: John Gutierres MD;  Location: [facility] Cardiac Cath Lab;  Service: Cardiovascular;  Laterality: N/A;    CARDIAC CATHETERIZATION N/A 2024    Procedure: Left Heart Cath;  Surgeon: Gagan Kat MD;  Location: [facility] Cardiac Cath Lab;  Service: Cardiovascular;  Laterality: N/A;     SECTION, CLASSIC       Section x4    CHOLECYSTECTOMY  2024    Laparoscopic cholecystectomy with firefly cholangiography    CORONARY STENT PLACEMENT      HYSTERECTOMY  2014    Hysterectomy    OTHER SURGICAL HISTORY  2014    Oophorectomy - Bilat (Removal Of Both Ovaries) Laparoscopic    OTHER SURGICAL HISTORY      Laparosc Gastric Restrictive Proc By Adjustable Gastric Band    OTHER SURGICAL HISTORY      Abscess incision and drainage x4        Review of Systems   GENERAL: Negative for malaise, significant weight loss, fever  MUSCULOSKELETAL: see HPI  NEURO: See HPI     Physical Examination  Constitutional: Appears well-developed and well-nourished.  Head: Normocephalic and atraumatic.  Eyes: EOMI grossly  Cardiovascular: Intact distal pulses.   Respiratory: Effort  normal. No respiratory distress.  Neurologic: Alert and oriented to person, place, and time.  Skin: Skin is warm and dry.  Hematologic / Lymphatic: No lymphedema, lymphangitis.  Psychiatric: normal mood and affect. Behavior is normal.   Musculoskeletal:  Bilateral hands: Skin intact.  Normal rugal patterns.  No thenar or intrinsic wasting.  5/5 thumb abduction/finger abduction.  Tenderness about thumb CMC joints with positive CMC grind.  Positive Tinel's at level of carpal tunnel.  Mild changes with Bert/Phalen's maneuvers.  No tenderness about medial elbow/ulnar nerve.  Capillary refill less than 2 seconds distally.  Sensation grossly intact throughout.    Right middle finger with mass about the dorsal PIP joint region measuring approximately 5 x 8 mm.  Slightly firm and mobile.  Appears to transilluminate.    Radiographs: Bilateral hand radiographs ordered and available for my review/interpretation: Degenerative changes most notable about thumb CMC joints.  No fracture/dislocation.     Assessment:  Patient with right middle finger mass, bilateral thumb CMC arthritis as well as likely bilateral carpal tunnel syndrome in setting of MS.     Plan:  We discussed nature of diagnoses.  We discussed risks and benefits of continued observation versus excisional biopsy for right middle finger mass.  She is potentially interested in proceeding with excisional biopsy, but would like to further workup carpal tunnel syndrome prior to proceeding.  Recommend beginning with overnight bracing and bilateral upper extremity EMG has been ordered for further evaluation.  Plan on reviewing EMG in person versus over the phone and to check to see how the overnight bracing is helping with the symptoms.    Treatments of thumb CMC arthritis were discussed with the patient.  This includes nonoperative treatment with symptomatic splinting, anti-inflammatories, corticosteroid injections and possible surgical intervention if conservative  measures prove unsuccessful.  She will begin with CMC Comfort Cool bracing for the right side.    Follow-up after EMG.

## 2024-06-27 ENCOUNTER — APPOINTMENT (OUTPATIENT)
Dept: PRIMARY CARE | Facility: CLINIC | Age: 59
End: 2024-06-27
Payer: COMMERCIAL

## 2024-06-28 ENCOUNTER — APPOINTMENT (OUTPATIENT)
Dept: PRIMARY CARE | Facility: CLINIC | Age: 59
End: 2024-06-28
Payer: COMMERCIAL

## 2024-07-02 PROBLEM — R53.83 FATIGUE: Status: ACTIVE | Noted: 2024-07-02

## 2024-07-02 PROBLEM — R00.2 PALPITATIONS: Status: ACTIVE | Noted: 2024-07-02

## 2024-07-02 PROBLEM — B99.9 INFECTIOUS DISEASE: Status: ACTIVE | Noted: 2024-07-02

## 2024-07-02 PROBLEM — M19.039 OSTEOARTHRITIS OF WRIST: Status: ACTIVE | Noted: 2024-07-02

## 2024-07-02 PROBLEM — B02.9 HERPES ZOSTER: Status: ACTIVE | Noted: 2024-07-02

## 2024-07-02 PROBLEM — M54.14 THORACIC NEURITIS: Status: ACTIVE | Noted: 2024-07-02

## 2024-07-02 PROBLEM — D58.0: Status: ACTIVE | Noted: 2018-01-01

## 2024-07-02 PROBLEM — Z86.14 HISTORY OF METHICILLIN RESISTANT STAPHYLOCOCCUS AUREUS INFECTION: Status: ACTIVE | Noted: 2024-07-02

## 2024-07-02 PROBLEM — L02.411 ABSCESS OF AXILLA, RIGHT: Status: ACTIVE | Noted: 2024-07-02

## 2024-07-02 PROBLEM — M25.572 ANKLE PAIN, LEFT: Status: ACTIVE | Noted: 2024-07-02

## 2024-07-02 PROBLEM — I10 PRIMARY HYPERTENSION: Status: ACTIVE | Noted: 2017-03-22

## 2024-07-02 PROBLEM — G57.72 COMPLEX REGIONAL PAIN SYNDROME TYPE 2 OF LEFT LOWER EXTREMITY: Status: ACTIVE | Noted: 2024-07-02

## 2024-07-02 PROBLEM — E78.00 HYPERCHOLESTEROLEMIA: Status: ACTIVE | Noted: 2017-03-22

## 2024-07-02 PROBLEM — J00 COMMON COLD: Status: ACTIVE | Noted: 2024-07-02

## 2024-07-02 PROBLEM — M62.838 OTHER MUSCLE SPASM: Status: ACTIVE | Noted: 2024-07-02

## 2024-07-02 PROBLEM — J44.9 CHRONIC OBSTRUCTIVE PULMONARY DISEASE (MULTI): Status: ACTIVE | Noted: 2024-07-02

## 2024-07-02 PROBLEM — J20.9 ACUTE BRONCHITIS WITH BRONCHOSPASM: Status: ACTIVE | Noted: 2024-07-02

## 2024-07-02 PROBLEM — L30.4 INTERTRIGO: Status: ACTIVE | Noted: 2024-07-02

## 2024-07-02 PROBLEM — J30.9 ALLERGIC RHINITIS: Status: ACTIVE | Noted: 2024-07-02
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
## 2024-07-02 PROBLEM — L02.811 SCALP ABSCESS: Status: ACTIVE | Noted: 2024-07-02

## 2024-07-02 PROBLEM — I25.10 ARTERIOSCLEROSIS OF CORONARY ARTERY: Status: ACTIVE | Noted: 2024-07-02

## 2024-07-02 PROBLEM — E03.9 HYPOTHYROIDISM: Status: ACTIVE | Noted: 2024-07-02

## 2024-07-02 PROBLEM — M75.51 BURSITIS OF RIGHT SHOULDER: Status: ACTIVE | Noted: 2024-07-02

## 2024-07-02 PROBLEM — J45.31 MILD PERSISTENT ASTHMA WITH ACUTE EXACERBATION (HHS-HCC): Status: ACTIVE | Noted: 2024-07-02

## 2024-07-02 PROBLEM — Q68.8 OS TRIGONUM: Status: ACTIVE | Noted: 2024-07-02

## 2024-07-02 PROBLEM — Z86.39: Status: ACTIVE | Noted: 2024-07-02

## 2024-07-02 PROBLEM — M77.11 LATERAL EPICONDYLITIS OF RIGHT ELBOW: Status: ACTIVE | Noted: 2024-07-02

## 2024-07-02 PROBLEM — B35.1 TOENAIL FUNGUS: Status: ACTIVE | Noted: 2018-01-01

## 2024-07-02 PROBLEM — R06.83 SNORES: Status: ACTIVE | Noted: 2024-07-02

## 2024-07-02 PROBLEM — M67.90 DISORDER OF TENDON: Status: ACTIVE | Noted: 2024-07-02

## 2024-07-02 PROBLEM — J96.91 RESPIRATORY FAILURE WITH HYPOXIA (MULTI): Status: ACTIVE | Noted: 2024-07-02

## 2024-07-02 PROBLEM — R06.89 DIFFICULTY BREATHING: Status: ACTIVE | Noted: 2024-07-02

## 2024-07-02 PROBLEM — Z95.5 H/O HEART ARTERY STENT: Status: ACTIVE | Noted: 2018-01-01

## 2024-07-02 PROBLEM — Z86.39 HISTORY OF ELEVATED LIPIDS: Status: ACTIVE | Noted: 2024-07-02

## 2024-07-03 NOTE — PROGRESS NOTES
Subjective   Patient ID: Elaine Coffey is a 58 y.o. female who presents for Follow-up and Med Management.    HPI     Review of Systems    Objective   There were no vitals taken for this visit.    Physical Exam    Assessment/Plan

## 2024-07-05 ENCOUNTER — APPOINTMENT (OUTPATIENT)
Dept: PRIMARY CARE | Facility: CLINIC | Age: 59
End: 2024-07-05
Payer: COMMERCIAL

## 2024-07-05 ENCOUNTER — APPOINTMENT (OUTPATIENT)
Dept: PULMONOLOGY | Facility: CLINIC | Age: 59
End: 2024-07-05
Payer: COMMERCIAL

## 2024-07-05 VITALS
HEIGHT: 67 IN | SYSTOLIC BLOOD PRESSURE: 107 MMHG | OXYGEN SATURATION: 95 % | BODY MASS INDEX: 37.04 KG/M2 | WEIGHT: 236 LBS | DIASTOLIC BLOOD PRESSURE: 77 MMHG | HEART RATE: 71 BPM

## 2024-07-05 DIAGNOSIS — F19.982 DRUG-INDUCED INSOMNIA (MULTI): ICD-10-CM

## 2024-07-05 DIAGNOSIS — G47.33 OSA (OBSTRUCTIVE SLEEP APNEA): ICD-10-CM

## 2024-07-05 DIAGNOSIS — Z12.31 SCREENING MAMMOGRAM FOR BREAST CANCER: ICD-10-CM

## 2024-07-05 DIAGNOSIS — E66.01 CLASS 2 SEVERE OBESITY DUE TO EXCESS CALORIES WITH SERIOUS COMORBIDITY AND BODY MASS INDEX (BMI) OF 36.0 TO 36.9 IN ADULT (MULTI): Primary | ICD-10-CM

## 2024-07-05 DIAGNOSIS — J45.909 UNCOMPLICATED ASTHMA, UNSPECIFIED ASTHMA SEVERITY, UNSPECIFIED WHETHER PERSISTENT (HHS-HCC): ICD-10-CM

## 2024-07-05 DIAGNOSIS — F32.A DEPRESSION, UNSPECIFIED DEPRESSION TYPE: ICD-10-CM

## 2024-07-05 DIAGNOSIS — B37.9 CANDIDIASIS: ICD-10-CM

## 2024-07-05 DIAGNOSIS — E03.9 HYPOTHYROIDISM, UNSPECIFIED TYPE: ICD-10-CM

## 2024-07-05 DIAGNOSIS — Z12.11 SCREENING FOR COLON CANCER: ICD-10-CM

## 2024-07-05 DIAGNOSIS — Z79.899 MEDICATION MANAGEMENT: ICD-10-CM

## 2024-07-05 DIAGNOSIS — G56.03 BILATERAL CARPAL TUNNEL SYNDROME: ICD-10-CM

## 2024-07-05 PROCEDURE — 99214 OFFICE O/P EST MOD 30 MIN: CPT | Performed by: INTERNAL MEDICINE

## 2024-07-05 PROCEDURE — 3074F SYST BP LT 130 MM HG: CPT | Performed by: INTERNAL MEDICINE

## 2024-07-05 PROCEDURE — 3008F BODY MASS INDEX DOCD: CPT | Performed by: INTERNAL MEDICINE

## 2024-07-05 PROCEDURE — 3078F DIAST BP <80 MM HG: CPT | Performed by: INTERNAL MEDICINE

## 2024-07-05 PROCEDURE — 1036F TOBACCO NON-USER: CPT | Performed by: INTERNAL MEDICINE

## 2024-07-05 RX ORDER — SEMAGLUTIDE 0.5 MG/.5ML
0.5 INJECTION, SOLUTION SUBCUTANEOUS
Qty: 2 ML | Refills: 0 | Status: SHIPPED | OUTPATIENT
Start: 2024-07-07 | End: 2024-07-29

## 2024-07-05 RX ORDER — CLONAZEPAM 0.5 MG/1
0.5 TABLET, ORALLY DISINTEGRATING ORAL NIGHTLY
Qty: 30 TABLET | Refills: 2 | Status: SHIPPED | OUTPATIENT
Start: 2024-07-05

## 2024-07-05 RX ORDER — ALBUTEROL SULFATE 90 UG/1
AEROSOL, METERED RESPIRATORY (INHALATION)
Qty: 18 G | Refills: 0 | Status: SHIPPED | OUTPATIENT
Start: 2024-07-05

## 2024-07-05 RX ORDER — LEVOTHYROXINE SODIUM 50 UG/1
50 TABLET ORAL DAILY
Qty: 90 TABLET | Refills: 1 | Status: SHIPPED | OUTPATIENT
Start: 2024-07-05

## 2024-07-05 RX ORDER — NYSTATIN 100000 [USP'U]/G
1 POWDER TOPICAL 2 TIMES DAILY
Qty: 30 G | Refills: 0 | Status: SHIPPED | OUTPATIENT
Start: 2024-07-05 | End: 2025-07-05

## 2024-07-05 RX ORDER — NYSTATIN AND TRIAMCINOLONE ACETONIDE 100000; 1 [USP'U]/G; MG/G
OINTMENT TOPICAL 2 TIMES DAILY
Qty: 60 G | Refills: 0 | Status: SHIPPED | OUTPATIENT
Start: 2024-07-05

## 2024-07-05 RX ORDER — FLUOXETINE HYDROCHLORIDE 40 MG/1
40 CAPSULE ORAL DAILY
Qty: 90 CAPSULE | Refills: 1 | Status: SHIPPED | OUTPATIENT
Start: 2024-07-05

## 2024-07-05 RX ORDER — SEMAGLUTIDE 1 MG/.5ML
1 INJECTION, SOLUTION SUBCUTANEOUS
Qty: 2 ML | Refills: 0 | Status: SHIPPED | OUTPATIENT
Start: 2024-07-07 | End: 2024-07-29

## 2024-07-05 ASSESSMENT — ENCOUNTER SYMPTOMS
DIZZINESS: 0
WOUND: 1
NUMBNESS: 1
ARTHRALGIAS: 1
WEAKNESS: 0
FATIGUE: 1

## 2024-07-05 NOTE — PATIENT INSTRUCTIONS
[facility]: [phone]   [facility]: [phone]    [facility] [phone]    No labs     Wound looks good.     Wean off clonazepam as discussed. Can melatonin 5-10mg nightly    3 months

## 2024-07-05 NOTE — PROGRESS NOTES
Subjective   Patient ID: Elaine Coffey is a 58 y.o. female who presents for Follow-up and Med Management.    HPI     Pt presents for follow up s/p lap cholecystectomy with firefly cholangiography 6/6/24. She states she is having trouble with wound healing for the past 1 month. Described 1 wound as red and occasionally purulent with tenderness. On follow up with surgery, she was told the wounds are healing nicely and is not concerning for infection. She tried to use the surgical soap on the site which dried it out. She also tried silver nitrite and neosporin with no improvements. She states she initially thought it was Hidradenitis suppurativa due to her previous history and tried taking some leftover doxycycline with no improvements as as well.     She indicates bilateral leg swelling that is more prominent in the afternoon. She states it helps to wear her compression stockings. She also notes that her weight tends to fluctuate daily between 1-2 lbs.     She states she still has fatigue and joint pain in the bilateral hands with associated numbness. Pt reports she saw ortho for evaluation and was told she has bilateral thumb arthritis and carpal tunnel. She continues to wear a brace nightly which shows some improvement. She was recommended to have an EMG done but she declines to have it done and is looking for an OT referral.     She also notes that her pulmonologist recommended she get a sleep study done due to persistent poor sleep. She is requesting to have it done at home instead. She states she is currently taking amitriptyline and clonazepam which seem to help her sleep. She indicates she would like to wean off clonazepam and try something more natural. She denies any medication side effects. No signs of abuse or misuse.     Pt is wanting to lose weight and is wanting to try Wygovy.     Review of Systems   Constitutional:  Positive for fatigue.   Cardiovascular:  Positive for leg swelling.   Musculoskeletal:   "Positive for arthralgias.   Skin:  Positive for wound.   Neurological:  Positive for numbness. Negative for dizziness and weakness.   All other systems reviewed and are negative.      Objective   /77   Pulse 71   Ht 1.702 m (5' 7\")   Wt 107 kg (236 lb)   SpO2 95%   BMI 36.96 kg/m²     Physical Exam  Constitutional:       General: She is not in acute distress.     Appearance: Normal appearance.   HENT:      Head: Normocephalic and atraumatic.   Cardiovascular:      Rate and Rhythm: Normal rate and regular rhythm.      Heart sounds: Normal heart sounds. No murmur heard.     No friction rub. No gallop.   Pulmonary:      Effort: Pulmonary effort is normal. No respiratory distress.      Breath sounds: Normal breath sounds. No stridor. No wheezing, rhonchi or rales.   Musculoskeletal:         General: Tenderness and deformity present.      Right lower leg: Edema present.      Left lower leg: Edema present.      Comments: Bilateral CMC tenderness, R > L  +1 pitting edema bilaterally    +small, nontender mass on R 3rd digit.    Skin:     General: Skin is warm and dry.      Findings: Lesion present.      Comments: Surgical wounds are nontender, not erythematous, and is dry. Skin surrounding wound is dry and flaking with no obvious purulence.   Neurological:      Mental Status: She is alert and oriented to person, place, and time. Mental status is at baseline.   Psychiatric:         Mood and Affect: Mood normal.         Assessment/Plan   #Wound s/p lap cholecystectomy  -Wounds healing without signs of infection or dehiscence. Cont local wound care    #Finger mass and bilateral carpal tunnel  - Ortho recommended wearing a brace and an EMG. Pt declines EMG.   - Performed carpal tunnel OMT bilaterally with moderate improvement in numbness.  - Refer to OT, if OT does not improve numbness or if it worsens, will refer back to ortho     #Obesity   -Start Wegovy 0.25mg weekly x 4 weeks then 0.5mg weekly x 4 weeks then 1mg " weekly     #Insomnia  - Ordered at home sleep study  - Cont amitriptyline 25 mg and start weaning off of clonazepam 0.5 mg for sleep. Recommended melatonin 5-10 mg nightly.  - Follows pulmonology  - UDS: Ordered- to be done next week.  - CSA: No MA in office today, will get at next office visit   - I have personally reviewed the OARRS report for Elaine Coffey. This report is scanned into the electronic medical record. I have considered the risks of abuse, dependence, addiction and diversion.    #MS  - Undergoing PT 1x a week  - Follows neurology at [facility] every 6 months    #CAD s/p PCI in 2018, HFpEF, HTN, HLD   - Follows with cardiology. NO current cardiac symptoms and euvolemic on exam   - Continue Ranex 500 mg BID, Lasix 80 mg BID, Carediezm, ASA, atorvastatin, Zetia and Plavix    #Osteopenia  -DEXA 3/27/24: spine T -2.4, neg FRAX  -Cont D/Ca and exercise. Repeat Dexa in 2 years     #GERD  - Cont omeprezole BID 20 mg BID    #Hypothyroidism  - Cont levothyroxine 50mcg daily.     Influenza: due 9/1/24  COVID: 2023  Pneumovax 23: NI  Shingrix: 2017 (1 out of 2)  Mamm: due 10/2023- order today   DEXA: 2/22/24 - due 2 yrs  Pap:   Colorectal ca: 2012--order today   Lung ca screening: HASEEB Gomez DO  Internal Medicine PGY-1    I saw and evaluated the patient. I personally obtained the key and critical portions of the history and physical exam or was physically present for key and critical portions performed by the resident/fellow. I reviewed the resident/fellow's documentation and discussed the patient with the resident/fellow. I agree with the resident/fellow's medical decision making as documented in the note.    Devan Newell, DO

## 2024-07-05 NOTE — PROGRESS NOTES
Subjective   Patient ID: Elaine Coffey is a 58 y.o. female who presents for Follow-up and Med Management. States she would like to try Wegovy. Fasting for labs. Pt had her gallbladder removed on 6/6/24; incision site has still been seeping. Pt has already followed up with Dr. Melissa. Requesting an order for a home sleep study. Last concern is with regards to her bone density; states she has osteopenia and would like to know if there is anything she should add into her routine.    HPI     Review of Systems    Objective   There were no vitals taken for this visit.    Physical Exam    Assessment/Plan

## 2024-07-10 DIAGNOSIS — Z86.39 PERSONAL HISTORY OF OTHER ENDOCRINE, NUTRITIONAL AND METABOLIC DISEASE: ICD-10-CM

## 2024-07-10 DIAGNOSIS — E78.5 HYPERLIPIDEMIA, UNSPECIFIED HYPERLIPIDEMIA TYPE: ICD-10-CM

## 2024-07-11 ENCOUNTER — HOSPITAL ENCOUNTER (EMERGENCY)
Facility: HOSPITAL | Age: 59
Discharge: HOME | End: 2024-07-11
Attending: STUDENT IN AN ORGANIZED HEALTH CARE EDUCATION/TRAINING PROGRAM
Payer: COMMERCIAL

## 2024-07-11 ENCOUNTER — APPOINTMENT (OUTPATIENT)
Dept: RADIOLOGY | Facility: HOSPITAL | Age: 59
End: 2024-07-11
Payer: COMMERCIAL

## 2024-07-11 VITALS
HEIGHT: 67 IN | BODY MASS INDEX: 36.57 KG/M2 | TEMPERATURE: 97.5 F | HEART RATE: 71 BPM | RESPIRATION RATE: 16 BRPM | WEIGHT: 233 LBS | DIASTOLIC BLOOD PRESSURE: 74 MMHG | SYSTOLIC BLOOD PRESSURE: 114 MMHG | OXYGEN SATURATION: 99 %

## 2024-07-11 DIAGNOSIS — M25.572 ACUTE BILATERAL ANKLE PAIN: Primary | ICD-10-CM

## 2024-07-11 DIAGNOSIS — M25.571 ACUTE BILATERAL ANKLE PAIN: Primary | ICD-10-CM

## 2024-07-11 PROCEDURE — 73610 X-RAY EXAM OF ANKLE: CPT | Mod: BILATERAL PROCEDURE | Performed by: RADIOLOGY

## 2024-07-11 PROCEDURE — 99283 EMERGENCY DEPT VISIT LOW MDM: CPT

## 2024-07-11 PROCEDURE — 73610 X-RAY EXAM OF ANKLE: CPT | Mod: 50

## 2024-07-11 ASSESSMENT — PAIN SCALES - GENERAL: PAINLEVEL_OUTOF10: 7

## 2024-07-11 ASSESSMENT — LIFESTYLE VARIABLES
HAVE PEOPLE ANNOYED YOU BY CRITICIZING YOUR DRINKING: NO
TOTAL SCORE: 0
EVER HAD A DRINK FIRST THING IN THE MORNING TO STEADY YOUR NERVES TO GET RID OF A HANGOVER: NO
HAVE YOU EVER FELT YOU SHOULD CUT DOWN ON YOUR DRINKING: NO
EVER FELT BAD OR GUILTY ABOUT YOUR DRINKING: NO

## 2024-07-11 ASSESSMENT — PAIN DESCRIPTION - ORIENTATION: ORIENTATION: RIGHT;LEFT

## 2024-07-11 ASSESSMENT — PAIN - FUNCTIONAL ASSESSMENT: PAIN_FUNCTIONAL_ASSESSMENT: 0-10

## 2024-07-11 ASSESSMENT — COLUMBIA-SUICIDE SEVERITY RATING SCALE - C-SSRS
2. HAVE YOU ACTUALLY HAD ANY THOUGHTS OF KILLING YOURSELF?: NO
6. HAVE YOU EVER DONE ANYTHING, STARTED TO DO ANYTHING, OR PREPARED TO DO ANYTHING TO END YOUR LIFE?: NO
1. IN THE PAST MONTH, HAVE YOU WISHED YOU WERE DEAD OR WISHED YOU COULD GO TO SLEEP AND NOT WAKE UP?: NO

## 2024-07-11 ASSESSMENT — PAIN DESCRIPTION - LOCATION: LOCATION: FOOT

## 2024-07-12 RX ORDER — ATORVASTATIN CALCIUM 40 MG/1
40 TABLET, FILM COATED ORAL DAILY
Qty: 90 TABLET | Refills: 3 | Status: SHIPPED | OUTPATIENT
Start: 2024-07-12

## 2024-07-12 NOTE — ED PROVIDER NOTES
History/Exam limitations: none  HPI was provided by patient    HPI:    Chief Complaint   Patient presents with    Foot Injury     Bilateral foot injury via tomato plants. Ambulated to room         Elaine Coffey is a 58 y.o. female presents with chief complaint of bilateral ankle pain.  On Monday she states the wind blew over her giant tomato plant and she went to go catch it on her deck when the pot rolled onto her ankles on the lateral aspect of the right ankle and medial aspect of the left ankle.  She states she has had pain with walking around since then was at physical therapy today she states for which she goes once a week for her MS that she should get x-rays to get it checked out.  She does not want anything here for pain.  Has some swelling but states at baseline and she is already on Lasix for it otherwise there are some bruising to the injured areas.  She has a history of osteoporosis so concerned if there is any breaks because she can get hairline fractures easily. denies any weakness, numbness, tingling.      ROS: All other review of systems are negative except as noted above and HPI or ROS.   CONSTITUTIONAL: fever, chills  CARDIOVASCULAR: chest pain, palpitations,   RESPIRATORY: cough, wheeze, shortness of breath  GI: abdominal pain, nausea  MUSCULOSKELETAL: deformity, neck pain, joint pain  SKIN: rash, color change  NEUROLOGIC: headache, numbness, focal weakness  NOTES: All systems reviewed, negative except as described above       Physical Exam:  GENERAL: Alert, oriented , cooperative,  in no acute distress.  HEAD: normocephalic, atraumatic  SKIN: Intact,  dry skin, no lesions.  PULMONARY: Clear bilaterally. No crackles, rhonchi, wheezing.  No respiratory distress.  No work of breathing.  CARDIAC: Regular rate and rhythm.  Pulses 2+ and radials.  No murmur, rub.  MUSCULOSKELETAL:   Bilateral ankles tender to palpation on the medial aspect where bruising is to the left ankle and lateral aspect to the  right ankle where bruises present.  Some generalized edema in bilateral lower extremities.  no erythema or decreased range of motion.  Strength exam 5 out of 5 in rotation flexion extension   no obvious deformity, ecchymosis, crepitus.    Cap refill less than 2 seconds distal to injury  Forefoot unremarkable without any acute abnormalities and no pain with squeezing interosseous of area of the ankle.  No pain at the base of the fifth metatarsal or calcaneus/ heel  NEUROLOGICAL:   neurovascular intact in bilateral lower extremities         MDM/ED COURSE:    The patient presented for evaluation ankle pain.  Differential included fracture, dislocation, ligamentous injury, sprain.   Imaging studies if performed were independently reviewed and interpreted by myself and confirmed by radiologist.  Imaging was performed of the patient's injured area demonstrating no acute fractures.  She did not want any treatment here.  Likely suffering from a bruise to the areas.  Patient feels safe for discharge home.  Patient nontoxic-appearing on reexamination.  Vital signs are stable.   The patient and caregiver are in agreement with the plan and given instructions to follow up with primary care physician for further imaging or treatment/management if pain persist.      I discussed the differential, results and plan with the patient and/or family/friend/caregiver if present.       I emphasized the importance of follow-up with the physician I referred them to in the timeframe recommended.  I explained reasons for the patient to return to the Emergency Department. Additional verbal discharge instructions were also given and discussed with the patient to supplement those generated by the EMR. We also discussed medications that were prescribed (if any) including common side effects and interactions. The patient was advised to abstain from driving, operating heavy machinery or making significant decisions while taking medications such as  opiates and muscle relaxers that may impair this. All questions were addressed.  They understand return precautions and discharge instructions. The patient and/or family/friend/caregiver expressed understanding.     Note: This note was dictated by speech recognition. Minor errors in transcription may be present.    Diagnoses as of 24 2200   Acute bilateral ankle pain         Past Medical History:   Diagnosis Date    Abnormal ECG     Antiplatelet or antithrombotic long-term use     PLAVIX    Asthma ([facility])     Calculus of gallbladder without cholecystitis without obstruction     CHF (congestive heart failure) (Multi)     Class 2 obesity with body mass index (BMI) of 37.0 to 37.9 in adult 2024    37.74 kg/m²    Coronary artery disease     1 cardiac stent placed 2018    Encounter for pre-operative cardiovascular clearance 2024    Mia Edmondson, APRN-CN for Cholecystectomy    Gallstones     GERD (gastroesophageal reflux disease)     Hidradenitis suppurativa     Hypertension     Hypothyroidism     Multiple sclerosis (Multi)     Myocardial infarction ([facility])     [facility]2024 at [facility],  #3 also here at [facility] with gb attack    Obesity     Sleep apnea     Wears eyeglasses       Social History     Socioeconomic History    Marital status:    Tobacco Use    Smoking status: Former     Current packs/day: 0.00     Average packs/day: 1 pack/day for 8.7 years (8.7 ttl pk-yrs)     Types: Cigarettes     Start date: 1983     Quit date: 10/1/1991     Years since quittin.8    Smokeless tobacco: Never   Vaping Use    Vaping status: Never Used   Substance and Sexual Activity    Alcohol use: Yes     Comment: seldom    Drug use: Never    Sexual activity: Not Currently     Partners: Male     Birth control/protection: Abstinence     Social Determinants of Health     Financial Resource Strain: Low Risk  (2024)    Overall Financial Resource Strain (CARDIA)     Difficulty of Paying  Living Expenses: Not hard at all   Transportation Needs: No Transportation Needs (1/19/2024)    PRAPARE - Transportation     Lack of Transportation (Medical): No     Lack of Transportation (Non-Medical): No   Housing Stability: Low Risk  (1/20/2024)    Housing Stability Vital Sign     Unable to Pay for Housing in the Last Year: No     Number of Places Lived in the Last Year: 1     Unstable Housing in the Last Year: No     Current Outpatient Medications   Medication Instructions    albuterol 90 mcg/actuation inhaler INHALE 1 TO 2 PUFFS EVERY 4 TO 6 HOURS AS NEEDED    albuterol 2.5 mg, nebulization, Every 4 hours PRN    amitriptyline (ELAVIL) 25 mg, oral, Nightly    aspirin 81 mg EC tablet 1 tablet, oral, Every morning    atorvastatin (LIPITOR) 40 mg, oral, Nightly    calcium carbonate (OS-SHANTA) 1,200 mg, oral, 2 times daily (morning and late afternoon), Take with food.    clonazePAM (KLONOPIN) 0.5 mg, oral, Nightly    clopidogrel (PLAVIX) 75 mg, oral, Daily    dilTIAZem CD (CARDIZEM CD) 240 mg, oral, Daily    ergocalciferol (Vitamin D-2) 1.25 MG (59950 UT) capsule 1 capsule, oral, Once Weekly, On Saturdays    ezetimibe (ZETIA) 10 mg, oral, Nightly    FLUoxetine (PROZAC) 40 mg, oral, Daily    furosemide (LASIX) 80 mg, oral, 2 times daily    furosemide (LASIX) 80 mg, oral, 2 times daily (morning and late afternoon)    gabapentin (NEURONTIN) 400 mg, oral, 3 times daily, Last OARRS fill: 4/7/23 #270 for 90 days    levothyroxine (SYNTHROID, LEVOXYL) 50 mcg, oral, Daily    meclizine (ANTIVERT) 25 mg, oral, Every 6 hours PRN    metoprolol succinate XL (TOPROL-XL) 25 mg, oral, Daily    nitroglycerin (Nitrostat) 0.4 mg SL tablet DISSOLVE 1 TABLET UNDER THE TONGUE AS NEEDED FOR CHEST PAIN.    nystatin (Mycostatin) 100,000 unit/gram powder 1 Application, Topical, 2 times daily    nystatin (Mycostatin) ointment Topical, 2 times daily, to affected area    nystatin-triamcinolone (Mycolog II) ointment Topical, 2 times daily, to  "affected area    omeprazole (PRILOSEC) 20 mg, oral, Daily, Do not crush or chew.    omeprazole (PRILOSEC) 40 mg, oral, 2 times daily before meals, Do not crush or chew.    ondansetron ODT (Zofran-ODT) 4 mg disintegrating tablet Take 1 tablet (4 mg) by mouth every 6 hours if needed for nausea or vomiting    oxyCODONE (ROXICODONE) 5 mg, oral, Every 6 hours PRN    potassium chloride CR (Klor-Con M20) 20 mEq ER tablet 20 mEq, oral, Daily, Do not crush or chew.    ranolazine (RANEXA) 500 mg, oral, 2 times daily, Do not crush, chew, or split.    tiZANidine (ZANAFLEX) 4 mg, oral, Nightly PRN, FOR SPASMS    Wegovy 0.5 mg, subcutaneous, Once Weekly    Wegovy 1 mg, subcutaneous, Once Weekly     Allergies   Allergen Reactions    Gadolinium-Containing Contrast Media Anaphylaxis     Pt developed nausea without vomiting , SOB, after receiving the IV contrast . Pt states she always gets nauseated but this time \"was worse\"/pt/             ED Triage Vitals [07/11/24 1903]   Temperature Heart Rate Respirations BP   36.4 °C (97.5 °F) 78 18 117/70      Pulse Ox Temp Source Heart Rate Source Patient Position   98 % Skin Monitor Sitting      BP Location FiO2 (%)     Left arm --               Labs and Imaging  XR ankle bilateral complete minimum 3 views   Final Result   No acute osseous abnormality. Soft tissue edema present.             MACRO:   None        Signed by: Ken Murray 7/11/2024 9:52 PM   Dictation workstation:   [id number]        Labs Reviewed - No data to display        Procedure  Procedures                  Ilan Mckoy, DO  07/11/24 2200    "

## 2024-07-19 ENCOUNTER — APPOINTMENT (OUTPATIENT)
Dept: PULMONOLOGY | Facility: CLINIC | Age: 59
End: 2024-07-19
Payer: COMMERCIAL

## 2024-07-24 ENCOUNTER — CLINICAL SUPPORT (OUTPATIENT)
Dept: CARDIAC REHAB | Facility: HOSPITAL | Age: 59
End: 2024-07-24

## 2024-07-24 DIAGNOSIS — I21.9 ACUTE MYOCARDIAL INFARCTION, UNSPECIFIED MI TYPE, UNSPECIFIED ARTERY (MULTI): ICD-10-CM

## 2024-07-24 PROCEDURE — 9430000001 HC PHASE III CARDIAC REHAB MONTHLY FEE

## 2024-08-11 DIAGNOSIS — B37.9 CANDIDIASIS: ICD-10-CM

## 2024-08-11 DIAGNOSIS — I10 HYPERTENSION, UNSPECIFIED TYPE: ICD-10-CM

## 2024-08-11 DIAGNOSIS — R00.2 HEART PALPITATIONS: ICD-10-CM

## 2024-08-11 DIAGNOSIS — R07.9 CHEST PAIN, UNSPECIFIED TYPE: ICD-10-CM

## 2024-08-12 DIAGNOSIS — B37.9 YEAST INFECTION: Primary | ICD-10-CM

## 2024-08-12 RX ORDER — DILTIAZEM HYDROCHLORIDE 240 MG/1
240 CAPSULE, COATED, EXTENDED RELEASE ORAL DAILY
Qty: 90 CAPSULE | Refills: 1 | Status: SHIPPED | OUTPATIENT
Start: 2024-08-12

## 2024-08-12 RX ORDER — NYSTATIN AND TRIAMCINOLONE ACETONIDE 100000; 1 [USP'U]/G; MG/G
OINTMENT TOPICAL 2 TIMES DAILY
Qty: 60 G | Refills: 0 | Status: SHIPPED | OUTPATIENT
Start: 2024-08-12

## 2024-08-12 RX ORDER — FLUCONAZOLE 150 MG/1
150 TABLET ORAL ONCE
Qty: 1 TABLET | Refills: 0 | Status: SHIPPED | OUTPATIENT
Start: 2024-08-12 | End: 2024-08-12

## 2024-08-17 DIAGNOSIS — E66.01 CLASS 2 SEVERE OBESITY DUE TO EXCESS CALORIES WITH SERIOUS COMORBIDITY AND BODY MASS INDEX (BMI) OF 36.0 TO 36.9 IN ADULT (MULTI): ICD-10-CM

## 2024-08-20 RX ORDER — SEMAGLUTIDE 1 MG/.5ML
1 INJECTION, SOLUTION SUBCUTANEOUS
Qty: 2 ML | Refills: 0 | Status: SHIPPED | OUTPATIENT
Start: 2024-08-25 | End: 2024-09-16

## 2024-08-22 ENCOUNTER — APPOINTMENT (OUTPATIENT)
Dept: PRIMARY CARE | Facility: CLINIC | Age: 59
End: 2024-08-22
Payer: COMMERCIAL

## 2024-08-26 ENCOUNTER — CLINICAL SUPPORT (OUTPATIENT)
Dept: CARDIAC REHAB | Facility: HOSPITAL | Age: 59
End: 2024-08-26

## 2024-08-26 DIAGNOSIS — I21.9 ACUTE MYOCARDIAL INFARCTION, INITIAL EPISODE OF CARE (MULTI): ICD-10-CM

## 2024-08-26 PROCEDURE — 9430000001 HC PHASE III CARDIAC REHAB MONTHLY FEE

## 2024-08-31 ENCOUNTER — LAB (OUTPATIENT)
Dept: LAB | Facility: LAB | Age: 59
End: 2024-08-31
Payer: COMMERCIAL

## 2024-08-31 DIAGNOSIS — Z79.899 MEDICATION MANAGEMENT: ICD-10-CM

## 2024-08-31 LAB
AMPHETAMINES UR QL SCN: NORMAL
BARBITURATES UR QL SCN: NORMAL
BZE UR QL SCN: NORMAL
CANNABINOIDS UR QL SCN: NORMAL
CREAT UR-MCNC: 138.2 MG/DL (ref 20–320)
PCP UR QL SCN: NORMAL

## 2024-08-31 PROCEDURE — 80361 OPIATES 1 OR MORE: CPT

## 2024-08-31 PROCEDURE — 80368 SEDATIVE HYPNOTICS: CPT

## 2024-08-31 PROCEDURE — 80365 DRUG SCREENING OXYCODONE: CPT

## 2024-08-31 PROCEDURE — 82570 ASSAY OF URINE CREATININE: CPT

## 2024-08-31 PROCEDURE — 80358 DRUG SCREENING METHADONE: CPT

## 2024-08-31 PROCEDURE — 80307 DRUG TEST PRSMV CHEM ANLYZR: CPT

## 2024-08-31 PROCEDURE — 80346 BENZODIAZEPINES1-12: CPT

## 2024-08-31 PROCEDURE — 80373 DRUG SCREENING TRAMADOL: CPT

## 2024-08-31 PROCEDURE — 80354 DRUG SCREENING FENTANYL: CPT

## 2024-09-06 LAB
1OH-MIDAZOLAM UR CFM-MCNC: <25 NG/ML
6MAM UR CFM-MCNC: <25 NG/ML
7AMINOCLONAZEPAM UR CFM-MCNC: 270 NG/ML
A-OH ALPRAZ UR CFM-MCNC: <25 NG/ML
ALPRAZ UR CFM-MCNC: <25 NG/ML
CHLORDIAZEP UR CFM-MCNC: <25 NG/ML
CLONAZEPAM UR CFM-MCNC: <25 NG/ML
CODEINE UR CFM-MCNC: <50 NG/ML
DIAZEPAM UR CFM-MCNC: <25 NG/ML
EDDP UR CFM-MCNC: <25 NG/ML
FENTANYL UR CFM-MCNC: <2.5 NG/ML
HYDROCODONE CTO UR CFM-MCNC: <25 NG/ML
HYDROMORPHONE UR CFM-MCNC: <25 NG/ML
LORAZEPAM UR CFM-MCNC: <25 NG/ML
METHADONE UR CFM-MCNC: <25 NG/ML
MIDAZOLAM UR CFM-MCNC: <25 NG/ML
MORPHINE UR CFM-MCNC: <50 NG/ML
NORDIAZEPAM UR CFM-MCNC: <25 NG/ML
NORFENTANYL UR CFM-MCNC: <2.5 NG/ML
NORHYDROCODONE UR CFM-MCNC: <25 NG/ML
NOROXYCODONE UR CFM-MCNC: <25 NG/ML
NORTRAMADOL UR-MCNC: <50 NG/ML
OXAZEPAM UR CFM-MCNC: <25 NG/ML
OXYCODONE UR CFM-MCNC: <25 NG/ML
OXYMORPHONE UR CFM-MCNC: <25 NG/ML
TEMAZEPAM UR CFM-MCNC: <25 NG/ML
TRAMADOL UR CFM-MCNC: <50 NG/ML
ZOLPIDEM UR CFM-MCNC: <25 NG/ML
ZOLPIDEM UR-MCNC: <25 NG/ML

## 2024-09-09 ENCOUNTER — LAB (OUTPATIENT)
Dept: LAB | Facility: LAB | Age: 59
End: 2024-09-09
Payer: COMMERCIAL

## 2024-09-09 ENCOUNTER — OFFICE VISIT (OUTPATIENT)
Dept: CARDIOLOGY | Facility: HOSPITAL | Age: 59
End: 2024-09-09
Payer: COMMERCIAL

## 2024-09-09 VITALS
SYSTOLIC BLOOD PRESSURE: 112 MMHG | DIASTOLIC BLOOD PRESSURE: 70 MMHG | WEIGHT: 225.75 LBS | OXYGEN SATURATION: 96 % | BODY MASS INDEX: 35.36 KG/M2 | HEART RATE: 69 BPM

## 2024-09-09 DIAGNOSIS — F32.A DEPRESSION, UNSPECIFIED DEPRESSION TYPE: ICD-10-CM

## 2024-09-09 DIAGNOSIS — E03.9 HYPOTHYROIDISM, UNSPECIFIED TYPE: ICD-10-CM

## 2024-09-09 DIAGNOSIS — I25.10 CORONARY ARTERY DISEASE INVOLVING NATIVE CORONARY ARTERY OF NATIVE HEART WITHOUT ANGINA PECTORIS: ICD-10-CM

## 2024-09-09 DIAGNOSIS — I25.10 CORONARY ARTERY DISEASE INVOLVING NATIVE CORONARY ARTERY OF NATIVE HEART WITHOUT ANGINA PECTORIS: Primary | ICD-10-CM

## 2024-09-09 DIAGNOSIS — E78.5 HYPERLIPIDEMIA, UNSPECIFIED HYPERLIPIDEMIA TYPE: ICD-10-CM

## 2024-09-09 LAB
ALBUMIN SERPL BCP-MCNC: 4.6 G/DL (ref 3.4–5)
ALP SERPL-CCNC: 129 U/L (ref 33–110)
ALT SERPL W P-5'-P-CCNC: 26 U/L (ref 7–45)
ANION GAP SERPL CALC-SCNC: 13 MMOL/L (ref 10–20)
AST SERPL W P-5'-P-CCNC: 19 U/L (ref 9–39)
BILIRUB SERPL-MCNC: 1.2 MG/DL (ref 0–1.2)
BUN SERPL-MCNC: 9 MG/DL (ref 6–23)
CALCIUM SERPL-MCNC: 9.5 MG/DL (ref 8.6–10.3)
CHLORIDE SERPL-SCNC: 101 MMOL/L (ref 98–107)
CO2 SERPL-SCNC: 33 MMOL/L (ref 21–32)
CREAT SERPL-MCNC: 1.09 MG/DL (ref 0.5–1.05)
EGFRCR SERPLBLD CKD-EPI 2021: 59 ML/MIN/1.73M*2
GLUCOSE SERPL-MCNC: 90 MG/DL (ref 74–99)
POTASSIUM SERPL-SCNC: 3.9 MMOL/L (ref 3.5–5.3)
PROT SERPL-MCNC: 6.5 G/DL (ref 6.4–8.2)
SODIUM SERPL-SCNC: 143 MMOL/L (ref 136–145)

## 2024-09-09 PROCEDURE — 1036F TOBACCO NON-USER: CPT | Performed by: NURSE PRACTITIONER

## 2024-09-09 PROCEDURE — 3074F SYST BP LT 130 MM HG: CPT | Performed by: NURSE PRACTITIONER

## 2024-09-09 PROCEDURE — 80053 COMPREHEN METABOLIC PANEL: CPT

## 2024-09-09 PROCEDURE — 99214 OFFICE O/P EST MOD 30 MIN: CPT | Performed by: NURSE PRACTITIONER

## 2024-09-09 PROCEDURE — 36415 COLL VENOUS BLD VENIPUNCTURE: CPT

## 2024-09-09 PROCEDURE — 3078F DIAST BP <80 MM HG: CPT | Performed by: NURSE PRACTITIONER

## 2024-09-09 RX ORDER — LEVOTHYROXINE SODIUM 50 UG/1
50 TABLET ORAL DAILY
Qty: 90 TABLET | Refills: 3 | Status: SHIPPED | OUTPATIENT
Start: 2024-09-09 | End: 2025-09-09

## 2024-09-09 RX ORDER — FLUOXETINE HYDROCHLORIDE 40 MG/1
40 CAPSULE ORAL DAILY
Qty: 90 CAPSULE | Refills: 3 | Status: SHIPPED | OUTPATIENT
Start: 2024-09-09 | End: 2025-09-09

## 2024-09-09 ASSESSMENT — ENCOUNTER SYMPTOMS
GASTROINTESTINAL NEGATIVE: 1
ENDOCRINE NEGATIVE: 1
EYES NEGATIVE: 1
NEUROLOGICAL NEGATIVE: 1
RESPIRATORY NEGATIVE: 1
PSYCHIATRIC NEGATIVE: 1
MYALGIAS: 1
NECK PAIN: 1
HEMATOLOGIC/LYMPHATIC NEGATIVE: 1

## 2024-09-09 NOTE — PROGRESS NOTES
Referred by Dr. Inge sutherland. provider found for Follow-up (3 mth.)     History Of Present Illness:    Elaine Coffey is a very pleasant 59 year old female with a history of HTN, HLD, MS and CAD, she is here for a follow up visit. The patient is seen in collaboration with Dr. Gutierres. Mrs. Coffey has been working on weight loss. Feels she is getting dried out. Denies chest pain or shortness of breath.  Going to cardiac rehab.       Review of Systems   Constitutional: Positive for malaise/fatigue.   HENT: Negative.     Eyes: Negative.    Cardiovascular:  Positive for chest pain.   Respiratory: Negative.     Endocrine: Negative.    Hematologic/Lymphatic: Negative.    Skin: Negative.    Musculoskeletal:  Positive for myalgias and neck pain.   Gastrointestinal: Negative.    Neurological: Negative.    Psychiatric/Behavioral: Negative.          Past Medical History:  She has a past medical history of Abnormal ECG, Antiplatelet or antithrombotic long-term use, Asthma ([facility]), Calculus of gallbladder without cholecystitis without obstruction, CHF (congestive heart failure) (Multi), Class 2 obesity with body mass index (BMI) of 37.0 to 37.9 in adult (2024), Coronary artery disease, Encounter for pre-operative cardiovascular clearance (2024), Gallstones, GERD (gastroesophageal reflux disease), Hidradenitis suppurativa, Hypertension, Hypothyroidism, Multiple sclerosis (Multi), Myocardial infarction (Multi), Obesity, Sleep apnea, and Wears eyeglasses.    Past Surgical History:  She has a past surgical history that includes  section, classic; Other surgical history (2014); Other surgical history; Hysterectomy (2014); Other surgical history; Ankle surgery (Left); Coronary stent placement; Cardiac catheterization (N/A, 2024); Cholecystectomy (2024); and Cardiac catheterization (N/A, 1/3/2024).      Social History:  She reports that she quit smoking about 32 years ago. Her smoking use included  cigarettes. She started smoking about 41 years ago. She has a 8.7 pack-year smoking history. She has never used smokeless tobacco. She reports current alcohol use. She reports that she does not use drugs.    Family History:  Family History   Problem Relation Name Age of Onset    Heart attack Mother Elaine Garcia     Bilateral breast cancer Mother Elaine Garcia     Emphysema Mother Elaine Garcia     Blood clot Mother Elaine Garcia     Abnormal EKG Mother Elaine Garcia     Anemia Mother Elaine Ellissko     Angina Mother Elaine Garcia     Depression Mother Elaine Mishrao     Heart disease Mother Elaine iMshrao     Lung disease Mother Elaine Garcia     Thyroid disease Mother Elaine Garcia     Heart attack Father Dawit Garcia     Lymphoma Father Dawit Ellissko     Angina Father Dawit Ellissko     Cancer Father Dawit Mishrao     Diabetes type I Father Dawit Ellissko     Heart disease Father Dawit Garcia     Hypertension Father Dawit Garcia     Blood clot Sister      Ovarian cancer Maternal Grandmother Delmy Giffordcali     Stroke Maternal Grandmother Delmy Martínez     Heart attack Maternal Grandmother Delmy Medranotrino     Diabetes type I Paternal Grandmother Olive Garcia     Asthma Sister Heidi Garcia     Kidney disease Sister Kaylynn Garcia         Allergies:  Gadolinium-containing contrast media    Outpatient Medications:  Current Outpatient Medications   Medication Instructions    albuterol 90 mcg/actuation inhaler INHALE 1 TO 2 PUFFS EVERY 4 TO 6 HOURS AS NEEDED    albuterol 2.5 mg, nebulization, Every 4 hours PRN    amitriptyline (ELAVIL) 25 mg, oral, Nightly    aspirin 81 mg EC tablet 1 tablet, oral, Every morning    atorvastatin (LIPITOR) 40 mg, oral, Daily    calcium carbonate (OS-SHANTA) 1,200 mg, oral, 2 times daily (morning and late afternoon), Take with food.    clonazePAM (KLONOPIN) 0.5 mg, oral, Nightly    clopidogrel (PLAVIX) 75 mg, oral, Daily    dilTIAZem CD (CARDIZEM CD) 240 mg, oral, Daily     ergocalciferol (Vitamin D-2) 1.25 MG (24934 UT) capsule 1 capsule, oral, Once Weekly, On Saturdays    ezetimibe (ZETIA) 10 mg, oral, Nightly    FLUoxetine (PROZAC) 40 mg, oral, Daily    furosemide (LASIX) 80 mg, oral, 2 times daily    furosemide (LASIX) 80 mg, oral, 2 times daily (morning and late afternoon)    gabapentin (NEURONTIN) 400 mg, oral, 3 times daily, Last OARRS fill: 4/7/23 #270 for 90 days    levothyroxine (SYNTHROID, LEVOXYL) 50 mcg, oral, Daily    meclizine (ANTIVERT) 25 mg, oral, Every 6 hours PRN    metoprolol succinate XL (TOPROL-XL) 25 mg, oral, Daily    nitroglycerin (Nitrostat) 0.4 mg SL tablet DISSOLVE 1 TABLET UNDER THE TONGUE AS NEEDED FOR CHEST PAIN.    nystatin (Mycostatin) 100,000 unit/gram powder 1 Application, Topical, 2 times daily    nystatin (Mycostatin) ointment Topical, 2 times daily, to affected area    nystatin-triamcinolone (Mycolog II) ointment Topical, 2 times daily, to affected area    omeprazole (PRILOSEC) 20 mg, oral, Daily, Do not crush or chew.    omeprazole (PRILOSEC) 40 mg, oral, 2 times daily before meals, Do not crush or chew.    ondansetron ODT (Zofran-ODT) 4 mg disintegrating tablet Take 1 tablet (4 mg) by mouth every 6 hours if needed for nausea or vomiting    oxyCODONE (ROXICODONE) 5 mg, oral, Every 6 hours PRN    potassium chloride CR (Klor-Con M20) 20 mEq ER tablet 20 mEq, oral, Daily, Do not crush or chew.    ranolazine (RANEXA) 500 mg, oral, 2 times daily, Do not crush, chew, or split.    tiZANidine (ZANAFLEX) 4 mg, oral, Nightly PRN, FOR SPASMS    Wegovy 1 mg, subcutaneous, Once Weekly        Last Recorded Vitals:  Vitals:    09/09/24 0802   BP: 112/70   Pulse: 69   SpO2: 96%   Weight: 102 kg (225 lb 12 oz)       Physical Exam:  Physical Exam  Vitals reviewed.   HENT:      Head: Normocephalic.      Nose: Nose normal.   Eyes:      Pupils: Pupils are equal, round, and reactive to light.   Cardiovascular:      Rate and Rhythm: Normal rate and regular rhythm.    Pulmonary:      Effort: Pulmonary effort is normal.      Breath sounds: Normal breath sounds.   Abdominal:      General: Abdomen is flat.      Palpations: Abdomen is soft.   Musculoskeletal:         General: Normal range of motion.      Cervical back: Normal range of motion.   Skin:     General: Skin is warm and dry.   Neurological:      General: No focal deficit present.      Mental Status: He is alert and oriented to person, place, and time.   Psychiatric:         Mood and Affect: Mood normal.          Last Labs:  CBC -  Lab Results   Component Value Date    WBC 4.8 05/29/2024    HGB 13.6 05/29/2024    HCT 39.9 05/29/2024    MCV 88 05/29/2024     05/29/2024       CMP -  Lab Results   Component Value Date    CALCIUM 9.1 05/29/2024    PHOS 2.8 01/09/2024    PROT 6.3 (L) 05/29/2024    ALBUMIN 4.3 05/29/2024    AST 15 05/29/2024    ALT 17 05/29/2024    ALKPHOS 131 (H) 05/29/2024    BILITOT 0.8 05/29/2024       LIPID PANEL -   Lab Results   Component Value Date    CHOL 143 01/03/2024    TRIG 150 (H) 01/03/2024    HDL 38.8 01/03/2024    CHHDL 3.7 01/03/2024    LDLF 86 01/06/2018    VLDL 30 01/03/2024    NHDL 104 01/03/2024       RENAL FUNCTION PANEL -   Lab Results   Component Value Date    GLUCOSE 98 05/29/2024     05/29/2024    K 3.5 05/29/2024     05/29/2024    CO2 32 05/29/2024    ANIONGAP 13 05/29/2024    BUN 15 05/29/2024    CREATININE 0.98 05/29/2024    GFRMALE CANCELED 08/04/2023    CALCIUM 9.1 05/29/2024    PHOS 2.8 01/09/2024    ALBUMIN 4.3 05/29/2024        Lab Results   Component Value Date    BNP 22 02/19/2024    HGBA1C 5.3 01/03/2024       Last Cardiology Tests:  ECG:  Echo:  Transthoracic Echo (TTE) Complete 01/03/2024   1. Left ventricular systolic function is normal with a 55-60% estimated ejection fraction.   2. RVSP within normal limits.    Echocardiogram 8/20/2021  1. The left ventricular systolic function is normal with a 60% estimated ejection fraction.  2. Spectral Doppler  shows an impaired relaxation pattern of left ventricular diastolic filling.  3. There is trace to mild mitral and tricuspid regurgitation.    echocardiogram 1/18/2018   1. The left ventricular systolic function is low normal with a 50-55% estimated  ejection fraction.   2. Spectral Doppler shows an impaired relaxation pattern of left ventricular  diastolic filling.   3. There is mild mitral and tricuspid regurgitaiton.      Echocardiogram 1/18/2018   LVEF 50-55%   mild MR   mild TR     Ejection Fractions:  LVEF 55-60%    Cath:  Cardiac catheterization - coronary   Cath 1/2/2024  . Left main: no significant angiographic disease.   2. LAD: no significant angiographic disease.   3. LCx: no significant angiographic disease.   4. RCA: patent proximal stent, no significant angiographic disease.   5. LVEDP 18mmHg, no aortic stenosis on LV-Ao gradient.    Cardiac cath 12/14/2018   1. No significant angiographic CAD, ostial-proximal RCA patent.   2. LVEDP 31mmHg, no aortic stenosis on LV-Ao gradient.  Stress Test:  Nuclear Stress Test 05/25/2023  1. No evidence of inducible myocardial ischemia. Predominantly fixed  moderate-sized moderate to severe perfusion defects involving  inferior wall as well as inferolateral wall, likely represent prior  infarction. Similar to prior exam on 11/20/2018.  2. The left ventricle is normal in size.  3. Gated images demonstrate normal LV wall motion except hypokinesis  in the inferior wall with a post-stress LV EF estimated at 56%.      nuclear stress test 11/20/2018   1. Moderate-sized territory of largely fixed prior completed  myocardial infarction involving the basal inferior/inferolateral wall  with minimal nonsignificant flower-infarct ischemia in the  inferolateral territory.  2. There is moderate dilation of the left ventricle with an  end-diastolic volume calculated at 145 mL.  3. Wall motion or LV quantification was not assessed due to unable to  do gated imaged secondary to  arrhythmia.    Cardiac Imaging:      Assessment/Plan      Mrs. Coffey is a very pleasant 59 year old female with a history of CAD, HTN, HLD and MS. She had a PCI on 1/4/2018 at [facility]. She had a repeat heart cath on 12/14/2018 showing a patent RCA and no significant CAD. Heart cath in 1/2024 that showed patent stent, echo showed a preserved LV function. She continues to do well from a cardiac standpoint. Weight loss was congratulated. Continues to stay active.  Heart rate and blood pressure is well controlled today.      Plan   -call with any questions   -continue ASA, Atorvastatin, and Plavix  -continue Caredizem   -continue  Ranexa 500 mg twice a day  -follow up in three months   -decrease the Lasix to 80 mg once a day   -decrease the Potassium to a half a tablet a day            Mia Edmondson, APRN-CNP

## 2024-09-09 NOTE — PATIENT INSTRUCTIONS
DECREASE THE LASIX TO ONCE A DAY   DECREASE THE POTASSIUM TO A HALF A TABLET A DAY   BLOOD WORK TODAY   FOLLOW UP IN THREE MONTHS   STOP THE PLAVIX A WEEK PRIOR TO THE COLONOSCOPY  DO NOT STOP THE ASPIRIN

## 2024-09-12 ENCOUNTER — APPOINTMENT (OUTPATIENT)
Dept: NEUROLOGY | Facility: CLINIC | Age: 59
End: 2024-09-12
Payer: COMMERCIAL

## 2024-09-16 DIAGNOSIS — E66.01 CLASS 2 SEVERE OBESITY DUE TO EXCESS CALORIES WITH SERIOUS COMORBIDITY AND BODY MASS INDEX (BMI) OF 36.0 TO 36.9 IN ADULT: ICD-10-CM

## 2024-09-16 RX ORDER — SEMAGLUTIDE 1 MG/.5ML
1 INJECTION, SOLUTION SUBCUTANEOUS
Qty: 2 ML | Refills: 1 | Status: SHIPPED | OUTPATIENT
Start: 2024-09-22

## 2024-09-17 RX ORDER — SEMAGLUTIDE 1.7 MG/.75ML
1.7 INJECTION, SOLUTION SUBCUTANEOUS WEEKLY
Qty: 3 ML | Refills: 0 | Status: SHIPPED | OUTPATIENT
Start: 2024-09-17 | End: 2024-10-09

## 2024-09-18 ENCOUNTER — APPOINTMENT (OUTPATIENT)
Dept: RADIOLOGY | Facility: HOSPITAL | Age: 59
End: 2024-09-18
Payer: COMMERCIAL

## 2024-09-18 ENCOUNTER — HOSPITAL ENCOUNTER (EMERGENCY)
Facility: HOSPITAL | Age: 59
Discharge: HOME | End: 2024-09-18
Attending: STUDENT IN AN ORGANIZED HEALTH CARE EDUCATION/TRAINING PROGRAM
Payer: COMMERCIAL

## 2024-09-18 VITALS
DIASTOLIC BLOOD PRESSURE: 76 MMHG | TEMPERATURE: 99.7 F | OXYGEN SATURATION: 96 % | HEART RATE: 78 BPM | BODY MASS INDEX: 34.37 KG/M2 | WEIGHT: 219 LBS | HEIGHT: 67 IN | SYSTOLIC BLOOD PRESSURE: 131 MMHG | RESPIRATION RATE: 16 BRPM

## 2024-09-18 DIAGNOSIS — R07.9 CHEST PAIN, UNSPECIFIED: ICD-10-CM

## 2024-09-18 DIAGNOSIS — M25.562 ACUTE PAIN OF LEFT KNEE: ICD-10-CM
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
## 2024-09-18 DIAGNOSIS — S20.211A CONTUSION OF RIB ON RIGHT SIDE, INITIAL ENCOUNTER: ICD-10-CM

## 2024-09-18 DIAGNOSIS — M25.571 ACUTE RIGHT ANKLE PAIN: ICD-10-CM

## 2024-09-18 PROCEDURE — 73590 X-RAY EXAM OF LOWER LEG: CPT | Mod: BILATERAL PROCEDURE | Performed by: RADIOLOGY

## 2024-09-18 PROCEDURE — 2500000004 HC RX 250 GENERAL PHARMACY W/ HCPCS (ALT 636 FOR OP/ED)

## 2024-09-18 PROCEDURE — 71250 CT THORAX DX C-: CPT | Mod: FOREIGN READ | Performed by: RADIOLOGY

## 2024-09-18 PROCEDURE — 71250 CT THORAX DX C-: CPT

## 2024-09-18 PROCEDURE — 73564 X-RAY EXAM KNEE 4 OR MORE: CPT | Mod: LT

## 2024-09-18 PROCEDURE — 2500000005 HC RX 250 GENERAL PHARMACY W/O HCPCS

## 2024-09-18 PROCEDURE — 99284 EMERGENCY DEPT VISIT MOD MDM: CPT | Mod: 25

## 2024-09-18 PROCEDURE — 73590 X-RAY EXAM OF LOWER LEG: CPT | Mod: 50

## 2024-09-18 PROCEDURE — 73630 X-RAY EXAM OF FOOT: CPT | Mod: RIGHT SIDE | Performed by: RADIOLOGY

## 2024-09-18 PROCEDURE — 73630 X-RAY EXAM OF FOOT: CPT | Mod: RT

## 2024-09-18 PROCEDURE — 73610 X-RAY EXAM OF ANKLE: CPT | Mod: RT

## 2024-09-18 PROCEDURE — 96372 THER/PROPH/DIAG INJ SC/IM: CPT

## 2024-09-18 PROCEDURE — 73564 X-RAY EXAM KNEE 4 OR MORE: CPT | Mod: LEFT SIDE | Performed by: RADIOLOGY

## 2024-09-18 PROCEDURE — 73610 X-RAY EXAM OF ANKLE: CPT | Mod: RIGHT SIDE | Performed by: RADIOLOGY

## 2024-09-18 RX ORDER — NITROGLYCERIN 0.4 MG/1
TABLET SUBLINGUAL
Qty: 25 TABLET | Refills: 2 | Status: SHIPPED | OUTPATIENT
Start: 2024-09-18 | End: 2024-10-18

## 2024-09-18 RX ORDER — LIDOCAINE 560 MG/1
1 PATCH PERCUTANEOUS; TOPICAL; TRANSDERMAL ONCE
Status: DISCONTINUED | OUTPATIENT
Start: 2024-09-18 | End: 2024-09-18 | Stop reason: HOSPADM

## 2024-09-18 RX ORDER — KETOROLAC TROMETHAMINE 30 MG/ML
30 INJECTION, SOLUTION INTRAMUSCULAR; INTRAVENOUS ONCE
Status: COMPLETED | OUTPATIENT
Start: 2024-09-18 | End: 2024-09-18

## 2024-09-18 ASSESSMENT — PAIN DESCRIPTION - ORIENTATION: ORIENTATION: RIGHT;LEFT

## 2024-09-18 ASSESSMENT — LIFESTYLE VARIABLES
EVER HAD A DRINK FIRST THING IN THE MORNING TO STEADY YOUR NERVES TO GET RID OF A HANGOVER: NO
HAVE PEOPLE ANNOYED YOU BY CRITICIZING YOUR DRINKING: NO
TOTAL SCORE: 0
EVER FELT BAD OR GUILTY ABOUT YOUR DRINKING: NO
HAVE YOU EVER FELT YOU SHOULD CUT DOWN ON YOUR DRINKING: NO

## 2024-09-18 ASSESSMENT — PAIN DESCRIPTION - DESCRIPTORS: DESCRIPTORS: ACHING

## 2024-09-18 ASSESSMENT — PAIN SCALES - GENERAL: PAINLEVEL_OUTOF10: 6

## 2024-09-18 ASSESSMENT — PAIN DESCRIPTION - PROGRESSION: CLINICAL_PROGRESSION: NOT CHANGED

## 2024-09-18 ASSESSMENT — PAIN DESCRIPTION - LOCATION: LOCATION: OTHER (COMMENT)

## 2024-09-18 ASSESSMENT — PAIN DESCRIPTION - ONSET: ONSET: ONGOING

## 2024-09-18 ASSESSMENT — PAIN DESCRIPTION - FREQUENCY: FREQUENCY: CONSTANT/CONTINUOUS

## 2024-09-18 ASSESSMENT — PAIN - FUNCTIONAL ASSESSMENT: PAIN_FUNCTIONAL_ASSESSMENT: 0-10

## 2024-09-18 NOTE — ED NOTES
"Pt arrives with c/o right lower rib pain, left knee and shin pain and right ankle pain s/p trip and fall on Friday. PT has hx of MS and was attempting to walk up stairs, pt's leg \"gave out\" and pt fell forward. Pt denies SOB. Pt denies hitting head or LOC. PT currently in no obvious distress. Pt has been taking Tylenol with no symptom relief.      Salvador Florentino RN  09/18/24 3243    "

## 2024-09-18 NOTE — ED PROVIDER NOTES
HPI   Chief Complaint   Patient presents with    Fall     Pt presents to the ER with mult complaints due to a fall on Friday. PT has right ankle pain/bruising, Left shin/knee pain, Right rib pain when breathing in, and Left elbow pain with a hard knot. PT does take blood thinners, denies hitting her head or LOC. Msps are intact. PT denies shortness of breath or chest pain. Pt has a hx of falls due to her MS.        Patient is a 59-year-old female with significant PMH of MS presents to the ED with cc of mechanical fall x 5 days.  Patient states she tripped up some concrete stairs and fell forward.  Patient denies any head injury or LOC.  Patient is on Plavix.  Patient endorses right rib pain, left knee pain right ankle pain.  Patient is still able to bear weight.  Patient does endorse some tingling in her right foot.  Patient denies any headache vision changes chest pain shortness of breath.  Patient does endorse pain in her right ribs worse with deep breaths.  Patient states it feels similar to when she has fractured her ribs in the past.  Patient denies any fever chills nausea vomiting abdominal pain diarrhea.              Patient History   Past Medical History:   Diagnosis Date    Abnormal ECG     Antiplatelet or antithrombotic long-term use     PLAVIX    Asthma ([facility])     Calculus of gallbladder without cholecystitis without obstruction     CHF (congestive heart failure) (Multi)     Class 2 obesity with body mass index (BMI) of 37.0 to 37.9 in adult 05/29/2024    37.74 kg/m²    Coronary artery disease     1 cardiac stent placed jan 2018    Encounter for pre-operative cardiovascular clearance 06/03/2024    Mia Edmondson, APRN-CN for Cholecystectomy    Gallstones     GERD (gastroesophageal reflux disease)     Hidradenitis suppurativa     Hypertension     Hypothyroidism     Multiple sclerosis (Multi)     Myocardial infarction (Multi)     [facility] #1, #2 jan 2024 at [facility],  #3 also here at [facility] with huey  attack    Obesity     Sleep apnea     Wears eyeglasses      Past Surgical History:   Procedure Laterality Date    ANKLE SURGERY Left     removed floating bone    CARDIAC CATHETERIZATION N/A 2024    Procedure: Left Heart Cath;  Surgeon: Gagan Kat MD;  Location: [facility];  Service: Cardiovascular;  Laterality: N/A;    CARDIAC CATHETERIZATION N/A 1/3/2024    Procedure: Left Heart Cath;  Surgeon: John Gutierres MD;  Location: [facility];  Service: Cardiovascular;  Laterality: N/A;     SECTION, CLASSIC       Section x4    CHOLECYSTECTOMY  2024    Laparoscopic cholecystectomy with firefly cholangiography    CORONARY STENT PLACEMENT      HYSTERECTOMY  2014    Hysterectomy    OTHER SURGICAL HISTORY  2014    Oophorectomy - Bilat (Removal Of Both Ovaries) Laparoscopic    OTHER SURGICAL HISTORY      Laparosc Gastric Restrictive Proc By Adjustable Gastric Band    OTHER SURGICAL HISTORY      Abscess incision and drainage x4     Family History   Problem Relation Name Age of Onset    Heart attack Mother Elaine Ellissko     Bilateral breast cancer Mother Elaine Olesko     Emphysema Mother Elaine Olesko     Blood clot Mother Elaine Ellissko     Abnormal EKG Mother Elaine Olesko     Anemia Mother Elaine Olesko     Angina Mother Elaine Olesko     Depression Mother Elaine Olesko     Heart disease Mother Elaine Olesko     Lung disease Mother Elaine Ellissko     Thyroid disease Mother Elaine Olesko     Heart attack Father Dawit Olesko     Lymphoma Father Dawit Olesko     Angina Father Dawit Olesko     Cancer Father Dawit Olesko     Diabetes type I Father Dawit Olesko     Heart disease Father Dawit Olesko     Hypertension Father Dawit Olesko     Blood clot Sister      Ovarian cancer Maternal Grandmother Delmy Martínez     Stroke Maternal Grandmother Delmy Martínez     Heart attack Maternal Grandmother Delmy Martínez     Diabetes type I Paternal Grandmother  Olive Garcia     Asthma Sister Heidi Garcia     Kidney disease Sister Kaylynn Garcia      Social History     Tobacco Use    Smoking status: Former     Current packs/day: 0.00     Average packs/day: 1 pack/day for 8.7 years (8.7 ttl pk-yrs)     Types: Cigarettes     Start date: 1983     Quit date: 10/1/1991     Years since quittin.9    Smokeless tobacco: Never   Vaping Use    Vaping status: Never Used   Substance Use Topics    Alcohol use: Yes     Comment: seldom    Drug use: Never       Physical Exam   ED Triage Vitals [24 1244]   Temperature Heart Rate Respirations BP   37.6 °C (99.7 °F) 83 17 122/83      Pulse Ox Temp Source Heart Rate Source Patient Position   94 % Temporal Monitor Sitting      BP Location FiO2 (%)     Left arm --       Physical Exam  HENT:      Head: Normocephalic.   Cardiovascular:      Rate and Rhythm: Normal rate and regular rhythm.      Pulses: Normal pulses.   Pulmonary:      Effort: Pulmonary effort is normal.      Breath sounds: No wheezing, rhonchi or rales.   Abdominal:      Palpations: Abdomen is soft.      Tenderness: There is no abdominal tenderness. There is no right CVA tenderness, left CVA tenderness, guarding or rebound.   Musculoskeletal:         General: Swelling (R ankle) present.      Comments: Pain with plantarflexion of the right ankle.  Full range of motion of all extremities and digits no pain with flexion or extension of the left knee.  Pain on palpation to the lateral malleolus of the right ankle.  Pain on palpation to the right ribs.  No mid spinous or paraspinous tenderness.   Skin:     Findings: Bruising (R ankle) present.   Neurological:      General: No focal deficit present.      Mental Status: She is alert and oriented to person, place, and time. Mental status is at baseline.      Cranial Nerves: No cranial nerve deficit.      Sensory: No sensory deficit.      Motor: No weakness.           ED Course & MDM   Diagnoses as of 24 1546   Fall,  initial encounter   Acute right ankle pain   Acute pain of left knee   Contusion of rib on right side, initial encounter                 No data recorded     Quinn Coma Scale Score: 15 (09/18/24 1245 : Alejandra Cruz, EMT)                           Medical Decision Making  Medical Decision Making:  Patient presented as described in HPI. Patient case including ROS, PE, and treatment and plan discussed with ED attending if attached as cosigner. Due to patients presentation orders completed include as documented.  Patient presents to the ED with cc of fall x 5 days ago.  Patient is having left knee pain, right ankle pain and right rib pain.  Patient was given Lidoderm patch and Toradol.  Patient is nontoxic-appearing, lung sounds are clear abdomen is soft and nontender, Pain with plantarflexion of the right ankle.  Full range of motion of all extremities and digits no pain with flexion or extension of the left knee.  Pain on palpation to the lateral malleolus of the right ankle.  Pain on palpation to the right ribs.  No mid spinous or paraspinous tenderness. patient imaging is negative.  Patient educated on his findings.  Patient educated continue RICE therapy.  Patient has Ace wrap's at home and does not want an Ace wrap here.  Patient educated on any worsening symptoms to return.  Patient given incentive spirometer.  Patient remained stable discharged.  Patient was advised to follow up with PCP or recommended provider in 2-3 days for another evaluation and exam. I advised patient/guardian to return or go to closest emergency room immediately if symptoms change, get worse, new symptoms develop prior to follow up. If there is no improvement in symptoms in the next 24 hours they are advised to return for further evaluation and exam. I also explained the plan and treatment course. Patient/guardian is in agreement with plan, treatment course, and follow up and states verbally that they will comply.      Patient  care discussed with: N/A  Social Determinants affecting care: N/A    Final diagnosis and disposition as below.  See CI    Homegoing. I discussed the differential; results and discharge plan with the patient and/or family/friend/caregiver if present.  I emphasized the importance of follow-up with the physician I referred them to in the timeframe recommended.  I explained reasons for the patient to return to the Emergency Department. They agreed that if they feel their condition is worsening or if they have any other concern they should call 911 immediately for further assistance. I gave the patient an opportunity to ask all questions they had and answered all of them accordingly. They understand return precautions and discharge instructions. The patient and/or family/friend/caregiver expressed understanding verbally and that they would comply.       Disposition:  Discharge      This note has been transcribed using voice recognition and may contain grammatical errors, misplaced words, incorrect words, incorrect phrases or other errors.        XR ankle right 3+ views   Final Result   No evidence of fracture or dislocation. Ankle joint effusion.             MACRO:   None        Signed by: Rebecca Paulino 9/18/2024 3:22 PM   Dictation workstation:   [id number]      XR foot right 3+ views   Final Result   No evidence of fracture dislocation.             MACRO:   None        Signed by: Rebecca Paulino 9/18/2024 3:24 PM   Dictation workstation:   [id number]      XR knee left 4+ views   Final Result   A negative examination.             MACRO:   None        Signed by: Rebecca Paulino 9/18/2024 3:18 PM   Dictation workstation:   [id number]      XR tibia fibula bilateral 2 views   Final Result   No evidence of fracture or dislocation.             MACRO:   None        Signed by: Rebecca Paulino 9/18/2024 3:21 PM   Dictation workstation:   [id number]      CT chest wo IV contrast   Final Result   1. No acute pathology. No rib fractures are  identified.   2. Coronary artery calcifications.   3. Stable sclerotic density in the left side of the vertebral body of   T12.   Signed by Ilan Aden MD         Procedure  Procedures     Ariella Hernandez PA-C  09/18/24 [id number]

## 2024-09-19 ENCOUNTER — TELEPHONE (OUTPATIENT)
Dept: PRIMARY CARE | Facility: CLINIC | Age: 59
End: 2024-09-19
Payer: COMMERCIAL

## 2024-09-21 DIAGNOSIS — B37.9 CANDIDIASIS: ICD-10-CM

## 2024-09-23 RX ORDER — NYSTATIN AND TRIAMCINOLONE ACETONIDE 100000; 1 [USP'U]/G; MG/G
OINTMENT TOPICAL 2 TIMES DAILY
Qty: 60 G | Refills: 0 | Status: SHIPPED | OUTPATIENT
Start: 2024-09-23

## 2024-09-23 RX ORDER — NYSTATIN TOPICAL POWDER 100000 U/G
POWDER TOPICAL 2 TIMES DAILY
Qty: 30 G | Refills: 0 | Status: SHIPPED | OUTPATIENT
Start: 2024-09-23 | End: 2024-09-23 | Stop reason: ALTCHOICE

## 2024-09-23 RX ORDER — NYSTATIN 100000 U/G
OINTMENT TOPICAL 2 TIMES DAILY
Qty: 30 G | Refills: 3 | Status: SHIPPED | OUTPATIENT
Start: 2024-09-23

## 2024-09-25 ENCOUNTER — APPOINTMENT (OUTPATIENT)
Dept: PULMONOLOGY | Facility: CLINIC | Age: 59
End: 2024-09-25
Payer: COMMERCIAL

## 2024-10-09 ENCOUNTER — APPOINTMENT (OUTPATIENT)
Dept: PRIMARY CARE | Facility: CLINIC | Age: 59
End: 2024-10-09
Payer: COMMERCIAL

## 2024-10-10 ENCOUNTER — HOSPITAL ENCOUNTER (OUTPATIENT)
Dept: RADIOLOGY | Facility: CLINIC | Age: 59
Discharge: HOME | End: 2024-10-10
Payer: COMMERCIAL

## 2024-10-10 ENCOUNTER — APPOINTMENT (OUTPATIENT)
Dept: PRIMARY CARE | Facility: CLINIC | Age: 59
End: 2024-10-10
Payer: COMMERCIAL

## 2024-10-10 VITALS
BODY MASS INDEX: 33.99 KG/M2 | SYSTOLIC BLOOD PRESSURE: 111 MMHG | HEART RATE: 66 BPM | DIASTOLIC BLOOD PRESSURE: 55 MMHG | WEIGHT: 217 LBS | OXYGEN SATURATION: 94 %

## 2024-10-10 DIAGNOSIS — J04.0 LARYNGITIS FROM REFLUX OF STOMACH ACID: ICD-10-CM

## 2024-10-10 DIAGNOSIS — R82.998 DARK URINE: ICD-10-CM

## 2024-10-10 DIAGNOSIS — I50.9 EDEMA DUE TO CONGESTIVE HEART FAILURE: ICD-10-CM

## 2024-10-10 DIAGNOSIS — G35 MULTIPLE SCLEROSIS (MULTI): ICD-10-CM

## 2024-10-10 DIAGNOSIS — M25.531 RIGHT WRIST PAIN: ICD-10-CM

## 2024-10-10 DIAGNOSIS — I21.29 MYOCARDIAL INFARCTION INVOLVING OTHER CORONARY ARTERY, UNSPECIFIED MI TYPE (MULTI): ICD-10-CM

## 2024-10-10 DIAGNOSIS — K21.9 LARYNGITIS FROM REFLUX OF STOMACH ACID: ICD-10-CM

## 2024-10-10 DIAGNOSIS — R82.81 PYURIA: ICD-10-CM

## 2024-10-10 DIAGNOSIS — F19.982 DRUG-INDUCED INSOMNIA (MULTI): ICD-10-CM

## 2024-10-10 DIAGNOSIS — E03.9 HYPOTHYROIDISM, UNSPECIFIED TYPE: Primary | ICD-10-CM

## 2024-10-10 LAB
POC APPEARANCE, URINE: CLEAR
POC BILIRUBIN, URINE: NEGATIVE
POC BLOOD, URINE: NEGATIVE
POC COLOR, URINE: YELLOW
POC GLUCOSE, URINE: NEGATIVE MG/DL
POC KETONES, URINE: NEGATIVE MG/DL
POC LEUKOCYTES, URINE: ABNORMAL
POC NITRITE,URINE: NEGATIVE
POC PH, URINE: 7.5 PH
POC PROTEIN, URINE: NEGATIVE MG/DL
POC SPECIFIC GRAVITY, URINE: 1.01
POC UROBILINOGEN, URINE: 0.2 EU/DL

## 2024-10-10 PROCEDURE — 3074F SYST BP LT 130 MM HG: CPT | Performed by: INTERNAL MEDICINE

## 2024-10-10 PROCEDURE — 1036F TOBACCO NON-USER: CPT | Performed by: INTERNAL MEDICINE

## 2024-10-10 PROCEDURE — 73130 X-RAY EXAM OF HAND: CPT | Mod: RT

## 2024-10-10 PROCEDURE — 87186 SC STD MICRODIL/AGAR DIL: CPT

## 2024-10-10 PROCEDURE — 73130 X-RAY EXAM OF HAND: CPT | Mod: RIGHT SIDE | Performed by: RADIOLOGY

## 2024-10-10 PROCEDURE — 99214 OFFICE O/P EST MOD 30 MIN: CPT | Performed by: INTERNAL MEDICINE

## 2024-10-10 PROCEDURE — 87086 URINE CULTURE/COLONY COUNT: CPT

## 2024-10-10 PROCEDURE — 73110 X-RAY EXAM OF WRIST: CPT | Mod: RT

## 2024-10-10 PROCEDURE — 87077 CULTURE AEROBIC IDENTIFY: CPT

## 2024-10-10 PROCEDURE — 3078F DIAST BP <80 MM HG: CPT | Performed by: INTERNAL MEDICINE

## 2024-10-10 PROCEDURE — 81003 URINALYSIS AUTO W/O SCOPE: CPT | Performed by: INTERNAL MEDICINE

## 2024-10-10 PROCEDURE — 73110 X-RAY EXAM OF WRIST: CPT | Mod: RIGHT SIDE | Performed by: RADIOLOGY

## 2024-10-10 RX ORDER — OMEPRAZOLE 40 MG/1
40 CAPSULE, DELAYED RELEASE ORAL
Qty: 90 CAPSULE | Refills: 1 | Status: SHIPPED | OUTPATIENT
Start: 2024-10-10 | End: 2025-04-08

## 2024-10-10 RX ORDER — CLONAZEPAM 0.5 MG/1
0.5 TABLET, ORALLY DISINTEGRATING ORAL NIGHTLY
Qty: 30 TABLET | Refills: 2 | Status: SHIPPED | OUTPATIENT
Start: 2024-10-10

## 2024-10-10 RX ORDER — FUROSEMIDE 80 MG/1
80 TABLET ORAL DAILY
Start: 2024-10-10 | End: 2025-10-10

## 2024-10-10 RX ORDER — TIZANIDINE 4 MG/1
4 TABLET ORAL NIGHTLY PRN
Qty: 30 TABLET | Refills: 1 | Status: SHIPPED | OUTPATIENT
Start: 2024-10-10

## 2024-10-10 NOTE — PATIENT INSTRUCTIONS
[facility]: [phone]   [facility]: [phone]     Please call [phone] to arrange home sleep study or set up in-lab sleep study.    Please complete fasting blood work. Fast for 10 hours, black coffee and water the morning of labs are OK.     Hand xray today     3 mo     3 months

## 2024-10-10 NOTE — PROGRESS NOTES
"Subjective   Patient ID: Elaine Coffey is a 59 y.o. female who presents for Follow-up (3 month follow up).    HPI     Reports a fall last week, she fell onto her right hand/wrist. Still having a lot of pain in her hand wrist. Could not shake my hand today due to the pain.     Reports \"beer colored\" urine. No dysuria, urinary urgency or frequency.     Still taking clonazeapam 0.5mg nightly for sleep. Greatly improves her quality life. No med side effects. UDS UTD needs CSA needed today     Review of Systems   All other systems reviewed and are negative.      Objective   There were no vitals taken for this visit.    Physical Exam  Constitutional:       Appearance: Normal appearance.   HENT:      Head: Normocephalic and atraumatic.   Cardiovascular:      Rate and Rhythm: Normal rate and regular rhythm.      Heart sounds: Normal heart sounds. No murmur heard.     No gallop.   Pulmonary:      Effort: Pulmonary effort is normal. No respiratory distress.      Breath sounds: No wheezing or rales.   Skin:     General: Skin is warm and dry.      Findings: No rash.   Neurological:      Mental Status: She is alert and oriented to person, place, and time. Mental status is at baseline.   Psychiatric:         Mood and Affect: Mood normal.         Behavior: Behavior normal.         Assessment/Plan       #Right hand/wrist pain s/p fall  -Order right hand and wrist xray     #Dark urine  -UA with small LE- no urinary sx. Will send for Cx     #Finger mass and bilateral carpal tunnel  -Has been wearing wrist splints that have been helpful       #Obesity   -Taking Wegovy 1.7mg weekly. Has lost 20 lbs in 3 months      #Insomnia  - Ordered at home sleep study  - Cont amitriptyline 25 mg and  clonazepam 0.5 mg for sleep.   - Follows pulmonology  - UDS: 8/31/24  - CSA: 10/10/24  - I have personally reviewed the [facility]AR[facility] report for Elaine Coffey. This report is scanned into the electronic medical record. I have considered the risks of abuse, " dependence, addiction and diversion.     #MS  - Undergoing PT 1x a week  - Follows neurology at [facility] every 6 months     #CAD s/p PCI in 2018, HFpEF, HTN, HLD   - Follows with cardiology. NO current cardiac symptoms and euvolemic on exam   - Continue Ranex 500 mg BID, Lasix 80 mg daily, Cardizem, ASA, atorvastatin, Zetia and Plavix  -Check lipid panel      #Osteopenia  -DEXA 3/27/24: spine T -2.4, neg FRAX  -Cont D/Ca and exercise. Repeat Dexa in 2 years      #GERD  - Cont omeprezole BID  40mg daily      #Hypothyroidism  - Cont levothyroxine 50mcg daily.    -Check TSH    Influenza: Recommended   COVID: Recommended   Prevnar 20: age 65  Shingrix: 2017 (1 out of 2)  Mamm: due 10/2023- ordered  DEXA: 2/22/24 - due 2 yrs  Pap:   Colorectal ca: 2012--ordered  Lung ca screening: NI      RTC 3 mo

## 2024-10-13 LAB — BACTERIA UR CULT: ABNORMAL

## 2024-10-14 ENCOUNTER — LAB (OUTPATIENT)
Dept: LAB | Facility: LAB | Age: 59
End: 2024-10-14
Payer: COMMERCIAL

## 2024-10-14 DIAGNOSIS — E03.9 HYPOTHYROIDISM, UNSPECIFIED TYPE: ICD-10-CM

## 2024-10-14 DIAGNOSIS — I21.29 MYOCARDIAL INFARCTION INVOLVING OTHER CORONARY ARTERY, UNSPECIFIED MI TYPE (MULTI): ICD-10-CM

## 2024-10-14 DIAGNOSIS — N39.0 URINARY TRACT INFECTION WITHOUT HEMATURIA, SITE UNSPECIFIED: Primary | ICD-10-CM

## 2024-10-14 LAB
CHOLEST SERPL-MCNC: 121 MG/DL (ref 0–199)
CHOLESTEROL/HDL RATIO: 3.4
HDLC SERPL-MCNC: 35.4 MG/DL
LDLC SERPL CALC-MCNC: 57 MG/DL
NON HDL CHOLESTEROL: 86 MG/DL (ref 0–149)
TRIGL SERPL-MCNC: 143 MG/DL (ref 0–149)
TSH SERPL-ACNC: 1.06 MIU/L (ref 0.44–3.98)
VLDL: 29 MG/DL (ref 0–40)

## 2024-10-14 PROCEDURE — 80061 LIPID PANEL: CPT

## 2024-10-14 PROCEDURE — 84443 ASSAY THYROID STIM HORMONE: CPT

## 2024-10-14 PROCEDURE — 36415 COLL VENOUS BLD VENIPUNCTURE: CPT

## 2024-10-14 RX ORDER — NITROFURANTOIN 25; 75 MG/1; MG/1
100 CAPSULE ORAL 2 TIMES DAILY
Qty: 10 CAPSULE | Refills: 0 | Status: SHIPPED | OUTPATIENT
Start: 2024-10-14 | End: 2024-10-19

## 2024-10-16 DIAGNOSIS — E66.01 CLASS 2 SEVERE OBESITY DUE TO EXCESS CALORIES WITH SERIOUS COMORBIDITY AND BODY MASS INDEX (BMI) OF 36.0 TO 36.9 IN ADULT: ICD-10-CM

## 2024-10-16 DIAGNOSIS — E66.812 CLASS 2 SEVERE OBESITY DUE TO EXCESS CALORIES WITH SERIOUS COMORBIDITY AND BODY MASS INDEX (BMI) OF 36.0 TO 36.9 IN ADULT: ICD-10-CM

## 2024-10-18 RX ORDER — SEMAGLUTIDE 2.4 MG/.75ML
2.4 INJECTION, SOLUTION SUBCUTANEOUS WEEKLY
Qty: 3 ML | Refills: 2 | Status: SHIPPED | OUTPATIENT
Start: 2024-10-18

## 2024-10-21 DIAGNOSIS — R82.998 DARK URINE: Primary | ICD-10-CM

## 2024-10-21 DIAGNOSIS — G35 MULTIPLE SCLEROSIS (MULTI): ICD-10-CM

## 2024-10-21 RX ORDER — GABAPENTIN 400 MG/1
400 CAPSULE ORAL 3 TIMES DAILY
Qty: 270 CAPSULE | Refills: 1 | Status: SHIPPED | OUTPATIENT
Start: 2024-10-21

## 2024-10-28 ENCOUNTER — CLINICAL SUPPORT (OUTPATIENT)
Dept: CARDIAC REHAB | Facility: HOSPITAL | Age: 59
End: 2024-10-28

## 2024-10-28 DIAGNOSIS — I21.9 ACUTE MYOCARDIAL INFARCTION, UNSPECIFIED MI TYPE, UNSPECIFIED ARTERY (MULTI): ICD-10-CM

## 2024-10-28 DIAGNOSIS — R30.0 DYSURIA: Primary | ICD-10-CM

## 2024-10-28 PROCEDURE — 9430000001 HC PHASE III CARDIAC REHAB MONTHLY FEE

## 2024-10-28 PROCEDURE — 9430000001 HC PHASE III CARDIAC REHAB MONTHLY FEE: Performed by: INTERNAL MEDICINE

## 2024-10-28 RX ORDER — NITROFURANTOIN 25; 75 MG/1; MG/1
100 CAPSULE ORAL 2 TIMES DAILY
Qty: 10 CAPSULE | Refills: 0 | Status: SHIPPED | OUTPATIENT
Start: 2024-10-28 | End: 2024-11-02

## 2024-10-31 ENCOUNTER — HOSPITAL ENCOUNTER (OUTPATIENT)
Dept: RADIOLOGY | Facility: HOSPITAL | Age: 59
Discharge: HOME | End: 2024-10-31
Payer: COMMERCIAL

## 2024-10-31 VITALS — BODY MASS INDEX: 32.49 KG/M2 | HEIGHT: 67 IN | WEIGHT: 207 LBS

## 2024-10-31 DIAGNOSIS — Z12.31 SCREENING MAMMOGRAM FOR BREAST CANCER: ICD-10-CM

## 2024-10-31 PROCEDURE — 77067 SCR MAMMO BI INCL CAD: CPT

## 2024-10-31 PROCEDURE — 77067 SCR MAMMO BI INCL CAD: CPT | Performed by: RADIOLOGY

## 2024-10-31 PROCEDURE — 77063 BREAST TOMOSYNTHESIS BI: CPT | Performed by: RADIOLOGY

## 2024-11-04 ENCOUNTER — PATIENT MESSAGE (OUTPATIENT)
Dept: PRIMARY CARE | Facility: CLINIC | Age: 59
End: 2024-11-04
Payer: COMMERCIAL

## 2024-11-04 DIAGNOSIS — G35 MULTIPLE SCLEROSIS (MULTI): ICD-10-CM

## 2024-11-14 ENCOUNTER — APPOINTMENT (OUTPATIENT)
Dept: CARDIOLOGY | Facility: HOSPITAL | Age: 59
End: 2024-11-14
Payer: COMMERCIAL

## 2024-11-14 ENCOUNTER — HOSPITAL ENCOUNTER (OUTPATIENT)
Facility: HOSPITAL | Age: 59
Setting detail: OBSERVATION
Discharge: HOME | End: 2024-11-15
Attending: EMERGENCY MEDICINE | Admitting: INTERNAL MEDICINE
Payer: COMMERCIAL

## 2024-11-14 ENCOUNTER — APPOINTMENT (OUTPATIENT)
Dept: RADIOLOGY | Facility: HOSPITAL | Age: 59
End: 2024-11-14
Payer: COMMERCIAL

## 2024-11-14 DIAGNOSIS — I50.9 EDEMA DUE TO CONGESTIVE HEART FAILURE: ICD-10-CM

## 2024-11-14 DIAGNOSIS — R10.84 GENERALIZED ABDOMINAL PAIN: Primary | ICD-10-CM

## 2024-11-14 DIAGNOSIS — K59.00 CONSTIPATION, UNSPECIFIED CONSTIPATION TYPE: ICD-10-CM

## 2024-11-14 DIAGNOSIS — B37.9 CANDIDIASIS: ICD-10-CM

## 2024-11-14 DIAGNOSIS — R11.2 NAUSEA AND VOMITING, UNSPECIFIED VOMITING TYPE: ICD-10-CM

## 2024-11-14 LAB
ALBUMIN SERPL BCP-MCNC: 4.5 G/DL (ref 3.4–5)
ALP SERPL-CCNC: 115 U/L (ref 33–110)
ALT SERPL W P-5'-P-CCNC: 17 U/L (ref 7–45)
ANION GAP SERPL CALC-SCNC: 12 MMOL/L (ref 10–20)
APPEARANCE UR: CLEAR
AST SERPL W P-5'-P-CCNC: 16 U/L (ref 9–39)
BASOPHILS # BLD AUTO: 0.03 X10*3/UL (ref 0–0.1)
BASOPHILS NFR BLD AUTO: 0.2 %
BILIRUB SERPL-MCNC: 1 MG/DL (ref 0–1.2)
BILIRUB UR STRIP.AUTO-MCNC: NEGATIVE MG/DL
BUN SERPL-MCNC: 13 MG/DL (ref 6–23)
CALCIUM SERPL-MCNC: 9.8 MG/DL (ref 8.6–10.3)
CARDIAC TROPONIN I PNL SERPL HS: 3 NG/L (ref 0–13)
CHLORIDE SERPL-SCNC: 98 MMOL/L (ref 98–107)
CO2 SERPL-SCNC: 30 MMOL/L (ref 21–32)
COLOR UR: YELLOW
CREAT SERPL-MCNC: 1.05 MG/DL (ref 0.5–1.05)
EGFRCR SERPLBLD CKD-EPI 2021: 62 ML/MIN/1.73M*2
EOSINOPHIL # BLD AUTO: 0.12 X10*3/UL (ref 0–0.7)
EOSINOPHIL NFR BLD AUTO: 1 %
ERYTHROCYTE [DISTWIDTH] IN BLOOD BY AUTOMATED COUNT: 12.7 % (ref 11.5–14.5)
GLUCOSE SERPL-MCNC: 117 MG/DL (ref 74–99)
GLUCOSE UR STRIP.AUTO-MCNC: NORMAL MG/DL
HCT VFR BLD AUTO: 45.7 % (ref 36–46)
HGB BLD-MCNC: 15.4 G/DL (ref 12–16)
HOLD SPECIMEN: NORMAL
HYALINE CASTS #/AREA URNS AUTO: ABNORMAL /LPF
IMM GRANULOCYTES # BLD AUTO: 0.03 X10*3/UL (ref 0–0.7)
IMM GRANULOCYTES NFR BLD AUTO: 0.2 % (ref 0–0.9)
KETONES UR STRIP.AUTO-MCNC: NEGATIVE MG/DL
LACTATE SERPL-SCNC: 0.8 MMOL/L (ref 0.4–2)
LEUKOCYTE ESTERASE UR QL STRIP.AUTO: NEGATIVE
LIPASE SERPL-CCNC: 27 U/L (ref 9–82)
LYMPHOCYTES # BLD AUTO: 1.01 X10*3/UL (ref 1.2–4.8)
LYMPHOCYTES NFR BLD AUTO: 8.4 %
MAGNESIUM SERPL-MCNC: 2.05 MG/DL (ref 1.6–2.4)
MCH RBC QN AUTO: 30.6 PG (ref 26–34)
MCHC RBC AUTO-ENTMCNC: 33.7 G/DL (ref 32–36)
MCV RBC AUTO: 91 FL (ref 80–100)
MONOCYTES # BLD AUTO: 0.8 X10*3/UL (ref 0.1–1)
MONOCYTES NFR BLD AUTO: 6.7 %
MUCOUS THREADS #/AREA URNS AUTO: ABNORMAL /LPF
NEUTROPHILS # BLD AUTO: 10.03 X10*3/UL (ref 1.2–7.7)
NEUTROPHILS NFR BLD AUTO: 83.5 %
NITRITE UR QL STRIP.AUTO: NEGATIVE
NRBC BLD-RTO: 0 /100 WBCS (ref 0–0)
PH UR STRIP.AUTO: 5.5 [PH]
PLATELET # BLD AUTO: 207 X10*3/UL (ref 150–450)
POTASSIUM SERPL-SCNC: 3.2 MMOL/L (ref 3.5–5.3)
PROT SERPL-MCNC: 6.8 G/DL (ref 6.4–8.2)
PROT UR STRIP.AUTO-MCNC: ABNORMAL MG/DL
RBC # BLD AUTO: 5.03 X10*6/UL (ref 4–5.2)
RBC # UR STRIP.AUTO: NEGATIVE /UL
RBC #/AREA URNS AUTO: ABNORMAL /HPF
SODIUM SERPL-SCNC: 137 MMOL/L (ref 136–145)
SP GR UR STRIP.AUTO: 1.04
SQUAMOUS #/AREA URNS AUTO: ABNORMAL /HPF
UROBILINOGEN UR STRIP.AUTO-MCNC: ABNORMAL MG/DL
WBC # BLD AUTO: 12 X10*3/UL (ref 4.4–11.3)
WBC #/AREA URNS AUTO: ABNORMAL /HPF
WBC CLUMPS #/AREA URNS AUTO: ABNORMAL /HPF

## 2024-11-14 PROCEDURE — 80053 COMPREHEN METABOLIC PANEL: CPT | Performed by: EMERGENCY MEDICINE

## 2024-11-14 PROCEDURE — 2550000001 HC RX 255 CONTRASTS: Performed by: EMERGENCY MEDICINE

## 2024-11-14 PROCEDURE — 96376 TX/PRO/DX INJ SAME DRUG ADON: CPT

## 2024-11-14 PROCEDURE — 2500000004 HC RX 250 GENERAL PHARMACY W/ HCPCS (ALT 636 FOR OP/ED): Performed by: INTERNAL MEDICINE

## 2024-11-14 PROCEDURE — 2500000004 HC RX 250 GENERAL PHARMACY W/ HCPCS (ALT 636 FOR OP/ED): Performed by: EMERGENCY MEDICINE

## 2024-11-14 PROCEDURE — 85025 COMPLETE CBC W/AUTO DIFF WBC: CPT | Performed by: EMERGENCY MEDICINE

## 2024-11-14 PROCEDURE — 2500000001 HC RX 250 WO HCPCS SELF ADMINISTERED DRUGS (ALT 637 FOR MEDICARE OP): Performed by: INTERNAL MEDICINE

## 2024-11-14 PROCEDURE — 84484 ASSAY OF TROPONIN QUANT: CPT | Performed by: EMERGENCY MEDICINE

## 2024-11-14 PROCEDURE — 93005 ELECTROCARDIOGRAM TRACING: CPT

## 2024-11-14 PROCEDURE — 96375 TX/PRO/DX INJ NEW DRUG ADDON: CPT

## 2024-11-14 PROCEDURE — G0378 HOSPITAL OBSERVATION PER HR: HCPCS

## 2024-11-14 PROCEDURE — 2500000002 HC RX 250 W HCPCS SELF ADMINISTERED DRUGS (ALT 637 FOR MEDICARE OP, ALT 636 FOR OP/ED): Performed by: INTERNAL MEDICINE

## 2024-11-14 PROCEDURE — 87506 IADNA-DNA/RNA PROBE TQ 6-11: CPT | Mod: GEALAB | Performed by: PHYSICIAN ASSISTANT

## 2024-11-14 PROCEDURE — 74177 CT ABD & PELVIS W/CONTRAST: CPT

## 2024-11-14 PROCEDURE — 94760 N-INVAS EAR/PLS OXIMETRY 1: CPT

## 2024-11-14 PROCEDURE — 96372 THER/PROPH/DIAG INJ SC/IM: CPT | Performed by: INTERNAL MEDICINE

## 2024-11-14 PROCEDURE — 36415 COLL VENOUS BLD VENIPUNCTURE: CPT | Performed by: EMERGENCY MEDICINE

## 2024-11-14 PROCEDURE — 96372 THER/PROPH/DIAG INJ SC/IM: CPT

## 2024-11-14 PROCEDURE — 96367 TX/PROPH/DG ADDL SEQ IV INF: CPT

## 2024-11-14 PROCEDURE — 74177 CT ABD & PELVIS W/CONTRAST: CPT | Mod: FOREIGN READ | Performed by: RADIOLOGY

## 2024-11-14 PROCEDURE — 96366 THER/PROPH/DIAG IV INF ADDON: CPT

## 2024-11-14 PROCEDURE — 81001 URINALYSIS AUTO W/SCOPE: CPT | Performed by: EMERGENCY MEDICINE

## 2024-11-14 PROCEDURE — 2500000001 HC RX 250 WO HCPCS SELF ADMINISTERED DRUGS (ALT 637 FOR MEDICARE OP): Performed by: PHYSICIAN ASSISTANT

## 2024-11-14 PROCEDURE — 2500000004 HC RX 250 GENERAL PHARMACY W/ HCPCS (ALT 636 FOR OP/ED)

## 2024-11-14 PROCEDURE — 83690 ASSAY OF LIPASE: CPT | Performed by: EMERGENCY MEDICINE

## 2024-11-14 PROCEDURE — 2500000004 HC RX 250 GENERAL PHARMACY W/ HCPCS (ALT 636 FOR OP/ED): Performed by: SURGERY

## 2024-11-14 PROCEDURE — 2500000001 HC RX 250 WO HCPCS SELF ADMINISTERED DRUGS (ALT 637 FOR MEDICARE OP)

## 2024-11-14 PROCEDURE — 2500000001 HC RX 250 WO HCPCS SELF ADMINISTERED DRUGS (ALT 637 FOR MEDICARE OP): Performed by: STUDENT IN AN ORGANIZED HEALTH CARE EDUCATION/TRAINING PROGRAM

## 2024-11-14 PROCEDURE — 2500000004 HC RX 250 GENERAL PHARMACY W/ HCPCS (ALT 636 FOR OP/ED): Performed by: STUDENT IN AN ORGANIZED HEALTH CARE EDUCATION/TRAINING PROGRAM

## 2024-11-14 PROCEDURE — 83735 ASSAY OF MAGNESIUM: CPT | Performed by: STUDENT IN AN ORGANIZED HEALTH CARE EDUCATION/TRAINING PROGRAM

## 2024-11-14 PROCEDURE — 99223 1ST HOSP IP/OBS HIGH 75: CPT | Performed by: INTERNAL MEDICINE

## 2024-11-14 PROCEDURE — 96361 HYDRATE IV INFUSION ADD-ON: CPT

## 2024-11-14 PROCEDURE — 83605 ASSAY OF LACTIC ACID: CPT | Performed by: EMERGENCY MEDICINE

## 2024-11-14 PROCEDURE — 99222 1ST HOSP IP/OBS MODERATE 55: CPT | Performed by: NURSE PRACTITIONER

## 2024-11-14 PROCEDURE — 96365 THER/PROPH/DIAG IV INF INIT: CPT | Mod: 59

## 2024-11-14 PROCEDURE — 99285 EMERGENCY DEPT VISIT HI MDM: CPT | Mod: 25

## 2024-11-14 RX ORDER — MORPHINE SULFATE 2 MG/ML
2 INJECTION, SOLUTION INTRAMUSCULAR; INTRAVENOUS ONCE
Status: COMPLETED | OUTPATIENT
Start: 2024-11-14 | End: 2024-11-14

## 2024-11-14 RX ORDER — PANTOPRAZOLE SODIUM 40 MG/1
40 TABLET, DELAYED RELEASE ORAL
Status: DISCONTINUED | OUTPATIENT
Start: 2024-11-14 | End: 2024-11-15 | Stop reason: HOSPADM

## 2024-11-14 RX ORDER — HEPARIN SODIUM 5000 [USP'U]/ML
5000 INJECTION, SOLUTION INTRAVENOUS; SUBCUTANEOUS EVERY 8 HOURS SCHEDULED
Status: DISCONTINUED | OUTPATIENT
Start: 2024-11-14 | End: 2024-11-15 | Stop reason: HOSPADM

## 2024-11-14 RX ORDER — ONDANSETRON HYDROCHLORIDE 2 MG/ML
4 INJECTION, SOLUTION INTRAVENOUS ONCE
Status: COMPLETED | OUTPATIENT
Start: 2024-11-14 | End: 2024-11-14

## 2024-11-14 RX ORDER — GUAIFENESIN/DEXTROMETHORPHAN 100-10MG/5
5 SYRUP ORAL EVERY 4 HOURS PRN
Status: DISCONTINUED | OUTPATIENT
Start: 2024-11-14 | End: 2024-11-15 | Stop reason: HOSPADM

## 2024-11-14 RX ORDER — ALBUTEROL SULFATE 0.83 MG/ML
2.5 SOLUTION RESPIRATORY (INHALATION) EVERY 4 HOURS PRN
Status: DISCONTINUED | OUTPATIENT
Start: 2024-11-14 | End: 2024-11-14

## 2024-11-14 RX ORDER — MORPHINE SULFATE 2 MG/ML
1 INJECTION, SOLUTION INTRAMUSCULAR; INTRAVENOUS EVERY 4 HOURS PRN
Status: DISCONTINUED | OUTPATIENT
Start: 2024-11-14 | End: 2024-11-15 | Stop reason: HOSPADM

## 2024-11-14 RX ORDER — GABAPENTIN 400 MG/1
400 CAPSULE ORAL 3 TIMES DAILY
Status: DISCONTINUED | OUTPATIENT
Start: 2024-11-14 | End: 2024-11-15 | Stop reason: HOSPADM

## 2024-11-14 RX ORDER — LEVOTHYROXINE SODIUM 50 UG/1
50 TABLET ORAL DAILY
Status: DISCONTINUED | OUTPATIENT
Start: 2024-11-14 | End: 2024-11-15 | Stop reason: HOSPADM

## 2024-11-14 RX ORDER — EZETIMIBE 10 MG/1
10 TABLET ORAL NIGHTLY
Status: DISCONTINUED | OUTPATIENT
Start: 2024-11-14 | End: 2024-11-15 | Stop reason: HOSPADM

## 2024-11-14 RX ORDER — AMITRIPTYLINE HYDROCHLORIDE 25 MG/1
25 TABLET, FILM COATED ORAL NIGHTLY
Status: DISCONTINUED | OUTPATIENT
Start: 2024-11-14 | End: 2024-11-15 | Stop reason: HOSPADM

## 2024-11-14 RX ORDER — DILTIAZEM HYDROCHLORIDE 120 MG/1
240 CAPSULE, COATED, EXTENDED RELEASE ORAL DAILY
Status: DISCONTINUED | OUTPATIENT
Start: 2024-11-14 | End: 2024-11-15 | Stop reason: HOSPADM

## 2024-11-14 RX ORDER — MORPHINE SULFATE 2 MG/ML
2 INJECTION, SOLUTION INTRAMUSCULAR; INTRAVENOUS EVERY 4 HOURS PRN
Status: DISCONTINUED | OUTPATIENT
Start: 2024-11-14 | End: 2024-11-15 | Stop reason: HOSPADM

## 2024-11-14 RX ORDER — ONDANSETRON HYDROCHLORIDE 2 MG/ML
4 INJECTION, SOLUTION INTRAVENOUS EVERY 8 HOURS PRN
Status: DISCONTINUED | OUTPATIENT
Start: 2024-11-14 | End: 2024-11-15 | Stop reason: HOSPADM

## 2024-11-14 RX ORDER — CALCIUM CARBONATE 200(500)MG
500 TABLET,CHEWABLE ORAL 4 TIMES DAILY PRN
Status: DISCONTINUED | OUTPATIENT
Start: 2024-11-14 | End: 2024-11-15 | Stop reason: HOSPADM

## 2024-11-14 RX ORDER — METOPROLOL SUCCINATE 25 MG/1
25 TABLET, EXTENDED RELEASE ORAL DAILY
Status: DISCONTINUED | OUTPATIENT
Start: 2024-11-14 | End: 2024-11-15 | Stop reason: HOSPADM

## 2024-11-14 RX ORDER — MORPHINE SULFATE 2 MG/ML
INJECTION, SOLUTION INTRAMUSCULAR; INTRAVENOUS
Status: DISPENSED
Start: 2024-11-14 | End: 2024-11-14

## 2024-11-14 RX ORDER — ASPIRIN 81 MG/1
81 TABLET ORAL EVERY MORNING
Status: DISCONTINUED | OUTPATIENT
Start: 2024-11-14 | End: 2024-11-15 | Stop reason: HOSPADM

## 2024-11-14 RX ORDER — GUAIFENESIN 600 MG/1
600 TABLET, EXTENDED RELEASE ORAL EVERY 12 HOURS PRN
Status: DISCONTINUED | OUTPATIENT
Start: 2024-11-14 | End: 2024-11-15 | Stop reason: HOSPADM

## 2024-11-14 RX ORDER — METRONIDAZOLE 500 MG/100ML
INJECTION, SOLUTION INTRAVENOUS
Status: COMPLETED
Start: 2024-11-14 | End: 2024-11-14

## 2024-11-14 RX ORDER — METRONIDAZOLE 500 MG/100ML
500 INJECTION, SOLUTION INTRAVENOUS EVERY 8 HOURS
Status: DISCONTINUED | OUTPATIENT
Start: 2024-11-14 | End: 2024-11-15 | Stop reason: HOSPADM

## 2024-11-14 RX ORDER — SENNOSIDES 8.6 MG/1
1 TABLET ORAL 2 TIMES DAILY
Status: DISCONTINUED | OUTPATIENT
Start: 2024-11-14 | End: 2024-11-15 | Stop reason: HOSPADM

## 2024-11-14 RX ORDER — ACETAMINOPHEN 325 MG/1
650 TABLET ORAL EVERY 4 HOURS PRN
Status: DISCONTINUED | OUTPATIENT
Start: 2024-11-14 | End: 2024-11-15 | Stop reason: HOSPADM

## 2024-11-14 RX ORDER — CLOPIDOGREL BISULFATE 75 MG/1
75 TABLET ORAL DAILY
Status: DISCONTINUED | OUTPATIENT
Start: 2024-11-14 | End: 2024-11-15 | Stop reason: HOSPADM

## 2024-11-14 RX ORDER — POLYETHYLENE GLYCOL 3350 17 G/17G
17 POWDER, FOR SOLUTION ORAL DAILY
Status: DISCONTINUED | OUTPATIENT
Start: 2024-11-14 | End: 2024-11-14

## 2024-11-14 RX ORDER — POTASSIUM CHLORIDE 1.5 G/1.58G
40 POWDER, FOR SOLUTION ORAL ONCE
Status: COMPLETED | OUTPATIENT
Start: 2024-11-14 | End: 2024-11-14

## 2024-11-14 RX ORDER — POLYETHYLENE GLYCOL 3350 17 G/17G
17 POWDER, FOR SOLUTION ORAL 2 TIMES DAILY
Status: DISCONTINUED | OUTPATIENT
Start: 2024-11-14 | End: 2024-11-15 | Stop reason: HOSPADM

## 2024-11-14 RX ORDER — CALCIUM CARBONATE 500(1250)
1250 TABLET ORAL
Status: DISCONTINUED | OUTPATIENT
Start: 2024-11-14 | End: 2024-11-15 | Stop reason: HOSPADM

## 2024-11-14 RX ORDER — ACETAMINOPHEN 160 MG/5ML
650 SOLUTION ORAL EVERY 4 HOURS PRN
Status: DISCONTINUED | OUTPATIENT
Start: 2024-11-14 | End: 2024-11-15 | Stop reason: HOSPADM

## 2024-11-14 RX ORDER — ONDANSETRON 4 MG/1
4 TABLET, FILM COATED ORAL EVERY 8 HOURS PRN
Status: DISCONTINUED | OUTPATIENT
Start: 2024-11-14 | End: 2024-11-15 | Stop reason: HOSPADM

## 2024-11-14 RX ORDER — POLYETHYLENE GLYCOL 3350 17 G/17G
238 POWDER, FOR SOLUTION ORAL ONCE
Status: COMPLETED | OUTPATIENT
Start: 2024-11-14 | End: 2024-11-14

## 2024-11-14 RX ORDER — ACETAMINOPHEN 650 MG/1
650 SUPPOSITORY RECTAL EVERY 4 HOURS PRN
Status: DISCONTINUED | OUTPATIENT
Start: 2024-11-14 | End: 2024-11-15 | Stop reason: HOSPADM

## 2024-11-14 RX ORDER — CLONAZEPAM 0.5 MG/1
0.5 TABLET, ORALLY DISINTEGRATING ORAL NIGHTLY
Status: DISCONTINUED | OUTPATIENT
Start: 2024-11-14 | End: 2024-11-14

## 2024-11-14 RX ORDER — ALBUTEROL SULFATE 0.83 MG/ML
2.5 SOLUTION RESPIRATORY (INHALATION) EVERY 2 HOUR PRN
Status: DISCONTINUED | OUTPATIENT
Start: 2024-11-14 | End: 2024-11-15 | Stop reason: HOSPADM

## 2024-11-14 RX ORDER — NAPROXEN SODIUM 220 MG/1
TABLET, FILM COATED ORAL
Status: DISPENSED
Start: 2024-11-14 | End: 2024-11-14

## 2024-11-14 RX ORDER — TIZANIDINE 4 MG/1
4 TABLET ORAL NIGHTLY PRN
Status: DISCONTINUED | OUTPATIENT
Start: 2024-11-14 | End: 2024-11-15 | Stop reason: HOSPADM

## 2024-11-14 RX ORDER — RANOLAZINE 500 MG/1
500 TABLET, EXTENDED RELEASE ORAL 2 TIMES DAILY
Status: DISCONTINUED | OUTPATIENT
Start: 2024-11-14 | End: 2024-11-15 | Stop reason: HOSPADM

## 2024-11-14 RX ORDER — FLUOXETINE HYDROCHLORIDE 20 MG/1
40 CAPSULE ORAL DAILY
Status: DISCONTINUED | OUTPATIENT
Start: 2024-11-14 | End: 2024-11-15 | Stop reason: HOSPADM

## 2024-11-14 RX ORDER — ALUMINUM HYDROXIDE, MAGNESIUM HYDROXIDE, AND SIMETHICONE 1200; 120; 1200 MG/30ML; MG/30ML; MG/30ML
30 SUSPENSION ORAL EVERY 6 HOURS PRN
Status: DISCONTINUED | OUTPATIENT
Start: 2024-11-14 | End: 2024-11-15 | Stop reason: HOSPADM

## 2024-11-14 RX ORDER — ALBUTEROL SULFATE 90 UG/1
2 INHALANT RESPIRATORY (INHALATION) EVERY 6 HOURS PRN
Status: DISCONTINUED | OUTPATIENT
Start: 2024-11-14 | End: 2024-11-15 | Stop reason: HOSPADM

## 2024-11-14 RX ORDER — DEXTROSE MONOHYDRATE, SODIUM CHLORIDE, AND POTASSIUM CHLORIDE 50; 2.98; 9 G/1000ML; G/1000ML; G/1000ML
100 INJECTION, SOLUTION INTRAVENOUS CONTINUOUS
Status: DISCONTINUED | OUTPATIENT
Start: 2024-11-14 | End: 2024-11-15

## 2024-11-14 RX ORDER — CEFTRIAXONE 1 G/50ML
1 INJECTION, SOLUTION INTRAVENOUS EVERY 12 HOURS
Status: DISCONTINUED | OUTPATIENT
Start: 2024-11-14 | End: 2024-11-15 | Stop reason: HOSPADM

## 2024-11-14 RX ORDER — MECLIZINE HYDROCHLORIDE 25 MG/1
25 TABLET ORAL EVERY 6 HOURS PRN
Status: DISCONTINUED | OUTPATIENT
Start: 2024-11-14 | End: 2024-11-15 | Stop reason: HOSPADM

## 2024-11-14 RX ORDER — DOCUSATE SODIUM 100 MG/1
100 CAPSULE, LIQUID FILLED ORAL 2 TIMES DAILY
Status: DISCONTINUED | OUTPATIENT
Start: 2024-11-14 | End: 2024-11-15 | Stop reason: HOSPADM

## 2024-11-14 RX ORDER — CEFTRIAXONE 1 G/50ML
INJECTION, SOLUTION INTRAVENOUS
Status: COMPLETED
Start: 2024-11-14 | End: 2024-11-14

## 2024-11-14 RX ORDER — CLONAZEPAM 1 MG/1
0.5 TABLET ORAL NIGHTLY
Status: DISCONTINUED | OUTPATIENT
Start: 2024-11-14 | End: 2024-11-15 | Stop reason: HOSPADM

## 2024-11-14 RX ORDER — ATORVASTATIN CALCIUM 40 MG/1
40 TABLET, FILM COATED ORAL DAILY
Status: DISCONTINUED | OUTPATIENT
Start: 2024-11-14 | End: 2024-11-15 | Stop reason: HOSPADM

## 2024-11-14 RX ORDER — HEPARIN SODIUM 5000 [USP'U]/ML
INJECTION, SOLUTION INTRAVENOUS; SUBCUTANEOUS
Status: COMPLETED
Start: 2024-11-14 | End: 2024-11-14

## 2024-11-14 RX ORDER — POTASSIUM CHLORIDE 20 MEQ/1
40 TABLET, EXTENDED RELEASE ORAL ONCE
Status: DISCONTINUED | OUTPATIENT
Start: 2024-11-14 | End: 2024-11-14

## 2024-11-14 SDOH — ECONOMIC STABILITY: FOOD INSECURITY: WITHIN THE PAST 12 MONTHS, THE FOOD YOU BOUGHT JUST DIDN'T LAST AND YOU DIDN'T HAVE MONEY TO GET MORE.: NEVER TRUE

## 2024-11-14 SDOH — SOCIAL STABILITY: SOCIAL INSECURITY: ABUSE: ADULT

## 2024-11-14 SDOH — SOCIAL STABILITY: SOCIAL INSECURITY: DO YOU FEEL UNSAFE GOING BACK TO THE PLACE WHERE YOU ARE LIVING?: NO

## 2024-11-14 SDOH — SOCIAL STABILITY: SOCIAL INSECURITY: WERE YOU ABLE TO COMPLETE ALL THE BEHAVIORAL HEALTH SCREENINGS?: YES

## 2024-11-14 SDOH — SOCIAL STABILITY: SOCIAL INSECURITY: WITHIN THE LAST YEAR, HAVE YOU BEEN AFRAID OF YOUR PARTNER OR EX-PARTNER?: NO

## 2024-11-14 SDOH — SOCIAL STABILITY: SOCIAL INSECURITY: HAVE YOU HAD ANY THOUGHTS OF HARMING ANYONE ELSE?: NO

## 2024-11-14 SDOH — SOCIAL STABILITY: SOCIAL INSECURITY: WITHIN THE LAST YEAR, HAVE YOU BEEN HUMILIATED OR EMOTIONALLY ABUSED IN OTHER WAYS BY YOUR PARTNER OR EX-PARTNER?: NO

## 2024-11-14 SDOH — SOCIAL STABILITY: SOCIAL INSECURITY: DO YOU FEEL ANYONE HAS EXPLOITED OR TAKEN ADVANTAGE OF YOU FINANCIALLY OR OF YOUR PERSONAL PROPERTY?: NO

## 2024-11-14 SDOH — SOCIAL STABILITY: SOCIAL INSECURITY: HAVE YOU HAD THOUGHTS OF HARMING ANYONE ELSE?: NO

## 2024-11-14 SDOH — SOCIAL STABILITY: SOCIAL INSECURITY: ARE YOU OR HAVE YOU BEEN THREATENED OR ABUSED PHYSICALLY, EMOTIONALLY, OR SEXUALLY BY ANYONE?: NO

## 2024-11-14 SDOH — SOCIAL STABILITY: SOCIAL INSECURITY
WITHIN THE LAST YEAR, HAVE YOU BEEN KICKED, HIT, SLAPPED, OR OTHERWISE PHYSICALLY HURT BY YOUR PARTNER OR EX-PARTNER?: NO

## 2024-11-14 SDOH — SOCIAL STABILITY: SOCIAL INSECURITY: DOES ANYONE TRY TO KEEP YOU FROM HAVING/CONTACTING OTHER FRIENDS OR DOING THINGS OUTSIDE YOUR HOME?: NO

## 2024-11-14 SDOH — ECONOMIC STABILITY: INCOME INSECURITY: IN THE PAST 12 MONTHS HAS THE ELECTRIC, GAS, OIL, OR WATER COMPANY THREATENED TO SHUT OFF SERVICES IN YOUR HOME?: NO

## 2024-11-14 SDOH — SOCIAL STABILITY: SOCIAL INSECURITY: ARE THERE ANY APPARENT SIGNS OF INJURIES/BEHAVIORS THAT COULD BE RELATED TO ABUSE/NEGLECT?: NO

## 2024-11-14 SDOH — SOCIAL STABILITY: SOCIAL INSECURITY
WITHIN THE LAST YEAR, HAVE YOU BEEN RAPED OR FORCED TO HAVE ANY KIND OF SEXUAL ACTIVITY BY YOUR PARTNER OR EX-PARTNER?: NO

## 2024-11-14 SDOH — ECONOMIC STABILITY: FOOD INSECURITY: WITHIN THE PAST 12 MONTHS, YOU WORRIED THAT YOUR FOOD WOULD RUN OUT BEFORE YOU GOT THE MONEY TO BUY MORE.: NEVER TRUE

## 2024-11-14 SDOH — SOCIAL STABILITY: SOCIAL INSECURITY: HAS ANYONE EVER THREATENED TO HURT YOUR FAMILY OR YOUR PETS?: NO

## 2024-11-14 ASSESSMENT — COGNITIVE AND FUNCTIONAL STATUS - GENERAL
MOBILITY SCORE: 24
PATIENT BASELINE BEDBOUND: NO
DAILY ACTIVITIY SCORE: 24
DAILY ACTIVITIY SCORE: 24
MOBILITY SCORE: 24

## 2024-11-14 ASSESSMENT — PAIN SCALES - GENERAL
PAINLEVEL_OUTOF10: 10 - WORST POSSIBLE PAIN
PAINLEVEL_OUTOF10: 0 - NO PAIN
PAINLEVEL_OUTOF10: 10 - WORST POSSIBLE PAIN
PAINLEVEL_OUTOF10: 0 - NO PAIN
PAINLEVEL_OUTOF10: 0 - NO PAIN

## 2024-11-14 ASSESSMENT — PAIN - FUNCTIONAL ASSESSMENT
PAIN_FUNCTIONAL_ASSESSMENT: 0-10

## 2024-11-14 ASSESSMENT — ENCOUNTER SYMPTOMS
NEUROLOGICAL NEGATIVE: 1
CONSTIPATION: 1
PALPITATIONS: 0
COUGH: 0
EYES NEGATIVE: 1
ROS GI COMMENTS: SEE HPI
FATIGUE: 0
HEADACHES: 0
CARDIOVASCULAR NEGATIVE: 1
ENDOCRINE NEGATIVE: 1
DIZZINESS: 0
RESPIRATORY NEGATIVE: 1
WHEEZING: 0
ACTIVITY CHANGE: 1
LIGHT-HEADEDNESS: 0
APPETITE CHANGE: 1
HEMATOLOGIC/LYMPHATIC NEGATIVE: 1
FEVER: 0
ABDOMINAL PAIN: 1
PSYCHIATRIC NEGATIVE: 1
CHEST TIGHTNESS: 0
CHILLS: 0
SHORTNESS OF BREATH: 0
ABDOMINAL DISTENTION: 1
DIAPHORESIS: 0
MUSCULOSKELETAL NEGATIVE: 1
ALLERGIC/IMMUNOLOGIC NEGATIVE: 1
DIFFICULTY URINATING: 0

## 2024-11-14 ASSESSMENT — PATIENT HEALTH QUESTIONNAIRE - PHQ9
2. FEELING DOWN, DEPRESSED OR HOPELESS: NOT AT ALL
1. LITTLE INTEREST OR PLEASURE IN DOING THINGS: NOT AT ALL
SUM OF ALL RESPONSES TO PHQ9 QUESTIONS 1 & 2: 0

## 2024-11-14 ASSESSMENT — ACTIVITIES OF DAILY LIVING (ADL)
BATHING: INDEPENDENT
HEARING - RIGHT EAR: FUNCTIONAL
GROOMING: INDEPENDENT
ASSISTIVE_DEVICE: EYEGLASSES
JUDGMENT_ADEQUATE_SAFELY_COMPLETE_DAILY_ACTIVITIES: YES
HEARING - LEFT EAR: FUNCTIONAL
WALKS IN HOME: INDEPENDENT
ADEQUATE_TO_COMPLETE_ADL: YES
DRESSING YOURSELF: INDEPENDENT
TOILETING: INDEPENDENT
FEEDING YOURSELF: INDEPENDENT
PATIENT'S MEMORY ADEQUATE TO SAFELY COMPLETE DAILY ACTIVITIES?: YES
LACK_OF_TRANSPORTATION: NO

## 2024-11-14 ASSESSMENT — LIFESTYLE VARIABLES
HOW OFTEN DO YOU HAVE A DRINK CONTAINING ALCOHOL: NEVER
AUDIT-C TOTAL SCORE: 0
AUDIT-C TOTAL SCORE: 0
EVER HAD A DRINK FIRST THING IN THE MORNING TO STEADY YOUR NERVES TO GET RID OF A HANGOVER: NO
HAVE YOU EVER FELT YOU SHOULD CUT DOWN ON YOUR DRINKING: NO
SKIP TO QUESTIONS 9-10: 1
HOW MANY STANDARD DRINKS CONTAINING ALCOHOL DO YOU HAVE ON A TYPICAL DAY: PATIENT DOES NOT DRINK
HOW OFTEN DO YOU HAVE 6 OR MORE DRINKS ON ONE OCCASION: NEVER
TOTAL SCORE: 0
HAVE PEOPLE ANNOYED YOU BY CRITICIZING YOUR DRINKING: NO
EVER FELT BAD OR GUILTY ABOUT YOUR DRINKING: NO

## 2024-11-14 ASSESSMENT — PAIN DESCRIPTION - LOCATION: LOCATION: ABDOMEN

## 2024-11-14 ASSESSMENT — PAIN DESCRIPTION - PROGRESSION: CLINICAL_PROGRESSION: RESOLVED

## 2024-11-14 NOTE — PROGRESS NOTES
"Elaine Coffey is a 59 y.o. female on day 0 of admission presenting with Abdominal pain, generalized.    Subjective   Pt continues to have abdominal pain. She is starting to have BM.        Objective     Physical Exam  Vitals and nursing note reviewed.   Constitutional:       General: She is not in acute distress.     Appearance: Normal appearance. She is not ill-appearing or toxic-appearing.   HENT:      Head: Normocephalic and atraumatic.      Nose: Nose normal.      Mouth/Throat:      Mouth: Mucous membranes are moist.   Eyes:      Extraocular Movements: Extraocular movements intact.      Conjunctiva/sclera: Conjunctivae normal.      Pupils: Pupils are equal, round, and reactive to light.   Cardiovascular:      Rate and Rhythm: Normal rate and regular rhythm.      Heart sounds: No murmur heard.     No gallop.   Pulmonary:      Effort: Pulmonary effort is normal. No respiratory distress.      Breath sounds: Normal breath sounds. No wheezing, rhonchi or rales.   Abdominal:      General: Abdomen is flat. Bowel sounds are normal. There is distension.      Palpations: Abdomen is soft. There is no mass.      Tenderness: There is abdominal tenderness.   Musculoskeletal:         General: No swelling or tenderness. Normal range of motion.      Cervical back: Normal range of motion and neck supple.   Skin:     General: Skin is warm and dry.   Neurological:      General: No focal deficit present.      Mental Status: She is alert and oriented to person, place, and time. Mental status is at baseline.   Psychiatric:         Mood and Affect: Mood normal.         Behavior: Behavior normal.         Thought Content: Thought content normal.         Judgment: Judgment normal.         Last Recorded Vitals:  /71   Pulse 79   Temp 35.7 °C (96.3 °F)   Resp 18   Ht 1.702 m (5' 7\")   Wt 93.9 kg (207 lb 0.2 oz)   SpO2 (!) 89%   BMI 32.42 kg/m²      Scheduled medications:  amitriptyline, 25 mg, oral, Nightly  aspirin, , , "   aspirin, 81 mg, oral, q AM  atorvastatin, 40 mg, oral, Daily  calcium carbonate, 1,250 mg, oral, BID  cefTRIAXone, 1 g, intravenous, q12h  clonazePAM, 0.5 mg, oral, Nightly  clopidogrel, 75 mg, oral, Daily  dilTIAZem CD, 240 mg, oral, Daily  docusate sodium, 100 mg, oral, BID  ezetimibe, 10 mg, oral, Nightly  FLUoxetine, 40 mg, oral, Daily  gabapentin, 400 mg, oral, TID  heparin (porcine), 5,000 Units, subcutaneous, q8h JEAN  levothyroxine, 50 mcg, oral, Daily  metoprolol succinate XL, 25 mg, oral, Daily  metroNIDAZOLE, 500 mg, intravenous, q8h  pantoprazole, 40 mg, oral, Daily before breakfast  polyethylene glycol, 17 g, oral, BID  ranolazine, 500 mg, oral, BID  sennosides, 1 tablet, oral, BID      Continuous medications:  potassium chloride-D5-0.9%NaCl, 100 mL/hr, Last Rate: 100 mL/hr (11/14/24 1131)      PRN medications:  PRN medications: acetaminophen **OR** acetaminophen **OR** acetaminophen, albuterol, albuterol, alum-mag hydroxide-simeth, aspirin, calcium carbonate, dextromethorphan-guaifenesin, guaiFENesin, meclizine, morphine **OR** morphine, ondansetron **OR** ondansetron, tiZANidine     Relevant Results:  Results for orders placed or performed during the hospital encounter of 11/14/24 (from the past 24 hours)   CBC and Auto Differential   Result Value Ref Range    WBC 12.0 (H) 4.4 - 11.3 x10*3/uL    nRBC 0.0 0.0 - 0.0 /100 WBCs    RBC 5.03 4.00 - 5.20 x10*6/uL    Hemoglobin 15.4 12.0 - 16.0 g/dL    Hematocrit 45.7 36.0 - 46.0 %    MCV 91 80 - 100 fL    MCH 30.6 26.0 - 34.0 pg    MCHC 33.7 32.0 - 36.0 g/dL    RDW 12.7 11.5 - 14.5 %    Platelets 207 150 - 450 x10*3/uL    Neutrophils % 83.5 40.0 - 80.0 %    Immature Granulocytes %, Automated 0.2 0.0 - 0.9 %    Lymphocytes % 8.4 13.0 - 44.0 %    Monocytes % 6.7 2.0 - 10.0 %    Eosinophils % 1.0 0.0 - 6.0 %    Basophils % 0.2 0.0 - 2.0 %    Neutrophils Absolute 10.03 (H) 1.20 - 7.70 x10*3/uL    Immature Granulocytes Absolute, Automated 0.03 0.00 - 0.70  x10*3/uL    Lymphocytes Absolute 1.01 (L) 1.20 - 4.80 x10*3/uL    Monocytes Absolute 0.80 0.10 - 1.00 x10*3/uL    Eosinophils Absolute 0.12 0.00 - 0.70 x10*3/uL    Basophils Absolute 0.03 0.00 - 0.10 x10*3/uL   Comprehensive metabolic panel   Result Value Ref Range    Glucose 117 (H) 74 - 99 mg/dL    Sodium 137 136 - 145 mmol/L    Potassium 3.2 (L) 3.5 - 5.3 mmol/L    Chloride 98 98 - 107 mmol/L    Bicarbonate 30 21 - 32 mmol/L    Anion Gap 12 10 - 20 mmol/L    Urea Nitrogen 13 6 - 23 mg/dL    Creatinine 1.05 0.50 - 1.05 mg/dL    eGFR 62 >60 mL/min/1.73m*2    Calcium 9.8 8.6 - 10.3 mg/dL    Albumin 4.5 3.4 - 5.0 g/dL    Alkaline Phosphatase 115 (H) 33 - 110 U/L    Total Protein 6.8 6.4 - 8.2 g/dL    AST 16 9 - 39 U/L    Bilirubin, Total 1.0 0.0 - 1.2 mg/dL    ALT 17 7 - 45 U/L   Lactate   Result Value Ref Range    Lactate 0.8 0.4 - 2.0 mmol/L   Troponin I, High Sensitivity   Result Value Ref Range    Troponin I, High Sensitivity 3 0 - 13 ng/L   Lipase   Result Value Ref Range    Lipase 27 9 - 82 U/L   Urinalysis with Reflex Culture and Microscopic   Result Value Ref Range    Color, Urine Yellow Light-Yellow, Yellow, Dark-Yellow    Appearance, Urine Clear Clear    Specific Gravity, Urine 1.038 (N) 1.005 - 1.035    pH, Urine 5.5 5.0, 5.5, 6.0, 6.5, 7.0, 7.5, 8.0    Protein, Urine 10 (TRACE) NEGATIVE, 10 (TRACE), 20 (TRACE) mg/dL    Glucose, Urine Normal Normal mg/dL    Blood, Urine NEGATIVE NEGATIVE    Ketones, Urine NEGATIVE NEGATIVE mg/dL    Bilirubin, Urine NEGATIVE NEGATIVE    Urobilinogen, Urine 2 (1+) (A) Normal mg/dL    Nitrite, Urine NEGATIVE NEGATIVE    Leukocyte Esterase, Urine NEGATIVE NEGATIVE   Urinalysis Microscopic   Result Value Ref Range    WBC, Urine 1-5 1-5, NONE /HPF    WBC Clumps, Urine RARE Reference range not established. /HPF    RBC, Urine 3-5 NONE, 1-2, 3-5 /HPF    Squamous Epithelial Cells, Urine 1-9 (SPARSE) Reference range not established. /HPF    Mucus, Urine FEW Reference range not  established. /LPF    Hyaline Casts, Urine 3+ (A) NONE /LPF   ECG 12 lead   Result Value Ref Range    Ventricular Rate 69 BPM    Atrial Rate 69 BPM    WA Interval 154 ms    QRS Duration 112 ms    QT Interval 446 ms    QTC Calculation(Bazett) 477 ms    P Axis 59 degrees    R Axis -10 degrees    T Axis 26 degrees    QRS Count 12 beats    Q Onset 211 ms    P Onset 134 ms    P Offset 188 ms    T Offset 434 ms    QTC Fredericia 467 ms     *Note: Due to a large number of results and/or encounters for the requested time period, some results have not been displayed. A complete set of results can be found in Results Review.       ECG 12 lead    Result Date: 11/14/2024  Normal sinus rhythm Inferior infarct , age undetermined Anterolateral infarct (cited on or before 02-JAN-2024) Abnormal ECG When compared with ECG of 03-JUN-2024 08:11, Inferior infarct is now Present Questionable change in initial forces of Lateral leads    CT abdomen pelvis w IV contrast    Result Date: 11/14/2024  STUDY: CT Abdomen and Pelvis with IV Contrast; 11/14/2024 3:58 AM INDICATION: Abdominal pain and vomiting with constipation.  Evaluate for obstruction. COMPARISON: CT chest 9/18/2024.  CXR 2/19/2024.  US gallbladder 2/4/2024.  CT AP 2/4/2024. ACCESSION NUMBER(S): [id number] ORDERING CLINICIAN: CARLOS SALMERON TECHNIQUE: CT of the abdomen and pelvis was performed.  Contiguous axial images were obtained at 3 mm slice thickness through the abdomen and pelvis. Coronal and sagittal reconstructions at 3 mm slice thickness were performed.  FINDINGS: LOWER CHEST: No cardiomegaly.  No pericardial effusion.  Lung bases are clear.  ABDOMEN:  LIVER: No hepatomegaly.  Smooth surface contour.  Normal attenuation.  BILE DUCTS: No intrahepatic or extrahepatic biliary ductal dilatation.  GALLBLADDER: Cholecystectomy. STOMACH: No abnormalities identified.  PANCREAS: No masses or ductal dilatation.  SPLEEN: No splenomegaly or focal splenic lesion.  ADRENAL GLANDS:  No thickening or nodules.  KIDNEYS AND URETERS: Kidneys are normal in size and location.  No renal or ureteral calculi.  PELVIS:  BLADDER: No abnormalities identified.  REPRODUCTIVE ORGANS: No abnormalities identified.  BOWEL: Gastric banding device.  There is diffuse dilatation of the colon containing liquid stool.  There is mild wall thickening of the sigmoid colon suggesting colitis.  VESSELS: No abnormalities identified.  Abdominal aorta is normal in caliber.  PERITONEUM/RETROPERITONEUM/LYMPH NODES: No free fluid.  No pneumoperitoneum. No lymphadenopathy.  ABDOMINAL WALL: No abnormalities identified. SOFT TISSUES: No abnormalities identified.  BONES: No acute fracture or aggressive osseous lesion.    Diffuse dilatation of the colon containing liquid stool. There is mild wall thickening of the sigmoid colon suggesting colitis. Signed by Evelyn Rae MD                    Assessment/Plan   Principal Problem:    Abdominal pain, generalized    Abdominal pain 2/2 colitis and constipation  - surg following  - NPO  - IVF  - zofran PRN   - colace  - miralax  - soap suds enemas  - CTX/ flagyl  - morphine PRN    HypoK  - replace  - check Mg    Leukocytosis  - monitor    Multiple sclerosis-last remission 3 years ago  - gabapentin     CAD sp MI, HTN, HFpEF  - metoprolol  - cardizem  - ASA/plavix  - Statin  - ranexa  - zetia    Hypothyroid  - Synthroid     GERD  - protonix    Depression/ anxiety  - prozac  - klonopin  - amitriptyline    DVT ppx: SQ hep  Dispo: monitor clinically          Jill Hopson MD  Hospitalist

## 2024-11-14 NOTE — ED PROVIDER NOTES
HPI   Chief Complaint   Patient presents with    Abdominal Pain       Elaine is a 59-year-old woman who presents with abdominal pain and vomiting/constipation.  She has been having some constipation for over a week and has been taking different laxative suppositories without much improvement.  Tonight started having increased abdominal pain and nausea.  History comes from patient and spouse at bedside.  Also reviewed EMR.  She had a history of previous lap band and the fluid was all removed.  She had a hysterectomy as well.  She is currently on Wegovy a has been taking it since the summertime.  She thinks it may be contributing to some of her symptoms.  She notes her bowels have been making lots of loud noises and she has a history of intussusception as well.  No fever chills cough or cold.  No recent trauma or falls.                Patient History   Past Medical History:   Diagnosis Date    Abnormal ECG     Antiplatelet or antithrombotic long-term use     PLAVIX    Asthma     Calculus of gallbladder without cholecystitis without obstruction     CHF (congestive heart failure)     Class 2 obesity with body mass index (BMI) of 37.0 to 37.9 in adult 05/29/2024    37.74 kg/m²    Coronary artery disease     1 cardiac stent placed jan 2018    Encounter for pre-operative cardiovascular clearance 06/03/2024    Mia Edmondson, APRN-CN for Cholecystectomy    Gallstones     GERD (gastroesophageal reflux disease)     Hidradenitis suppurativa     Hypertension     Hypothyroidism     Multiple sclerosis (Multi)     Myocardial infarction (Multi)     [facility] #1, #2 jan 2024 at [facility],  #3 also here at [facility] with gb attack    Obesity     Sleep apnea     Wears eyeglasses      Past Surgical History:   Procedure Laterality Date    ANKLE SURGERY Left     removed floating bone    CARDIAC CATHETERIZATION N/A 01/02/2024    Procedure: Left Heart Cath;  Surgeon: Gagan Kat MD;  Location: [facility] Card[facility];  Service:  Cardiovascular;  Laterality: N/A;    CARDIAC CATHETERIZATION N/A 1/3/2024    Procedure: Left Heart Cath;  Surgeon: John Gutierres MD;  Location: [facility];  Service: Cardiovascular;  Laterality: N/A;     SECTION, CLASSIC       Section x4    CHOLECYSTECTOMY  2024    Laparoscopic cholecystectomy with firefly cholangiography    CORONARY STENT PLACEMENT      HYSTERECTOMY  2014    Hysterectomy    OTHER SURGICAL HISTORY  2014    Oophorectomy - Bilat (Removal Of Both Ovaries) Laparoscopic    OTHER SURGICAL HISTORY      Laparosc Gastric Restrictive Proc By Adjustable Gastric Band    OTHER SURGICAL HISTORY      Abscess incision and drainage x4     Family History   Problem Relation Name Age of Onset    Heart attack Mother Elaine Ellissko     Bilateral breast cancer Mother Elaine Olesko 50    Emphysema Mother Elaine Olesko     Blood clot Mother Elaine Ellissko     Abnormal EKG Mother Elaine Olesko     Anemia Mother Elaine Olesko     Angina Mother Elaine Olesko     Depression Mother Elaine Olesko     Heart disease Mother Elaine Olesko     Lung disease Mother Elaine Olesko     Thyroid disease Mother Elaine Olesko     Heart attack Father Dawit Ellissko     Lymphoma Father Dawit Olesko     Angina Father Dawit Olesko     Cancer Father Dawit Olesko     Diabetes type I Father Dawit Olesko     Heart disease Father Dawit Olesko     Hypertension Father Adwit Olesko     Blood clot Sister      Asthma Sister Heidi Ellissko     Kidney disease Sister Kaylynn Garcia     Ovarian cancer Maternal Grandmother Delmy [place]monflores     Stroke Maternal Grandmother Delmy [place]ores     Heart attack Maternal Grandmother Delmy Tanya     Diabetes type I Paternal Grandmother Olive Mishraarmando     Breast cancer Father's Sister  40     Social History     Tobacco Use    Smoking status: Former     Current packs/day: 0.00     Average packs/day: 1 pack/day for 8.7 years (8.7 ttl pk-yrs)     Types: Cigarettes      Start date: 1983     Quit date: 10/1/1991     Years since quittin.1    Smokeless tobacco: Never   Vaping Use    Vaping status: Never Used   Substance Use Topics    Alcohol use: Yes     Comment: seldom    Drug use: Never       Physical Exam   ED Triage Vitals [24 0140]   Temperature Heart Rate Respirations BP   35.7 °C (96.3 °F) 69 18 113/68      Pulse Ox Temp src Heart Rate Source Patient Position   94 % -- -- --      BP Location FiO2 (%)     -- --       Physical Exam  Vitals reviewed.   Constitutional:       General: She is awake.   HENT:      Head: Normocephalic.      Nose: Nose normal.   Cardiovascular:      Rate and Rhythm: Normal rate and regular rhythm.   Pulmonary:      Effort: Pulmonary effort is normal.      Breath sounds: Normal breath sounds.   Abdominal:      Comments: Patient has hyperactive bowel sounds concerning for possible obstruction.  Nondistended exam.  Limited due to her vomiting at the time.   Musculoskeletal:      Cervical back: Normal range of motion.   Skin:     General: Skin is warm.      Capillary Refill: Capillary refill takes less than 2 seconds.   Neurological:      Mental Status: She is alert.           ED Course & MDM   ED Course as of 24 0543   u 2024   0157 Patient was worked up for her abdominal pain vomiting constipation.  Concerned that there may be obstruction based on her exam including her hyperactive bowel sounds.  Patient has previous surgeries.  CT scan labs were ordered and patient was given Zofran. [RZ]   0239 EKG done at 0 140 interpreted by me shows normal sinus rhythm 69 bpm no obvious ischemia similar to old on Shannon 3, 2024 no STEMI [RZ]   0306 Reexamination patient is feeling little better but continues have spasms with significant abdominal discomfort.  Will be given some morphine after discussion with her.  Also some fluids and Zofran.  Awaiting CT results. [RZ]   7082 CT suggests diffuse colitis.  Clinically suspect is more of a  ileus.  I spoke to Dr. Waters who agrees with plans to hospitalize and the medicine service to consult her.  She reports patient can have some ice chips.  I updated the patient and family. [RZ]      ED Course User Index  [RZ] Max Dc MD         Diagnoses as of 11/14/24 0543   Generalized abdominal pain   Nausea and vomiting, unspecified vomiting type   Constipation, unspecified constipation type                 No data recorded     Quinn Coma Scale Score: 15 (11/14/24 0200 : Ricardo Stevenson RN)                           Medical Decision Making  Patient was worked up with CT scan and labs.  She is currently vomiting in the ED just before my exam.  She is given some additional Zofran and kept n.p.o. for now.        Procedure  Procedures     Max Dc MD  11/14/24 0449       Max Dc MD  11/14/24 0575

## 2024-11-14 NOTE — PROGRESS NOTES
Pharmacy Medication History Review    Elaine Coffey is a 59 y.o. female admitted for Abdominal pain, generalized. Pharmacy reviewed the patient's [id number] medications and allergies for accuracy.    The list below reflectives the updated PTA list. Please review each medication in order reconciliation for additional clarification and justification.  Prior to Admission Medications   Prescriptions Last Dose Informant Patient Reported? Taking?   FLUoxetine (PROzac) 40 mg capsule 11/13/2024 Morning Self Yes Yes   Sig: Take 1 capsule (40 mg) by mouth once daily.   albuterol 2.5 mg /3 mL (0.083 %) nebulizer solution More than a month Self Yes Yes   Sig: Take 3 mL (2.5 mg) by nebulization every 4 hours if needed for wheezing.   Patient not taking: Reported on 11/14/2024   albuterol 90 mcg/actuation inhaler More than a month Self Yes Yes   Sig: INHALE 1 TO 2 PUFFS EVERY 4 TO 6 HOURS AS NEEDED   Patient not taking: Reported on 11/14/2024   amitriptyline (Elavil) 25 mg tablet 11/13/2024 Evening Self Yes Yes   Sig: Take 1 tablet (25 mg) by mouth once daily at bedtime.   aspirin 81 mg EC tablet 11/13/2024 Morning Self Yes Yes   Sig: Take 1 tablet (81 mg) by mouth once daily in the morning.   atorvastatin (Lipitor) 40 mg tablet 11/13/2024 Evening Self Yes Yes   Sig: TAKE 1 TABLET DAILY   calcium carbonate (OS-SHANTA) 500 mg calcium (1,250 mg) capsule 11/14/2024 Noon Self Yes Yes   Sig: Take 1,200 mg by mouth 2 times a day with meals. Take with food.   clonazePAM (KlonoPIN) 0.5 mg disintegrating tablet 11/13/2024 Evening Self Yes Yes   Sig: Take 1 tablet (0.5 mg) by mouth once daily at bedtime.   clopidogrel (Plavix) 75 mg tablet 11/13/2024 Morning Self Yes Yes   Sig: Take 1 tablet (75 mg) by mouth once daily.   dilTIAZem CD (Cardizem CD) 240 mg 24 hr capsule 11/13/2024 Morning Self Yes Yes   Sig: TAKE 1 CAPSULE BY MOUTH EVERY DAY   ergocalciferol (Vitamin D-2) 1.25 MG (90165 UT) capsule 11/13/2024 Evening Self Yes Yes   Sig:  Take 1 capsule (1,250 mcg) by mouth 1 (one) time per week. On Saturdays   ezetimibe (Zetia) 10 mg tablet 11/13/2024 Evening Self Yes Yes   Sig: TAKE 1 TABLET (10 MG) BY MOUTH ONCE DAILY AT BEDTIME.   furosemide (Lasix) 80 mg tablet 11/13/2024 Morning Self Yes Yes   Sig: Take 1 tablet (80 mg) by mouth once daily.   Patient taking differently: Take 0.5 tablets (40 mg) by mouth once daily.   gabapentin (Neurontin) 400 mg capsule 11/13/2024 Evening Self Yes Yes   Sig: Take 1 capsule (400 mg) by mouth 3 times a day. Last [place] fill: 4/7/23 #270 for 90 days   levothyroxine (Synthroid, Levoxyl) 50 mcg tablet 11/13/2024 Morning Self Yes Yes   Sig: Take 1 tablet (50 mcg) by mouth once daily.   meclizine (Antivert) 25 mg tablet Unknown Self Yes Yes   Sig: Take 1 tablet (25 mg) by mouth if needed for dizziness.   metoprolol succinate XL (Toprol-XL) 25 mg 24 hr tablet 11/13/2024 Morning Self Yes Yes   Sig: TAKE 1 TABLET BY MOUTH EVERY DAY   nitroglycerin (Nitrostat) 0.4 mg SL tablet   Yes Yes   Sig: DISSOLVE 1 TABLET UNDER THE TONGUE AS NEEDED FOR CHEST PAIN.   nystatin-triamcinolone (Mycolog II) ointment 11/13/2024 Morning Self Yes Yes   Sig: APPLY TO AFFECTED AREA TWICE A DAY   Patient taking differently: Apply 1 Application topically once daily. to affected area   omeprazole (PriLOSEC) 40 mg DR capsule 11/13/2024 Morning Self Yes Yes   Sig: Take 1 capsule (40 mg) by mouth once daily in the morning. Take before meals. Do not crush or chew.   ondansetron ODT (Zofran-ODT) 4 mg disintegrating tablet Not Taking Self Yes No   Sig: Take 1 tablet (4 mg) by mouth every 6 hours if needed for nausea or vomiting   Patient not taking: Reported on 11/14/2024   potassium chloride CR (Klor-Con M20) 20 mEq ER tablet 11/13/2024 Noon Self Yes Yes   Sig: Take 1 tablet (20 mEq) by mouth once daily. Do not crush or chew.   ranolazine (Ranexa) 500 mg 12 hr tablet 11/13/2024 Evening Self Yes Yes   Sig: Take 1 tablet (500 mg) by mouth 2 times a day.  "Do not crush, chew, or split.   semaglutide, weight loss, (Wegovy) 2.4 mg/0.75 mL pen injector 11/8/2024 Self Yes Yes   Sig: Inject 2.4 mg under the skin 1 (one) time per week.   tiZANidine (Zanaflex) 4 mg tablet 11/13/2024 Evening Self Yes Yes   Sig: Take 1 tablet (4 mg) by mouth as needed at bedtime for muscle spasms. FOR SPASMS      Facility-Administered Medications: None           The list below reflectives the updated allergy list. Please review each documented allergy for additional clarification and justification.  Allergies  Reviewed by Terry Hayes MD on 11/14/2024        Severity Reactions Comments    Gadolinium-containing Contrast Media High Anaphylaxis Pt developed nausea without vomiting , SOB, after receiving the IV contrast . Pt states she always gets nauseated but this time \"was worse\"/pt/            Below are additional concerns with the patient's PTA list.    THERESE DARNELL    "

## 2024-11-14 NOTE — H&P
History Of Present Illness  Elaine Coffey is a 59 y.o. female with history of multiple sclerosis, myocardial infarction, coronary artery disease, obesity, heart failure, and cholelithiasis s/p cholecystectomy presenting with LLQ abdominal pain.  She says she has been taking semaglutide since July of this year and recently went up in the dose.  She mentions being constipated for the last 1 month and thus has been taking laxatives, including suppositories, and consuming prunes.  She mentions not having a bowel movement since November 6 (8 days ago).  Today at about 6 PM, shortly after dinner, she developed what she describes as stabbing left lower quadrant abdominal pain.  She says she vomited 5 times tonight and almost passed out.  No hematemesis, melena or hematochezia. No fever or chills. No chest pain, SOB or palpitations. She says she has the urge to urinate but nothing comes out most of the time. No hematuria. She presented to the ED via EMS. Labs were remarkable only for a K of 3.2. CT imaging of the abdomen revealed mild wall thickening of the sigmoid colon suggesting colitis.     Past Medical History  Past Medical History:   Diagnosis Date    Abnormal ECG     Antiplatelet or antithrombotic long-term use     PLAVIX    Asthma     Calculus of gallbladder without cholecystitis without obstruction     CHF (congestive heart failure)     Class 2 obesity with body mass index (BMI) of 37.0 to 37.9 in adult 05/29/2024    37.74 kg/m²    Coronary artery disease     1 cardiac stent placed jan 2018    Encounter for pre-operative cardiovascular clearance 06/03/2024    Mia Edmondson, APRN-CN for Cholecystectomy    Gallstones     GERD (gastroesophageal reflux disease)     Hidradenitis suppurativa     Hypertension     Hypothyroidism     Multiple sclerosis (Multi)     Myocardial infarction (Multi)     [facility] #1, #2 jan 2024 at [facility],  #3 also here at [facility] with gb attack    Obesity     Sleep apnea     Wears eyeglasses         Past Surgical History  Past Surgical History:   Procedure Laterality Date    ANKLE SURGERY Left     removed floating bone    CARDIAC CATHETERIZATION N/A 2024    Procedure: Left Heart Cath;  Surgeon: Gagan Kat MD;  Location: [facility];  Service: Cardiovascular;  Laterality: N/A;    CARDIAC CATHETERIZATION N/A 1/3/2024    Procedure: Left Heart Cath;  Surgeon: John Gutierres MD;  Location: [facility];  Service: Cardiovascular;  Laterality: N/A;     SECTION, CLASSIC       Section x4    CHOLECYSTECTOMY  2024    Laparoscopic cholecystectomy with firefly cholangiography    CORONARY STENT PLACEMENT      HYSTERECTOMY  2014    Hysterectomy    OTHER SURGICAL HISTORY  2014    Oophorectomy - Bilat (Removal Of Both Ovaries) Laparoscopic    OTHER SURGICAL HISTORY      Laparosc Gastric Restrictive Proc By Adjustable Gastric Band    OTHER SURGICAL HISTORY      Abscess incision and drainage x4        Social History  She reports that she quit smoking about 33 years ago. Her smoking use included cigarettes. She started smoking about 41 years ago. She has a 8.7 pack-year smoking history. She has never used smokeless tobacco. She reports current alcohol use. She reports that she does not use drugs.    Family History  Family History   Problem Relation Name Age of Onset    Heart attack Mother Elaine Radha     Bilateral breast cancer Mother Elaine Tado 50    Emphysema Mother Elaine Tado     Blood clot Mother Elaine Tado     Abnormal EKG Mother Elaine Tado     Anemia Mother Elaine Calebsko     Angina Mother Elaine Claebsko     Depression Mother Elaine Ellissko     Heart disease Mother Elaine Ellissko     Lung disease Mother Elaine Ellissko     Thyroid disease Mother Elaine Ellissko     Heart attack Father Daiwt Ellissko     Lymphoma Father Dawit Calebsko     Angina Father Dawit Calebsko     Cancer Father Dawit Calebsko     Diabetes type I Father Dawit Calebsko     Heart  disease Father Dawit Garcia     Hypertension Father Dawit Garcia     Blood clot Sister      Asthma Sister Heidi Garcia     Kidney disease Sister Kaylynn Garcia     Ovarian cancer Maternal Grandmother Delmy Martínez     Stroke Maternal Grandmother Delmy Martínez     Heart attack Maternal Grandmother Delmy Martínez     Diabetes type I Paternal Grandmother Oilve Garcia     Breast cancer Father's Sister  40        Allergies  Gadolinium-containing contrast media    Medications  No current facility-administered medications on file prior to encounter.     Current Outpatient Medications on File Prior to Encounter   Medication Sig Dispense Refill    albuterol 2.5 mg /3 mL (0.083 %) nebulizer solution Take 3 mL (2.5 mg) by nebulization every 4 hours if needed for wheezing.      albuterol 90 mcg/actuation inhaler INHALE 1 TO 2 PUFFS EVERY 4 TO 6 HOURS AS NEEDED 18 g 0    amitriptyline (Elavil) 25 mg tablet Take 1 tablet (25 mg) by mouth once daily at bedtime.      aspirin 81 mg EC tablet Take 1 tablet (81 mg) by mouth once daily in the morning.      atorvastatin (Lipitor) 40 mg tablet TAKE 1 TABLET DAILY 90 tablet 3    calcium carbonate (OS-SHANTA) 500 mg calcium (1,250 mg) capsule Take 1,200 mg by mouth 2 times a day with meals. Take with food.      clonazePAM (KlonoPIN) 0.5 mg disintegrating tablet Take 1 tablet (0.5 mg) by mouth once daily at bedtime. 30 tablet 2    clopidogrel (Plavix) 75 mg tablet Take 1 tablet (75 mg) by mouth once daily. 30 tablet 11    dilTIAZem CD (Cardizem CD) 240 mg 24 hr capsule TAKE 1 CAPSULE BY MOUTH EVERY DAY 90 capsule 1    ergocalciferol (Vitamin D-2) 1.25 MG (42053 UT) capsule Take 1 capsule (1,250 mcg) by mouth 1 (one) time per week. On Saturdays      ezetimibe (Zetia) 10 mg tablet TAKE 1 TABLET (10 MG) BY MOUTH ONCE DAILY AT BEDTIME. 90 tablet 4    FLUoxetine (PROzac) 40 mg capsule Take 1 capsule (40 mg) by mouth once daily. 90 capsule 3    furosemide (Lasix) 80 mg tablet Take 1  tablet (80 mg) by mouth once daily.      gabapentin (Neurontin) 400 mg capsule Take 1 capsule (400 mg) by mouth 3 times a day. Last O[facility] fill: 4/7/23 #270 for 90 days 270 capsule 1    levothyroxine (Synthroid, Levoxyl) 50 mcg tablet Take 1 tablet (50 mcg) by mouth once daily. 90 tablet 3    meclizine (Antivert) 25 mg tablet Take 1 tablet (25 mg) by mouth every 6 hours if needed for dizziness.      metoprolol succinate XL (Toprol-XL) 25 mg 24 hr tablet TAKE 1 TABLET BY MOUTH EVERY DAY 90 tablet 3    nitroglycerin (Nitrostat) 0.4 mg SL tablet DISSOLVE 1 TABLET UNDER THE TONGUE AS NEEDED FOR CHEST PAIN. 25 tablet 2    nystatin-triamcinolone (Mycolog II) ointment APPLY TO AFFECTED AREA TWICE A DAY 60 g 0    omeprazole (PriLOSEC) 40 mg DR capsule Take 1 capsule (40 mg) by mouth once daily in the morning. Take before meals. Do not crush or chew. 90 capsule 1    ondansetron ODT (Zofran-ODT) 4 mg disintegrating tablet Take 1 tablet (4 mg) by mouth every 6 hours if needed for nausea or vomiting 15 tablet 0    potassium chloride CR (Klor-Con M20) 20 mEq ER tablet Take 1 tablet (20 mEq) by mouth once daily. Do not crush or chew. 30 tablet 11    ranolazine (Ranexa) 500 mg 12 hr tablet Take 1 tablet (500 mg) by mouth 2 times a day. Do not crush, chew, or split. 60 tablet 11    semaglutide, weight loss, (Wegovy) 2.4 mg/0.75 mL pen injector Inject 2.4 mg under the skin 1 (one) time per week. 3 mL 2    tiZANidine (Zanaflex) 4 mg tablet Take 1 tablet (4 mg) by mouth as needed at bedtime for muscle spasms. FOR SPASMS 30 tablet 1         Review of Systems   Constitutional:  Positive for activity change.   HENT: Negative.     Eyes: Negative.    Respiratory: Negative.     Cardiovascular: Negative.    Gastrointestinal:         See HPI   Endocrine: Negative.    Genitourinary:         See HPI   Musculoskeletal: Negative.    Skin: Negative.    Allergic/Immunologic: Negative.    Neurological: Negative.    Hematological: Negative.     Psychiatric/Behavioral: Negative.          Physical Exam  Constitutional:       General: She is not in acute distress.     Appearance: She is obese. She is ill-appearing. She is not toxic-appearing or diaphoretic.   HENT:      Head: Normocephalic and atraumatic.      Right Ear: External ear normal.      Left Ear: External ear normal.      Nose: Nose normal.      Mouth/Throat:      Mouth: Mucous membranes are dry.      Pharynx: Oropharynx is clear. No oropharyngeal exudate or posterior oropharyngeal erythema.   Eyes:      General: No scleral icterus.        Right eye: No discharge.         Left eye: No discharge.      Conjunctiva/sclera: Conjunctivae normal.   Cardiovascular:      Rate and Rhythm: Normal rate and regular rhythm.      Heart sounds: No murmur heard.  Pulmonary:      Breath sounds: No wheezing, rhonchi or rales.   Abdominal:      General: There is no distension.      Palpations: Abdomen is soft. There is no mass.      Tenderness: There is abdominal tenderness. There is no right CVA tenderness, left CVA tenderness, guarding or rebound.      Hernia: No hernia is present.      Comments: LLQ tenderness   Musculoskeletal:      Cervical back: Neck supple.      Right lower leg: No edema.      Left lower leg: No edema.   Lymphadenopathy:      Cervical: No cervical adenopathy.   Skin:     General: Skin is warm and dry.      Findings: No lesion or rash.   Neurological:      General: No focal deficit present.      Mental Status: She is alert and oriented to person, place, and time.   Psychiatric:         Mood and Affect: Mood normal.         Behavior: Behavior normal.          Last Recorded Vitals  Blood pressure (!) 104/48, pulse 69, temperature 35.7 °C (96.3 °F), resp. rate 18, SpO2 95%.    Relevant Results   Latest Reference Range & Units 11/14/24 02:07 11/14/24 02:08   GLUCOSE 74 - 99 mg/dL 117 (H)    SODIUM 136 - 145 mmol/L 137    POTASSIUM 3.5 - 5.3 mmol/L 3.2 (L)    CHLORIDE 98 - 107 mmol/L 98     Bicarbonate 21 - 32 mmol/L 30    Anion Gap 10 - 20 mmol/L 12    Blood Urea Nitrogen 6 - 23 mg/dL 13    Creatinine 0.50 - 1.05 mg/dL 1.05    EGFR >60 mL/min/1.73m*2 62    Calcium 8.6 - 10.3 mg/dL 9.8    Albumin 3.4 - 5.0 g/dL 4.5    Alkaline Phosphatase 33 - 110 U/L 115 (H)    ALT 7 - 45 U/L 17    AST 9 - 39 U/L 16    Bilirubin Total 0.0 - 1.2 mg/dL 1.0    Total Protein 6.4 - 8.2 g/dL 6.8    Lactate 0.4 - 2.0 mmol/L 0.8    LIPASE 9 - 82 U/L 27    Troponin I, High Sensitivity 0 - 13 ng/L 3    WBC 4.4 - 11.3 x10*3/uL 12.0 (H)    nRBC 0.0 - 0.0 /100 WBCs 0.0    RBC 4.00 - 5.20 x10*6/uL 5.03    HEMOGLOBIN 12.0 - 16.0 g/dL 15.4    HEMATOCRIT 36.0 - 46.0 % 45.7    MCV 80 - 100 fL 91    MCH 26.0 - 34.0 pg 30.6    MCHC 32.0 - 36.0 g/dL 33.7    RED CELL DISTRIBUTION WIDTH 11.5 - 14.5 % 12.7    Platelets 150 - 450 x10*3/uL 207    Neutrophils % 40.0 - 80.0 % 83.5    Immature Granulocytes %, Automated 0.0 - 0.9 % 0.2    Lymphocytes % 13.0 - 44.0 % 8.4    Monocytes % 2.0 - 10.0 % 6.7    Eosinophils % 0.0 - 6.0 % 1.0    Basophils % 0.0 - 2.0 % 0.2    Neutrophils Absolute 1.20 - 7.70 x10*3/uL 10.03 (H)    Immature Granulocytes Absolute, Automated 0.00 - 0.70 x10*3/uL 0.03    Lymphocytes Absolute 1.20 - 4.80 x10*3/uL 1.01 (L)    Monocytes Absolute 0.10 - 1.00 x10*3/uL 0.80    Eosinophils Absolute 0.00 - 0.70 x10*3/uL 0.12    Basophils Absolute 0.00 - 0.10 x10*3/uL 0.03    Color, Urine Light-Yellow, Yellow, Dark-Yellow   Yellow   Appearance, Urine Clear   Clear   Specific Gravity, Urine 1.005 - 1.035   1.038 !   pH, Urine 5.0, 5.5, 6.0, 6.5, 7.0, 7.5, 8.0   5.5   Protein, Urine NEGATIVE, 10 (TRACE), 20 (TRACE) mg/dL  10 (TRACE)   Glucose, Urine Normal mg/dL  Normal   Blood, Urine NEGATIVE   NEGATIVE   Ketones, Urine NEGATIVE mg/dL  NEGATIVE   Bilirubin, Urine NEGATIVE   NEGATIVE   Urobilinogen, Urine Normal mg/dL  2 (1+) !   Nitrite, Urine NEGATIVE   NEGATIVE   Leukocyte Esterase, Urine NEGATIVE   NEGATIVE   Hyaline Casts, Urine  NONE /LPF  3+ !   Mucus, Urine Reference range not established. /LPF  FEW   WBC Clumps, Urine Reference range not established. /HPF  RARE   Squamous Epithelial Cells, Urine Reference range not established. /HPF  1-9 (SPARSE)   RBC, Urine NONE, 1-2, 3-5 /HPF  3-5   WBC, Urine 1-5, NONE /HPF  1-5   (H): Data is abnormally high  (L): Data is abnormally low  !: Data is abnormal    CT ABD/PELVIS:     IMPRESSION:  Diffuse dilatation of the colon containing liquid stool. There is mild  wall thickening of the sigmoid colon suggesting colitis.     Assessment/Plan   Abdominal pain  Appears to be secondary to colitis  Rule out ileus  Empirically treat with IV metronidazole and ceftriaxone  NPO  IVF  General surgery consulted    Hypokalemia  Replace and recheck level in am    History of multiple sclerosis  Clinically stable    History of CAD and MI  S/P PCI with stenting  Clinically stable on DAPT      I spent 75 minutes in the professional and overall care of this patient.      Terry Hayes MD

## 2024-11-14 NOTE — ED TRIAGE NOTES
Patient from home having c/o abdominal pain that started yesterday. Patient has not had a bowel movement since 11/6/2024. Patient has been nauseous with emesis. Patient also having c/o dizziness. Patient started new medication last month (semaglutide injection) which she is concerned maybe causing constipation

## 2024-11-14 NOTE — CONSULTS
Reason For Consult  Abd pain    History Of Present Illness  Elaine Coffey is a 59 y.o. female presenting with abdominal pain and constipation for 2 months that became significantly worse in last 2 days.   She states that she feels she has been unable to pass gas or have BM in 2 days. She has tried prunes, colace stool softener, OTC laxatives, though not miralax, and fleet enema at home. All of these treatments were not helpful to relieve her symptoms so she came to [facility] ER last night.   She denies fever or chills. No n/v.   No urinary sx but she notes that her urine is darker than normal.  No recent sick contacts.   She thinks that her symptoms are related to her weekly wygovy medication graciela sx worsened when she increased her dose 2 months ago.     Evaluation in ER reveals hypokalemia, lactate WNL, troponin WNL, lipase WNL, mild leukocytosis without anemia. CT abd with diffuse dilatation of the colon containing liquid stool and mild wall thickening of the sigmoid colon suggesting colitis.    She states he has had 3 Mis in the past and has bene doing cardiac rehab.     No smoking, no alcohol.   PMHx multiple sclerosis, myocardial infarction, coronary artery disease, obesity, heart failure.     PSHx hysterectomy, cholecystectomy, c section, cardiac catheterization.       Past Medical History  She has a past medical history of Abnormal ECG, Antiplatelet or antithrombotic long-term use, Asthma, Calculus of gallbladder without cholecystitis without obstruction, CHF (congestive heart failure), Class 2 obesity with body mass index (BMI) of 37.0 to 37.9 in adult (05/29/2024), Coronary artery disease, Encounter for pre-operative cardiovascular clearance (06/03/2024), Gallstones, GERD (gastroesophageal reflux disease), Hidradenitis suppurativa, Hypertension, Hypothyroidism, Multiple sclerosis (Multi), Myocardial infarction (Multi), Obesity, Sleep apnea, and Wears eyeglasses.    Surgical History  She has a past surgical  history that includes  section, classic; Other surgical history (2014); Other surgical history; Hysterectomy (2014); Other surgical history; Ankle surgery (Left); Coronary stent placement; Cardiac catheterization (N/A, 2024); Cholecystectomy (2024); and Cardiac catheterization (N/A, 1/3/2024).     Social History  She reports that she quit smoking about 33 years ago. Her smoking use included cigarettes. She started smoking about 41 years ago. She has a 8.7 pack-year smoking history. She has never used smokeless tobacco. She reports current alcohol use. She reports that she does not use drugs.    Family History  Family History   Problem Relation Name Age of Onset    Heart attack Mother Elaine Mishrao     Bilateral breast cancer Mother Elaine Mishrao 50    Emphysema Mother Elaine Olesko     Blood clot Mother Elaine Ellissko     Abnormal EKG Mother Elaine Olesko     Anemia Mother Elaine Olesko     Angina Mother Elaine Olesko     Depression Mother Elaine Olesko     Heart disease Mother Elaine Ellissko     Lung disease Mother Elaine Ellissko     Thyroid disease Mother Elaine Olesko     Heart attack Father Dawit Ellissko     Lymphoma Father Dawit Olesko     Angina Father Dawit Olesko     Cancer Father Dawit Ellissko     Diabetes type I Father Dawit Ellissko     Heart disease Father Dawit Olesko     Hypertension Father Dawit Ellissko     Blood clot Sister      Asthma Sister Heidi Garcia     Kidney disease Sister Kaylynn Garcia     Ovarian cancer Maternal Grandmother Delmy Tanya     Stroke Maternal Grandmother Delmy Giffordcktrino     Heart attack Maternal Grandmother Delmy Tanya     Diabetes type I Paternal Grandmother Olive Radha     Breast cancer Father's Sister  40        Allergies  Gadolinium-containing contrast media    Review of Systems  Review of Systems   Constitutional:  Positive for activity change (inc in wygovy medication dose) and appetite change. Negative for chills,  diaphoresis, fatigue and fever.   Respiratory:  Negative for cough, chest tightness, shortness of breath and wheezing.    Cardiovascular:  Negative for chest pain, palpitations and leg swelling.   Gastrointestinal:  Positive for abdominal distention, abdominal pain and constipation.   Genitourinary:  Negative for difficulty urinating.   Neurological:  Negative for dizziness, light-headedness and headaches.          Physical Exam  Physical Exam  Vitals reviewed.   Constitutional:       General: She is not in acute distress.     Appearance: Normal appearance. She is normal weight. She is not ill-appearing, toxic-appearing or diaphoretic.   HENT:      Head: Normocephalic and atraumatic.      Mouth/Throat:      Mouth: Mucous membranes are moist.   Eyes:      General: No scleral icterus.        Right eye: No discharge.         Left eye: No discharge.      Conjunctiva/sclera: Conjunctivae normal.   Cardiovascular:      Rate and Rhythm: Normal rate and regular rhythm.      Pulses: Normal pulses.      Heart sounds: Normal heart sounds. No murmur heard.     No friction rub. No gallop.   Pulmonary:      Effort: Pulmonary effort is normal. No respiratory distress.      Breath sounds: Normal breath sounds. No stridor. No wheezing, rhonchi or rales.   Chest:      Chest wall: No tenderness.   Abdominal:      General: There is distension.      Tenderness: There is abdominal tenderness.      Comments: Hypoactive BS throughout   Musculoskeletal:      Right lower leg: No edema.      Left lower leg: No edema.   Skin:     General: Skin is warm and dry.      Capillary Refill: Capillary refill takes less than 2 seconds.   Neurological:      Mental Status: She is alert and oriented to person, place, and time.      Gait: Abnormal gait: not observed.   Psychiatric:         Mood and Affect: Mood normal.         Behavior: Behavior normal.         Thought Content: Thought content normal.         Judgment: Judgment normal.            Last  "Recorded Vitals  Blood pressure 104/71, pulse 69, temperature 35.7 °C (96.3 °F), resp. rate 18, height 1.702 m (5' 7\"), weight 93.9 kg (207 lb 0.2 oz), SpO2 95%.    Relevant Results    ECG 12 lead    Result Date: 11/14/2024  Normal sinus rhythm Inferior infarct , age undetermined Anterolateral infarct (cited on or before 02-JAN-2024) Abnormal ECG When compared with ECG of 03-JUN-2024 08:11, Inferior infarct is now Present Questionable change in initial forces of Lateral leads    CT abdomen pelvis w IV contrast    Result Date: 11/14/2024  STUDY: CT Abdomen and Pelvis with IV Contrast; 11/14/2024 3:58 AM INDICATION: Abdominal pain and vomiting with constipation.  Evaluate for obstruction. COMPARISON: CT chest 9/18/2024.  CXR 2/19/2024.  US gallbladder 2/4/2024.  CT AP 2/4/2024. ACCESSION NUMBER(S): [id number] ORDERING CLINICIAN: CARLOS SALMERON TECHNIQUE: CT of the abdomen and pelvis was performed.  Contiguous axial images were obtained at 3 mm slice thickness through the abdomen and pelvis. Coronal and sagittal reconstructions at 3 mm slice thickness were performed.  FINDINGS: LOWER CHEST: No cardiomegaly.  No pericardial effusion.  Lung bases are clear.  ABDOMEN:  LIVER: No hepatomegaly.  Smooth surface contour.  Normal attenuation.  BILE DUCTS: No intrahepatic or extrahepatic biliary ductal dilatation.  GALLBLADDER: Cholecystectomy. STOMACH: No abnormalities identified.  PANCREAS: No masses or ductal dilatation.  SPLEEN: No splenomegaly or focal splenic lesion.  ADRENAL GLANDS: No thickening or nodules.  KIDNEYS AND URETERS: Kidneys are normal in size and location.  No renal or ureteral calculi.  PELVIS:  BLADDER: No abnormalities identified.  REPRODUCTIVE ORGANS: No abnormalities identified.  BOWEL: Gastric banding device.  There is diffuse dilatation of the colon containing liquid stool.  There is mild wall thickening of the sigmoid colon suggesting colitis.  VESSELS: No abnormalities identified.  Abdominal " aorta is normal in caliber.  PERITONEUM/RETROPERITONEUM/LYMPH NODES: No free fluid.  No pneumoperitoneum. No lymphadenopathy.  ABDOMINAL WALL: No abnormalities identified. SOFT TISSUES: No abnormalities identified.  BONES: No acute fracture or aggressive osseous lesion.    Diffuse dilatation of the colon containing liquid stool. There is mild wall thickening of the sigmoid colon suggesting colitis. Signed by Evelyn Rae MD    BI mammo bilateral screening tomosynthesis    Result Date: 10/31/2024  Interpreted By:  Melissa Hernandez, STUDY: BI MAMMO BILATERAL SCREENING TOMOSYNTHESIS;  10/31/2024 8:38 am   ACCESSION NUMBER(S): [id number]   ORDERING CLINICIAN: SUPRIYA LUJAN   INDICATION: Screening.   ,Z12.31 Encounter for screening mammogram for malignant neoplasm of breast   COMPARISON: 11/13/2018 through 10/31/2023   FINDINGS: 2D and tomosynthesis images were reviewed at 1 mm slice thickness.   Density:  There are scattered areas of fibroglandular density.   No suspicious masses or calcifications are identified.       No mammographic evidence of malignancy.   BI-RADS CATEGORY: BI-RADS Category:  1 Negative. Recommendation:  Annual Screening. Recommended Date:  1 Year. Laterality:  Bilateral.       For any future breast imaging appointments, please call [phone] ([phone]).     MACRO: None   Signed by: Melissa Hernandez 10/31/2024 10:09 AM Dictation workstation:   [id number]     Scheduled medications  amitriptyline, 25 mg, oral, Nightly  aspirin, 81 mg, oral, q AM  atorvastatin, 40 mg, oral, Daily  calcium carbonate, 1,250 mg, oral, BID  cefTRIAXone, 1 g, intravenous, q12h  clonazePAM, 0.5 mg, oral, Nightly  clopidogrel, 75 mg, oral, Daily  dilTIAZem CD, 240 mg, oral, Daily  docusate sodium, 100 mg, oral, BID  ezetimibe, 10 mg, oral, Nightly  FLUoxetine, 40 mg, oral, Daily  gabapentin, 400 mg, oral, TID  heparin (porcine), 5,000 Units, subcutaneous, q8h JEAN  levothyroxine, 50 mcg, oral, Daily  metoprolol succinate XL, 25 mg,  oral, Daily  metroNIDAZOLE, 500 mg, intravenous, q8h  pantoprazole, 40 mg, oral, Daily before breakfast  polyethylene glycol, 17 g, oral, Daily  ranolazine, 500 mg, oral, BID      Continuous medications  potassium chloride-D5-0.9%NaCl, 100 mL/hr      PRN medications  PRN medications: acetaminophen **OR** acetaminophen **OR** acetaminophen, albuterol, albuterol, alum-mag hydroxide-simeth, calcium carbonate, dextromethorphan-guaifenesin, guaiFENesin, meclizine, ondansetron **OR** ondansetron, tiZANidine  Results for orders placed or performed during the hospital encounter of 11/14/24 (from the past 24 hours)   CBC and Auto Differential   Result Value Ref Range    WBC 12.0 (H) 4.4 - 11.3 x10*3/uL    nRBC 0.0 0.0 - 0.0 /100 WBCs    RBC 5.03 4.00 - 5.20 x10*6/uL    Hemoglobin 15.4 12.0 - 16.0 g/dL    Hematocrit 45.7 36.0 - 46.0 %    MCV 91 80 - 100 fL    MCH 30.6 26.0 - 34.0 pg    MCHC 33.7 32.0 - 36.0 g/dL    RDW 12.7 11.5 - 14.5 %    Platelets 207 150 - 450 x10*3/uL    Neutrophils % 83.5 40.0 - 80.0 %    Immature Granulocytes %, Automated 0.2 0.0 - 0.9 %    Lymphocytes % 8.4 13.0 - 44.0 %    Monocytes % 6.7 2.0 - 10.0 %    Eosinophils % 1.0 0.0 - 6.0 %    Basophils % 0.2 0.0 - 2.0 %    Neutrophils Absolute 10.03 (H) 1.20 - 7.70 x10*3/uL    Immature Granulocytes Absolute, Automated 0.03 0.00 - 0.70 x10*3/uL    Lymphocytes Absolute 1.01 (L) 1.20 - 4.80 x10*3/uL    Monocytes Absolute 0.80 0.10 - 1.00 x10*3/uL    Eosinophils Absolute 0.12 0.00 - 0.70 x10*3/uL    Basophils Absolute 0.03 0.00 - 0.10 x10*3/uL   Comprehensive metabolic panel   Result Value Ref Range    Glucose 117 (H) 74 - 99 mg/dL    Sodium 137 136 - 145 mmol/L    Potassium 3.2 (L) 3.5 - 5.3 mmol/L    Chloride 98 98 - 107 mmol/L    Bicarbonate 30 21 - 32 mmol/L    Anion Gap 12 10 - 20 mmol/L    Urea Nitrogen 13 6 - 23 mg/dL    Creatinine 1.05 0.50 - 1.05 mg/dL    eGFR 62 >60 mL/min/1.73m*2    Calcium 9.8 8.6 - 10.3 mg/dL    Albumin 4.5 3.4 - 5.0 g/dL     Alkaline Phosphatase 115 (H) 33 - 110 U/L    Total Protein 6.8 6.4 - 8.2 g/dL    AST 16 9 - 39 U/L    Bilirubin, Total 1.0 0.0 - 1.2 mg/dL    ALT 17 7 - 45 U/L   Lactate   Result Value Ref Range    Lactate 0.8 0.4 - 2.0 mmol/L   Troponin I, High Sensitivity   Result Value Ref Range    Troponin I, High Sensitivity 3 0 - 13 ng/L   Lipase   Result Value Ref Range    Lipase 27 9 - 82 U/L   Urinalysis with Reflex Culture and Microscopic   Result Value Ref Range    Color, Urine Yellow Light-Yellow, Yellow, Dark-Yellow    Appearance, Urine Clear Clear    Specific Gravity, Urine 1.038 (N) 1.005 - 1.035    pH, Urine 5.5 5.0, 5.5, 6.0, 6.5, 7.0, 7.5, 8.0    Protein, Urine 10 (TRACE) NEGATIVE, 10 (TRACE), 20 (TRACE) mg/dL    Glucose, Urine Normal Normal mg/dL    Blood, Urine NEGATIVE NEGATIVE    Ketones, Urine NEGATIVE NEGATIVE mg/dL    Bilirubin, Urine NEGATIVE NEGATIVE    Urobilinogen, Urine 2 (1+) (A) Normal mg/dL    Nitrite, Urine NEGATIVE NEGATIVE    Leukocyte Esterase, Urine NEGATIVE NEGATIVE   Urinalysis Microscopic   Result Value Ref Range    WBC, Urine 1-5 1-5, NONE /HPF    WBC Clumps, Urine RARE Reference range not established. /HPF    RBC, Urine 3-5 NONE, 1-2, 3-5 /HPF    Squamous Epithelial Cells, Urine 1-9 (SPARSE) Reference range not established. /HPF    Mucus, Urine FEW Reference range not established. /LPF    Hyaline Casts, Urine 3+ (A) NONE /LPF     *Note: Due to a large number of results and/or encounters for the requested time period, some results have not been displayed. A complete set of results can be found in Results Review.        Assessment/Plan     This is a 59 year old female with abdominal pain, constipation and likely ileus  - no acute surgical intervention at this time.   - reviewed imaging. CT abd with diffuse dilatation of the colon containing liquid stool and mild wall thickening of the sigmoid colon suggesting colitis.  - reviewed labs. hypokalemia, lactate WNL, troponin WNL, lipase WNL, mild  leukocytosis without anemia.  - recommend NPO for now.  - she has not been vomiting, can remain without NG tube so long as she is not uncomfortable in epigastric area and not having uncontrolled n/v.   - recommend IVF for hydration, recommend with dextrose and potassium.  - recommend soap suds enemas BID.  - recommend colace 100 mg BID.   - recommend miralax BID. If this is not helpful then could try lactulose.   - recommend pt discontinue wygovy for now until her GI sx resolve and normalize. Then would recommend she discuss alternative medication with her provider who prescribed and is managing this.     Surgery will cont to follow patient.     Remainder of care per primary.     Dr. Waters in to see patient. Plan of care discussed with Dr. Waetrs.     SHEILA Mora-CNP

## 2024-11-15 VITALS
HEIGHT: 67 IN | BODY MASS INDEX: 31.86 KG/M2 | OXYGEN SATURATION: 94 % | SYSTOLIC BLOOD PRESSURE: 111 MMHG | RESPIRATION RATE: 18 BRPM | WEIGHT: 203 LBS | HEART RATE: 70 BPM | TEMPERATURE: 97.3 F | DIASTOLIC BLOOD PRESSURE: 74 MMHG

## 2024-11-15 DIAGNOSIS — E66.01 CLASS 2 SEVERE OBESITY DUE TO EXCESS CALORIES WITH SERIOUS COMORBIDITY AND BODY MASS INDEX (BMI) OF 36.0 TO 36.9 IN ADULT: ICD-10-CM

## 2024-11-15 DIAGNOSIS — E66.812 CLASS 2 SEVERE OBESITY DUE TO EXCESS CALORIES WITH SERIOUS COMORBIDITY AND BODY MASS INDEX (BMI) OF 36.0 TO 36.9 IN ADULT: ICD-10-CM

## 2024-11-15 LAB
ALBUMIN SERPL BCP-MCNC: 3.6 G/DL (ref 3.4–5)
ALP SERPL-CCNC: 90 U/L (ref 33–110)
ALT SERPL W P-5'-P-CCNC: 14 U/L (ref 7–45)
ANION GAP SERPL CALC-SCNC: 8 MMOL/L (ref 10–20)
AST SERPL W P-5'-P-CCNC: 12 U/L (ref 9–39)
ATRIAL RATE: 69 BPM
BASOPHILS # BLD AUTO: 0.01 X10*3/UL (ref 0–0.1)
BASOPHILS NFR BLD AUTO: 0.2 %
BILIRUB SERPL-MCNC: 0.5 MG/DL (ref 0–1.2)
BUN SERPL-MCNC: 12 MG/DL (ref 6–23)
C COLI+JEJ+UPSA DNA STL QL NAA+PROBE: NOT DETECTED
CALCIUM SERPL-MCNC: 8.3 MG/DL (ref 8.6–10.3)
CHLORIDE SERPL-SCNC: 109 MMOL/L (ref 98–107)
CO2 SERPL-SCNC: 30 MMOL/L (ref 21–32)
CREAT SERPL-MCNC: 0.82 MG/DL (ref 0.5–1.05)
EC STX1 GENE STL QL NAA+PROBE: NOT DETECTED
EC STX2 GENE STL QL NAA+PROBE: NOT DETECTED
EGFRCR SERPLBLD CKD-EPI 2021: 84 ML/MIN/1.73M*2
EOSINOPHIL # BLD AUTO: 0.1 X10*3/UL (ref 0–0.7)
EOSINOPHIL NFR BLD AUTO: 2.2 %
ERYTHROCYTE [DISTWIDTH] IN BLOOD BY AUTOMATED COUNT: 13 % (ref 11.5–14.5)
GLUCOSE SERPL-MCNC: 93 MG/DL (ref 74–99)
HCT VFR BLD AUTO: 39.7 % (ref 36–46)
HGB BLD-MCNC: 12.9 G/DL (ref 12–16)
IMM GRANULOCYTES # BLD AUTO: 0.01 X10*3/UL (ref 0–0.7)
IMM GRANULOCYTES NFR BLD AUTO: 0.2 % (ref 0–0.9)
LYMPHOCYTES # BLD AUTO: 0.88 X10*3/UL (ref 1.2–4.8)
LYMPHOCYTES NFR BLD AUTO: 19.2 %
MCH RBC QN AUTO: 30.6 PG (ref 26–34)
MCHC RBC AUTO-ENTMCNC: 32.5 G/DL (ref 32–36)
MCV RBC AUTO: 94 FL (ref 80–100)
MONOCYTES # BLD AUTO: 0.39 X10*3/UL (ref 0.1–1)
MONOCYTES NFR BLD AUTO: 8.5 %
NEUTROPHILS # BLD AUTO: 3.2 X10*3/UL (ref 1.2–7.7)
NEUTROPHILS NFR BLD AUTO: 69.7 %
NOROVIRUS GI + GII RNA STL NAA+PROBE: NOT DETECTED
NRBC BLD-RTO: 0 /100 WBCS (ref 0–0)
P AXIS: 59 DEGREES
P OFFSET: 188 MS
P ONSET: 134 MS
PLATELET # BLD AUTO: 147 X10*3/UL (ref 150–450)
POTASSIUM SERPL-SCNC: 4 MMOL/L (ref 3.5–5.3)
PR INTERVAL: 154 MS
PROT SERPL-MCNC: 5.3 G/DL (ref 6.4–8.2)
Q ONSET: 211 MS
QRS COUNT: 12 BEATS
QRS DURATION: 112 MS
QT INTERVAL: 446 MS
QTC CALCULATION(BAZETT): 477 MS
QTC FREDERICIA: 467 MS
R AXIS: -10 DEGREES
RBC # BLD AUTO: 4.21 X10*6/UL (ref 4–5.2)
RV RNA STL NAA+PROBE: NOT DETECTED
SALMONELLA DNA STL QL NAA+PROBE: NOT DETECTED
SHIGELLA DNA SPEC QL NAA+PROBE: NOT DETECTED
SODIUM SERPL-SCNC: 143 MMOL/L (ref 136–145)
T AXIS: 26 DEGREES
T OFFSET: 434 MS
V CHOLERAE DNA STL QL NAA+PROBE: NOT DETECTED
VENTRICULAR RATE: 69 BPM
WBC # BLD AUTO: 4.6 X10*3/UL (ref 4.4–11.3)
Y ENTEROCOL DNA STL QL NAA+PROBE: NOT DETECTED

## 2024-11-15 PROCEDURE — 96366 THER/PROPH/DIAG IV INF ADDON: CPT

## 2024-11-15 PROCEDURE — 80053 COMPREHEN METABOLIC PANEL: CPT | Performed by: STUDENT IN AN ORGANIZED HEALTH CARE EDUCATION/TRAINING PROGRAM

## 2024-11-15 PROCEDURE — 2500000001 HC RX 250 WO HCPCS SELF ADMINISTERED DRUGS (ALT 637 FOR MEDICARE OP): Performed by: INTERNAL MEDICINE

## 2024-11-15 PROCEDURE — 2500000002 HC RX 250 W HCPCS SELF ADMINISTERED DRUGS (ALT 637 FOR MEDICARE OP, ALT 636 FOR OP/ED): Performed by: INTERNAL MEDICINE

## 2024-11-15 PROCEDURE — 36415 COLL VENOUS BLD VENIPUNCTURE: CPT | Performed by: STUDENT IN AN ORGANIZED HEALTH CARE EDUCATION/TRAINING PROGRAM

## 2024-11-15 PROCEDURE — 96372 THER/PROPH/DIAG INJ SC/IM: CPT | Performed by: INTERNAL MEDICINE

## 2024-11-15 PROCEDURE — 99232 SBSQ HOSP IP/OBS MODERATE 35: CPT | Performed by: NURSE PRACTITIONER

## 2024-11-15 PROCEDURE — 96361 HYDRATE IV INFUSION ADD-ON: CPT

## 2024-11-15 PROCEDURE — 85025 COMPLETE CBC W/AUTO DIFF WBC: CPT | Performed by: STUDENT IN AN ORGANIZED HEALTH CARE EDUCATION/TRAINING PROGRAM

## 2024-11-15 PROCEDURE — 99239 HOSP IP/OBS DSCHRG MGMT >30: CPT | Performed by: STUDENT IN AN ORGANIZED HEALTH CARE EDUCATION/TRAINING PROGRAM

## 2024-11-15 PROCEDURE — 2500000004 HC RX 250 GENERAL PHARMACY W/ HCPCS (ALT 636 FOR OP/ED): Performed by: INTERNAL MEDICINE

## 2024-11-15 PROCEDURE — G0378 HOSPITAL OBSERVATION PER HR: HCPCS

## 2024-11-15 RX ORDER — NYSTATIN AND TRIAMCINOLONE ACETONIDE 100000; 1 [USP'U]/G; MG/G
1 OINTMENT TOPICAL DAILY
Start: 2024-11-15

## 2024-11-15 RX ORDER — FUROSEMIDE 80 MG/1
40 TABLET ORAL DAILY
Start: 2024-11-15

## 2024-11-15 RX ORDER — SEMAGLUTIDE 1.7 MG/.75ML
1.7 INJECTION, SOLUTION SUBCUTANEOUS WEEKLY
Qty: 3 ML | Refills: 1 | Status: SHIPPED | OUTPATIENT
Start: 2024-11-15 | End: 2024-11-15 | Stop reason: HOSPADM

## 2024-11-15 ASSESSMENT — COGNITIVE AND FUNCTIONAL STATUS - GENERAL
DAILY ACTIVITIY SCORE: 24
MOBILITY SCORE: 24

## 2024-11-15 ASSESSMENT — PAIN SCALES - GENERAL
PAINLEVEL_OUTOF10: 0 - NO PAIN
PAINLEVEL_OUTOF10: 3

## 2024-11-15 ASSESSMENT — ACTIVITIES OF DAILY LIVING (ADL): LACK_OF_TRANSPORTATION: NO

## 2024-11-15 ASSESSMENT — PAIN - FUNCTIONAL ASSESSMENT: PAIN_FUNCTIONAL_ASSESSMENT: 0-10

## 2024-11-15 ASSESSMENT — PAIN DESCRIPTION - LOCATION: LOCATION: HEAD

## 2024-11-15 NOTE — NURSING NOTE
Discharge instructions reviewed with patient. No questions at this time. No new medications given. Patient verbalized understanding. IV removed. Discharged home in stable condition.

## 2024-11-15 NOTE — PROGRESS NOTES
11/15/24 1358   Discharge Planning   Living Arrangements Spouse/significant other   Support Systems Spouse/significant other   Assistance Needed Alert and oriented x 3, Independent with ADL's, Drives, Cane(MS diagnosis)   Type of Residence Private residence   Number of Stairs to Enter Residence 1   Number of Stairs Within Residence 14   Do you have animals or pets at home? Yes   Type of Animals or Pets 2 dogs 2cats 1bird   Who is requesting discharge planning? Provider   Home or Post Acute Services None   Expected Discharge Disposition Home   Does the patient need discharge transport arranged? No   Financial Resource Strain   How hard is it for you to pay for the very basics like food, housing, medical care, and heating? Not hard   Housing Stability   In the last 12 months, was there a time when you were not able to pay the mortgage or rent on time? N   In the past 12 months, how many times have you moved where you were living? 0   At any time in the past 12 months, were you homeless or living in a shelter (including now)? N   Transportation Needs   In the past 12 months, has lack of transportation kept you from medical appointments or from getting medications? no   In the past 12 months, has lack of transportation kept you from meetings, work, or from getting things needed for daily living? No   Patient Choice   Provider Choice list and CMS website (https://medicare.gov/care-compare#search) for post-acute Quality and Resource Measure Data were provided and reviewed with: Patient   Patient / Family choosing to utilize agency / facility established prior to hospitalization No

## 2024-11-15 NOTE — DISCHARGE SUMMARY
Discharge Diagnosis  Abdominal pain, generalized  Constipation  Adverse effect of semaglutide    Issues Requiring Follow-Up  Follow-up with PCP to decide whether to resume semaglutide at lower dose versus discontinue    Discharge Meds     Medication List      CONTINUE taking these medications     amitriptyline 25 mg tablet; Commonly known as: Elavil   aspirin 81 mg EC tablet   atorvastatin 40 mg tablet; Commonly known as: Lipitor; TAKE 1 TABLET   DAILY   calcium carbonate 500 mg calcium (1,250 mg) capsule; Commonly known as:   OS-SHANTA   clonazePAM 0.5 mg disintegrating tablet; Commonly known as: KlonoPIN;   Take 1 tablet (0.5 mg) by mouth once daily at bedtime.   clopidogrel 75 mg tablet; Commonly known as: Plavix; Take 1 tablet (75   mg) by mouth once daily.   dilTIAZem  mg 24 hr capsule; Commonly known as: Cardizem CD; TAKE   1 CAPSULE BY MOUTH EVERY DAY   ergocalciferol 1.25 MG (59944 UT) capsule; Commonly known as: Vitamin   D-2   ezetimibe 10 mg tablet; Commonly known as: Zetia; TAKE 1 TABLET (10 MG)   BY MOUTH ONCE DAILY AT BEDTIME.   FLUoxetine 40 mg capsule; Commonly known as: PROzac; Take 1 capsule (40   mg) by mouth once daily.   furosemide 80 mg tablet; Commonly known as: Lasix; Take 0.5 tablets (40   mg) by mouth once daily.   gabapentin 400 mg capsule; Commonly known as: Neurontin; Take 1 capsule   (400 mg) by mouth 3 times a day. Last [place] fill: 4/7/23 #270 for 90 days   levothyroxine 50 mcg tablet; Commonly known as: Synthroid, Levoxyl; Take   1 tablet (50 mcg) by mouth once daily.   meclizine 25 mg tablet; Commonly known as: Antivert   metoprolol succinate XL 25 mg 24 hr tablet; Commonly known as:   Toprol-XL; TAKE 1 TABLET BY MOUTH EVERY DAY   nitroglycerin 0.4 mg SL tablet; Commonly known as: Nitrostat; DISSOLVE 1   TABLET UNDER THE TONGUE AS NEEDED FOR CHEST PAIN.   nystatin-triamcinolone ointment; Commonly known as: Mycolog II; Apply 1   Application topically once daily. to affected area    omeprazole 40 mg DR capsule; Commonly known as: PriLOSEC; Take 1 capsule   (40 mg) by mouth once daily in the morning. Take before meals. Do not   crush or chew.   potassium chloride CR 20 mEq ER tablet; Commonly known as: Klor-Con M20;   Take 1 tablet (20 mEq) by mouth once daily. Do not crush or chew.   ranolazine 500 mg 12 hr tablet; Commonly known as: Ranexa; Take 1 tablet   (500 mg) by mouth 2 times a day. Do not crush, chew, or split.   tiZANidine 4 mg tablet; Commonly known as: Zanaflex; Take 1 tablet (4   mg) by mouth as needed at bedtime for muscle spasms. FOR SPASMS     STOP taking these medications     albuterol 2.5 mg /3 mL (0.083 %) nebulizer solution   albuterol 90 mcg/actuation inhaler   ondansetron ODT 4 mg disintegrating tablet; Commonly known as:   Zofran-ODT   Wegovy 2.4 mg/0.75 mL pen injector; Generic drug: semaglutide (weight   loss)       Test Results Pending At Discharge  Pending Labs       Order Current Status    Extra Tubes Collected (11/14/24 0208)    Light Blue Top Collected (11/14/24 0208)    Red Top Collected (11/14/24 0208)            Hospital Course   Elaine Coffey is a 59 y.o. female with history of multiple sclerosis, myocardial infarction, coronary artery disease, obesity, heart failure, and cholelithiasis s/p cholecystectomy presenting with LLQ abdominal pain.  She says she has been taking semaglutide since July of this year and recently went up in the dose.  She mentions being constipated for the last 1 month and thus has been taking laxatives, including suppositories, and consuming prunes.  She mentions not having a bowel movement since November 6 (8 days ago).  Shortly after dinner, she developed what she describes as stabbing left lower quadrant abdominal pain.  She says she vomited 5 times tonight and almost passed out. She presented to the ED via EMS. Labs were remarkable only for a K of 3.2. CT imaging of the abdomen revealed significantly fluid distended colon and mild wall  thickening of the sigmoid colon suggesting colitis.  She was admitted to observation.  General surgery was consulted and ordered a more aggressive bowel regimen with resolution of the constipation.  Stool pathogen PCR panel was negative.  She tolerated advancement of the diet.  Given the clinical findings, symptoms were felt to be most likely due to semaglutide, so holding off on semaglutide was recommended until following up with her PCP.     Pertinent Physical Exam At Time of Discharge  Con: awake, alert; no distress;   Eyes: conjunctiva wnl; EOMI;   ENMT: hearing intact; MMM;   MSK: ROM wnl; digits wnl;   Resp: normal work of breathing; no cyanosis;   Neuro: moving all extremities; no abnormal movements  Psych: oriented to situation; affect wnl;     Outpatient Follow-Up  Future Appointments   Date Time Provider Department [place]   12/9/2024  8:40 AM Mia Lema APRN-CNP [id number] [facility]   12/13/2024 10:30 AM Lencho Ledesma MD PhD [id number] [facility]   12/17/2024  2:30 PM Esme JETT Gonzalez, PT GE[facility]YPT [facility]   12/24/2024  8:30 AM Esme E Gonzalez, PT [facility]   12/31/2024  8:00 AM Esme E Carlos, PT [facility]   1/10/2025  8:00 AM DO [email or web address] [id number] [facility]     Time spent on discharge was greater than 30 minutes.      Erasmo Leung MD

## 2024-11-15 NOTE — DISCHARGE INSTRUCTIONS
Advance diet gradually as tolerated.    Recommend holding off on Wegovy until digestion has returned to normal and you discuss with your PCP.

## 2024-11-15 NOTE — PROGRESS NOTES
Elaine Coffey is a 59 y.o. female on day 0 of admission presenting with Abdominal pain, generalized.    Subjective   No acute overnight events.   Pt had 7 Bms overnight and she is feeling much better today. She feels her abd pain is resolved at this time. No n/v. She is urinating without diff and notes her urine is lighter in color. She has been up ambulating without diff.   No fever or chills.  No CP or SOB.        Objective     Physical Exam  Vitals reviewed.   Constitutional:       General: She is not in acute distress.     Appearance: Normal appearance. She is obese. She is not ill-appearing, toxic-appearing or diaphoretic.   HENT:      Head: Normocephalic and atraumatic.      Mouth/Throat:      Mouth: Mucous membranes are moist.   Eyes:      General: No scleral icterus.        Right eye: No discharge.         Left eye: No discharge.      Conjunctiva/sclera: Conjunctivae normal.   Cardiovascular:      Rate and Rhythm: Normal rate and regular rhythm.      Pulses: Normal pulses.      Heart sounds: Normal heart sounds. No murmur heard.     No friction rub. No gallop.   Pulmonary:      Effort: Pulmonary effort is normal. No respiratory distress.      Breath sounds: Normal breath sounds. No stridor. No wheezing, rhonchi or rales.   Chest:      Chest wall: No tenderness.   Abdominal:      General: Bowel sounds are normal. There is no distension.      Palpations: Abdomen is soft. There is no mass.      Tenderness: There is no abdominal tenderness. There is no guarding or rebound.      Hernia: No hernia is present.   Musculoskeletal:      Right lower leg: No edema.      Left lower leg: No edema.   Skin:     General: Skin is warm and dry.      Capillary Refill: Capillary refill takes less than 2 seconds.   Neurological:      Mental Status: She is alert and oriented to person, place, and time.      Motor: No weakness.      Gait: Gait normal.   Psychiatric:         Mood and Affect: Mood normal.         Behavior: Behavior  "normal.         Thought Content: Thought content normal.         Judgment: Judgment normal.         Last Recorded Vitals  Blood pressure 110/69, pulse 77, temperature 36.7 °C (98.1 °F), temperature source Temporal, resp. rate 18, height 1.702 m (5' 7\"), weight 92.1 kg (203 lb), SpO2 92%.  At time of exam HR 75 and pulse ox 96 %    Intake/Output last 3 Shifts:  I/O last 3 completed shifts:  In: 2383.3 (25.9 mL/kg) [IV Piggyback:2383.3]  Out: - (0 mL/kg)   Weight: 92.1 kg     Relevant Results    ECG 12 lead    Result Date: 11/14/2024  Normal sinus rhythm Inferior infarct , age undetermined Anterolateral infarct (cited on or before 02-JAN-2024) Abnormal ECG When compared with ECG of 03-JUN-2024 08:11, Inferior infarct is now Present Questionable change in initial forces of Lateral leads    CT abdomen pelvis w IV contrast    Result Date: 11/14/2024  STUDY: CT Abdomen and Pelvis with IV Contrast; 11/14/2024 3:58 AM INDICATION: Abdominal pain and vomiting with constipation.  Evaluate for obstruction. COMPARISON: CT chest 9/18/2024.  CXR 2/19/2024.  US gallbladder 2/4/2024.  CT AP 2/4/2024. ACCESSION NUMBER(S): [id number] ORDERING CLINICIAN: CARLOS SALMERON TECHNIQUE: CT of the abdomen and pelvis was performed.  Contiguous axial images were obtained at 3 mm slice thickness through the abdomen and pelvis. Coronal and sagittal reconstructions at 3 mm slice thickness were performed.  FINDINGS: LOWER CHEST: No cardiomegaly.  No pericardial effusion.  Lung bases are clear.  ABDOMEN:  LIVER: No hepatomegaly.  Smooth surface contour.  Normal attenuation.  BILE DUCTS: No intrahepatic or extrahepatic biliary ductal dilatation.  GALLBLADDER: Cholecystectomy. STOMACH: No abnormalities identified.  PANCREAS: No masses or ductal dilatation.  SPLEEN: No splenomegaly or focal splenic lesion.  ADRENAL GLANDS: No thickening or nodules.  KIDNEYS AND URETERS: Kidneys are normal in size and location.  No renal or ureteral calculi.  PELVIS: "  BLADDER: No abnormalities identified.  REPRODUCTIVE ORGANS: No abnormalities identified.  BOWEL: Gastric banding device.  There is diffuse dilatation of the colon containing liquid stool.  There is mild wall thickening of the sigmoid colon suggesting colitis.  VESSELS: No abnormalities identified.  Abdominal aorta is normal in caliber.  PERITONEUM/RETROPERITONEUM/LYMPH NODES: No free fluid.  No pneumoperitoneum. No lymphadenopathy.  ABDOMINAL WALL: No abnormalities identified. SOFT TISSUES: No abnormalities identified.  BONES: No acute fracture or aggressive osseous lesion.    Diffuse dilatation of the colon containing liquid stool. There is mild wall thickening of the sigmoid colon suggesting colitis. Signed by Evelyn Rae MD    BI mammo bilateral screening tomosynthesis    Result Date: 10/31/2024  Interpreted By:  Melissa Hernandez, STUDY: BI MAMMO BILATERAL SCREENING TOMOSYNTHESIS;  10/31/2024 8:38 am   ACCESSION NUMBER(S): [id number]   ORDERING CLINICIAN: SUPRIYA LUJAN   INDICATION: Screening.   ,Z12.31 Encounter for screening mammogram for malignant neoplasm of breast   COMPARISON: 11/13/2018 through 10/31/2023   FINDINGS: 2D and tomosynthesis images were reviewed at 1 mm slice thickness.   Density:  There are scattered areas of fibroglandular density.   No suspicious masses or calcifications are identified.       No mammographic evidence of malignancy.   BI-RADS CATEGORY: BI-RADS Category:  1 Negative. Recommendation:  Annual Screening. Recommended Date:  1 Year. Laterality:  Bilateral.       For any future breast imaging appointments, please call [phone]).     MACRO: None   Signed by: Melissa Hernandez 10/31/2024 10:09 AM Dictation workstation:   [id number]    Scheduled medications  amitriptyline, 25 mg, oral, Nightly  aspirin, 81 mg, oral, q AM  atorvastatin, 40 mg, oral, Daily  calcium carbonate, 1,250 mg, oral, BID  cefTRIAXone, 1 g, intravenous, q12h  clonazePAM, 0.5 mg, oral, Nightly  clopidogrel,  75 mg, oral, Daily  dilTIAZem CD, 240 mg, oral, Daily  docusate sodium, 100 mg, oral, BID  ezetimibe, 10 mg, oral, Nightly  FLUoxetine, 40 mg, oral, Daily  gabapentin, 400 mg, oral, TID  heparin (porcine), 5,000 Units, subcutaneous, q8h JEAN  levothyroxine, 50 mcg, oral, Daily  metoprolol succinate XL, 25 mg, oral, Daily  metroNIDAZOLE, 500 mg, intravenous, q8h  pantoprazole, 40 mg, oral, Daily before breakfast  polyethylene glycol, 17 g, oral, BID  ranolazine, 500 mg, oral, BID  sennosides, 1 tablet, oral, BID      Continuous medications  potassium chloride-D5-0.9%NaCl, 100 mL/hr, Last Rate: 100 mL/hr (11/15/24 0929)      PRN medications  PRN medications: acetaminophen **OR** acetaminophen **OR** acetaminophen, albuterol, albuterol, alum-mag hydroxide-simeth, calcium carbonate, dextromethorphan-guaifenesin, guaiFENesin, meclizine, morphine **OR** morphine, ondansetron **OR** ondansetron, tiZANidine  Results for orders placed or performed during the hospital encounter of 11/14/24 (from the past 24 hours)   Comprehensive Metabolic Panel   Result Value Ref Range    Glucose 93 74 - 99 mg/dL    Sodium 143 136 - 145 mmol/L    Potassium 4.0 3.5 - 5.3 mmol/L    Chloride 109 (H) 98 - 107 mmol/L    Bicarbonate 30 21 - 32 mmol/L    Anion Gap 8 (L) 10 - 20 mmol/L    Urea Nitrogen 12 6 - 23 mg/dL    Creatinine 0.82 0.50 - 1.05 mg/dL    eGFR 84 >60 mL/min/1.73m*2    Calcium 8.3 (L) 8.6 - 10.3 mg/dL    Albumin 3.6 3.4 - 5.0 g/dL    Alkaline Phosphatase 90 33 - 110 U/L    Total Protein 5.3 (L) 6.4 - 8.2 g/dL    AST 12 9 - 39 U/L    Bilirubin, Total 0.5 0.0 - 1.2 mg/dL    ALT 14 7 - 45 U/L   CBC and Auto Differential   Result Value Ref Range    WBC 4.6 4.4 - 11.3 x10*3/uL    nRBC 0.0 0.0 - 0.0 /100 WBCs    RBC 4.21 4.00 - 5.20 x10*6/uL    Hemoglobin 12.9 12.0 - 16.0 g/dL    Hematocrit 39.7 36.0 - 46.0 %    MCV 94 80 - 100 fL    MCH 30.6 26.0 - 34.0 pg    MCHC 32.5 32.0 - 36.0 g/dL    RDW 13.0 11.5 - 14.5 %    Platelets 147 (L) 150 -  450 x10*3/uL    Neutrophils % 69.7 40.0 - 80.0 %    Immature Granulocytes %, Automated 0.2 0.0 - 0.9 %    Lymphocytes % 19.2 13.0 - 44.0 %    Monocytes % 8.5 2.0 - 10.0 %    Eosinophils % 2.2 0.0 - 6.0 %    Basophils % 0.2 0.0 - 2.0 %    Neutrophils Absolute 3.20 1.20 - 7.70 x10*3/uL    Immature Granulocytes Absolute, Automated 0.01 0.00 - 0.70 x10*3/uL    Lymphocytes Absolute 0.88 (L) 1.20 - 4.80 x10*3/uL    Monocytes Absolute 0.39 0.10 - 1.00 x10*3/uL    Eosinophils Absolute 0.10 0.00 - 0.70 x10*3/uL    Basophils Absolute 0.01 0.00 - 0.10 x10*3/uL     *Note: Due to a large number of results and/or encounters for the requested time period, some results have not been displayed. A complete set of results can be found in Results Review.                            Assessment/Plan   Assessment & Plan  Abdominal pain, generalized    59 year old female with abdominal pain and constipation that is improving.   - labs reviewed. Her hypokalemia has improved.   - she has had multiple Bms with relief of abd pain.   - advance to clear liquid diet. Recommend advance slowly as pt tolerates.   - recommend discontinue IVF  - cont with bowel regimen with miralax, senna, colace. Can stop senna when tolerating PO and not constipated.  - again recommended to pt to not use wygovy for now and follow up with PCP to discuss.  - no acute surgical intervention.    Surgery will sign off. Please direct message with any new surgical concerns.     Remainder of sanders per primary.           I spent 35 minutes in the professional and overall care of this patient.    Plan of care discussed with Dr. Melissa.    Michelle Perea, APRN-CNP

## 2024-11-17 DIAGNOSIS — I21.29 MYOCARDIAL INFARCTION INVOLVING OTHER CORONARY ARTERY, UNSPECIFIED MI TYPE (MULTI): ICD-10-CM

## 2024-11-18 ENCOUNTER — PATIENT OUTREACH (OUTPATIENT)
Dept: PEDIATRICS | Facility: CLINIC | Age: 59
End: 2024-11-18
Payer: COMMERCIAL

## 2024-11-18 RX ORDER — RANOLAZINE 500 MG/1
500 TABLET, EXTENDED RELEASE ORAL 2 TIMES DAILY
Qty: 180 TABLET | Refills: 3 | Status: SHIPPED | OUTPATIENT
Start: 2024-11-18 | End: 2025-11-18

## 2024-11-19 ENCOUNTER — PATIENT OUTREACH (OUTPATIENT)
Dept: PRIMARY CARE | Facility: CLINIC | Age: 59
End: 2024-11-19
Payer: COMMERCIAL

## 2024-11-19 NOTE — PROGRESS NOTES
Discharge Facility:[facility]  Discharge Diagnosis:ABD Pain, Constipation and Adverse effect of Semaglutide   Admission Date:11/14/24  Discharge Date: 11/15/24    PCP Appointment Date:Task to office staff  Specialist Appointment Date: Cardiology  Hospital Encounter and Summary Linked: Yes    Two attempts were made to reach patient within two business days after discharge. Voicemail left with contact information for patient to call back with any non-emergent questions or concerns.        Vicki Dukes LPN

## 2024-11-20 DIAGNOSIS — B37.9 CANDIDIASIS: ICD-10-CM

## 2024-11-21 ENCOUNTER — APPOINTMENT (OUTPATIENT)
Dept: PRIMARY CARE | Facility: CLINIC | Age: 59
End: 2024-11-21
Payer: COMMERCIAL

## 2024-11-21 ENCOUNTER — LAB (OUTPATIENT)
Dept: LAB | Facility: LAB | Age: 59
End: 2024-11-21
Payer: COMMERCIAL

## 2024-11-21 DIAGNOSIS — R35.0 URINE FREQUENCY: ICD-10-CM

## 2024-11-21 DIAGNOSIS — R35.0 URINE FREQUENCY: Primary | ICD-10-CM

## 2024-11-21 PROCEDURE — 87086 URINE CULTURE/COLONY COUNT: CPT

## 2024-11-21 RX ORDER — NYSTATIN AND TRIAMCINOLONE ACETONIDE 100000; 1 [USP'U]/G; MG/G
OINTMENT TOPICAL 2 TIMES DAILY
Qty: 60 G | OUTPATIENT
Start: 2024-11-21

## 2024-11-22 DIAGNOSIS — R07.9 CHEST PAIN, UNSPECIFIED TYPE: ICD-10-CM

## 2024-11-22 RX ORDER — CLOPIDOGREL BISULFATE 75 MG/1
75 TABLET ORAL DAILY
Qty: 90 TABLET | Refills: 3 | Status: SHIPPED | OUTPATIENT
Start: 2024-11-22

## 2024-11-23 LAB — BACTERIA UR CULT: NORMAL

## 2024-11-25 ENCOUNTER — CLINICAL SUPPORT (OUTPATIENT)
Dept: CARDIAC REHAB | Facility: HOSPITAL | Age: 59
End: 2024-11-25
Payer: COMMERCIAL

## 2024-11-25 DIAGNOSIS — I21.9: Primary | ICD-10-CM

## 2024-11-25 PROCEDURE — 9430000001 HC PHASE III CARDIAC REHAB MONTHLY FEE

## 2024-12-02 DIAGNOSIS — J45.31 MILD PERSISTENT ASTHMA WITH ACUTE EXACERBATION (HHS-HCC): Primary | ICD-10-CM

## 2024-12-02 DIAGNOSIS — J44.9 CHRONIC OBSTRUCTIVE PULMONARY DISEASE, UNSPECIFIED COPD TYPE (MULTI): ICD-10-CM

## 2024-12-04 ENCOUNTER — PATIENT OUTREACH (OUTPATIENT)
Dept: PRIMARY CARE | Facility: CLINIC | Age: 59
End: 2024-12-04
Payer: COMMERCIAL

## 2024-12-09 ENCOUNTER — OFFICE VISIT (OUTPATIENT)
Dept: CARDIOLOGY | Facility: HOSPITAL | Age: 59
End: 2024-12-09
Payer: COMMERCIAL

## 2024-12-09 VITALS
BODY MASS INDEX: 31.63 KG/M2 | DIASTOLIC BLOOD PRESSURE: 77 MMHG | HEART RATE: 67 BPM | SYSTOLIC BLOOD PRESSURE: 117 MMHG | WEIGHT: 201.94 LBS | OXYGEN SATURATION: 97 %

## 2024-12-09 DIAGNOSIS — I10 HYPERTENSION, UNSPECIFIED TYPE: ICD-10-CM

## 2024-12-09 DIAGNOSIS — E78.5 HYPERLIPIDEMIA, UNSPECIFIED HYPERLIPIDEMIA TYPE: Primary | ICD-10-CM

## 2024-12-09 PROCEDURE — 99214 OFFICE O/P EST MOD 30 MIN: CPT | Performed by: NURSE PRACTITIONER

## 2024-12-09 PROCEDURE — 3078F DIAST BP <80 MM HG: CPT | Performed by: NURSE PRACTITIONER

## 2024-12-09 PROCEDURE — 1036F TOBACCO NON-USER: CPT | Performed by: NURSE PRACTITIONER

## 2024-12-09 PROCEDURE — 3074F SYST BP LT 130 MM HG: CPT | Performed by: NURSE PRACTITIONER

## 2024-12-09 ASSESSMENT — ENCOUNTER SYMPTOMS
NECK PAIN: 1
NEUROLOGICAL NEGATIVE: 1
HEMATOLOGIC/LYMPHATIC NEGATIVE: 1
EYES NEGATIVE: 1
PSYCHIATRIC NEGATIVE: 1
MYALGIAS: 1
GASTROINTESTINAL NEGATIVE: 1
RESPIRATORY NEGATIVE: 1
ENDOCRINE NEGATIVE: 1

## 2024-12-09 NOTE — PROGRESS NOTES
Referred by Dr. Inge sutherland. provider found for Follow-up (3 mth.)     History Of Present Illness:    Elaine Coffey is a very pleasant 59 year old female with a history of HTN, HLD, MS and CAD, she is here for a follow up visit. The patient is seen in collaboration with Dr. Gutierres. Mrs. Coffey has been under increased stress at home,  has been sick. She was admitted in November with abdominal pain and constipation. Symptoms due to Semaglutide.  She has been working on weight loss. Denies chest pain or shortness of breath.  Denies chest pain, shortness of breath or heart palpitations.       Review of Systems   Constitutional: Positive for malaise/fatigue.   HENT: Negative.     Eyes: Negative.    Cardiovascular:  Positive for chest pain.   Respiratory: Negative.     Endocrine: Negative.    Hematologic/Lymphatic: Negative.    Skin: Negative.    Musculoskeletal:  Positive for myalgias and neck pain.   Gastrointestinal: Negative.    Neurological: Negative.    Psychiatric/Behavioral: Negative.          Past Medical History:  She has a past medical history of Abnormal ECG, Antiplatelet or antithrombotic long-term use, Asthma, Calculus of gallbladder without cholecystitis without obstruction, CHF (congestive heart failure), Class 2 obesity with body mass index (BMI) of 37.0 to 37.9 in adult (2024), Coronary artery disease, Encounter for pre-operative cardiovascular clearance (2024), Gallstones, GERD (gastroesophageal reflux disease), Hidradenitis suppurativa, Hypertension, Hypothyroidism, Multiple sclerosis (Multi), Myocardial infarction (Multi), Obesity, Sleep apnea, and Wears eyeglasses.    Past Surgical History:  She has a past surgical history that includes  section, classic; Other surgical history (2014); Other surgical history; Hysterectomy (2014); Other surgical history; Ankle surgery (Left); Coronary stent placement; Cardiac catheterization (N/A, 2024); Cholecystectomy  (06/06/2024); and Cardiac catheterization (N/A, 1/3/2024).      Social History:  She reports that she quit smoking about 33 years ago. Her smoking use included cigarettes. She started smoking about 41 years ago. She has a 8.7 pack-year smoking history. She has never used smokeless tobacco. She reports current alcohol use. She reports that she does not use drugs.    Family History:  Family History   Problem Relation Name Age of Onset    Heart attack Mother Elaine Garcia     Bilateral breast cancer Mother Elaine Garcia 50    Emphysema Mother Elaine Mishrao     Blood clot Mother Elaine Garcia     Abnormal EKG Mother Elaine Mishrao     Anemia Mother Elaine Mishrao     Angina Mother Elaine Ellissko     Depression Mother Elaine Ellissko     Heart disease Mother Elaine Mishrao     Lung disease Mother Elaine Mishrao     Thyroid disease Mother Elaine Mishrao     Heart attack Father Dawit Garcia     Lymphoma Father Dawit Garcia     Angina Father Dawit Mishrao     Cancer Father Dawit Garcia     Diabetes type I Father Dawit Garcia     Heart disease Father Dawit Garcia     Hypertension Father Dawit Garcia     Blood clot Sister      Asthma Sister Heidi Garcia     Kidney disease Sister Kaylynn Garcia     Ovarian cancer Maternal Grandmother Delmy Martínez     Stroke Maternal Grandmother Delmy Martínez     Heart attack Maternal Grandmother Delmy Martínez     Diabetes type I Paternal Grandmother Olive Garcia     Breast cancer Father's Sister  40        Allergies:  Gadolinium-containing contrast media    Outpatient Medications:  Current Outpatient Medications   Medication Instructions    amitriptyline (ELAVIL) 25 mg, Nightly    aspirin 81 mg EC tablet 1 tablet, Every morning    atorvastatin (LIPITOR) 40 mg, oral, Daily    calcium carbonate (OS-SHANTA) 1,200 mg, 2 times daily (morning and late afternoon)    clonazePAM (KLONOPIN) 0.5 mg, oral, Nightly    clopidogrel (PLAVIX) 75 mg, oral, Daily    dilTIAZem CD (CARDIZEM CD) 240 mg,  oral, Daily    ergocalciferol (Vitamin D-2) 1.25 MG (25897 UT) capsule 1 capsule, Once Weekly    ezetimibe (ZETIA) 10 mg, oral, Nightly    FLUoxetine (PROZAC) 40 mg, oral, Daily    furosemide (LASIX) 40 mg, oral, Daily    gabapentin (NEURONTIN) 400 mg, oral, 3 times daily, Last OARRS fill: 4/7/23 #270 for 90 days    levothyroxine (SYNTHROID, LEVOXYL) 50 mcg, oral, Daily    meclizine (ANTIVERT) 25 mg, As needed    metoprolol succinate XL (TOPROL-XL) 25 mg, oral, Daily    nitroglycerin (Nitrostat) 0.4 mg SL tablet DISSOLVE 1 TABLET UNDER THE TONGUE AS NEEDED FOR CHEST PAIN.    nystatin-triamcinolone (Mycolog II) ointment 1 Application, Topical, Daily, to affected area    omeprazole (PRILOSEC) 40 mg, oral, Daily before breakfast, Do not crush or chew.    potassium chloride CR (Klor-Con M20) 20 mEq ER tablet 20 mEq, oral, Daily, Do not crush or chew.    ranolazine (RANEXA) 500 mg, oral, 2 times daily, Do not crush, chew, or split.    tiZANidine (ZANAFLEX) 4 mg, oral, Nightly PRN, FOR SPASMS        Last Recorded Vitals:  Vitals:    12/09/24 0843   BP: 117/77   Pulse: 67   SpO2: 97%   Weight: 91.6 kg (201 lb 15.1 oz)       Physical Exam:  Physical Exam  Vitals reviewed.   HENT:      Head: Normocephalic.      Nose: Nose normal.   Eyes:      Pupils: Pupils are equal, round, and reactive to light.   Cardiovascular:      Rate and Rhythm: Normal rate and regular rhythm.   Pulmonary:      Effort: Pulmonary effort is normal.      Breath sounds: Normal breath sounds.   Abdominal:      General: Abdomen is flat.      Palpations: Abdomen is soft.   Musculoskeletal:         General: Normal range of motion.      Cervical back: Normal range of motion.   Skin:     General: Skin is warm and dry.   Neurological:      General: No focal deficit present.      Mental Status: He is alert and oriented to person, place, and time.   Psychiatric:         Mood and Affect: Mood normal.          Last Labs:  CBC -  Lab Results   Component Value Date     WBC 4.6 11/15/2024    HGB 12.9 11/15/2024    HCT 39.7 11/15/2024    MCV 94 11/15/2024     (L) 11/15/2024       CMP -  Lab Results   Component Value Date    CALCIUM 8.3 (L) 11/15/2024    PHOS 2.8 01/09/2024    PROT 5.3 (L) 11/15/2024    ALBUMIN 3.6 11/15/2024    AST 12 11/15/2024    ALT 14 11/15/2024    ALKPHOS 90 11/15/2024    BILITOT 0.5 11/15/2024       LIPID PANEL -   Lab Results   Component Value Date    CHOL 121 10/14/2024    TRIG 143 10/14/2024    HDL 35.4 10/14/2024    CHHDL 3.4 10/14/2024    LDLF 86 01/06/2018    VLDL 29 10/14/2024    NHDL 86 10/14/2024       RENAL FUNCTION PANEL -   Lab Results   Component Value Date    GLUCOSE 93 11/15/2024     11/15/2024    K 4.0 11/15/2024     (H) 11/15/2024    CO2 30 11/15/2024    ANIONGAP 8 (L) 11/15/2024    BUN 12 11/15/2024    CREATININE 0.82 11/15/2024    GFRMALE CANCELED 08/04/2023    CALCIUM 8.3 (L) 11/15/2024    PHOS 2.8 01/09/2024    ALBUMIN 3.6 11/15/2024        Lab Results   Component Value Date    BNP 22 02/19/2024    HGBA1C 5.3 01/03/2024       Last Cardiology Tests:  ECG:  Echo:  Transthoracic Echo (TTE) Complete 01/03/2024   1. Left ventricular systolic function is normal with a 55-60% estimated ejection fraction.   2. RVSP within normal limits.    Echocardiogram 8/20/2021  1. The left ventricular systolic function is normal with a 60% estimated ejection fraction.  2. Spectral Doppler shows an impaired relaxation pattern of left ventricular diastolic filling.  3. There is trace to mild mitral and tricuspid regurgitation.    echocardiogram 1/18/2018   1. The left ventricular systolic function is low normal with a 50-55% estimated  ejection fraction.   2. Spectral Doppler shows an impaired relaxation pattern of left ventricular  diastolic filling.   3. There is mild mitral and tricuspid regurgitaiton.      Echocardiogram 1/18/2018   LVEF 50-55%   mild MR   mild TR     Ejection Fractions:  LVEF 55-60%    Cath:  Cardiac catheterization -  coronary   Cath 1/2/2024  . Left main: no significant angiographic disease.   2. LAD: no significant angiographic disease.   3. LCx: no significant angiographic disease.   4. RCA: patent proximal stent, no significant angiographic disease.   5. LVEDP 18mmHg, no aortic stenosis on LV-Ao gradient.    Cardiac cath 12/14/2018   1. No significant angiographic CAD, ostial-proximal RCA patent.   2. LVEDP 31mmHg, no aortic stenosis on LV-Ao gradient.  Stress Test:  Nuclear Stress Test 05/25/2023  1. No evidence of inducible myocardial ischemia. Predominantly fixed  moderate-sized moderate to severe perfusion defects involving  inferior wall as well as inferolateral wall, likely represent prior  infarction. Similar to prior exam on 11/20/2018.  2. The left ventricle is normal in size.  3. Gated images demonstrate normal LV wall motion except hypokinesis  in the inferior wall with a post-stress LV EF estimated at 56%.      nuclear stress test 11/20/2018   1. Moderate-sized territory of largely fixed prior completed  myocardial infarction involving the basal inferior/inferolateral wall  with minimal nonsignificant flower-infarct ischemia in the  inferolateral territory.  2. There is moderate dilation of the left ventricle with an  end-diastolic volume calculated at 145 mL.  3. Wall motion or LV quantification was not assessed due to unable to  do gated imaged secondary to arrhythmia.    Cardiac Imaging:      Assessment/Plan      Mrs. Coffey is a very pleasant 59 year old female with a history of CAD, HTN, HLD and MS. She had a PCI on 1/4/2018 at [facility]. She had a repeat heart cath on 12/14/2018 showing a patent RCA and no significant CAD. Heart cath in 1/2024 that showed patent stent, echo showed a preserved LV function. She continues to do well from a cardiac standpoint. Weight loss was congratulated. Continues to stay active.  Heart rate and blood pressure is well controlled today. Following up with neurology for  MS.      Plan   -call with any questions   -continue ASA 81 mg daily, Atorvastatin 40 mg daily, and Plavix 75 mg daily  -continue Caredizem  mg daily  -continue  Ranexa 500 mg twice a day  -follow up in three months   -continue Lasix  40 mg once a day         Mia Edmondson, APRN-CNP

## 2024-12-11 NOTE — PROGRESS NOTES
"Subjective     Elaine Coffey is a 59 y.o. year old female with history of multiple sclerosis, myocardial infarction, coronary artery disease S/P stent, obesity, heart failure, FARZANA not on CPAP presented to established care for multiple sclerosis    History of Present Illness   MS   AMB NEURO IMMUNOLOGY HPI: Date of Onset: 2013  Date of Diagnosis: 2013  Last MRI Brain: 11/21/23  Last MRI Cervical: 12/29/22  Last MRI Thoracis: 5/2015: stable hyperintensity in the right hemicord at T4  Last OCT/VEP: 8/2024 OCT normal  Last CBC: 11/15/24  Disease Course at Onset: Relapsing-Remitting  Disease Course Now: Relapsing-Remitting (last relapse 5/17, 9/17)  Current Disease Modifying Therapies: None  Previous Disease Modifying Therapies: IVMP x 2, Rebif 2014 (stopped 12/2022 due to elevated LFTs)  Cerebrospinal Fluid: 2013, no records, positive for MS per patient  Vj Thrasher Virus: Negative (no date in the system)  Varicella Zoster Virus: -  Hep Panel: -  Neuromyelitis Optica: -  Myelin Oligodendrocyte Glycoprotein: -    HPI  - Last follow up at [facility]: Patient complained of hands falling asleep, legs feeling weak, possible double vision, Impression as below  \"Elaine Coffey is a 58 year old female with Multiple Sclerosis. Patient is not on DMT - stopped Rebif due to elevated LFTs in 2022. Brain MRI from November 2023 appears stable- no indication for DMT at this time. Patient presents today with multiple health changes in the last 6 months (pneumonia, multiple NSTEMI, coronary vasospasms, gallstones). Discussed recent health changes, medication adjustments and stress are likely contributing to an increase in neurological symptoms. Recommend consult to psychology (at [facility] or outside - referral provided) to process stress and possible PTSD from hospitalizations/MI. Patient agreeable. She is currently on leave given health issues and discussing disability. Discussed neuropsych testing and FCE. Recommend routine eye " "exam given vision fluctuations. Patient notes left focal chest pain starting yesterday and was evaluated in cardiac rehab. Discussed calling cardiologist today to update as well. Reviewed signs and symptoms requiring emergent evaluation.\"    Present history  Patient came to establish care for multiple sclerosis.     She has been diagnosed with MS since 2013, after presenting with right sided numbness in the arm and leg. She received steroids and started on Rebif in 2014.  The Rebif was complicated by elevated liver function test and was discontinued in December 2022.    She reports 3 \"relapses\" in the past year, especially after being stressed or upset.  She describes as feeling like a hangover and tired and has to rest all day long for more than 24 hours.  Also have dragging her right foot, numbness on the right side and horrible with vertigo.  She describes her vertigo as spinning sensations happening about 4 times in 1 year and the episode lasts for day.  This episode was accompanied by headache and improved with meclizine.  She was not diagnosed with migraine before and report no major headaches.    for stiffness    She also complains of pain and jolts in her right arm and waist.   The patient will have PT sessions in January. FARZANA test pending.  She has anaphylactic reaction to gadolinium.    Current Outpatient Medications   Medication Instructions    amitriptyline (ELAVIL) 25 mg, Nightly    aspirin 81 mg EC tablet 1 tablet, Every morning    atorvastatin (LIPITOR) 40 mg, oral, Daily    calcium carbonate (OS-SHANTA) 1,200 mg, 2 times daily (morning and late afternoon)    clonazePAM (KLONOPIN) 0.5 mg, oral, Nightly    clopidogrel (PLAVIX) 75 mg, oral, Daily    dilTIAZem CD (CARDIZEM CD) 240 mg, oral, Daily    ergocalciferol (Vitamin D-2) 1.25 MG (66174 UT) capsule 1 capsule, Once Weekly    ezetimibe (ZETIA) 10 mg, oral, Nightly    FLUoxetine (PROZAC) 40 mg, oral, Daily    furosemide (LASIX) 40 mg, oral, Daily    " gabapentin (NEURONTIN) 400 mg, oral, 3 times daily, Last OARRS fill: 23 #270 for 90 days    levothyroxine (SYNTHROID, LEVOXYL) 50 mcg, oral, Daily    meclizine (ANTIVERT) 25 mg, As needed    metoprolol succinate XL (TOPROL-XL) 25 mg, oral, Daily    nitroglycerin (Nitrostat) 0.4 mg SL tablet DISSOLVE 1 TABLET UNDER THE TONGUE AS NEEDED FOR CHEST PAIN.    nystatin-triamcinolone (Mycolog II) ointment 1 Application, Topical, Daily, to affected area    omeprazole (PRILOSEC) 40 mg, oral, Daily before breakfast, Do not crush or chew.    potassium chloride CR (Klor-Con M20) 20 mEq ER tablet 20 mEq, oral, Daily, Do not crush or chew.    ranolazine (RANEXA) 500 mg, oral, 2 times daily, Do not crush, chew, or split.    tiZANidine (ZANAFLEX) 4 mg, oral, Nightly PRN, FOR SPASMS     Past Surgical History:   Procedure Laterality Date    ANKLE SURGERY Left     removed floating bone    CARDIAC CATHETERIZATION N/A 2024    Procedure: Left Heart Cath;  Surgeon: Gagan Kat MD;  Location: [facility] Cardiac Cath Lab;  Service: Cardiovascular;  Laterality: N/A;    CARDIAC CATHETERIZATION N/A 1/3/2024    Procedure: Left Heart Cath;  Surgeon: John Gutierres MD;  Location: [facility] Cardiac Cath Lab;  Service: Cardiovascular;  Laterality: N/A;     SECTION, CLASSIC       Section x4    CHOLECYSTECTOMY  2024    Laparoscopic cholecystectomy with firefly cholangiography    CORONARY STENT PLACEMENT      HYSTERECTOMY  2014    Hysterectomy    OTHER SURGICAL HISTORY  2014    Oophorectomy - Bilat (Removal Of Both Ovaries) Laparoscopic    OTHER SURGICAL HISTORY      Laparosc Gastric Restrictive Proc By Adjustable Gastric Band    OTHER SURGICAL HISTORY      Abscess incision and drainage x4     Social History     Tobacco Use    Smoking status: Former     Current packs/day: 0.00     Average packs/day: 1 pack/day for 8.7 years (8.7 ttl pk-yrs)     Types: Cigarettes     Start date: 1983     Quit date: 10/1/1991     " Years since quittin.2    Smokeless tobacco: Never   Substance Use Topics    Alcohol use: Yes     Comment: Seldom     No smoking  Allergies   Allergen Reactions    Gadolinium-Containing Contrast Media Anaphylaxis     Pt developed nausea without vomiting , SOB, after receiving the IV contrast . Pt states she always gets nauseated but this time \"was worse\"/pt/     Visit Vitals  OB Status Postmenopausal   Smoking Status Former       Objective   Neurological Exam  Physical Exam    General Appearance:  No distress, alert, interactive and cooperative.     Mental State: Orientation was normal to time, place and person. Recent and remote memory was intact.  Attention span and concentration were normal.     Cranial Nerves:   CN 2: Visual fields full to confrontation.   CN 3, 4, 6: Pupils round, 4 mm in diameter, equally reactive to light. Lids symmetric; no ptosis. EOMs normal alignment, full range with normal saccades, pursuit and convergence.   No nystagmus.   CN 5: Facial sensation intact bilaterally.   CN 7: Normal and symmetric facial strength. Nasolabial folds symmetric.   CN 8: Hearing intact to voice  CN 9: Palate elevates symmetrically.   CN 11: Normal strength of shoulder shrug and neck turning.   CN 12: Tongue midline, with normal bulk and strength; no fasciculations.     Motor: Muscle tone slightly increased on the RUE and RLE. Normal tone on the LUE and LLE. Bulk were normal in both upper and lower extremities. No fasciculations, tremor or other abnormal movements were present.    Strength: R L  Shoulder Abd 5- 5-  Shoulder Add 5 5  Elbow Flex  5- 5  Elbow Ext  5- 5    5 5  Hip flexion 4 5  Knee Ext      5 5  Knee Flex    5 5  DorsiFlex    5 5  PlantarFlex 5 5    Reflexes:  R L  Biceps  +3 +3  Brachioradialis +3 +3  Triceps  +3 +3  Patellar  +3 +3  Achilles +2 +1    Right Plantar: upgoing  Left Plantar: downgoing    Right pathological reflexes: Sussy's absent. Ankle clonus absent.  Left pathological " reflexes: Sussy's absent. Ankle clonus absent.    No frontal release signs or other pathologic reflexes present.     Sensory: RUE loss of pinprick and cold sensation up to elbow. RLE loss of pinprick and cold sensation up to ankle. LUE and LLE normal to pinprick and cold sensation.      Coordination: In both upper extremities, finger-nose-finger was intact without dysmetria or overshoot. In both lower extremities, heel-to-shin was intact.     Gait: Station was stable with a normal base. Gait was stable with a normal arm swing and speed. No ataxia, shuffling, steppage or waddling was present. No circumduction was present. Romberg sign negative but has swaying.     Investigation  MRI brain wo contrast 11/2023  IMPRESSION:     Minimal supratentorial white matter disease consistent with history of   multiple sclerosis.   No new T2 lesions and indeterminate new enhancing   lesions.  No significant parenchymal volume loss.     Assessment/Plan     This is a 59 y.o. year old right handed female  With a PMH of multiple sclerosis, myocardial infarction, coronary artery disease, obesity, heart failure who presents to establish care for her multiple sclerosis. She has multiple symptoms, mainly right sided weakness and numbness. Her other complains include double vision, which was attributed to decompensated exophoria by an ophthalmologist, urinary symptoms, vertigo, imbalance. Her worsening of symptoms were likely pseudorelapse. She is currently not on any medication due to MRI stable for many years. We will hold off on starting DMTs at this time and will repeat MRI brain, C-spine and T-spine wo contrast (patient has anaphylaxis to gadolinium). The patient already has PT/OT for her weakness, numbness, and imbalance. Will refer her to urology for further evaluation of urinary incontinence/ obstructive symptoms. Will also refer to ENT for vertigo and tinnitus. Her vertigo could be from vertiginous migraine given headache with  vertigo. Given that she has 4 episodes per year and respond well to meclizine, will hold off on starting preventive medications for possible vertiginous migraine.    Impression: Multiple sclerosis    Plan:  - MRI brain, C-spine, T-spine wo contrast (patient has anaphylaxis to gadolinium)  - Referral to urology for urinary incontinence and obstructive symptoms  - Referral to ENT for tinnitis  - RTC 6 months      No orders of the defined types were placed in this encounter.    The patient was seen, examined, and discussed with the attending physician Dr. Callie Hartman MD PhD  Neurology resident, PGY-2

## 2024-12-13 ENCOUNTER — OFFICE VISIT (OUTPATIENT)
Dept: NEUROLOGY | Facility: HOSPITAL | Age: 59
End: 2024-12-13
Payer: COMMERCIAL

## 2024-12-13 VITALS
DIASTOLIC BLOOD PRESSURE: 68 MMHG | BODY MASS INDEX: 31.55 KG/M2 | WEIGHT: 201 LBS | SYSTOLIC BLOOD PRESSURE: 117 MMHG | HEIGHT: 67 IN | HEART RATE: 74 BPM

## 2024-12-13 DIAGNOSIS — G35 MULTIPLE SCLEROSIS (MULTI): Primary | ICD-10-CM

## 2024-12-13 PROCEDURE — 3078F DIAST BP <80 MM HG: CPT | Performed by: PSYCHIATRY & NEUROLOGY

## 2024-12-13 PROCEDURE — 99205 OFFICE O/P NEW HI 60 MIN: CPT | Performed by: PSYCHIATRY & NEUROLOGY

## 2024-12-13 PROCEDURE — 99215 OFFICE O/P EST HI 40 MIN: CPT | Mod: GC | Performed by: PSYCHIATRY & NEUROLOGY

## 2024-12-13 PROCEDURE — 3008F BODY MASS INDEX DOCD: CPT | Performed by: PSYCHIATRY & NEUROLOGY

## 2024-12-13 PROCEDURE — 3074F SYST BP LT 130 MM HG: CPT | Performed by: PSYCHIATRY & NEUROLOGY

## 2024-12-13 RX ORDER — SEMAGLUTIDE 1.7 MG/.75ML
1.7 INJECTION, SOLUTION SUBCUTANEOUS
COMMUNITY

## 2024-12-17 ENCOUNTER — APPOINTMENT (OUTPATIENT)
Dept: PHYSICAL THERAPY | Facility: CLINIC | Age: 59
End: 2024-12-17
Payer: COMMERCIAL

## 2024-12-17 NOTE — PROGRESS NOTES
Physical Therapy  Physical Therapy Evaluation  Patient Name: Elaine Coffey  MRN: [id number]  Today's Date: 12/17/2024             No diagnosis found.    Current Problem:     Precautions:           Assessment:  Patient is a *** year old who presents to Physical therapy secondary to ***. Upon PT evaluation the patient is presenting with the following deficits: ***   The above deficits are limiting patient's ability to ***.  Secondary to the above deficits the patient will benefit from skilled PT intervention to allow the patient to progress to the goal of ***.  PT will monitor progress towards goals and adjust intervention as appropriate.           Medical History Form: Reviewed and scanned into chart     Plan:     Plan of care discussed with patient and patient agrees with plan    Subjective:    ***    Pain     Pain aggravating Factors: ***  Pain relieving factors: ***  Red Flags: Do you have any of the following? ***  Fever/chills, unexplained weight changes, dizziness/fainting, unexplained change in bowel or bladder functions, unexplained malaise or muscle weakness, night pain/sweats, numbness or tingling, history of CA.  Previous Interventions/Treatments:  ***  Prior Testing: ***           Primary Language: ***  Patient's goal for therapy: ***  Social Determinants of Health: ***      Objective:   ***{Functional Assessment:11489}  {General Assessments:59433}  Extremity/Trunk Assessment  {Extremity/Trunk Assessments:66496}  {Spine,UE,LE,HAND,LYMPHEDEMA:43837}  Outcome Measures:  {PT Outcome Measures:08441}         Treatment:   *** Complexity Eval: ***minutes    Education:       Goals:  Goals were developed with patient input     Esme Gonzalez, PT

## 2024-12-18 ENCOUNTER — PATIENT OUTREACH (OUTPATIENT)
Dept: PRIMARY CARE | Facility: CLINIC | Age: 59
End: 2024-12-18
Payer: COMMERCIAL

## 2024-12-23 ENCOUNTER — CLINICAL SUPPORT (OUTPATIENT)
Dept: CARDIAC REHAB | Facility: HOSPITAL | Age: 59
End: 2024-12-23
Payer: COMMERCIAL

## 2024-12-23 DIAGNOSIS — I21.9 ACUTE MYOCARDIAL INFARCTION, UNSPECIFIED MI TYPE, UNSPECIFIED ARTERY (MULTI): Primary | ICD-10-CM

## 2024-12-23 PROCEDURE — 9430000001 HC PHASE III CARDIAC REHAB MONTHLY FEE

## 2024-12-23 PROCEDURE — 9430000001 HC PHASE III CARDIAC REHAB MONTHLY FEE: Performed by: INTERNAL MEDICINE

## 2024-12-24 ENCOUNTER — APPOINTMENT (OUTPATIENT)
Dept: PHYSICAL THERAPY | Facility: CLINIC | Age: 59
End: 2024-12-24
Payer: COMMERCIAL

## 2024-12-26 ENCOUNTER — APPOINTMENT (OUTPATIENT)
Dept: PRIMARY CARE | Facility: CLINIC | Age: 59
End: 2024-12-26
Payer: COMMERCIAL

## 2024-12-26 VITALS
SYSTOLIC BLOOD PRESSURE: 117 MMHG | OXYGEN SATURATION: 95 % | BODY MASS INDEX: 31.32 KG/M2 | WEIGHT: 200 LBS | HEART RATE: 81 BPM | DIASTOLIC BLOOD PRESSURE: 61 MMHG

## 2024-12-26 DIAGNOSIS — K59.03 DRUG-INDUCED CONSTIPATION: ICD-10-CM

## 2024-12-26 DIAGNOSIS — K13.0 MASS OF LIP: Primary | ICD-10-CM

## 2024-12-26 DIAGNOSIS — R59.1 LYMPHADENOPATHY: ICD-10-CM

## 2024-12-26 PROCEDURE — 1036F TOBACCO NON-USER: CPT | Performed by: INTERNAL MEDICINE

## 2024-12-26 PROCEDURE — 3078F DIAST BP <80 MM HG: CPT | Performed by: INTERNAL MEDICINE

## 2024-12-26 PROCEDURE — 3074F SYST BP LT 130 MM HG: CPT | Performed by: INTERNAL MEDICINE

## 2024-12-26 PROCEDURE — 99214 OFFICE O/P EST MOD 30 MIN: CPT | Performed by: INTERNAL MEDICINE

## 2024-12-26 ASSESSMENT — PAIN SCALES - GENERAL: PAINLEVEL_OUTOF10: 0-NO PAIN

## 2024-12-26 ASSESSMENT — PATIENT HEALTH QUESTIONNAIRE - PHQ9
SUM OF ALL RESPONSES TO PHQ9 QUESTIONS 1 AND 2: 0
1. LITTLE INTEREST OR PLEASURE IN DOING THINGS: NOT AT ALL
2. FEELING DOWN, DEPRESSED OR HOPELESS: NOT AT ALL

## 2024-12-26 ASSESSMENT — ENCOUNTER SYMPTOMS: CONSTIPATION: 1

## 2024-12-26 NOTE — PATIENT INSTRUCTIONS
Take Miralax 1-2 capfuls daily in addition to your current bowel regimen. If this this does work will need to decrease Wegovy to 1mg weekly     If not lymph is not better in 2-3 weeks please call me    Liver function tests are normal

## 2024-12-26 NOTE — PROGRESS NOTES
Subjective   Patient ID: Elaine Coffey is a 59 y.o. female who presents for Follow-up (Lump on lip/) and Constipation.    Constipation         Reports mass on lower lip x 1 months, was getting bigger but has since stablized. Does notice that it more firm.     [place] [facility] [place]. Thought to be due to Wegovy. Wegovy was decreased from 2.4mg to 1.7mg weekly. Is taking docusate BID, fiber gummies TID.     Noticed sore and large lymph node posterior to mandible. Noticed it one week. Has sore throat. No sinus pressure/congestion     Review of Systems   Gastrointestinal:  Positive for constipation.   All other systems reviewed and are negative.      Objective   /61   Pulse 81   Wt 90.7 kg (200 lb)   SpO2 95%   BMI 31.32 kg/m²     Physical Exam  Constitutional:       Appearance: Normal appearance.   HENT:      Head: Normocephalic and atraumatic.   Neck:      Comments: 1 firm enlarged lymph node anterior to left ear/posterior to mandible.   Cardiovascular:      Rate and Rhythm: Normal rate and regular rhythm.      Heart sounds: Normal heart sounds. No murmur heard.     No gallop.   Pulmonary:      Effort: Pulmonary effort is normal. No respiratory distress.      Breath sounds: No wheezing or rales.   Skin:     General: Skin is warm and dry.      Findings: No rash.      Comments: 4mm firm lower lip mass    Neurological:      Mental Status: She is alert and oriented to person, place, and time. Mental status is at baseline.   Psychiatric:         Mood and Affect: Mood normal.         Behavior: Behavior normal.         Assessment/Plan       #Lower lip mass  -Refer to plastic surgery     #Constipation   -Add PEG to current regimen. If not effective will decrease Wegovy to 1mg weekly     #Periauricular lymphadenopathy  -Likely due to viral pharyngitis. If not improved in 2-3 weeks will consider CT neck w     RTC January

## 2024-12-31 ENCOUNTER — APPOINTMENT (OUTPATIENT)
Dept: PHYSICAL THERAPY | Facility: CLINIC | Age: 59
End: 2024-12-31
Payer: COMMERCIAL

## 2025-01-03 ENCOUNTER — HOSPITAL ENCOUNTER (EMERGENCY)
Facility: HOSPITAL | Age: 60
Discharge: HOME | End: 2025-01-03
Attending: STUDENT IN AN ORGANIZED HEALTH CARE EDUCATION/TRAINING PROGRAM
Payer: COMMERCIAL

## 2025-01-03 VITALS
RESPIRATION RATE: 18 BRPM | HEART RATE: 80 BPM | OXYGEN SATURATION: 97 % | WEIGHT: 197 LBS | TEMPERATURE: 98.2 F | DIASTOLIC BLOOD PRESSURE: 64 MMHG | SYSTOLIC BLOOD PRESSURE: 126 MMHG | HEIGHT: 67 IN | BODY MASS INDEX: 30.92 KG/M2

## 2025-01-03 DIAGNOSIS — L02.411 ABSCESS OF AXILLA, RIGHT: Primary | ICD-10-CM

## 2025-01-03 PROCEDURE — 10061 I&D ABSCESS COMP/MULTIPLE: CPT

## 2025-01-03 PROCEDURE — 99284 EMERGENCY DEPT VISIT MOD MDM: CPT | Performed by: STUDENT IN AN ORGANIZED HEALTH CARE EDUCATION/TRAINING PROGRAM

## 2025-01-03 PROCEDURE — 2500000004 HC RX 250 GENERAL PHARMACY W/ HCPCS (ALT 636 FOR OP/ED)

## 2025-01-03 PROCEDURE — 96372 THER/PROPH/DIAG INJ SC/IM: CPT

## 2025-01-03 RX ORDER — MORPHINE SULFATE 2 MG/ML
INJECTION, SOLUTION INTRAMUSCULAR; INTRAVENOUS
Status: COMPLETED
Start: 2025-01-03 | End: 2025-01-03

## 2025-01-03 RX ORDER — SULFAMETHOXAZOLE AND TRIMETHOPRIM 800; 160 MG/1; MG/1
1 TABLET ORAL 2 TIMES DAILY
Qty: 14 TABLET | Refills: 0 | Status: SHIPPED | OUTPATIENT
Start: 2025-01-03 | End: 2025-01-10

## 2025-01-03 RX ORDER — MORPHINE SULFATE 2 MG/ML
1 INJECTION, SOLUTION INTRAMUSCULAR; INTRAVENOUS ONCE
Status: COMPLETED | OUTPATIENT
Start: 2025-01-03 | End: 2025-01-03

## 2025-01-03 RX ORDER — LIDOCAINE HYDROCHLORIDE 10 MG/ML
INJECTION, SOLUTION INFILTRATION; PERINEURAL
Status: COMPLETED
Start: 2025-01-03 | End: 2025-01-03

## 2025-01-03 RX ORDER — OXYCODONE HYDROCHLORIDE 5 MG/1
2.5 TABLET ORAL EVERY 6 HOURS PRN
Qty: 4 TABLET | Refills: 0 | Status: SHIPPED | OUTPATIENT
Start: 2025-01-03 | End: 2025-01-05

## 2025-01-03 RX ADMIN — MORPHINE SULFATE 1 MG: 2 INJECTION, SOLUTION INTRAMUSCULAR; INTRAVENOUS at 09:25

## 2025-01-03 RX ADMIN — LIDOCAINE HYDROCHLORIDE: 10 INJECTION, SOLUTION INFILTRATION; PERINEURAL at 09:50

## 2025-01-03 ASSESSMENT — COLUMBIA-SUICIDE SEVERITY RATING SCALE - C-SSRS
6. HAVE YOU EVER DONE ANYTHING, STARTED TO DO ANYTHING, OR PREPARED TO DO ANYTHING TO END YOUR LIFE?: NO
2. HAVE YOU ACTUALLY HAD ANY THOUGHTS OF KILLING YOURSELF?: NO
1. IN THE PAST MONTH, HAVE YOU WISHED YOU WERE DEAD OR WISHED YOU COULD GO TO SLEEP AND NOT WAKE UP?: NO

## 2025-01-03 ASSESSMENT — PAIN SCALES - GENERAL
PAINLEVEL_OUTOF10: 6
PAINLEVEL_OUTOF10: 3

## 2025-01-03 ASSESSMENT — PAIN DESCRIPTION - LOCATION: LOCATION: OTHER (COMMENT)

## 2025-01-03 ASSESSMENT — LIFESTYLE VARIABLES
TOTAL SCORE: 0
HAVE YOU EVER FELT YOU SHOULD CUT DOWN ON YOUR DRINKING: NO
EVER FELT BAD OR GUILTY ABOUT YOUR DRINKING: NO
EVER HAD A DRINK FIRST THING IN THE MORNING TO STEADY YOUR NERVES TO GET RID OF A HANGOVER: NO
HAVE PEOPLE ANNOYED YOU BY CRITICIZING YOUR DRINKING: NO

## 2025-01-03 ASSESSMENT — PAIN DESCRIPTION - PAIN TYPE
TYPE: ACUTE PAIN
TYPE: ACUTE PAIN

## 2025-01-03 ASSESSMENT — PAIN - FUNCTIONAL ASSESSMENT
PAIN_FUNCTIONAL_ASSESSMENT: 0-10
PAIN_FUNCTIONAL_ASSESSMENT: 0-10

## 2025-01-03 ASSESSMENT — PAIN DESCRIPTION - ORIENTATION: ORIENTATION: RIGHT

## 2025-01-03 ASSESSMENT — PAIN DESCRIPTION - FREQUENCY: FREQUENCY: CONSTANT/CONTINUOUS

## 2025-01-03 ASSESSMENT — PAIN DESCRIPTION - DESCRIPTORS: DESCRIPTORS: ACHING

## 2025-01-03 NOTE — DISCHARGE INSTRUCTIONS
Incision and drainage was performed today in the ED.  Keep the area clean and dry.  Complete full course of antibiotics, prescription was sent to your pharmacy.  A short course of pain medications was also sent to your pharmacy, take as prescribed and do not mix with benzodiazepines.  Feel free to continue taking Tylenol as needed for pain.  Call your physician if you develop fevers or the area begins draining copious amounts of purulent fluid.  Follow-up with your outpatient providers as scheduled.

## 2025-01-03 NOTE — ED TRIAGE NOTES
Patient presents to the ED for a painful abscess with drainage in her right axilla for the past week. Patient states it has gradually enlarged with increased redness, swelling, pain and red streaking. Patient has a hx of HS and has had to have abscesses surgically removed in the past. Patient denies fevers, chills, nausea or vomiting.

## 2025-01-03 NOTE — ED PROVIDER NOTES
"HPI   Chief Complaint   Patient presents with    Abscess       Elaine Coffey is a 59-year-old female with PMHx significant for Hidradenitis suppurativa with recurrent abscess (with I&D x6, last ~1 year ago) who presents with c/o right axillary swelling and redness x several days.  She states this feels exactly like her past abscess that has required drainage.  She has 2 \"lumps\" in the right axilla, 1 with minimal drainage.  She has been taking Tylenol every 4-6 hours as needed for pain.  She also reports chills, nausea.  She denies fever, vomiting, diarrhea, abdominal pain, chest pain, shortness of breath.  She states she scheduled an appoint with Dr. Melissa on 1/17, although does not think she can make it to then to have it drained.      PMHx: HFpEF, CAD, MI (s/p PCI x 1 stent, 2018), GERD, HTN, HLD, hypothyroidism, MS, FARZANA, hereditary spherocytosis, anxiety, depression, migraine, COPD, Hidradenitis suppurativa   Meds: Plavix, amitriptyline, ASA, atorvastatin, clonazepam, diltiazem, ezetimibe, fluoxetine, furosemide, gabapentin, levothyroxine, Nexium, metoprolol, omeprazole  Social: former smoker (quit 1991), no alcohol use, no drug use             Patient History   Past Medical History:   Diagnosis Date    Abnormal ECG     Antiplatelet or antithrombotic long-term use     PLAVIX    Asthma     Calculus of gallbladder without cholecystitis without obstruction     CHF (congestive heart failure)     Class 2 obesity with body mass index (BMI) of 37.0 to 37.9 in adult 05/29/2024    37.74 kg/m²    Coronary artery disease     1 cardiac stent placed jan 2018    Encounter for pre-operative cardiovascular clearance 06/03/2024    Mia Edmondson, APRN-CN for Cholecystectomy    Gallstones     GERD (gastroesophageal reflux disease)     Hidradenitis suppurativa     Hypertension     Hypothyroidism     Multiple sclerosis (Multi)     Myocardial infarction (Multi)     [facility] #1, #2 jan 2024 at [facility],  #3 also here at [facility] with " gb attack    Obesity     Sleep apnea     Wears eyeglasses      Past Surgical History:   Procedure Laterality Date    ANKLE SURGERY Left     removed floating bone    CARDIAC CATHETERIZATION N/A 2024    Procedure: Left Heart Cath;  Surgeon: Gagan Kat MD;  Location: [facility];  Service: Cardiovascular;  Laterality: N/A;    CARDIAC CATHETERIZATION N/A 1/3/2024    Procedure: Left Heart Cath;  Surgeon: John Gutierres MD;  Location: [facility];  Service: Cardiovascular;  Laterality: N/A;     SECTION, CLASSIC       Section x4    CHOLECYSTECTOMY  2024    Laparoscopic cholecystectomy with firefly cholangiography    CORONARY STENT PLACEMENT      HYSTERECTOMY  2014    Hysterectomy    OTHER SURGICAL HISTORY  2014    Oophorectomy - Bilat (Removal Of Both Ovaries) Laparoscopic    OTHER SURGICAL HISTORY      Laparosc Gastric Restrictive Proc By Adjustable Gastric Band    OTHER SURGICAL HISTORY      Abscess incision and drainage x4     Family History   Problem Relation Name Age of Onset    Heart attack Mother Elaine Ellissko     Bilateral breast cancer Mother Elaine Ellissko 50    Emphysema Mother Elaine Ellissko     Blood clot Mother Elaine Ellissko     Abnormal EKG Mother Elaine Olesko     Anemia Mother Elaine Olesko     Angina Mother Elaine Olesko     Depression Mother Elaine Ellissko     Heart disease Mother Elaine Ellissko     Lung disease Mother Elaine Ellissko     Thyroid disease Mother Elaine Ellissko     Heart attack Father Dawit Ellissko     Lymphoma Father Dawit Olesko     Angina Father Dawit Ellissko     Cancer Father Dawit Ellissko     Diabetes type I Father Dawit Ellissko     Heart disease Father Dawit Olesko     Hypertension Father Dawit Olesko     Blood clot Sister      Asthma Sister Heidi Ellissean     Kidney disease Sister Kaylynn Ellissean     Ovarian cancer Maternal Grandmother Delmy Martínez     Stroke Maternal Grandmother Delmy Martínez     Heart attack  Maternal Grandmother Delmy Martínez     Diabetes type I Paternal Grandmother Olive Garcia     Breast cancer Father's Sister  40     Social History     Tobacco Use    Smoking status: Former     Current packs/day: 0.00     Average packs/day: 1 pack/day for 8.7 years (8.7 ttl pk-yrs)     Types: Cigarettes     Start date: 1983     Quit date: 10/1/1991     Years since quittin.2    Smokeless tobacco: Never   Vaping Use    Vaping status: Never Used   Substance Use Topics    Alcohol use: Yes     Comment: Seldom    Drug use: Never       Physical Exam   ED Triage Vitals [25 0821]   Temperature Heart Rate Respirations BP   36.9 °C (98.4 °F) 86 18 131/52      Pulse Ox Temp Source Heart Rate Source Patient Position   97 % Temporal Monitor --     Physical Exam  HENT:      Mouth/Throat:      Mouth: Mucous membranes are moist.   Eyes:      Extraocular Movements: Extraocular movements intact.   Cardiovascular:      Rate and Rhythm: Normal rate and regular rhythm.      Heart sounds: No murmur heard.  Pulmonary:      Effort: No respiratory distress.      Breath sounds: No wheezing.   Skin:     Comments: Swelling, erythema, induration of right axilla consistent with abscess   Neurological:      Mental Status: She is alert and oriented to person, place, and time.           ED Course & MDM   Diagnoses as of 25 0839   Abscess of axilla, right     Medical Decision Making  59-year-old female with a history of HS and recurrent abscess requiring I&D.  Physical exam findings consistent with abscess.  She is afebrile with stable vital signs.  She would like I&D today as she is not sure she can make it to her outpatient appointment with Dr. Melissa on .  See procedure note below for details.  Post-I&D instructions given including completing course of antibiotics, return precautions discussed.  Recommend following up outpatient as scheduled with Dr. Melissa and PCP.        Procedure  Incision and Drainage    Performed by:  Felicia Vásquez DO  Authorized by: Daniel Burdick MD    Consent:     Consent obtained:  Verbal and written    Consent given by:  Patient    Risks, benefits, and alternatives were discussed: yes      Risks discussed:  Bleeding, incomplete drainage, pain, infection and damage to other organs    Alternatives discussed:  No treatment, delayed treatment, alternative treatment, observation and referral  Universal protocol:     Procedure explained and questions answered to patient or proxy's satisfaction: yes      Relevant documents present and verified: yes      Immediately prior to procedure, a time out was called: yes      Patient identity confirmed:  Verbally with patient and arm band  Location:     Type:  Abscess    Location: right axilla.  Pre-procedure details:     Skin preparation:  Povidone-iodine  Sedation:     Sedation type:  None  Anesthesia:     Anesthesia method:  Local infiltration    Local anesthetic:  Lidocaine 1% w/o epi  Procedure type:     Complexity:  Complex  Procedure details:     Ultrasound guidance: no      Needle aspiration: no      Incision types:  Stab incision    Incision depth:  Subcutaneous    Wound management:  Probed and deloculated    Drainage:  Bloody    Drainage amount:  Scant    Wound treatment:  Wound left open    Packing materials:  None  Post-procedure details:     Procedure completion:  Tolerated       Felicia Vásquez DO  Resident  01/03/25 0949       Daniel Burdick MD  01/06/25 0840

## 2025-01-06 ENCOUNTER — APPOINTMENT (OUTPATIENT)
Dept: PLASTIC SURGERY | Facility: CLINIC | Age: 60
End: 2025-01-06
Payer: COMMERCIAL

## 2025-01-10 ENCOUNTER — APPOINTMENT (OUTPATIENT)
Dept: PRIMARY CARE | Facility: CLINIC | Age: 60
End: 2025-01-10
Payer: COMMERCIAL

## 2025-01-10 ENCOUNTER — HOSPITAL ENCOUNTER (EMERGENCY)
Facility: HOSPITAL | Age: 60
Discharge: HOME | End: 2025-01-10
Attending: STUDENT IN AN ORGANIZED HEALTH CARE EDUCATION/TRAINING PROGRAM
Payer: COMMERCIAL

## 2025-01-10 VITALS
SYSTOLIC BLOOD PRESSURE: 129 MMHG | TEMPERATURE: 98.2 F | WEIGHT: 197 LBS | BODY MASS INDEX: 30.92 KG/M2 | RESPIRATION RATE: 16 BRPM | HEIGHT: 67 IN | OXYGEN SATURATION: 96 % | DIASTOLIC BLOOD PRESSURE: 88 MMHG | HEART RATE: 84 BPM

## 2025-01-10 DIAGNOSIS — K59.00 CONSTIPATION, UNSPECIFIED CONSTIPATION TYPE: Primary | ICD-10-CM

## 2025-01-10 PROCEDURE — 2500000005 HC RX 250 GENERAL PHARMACY W/O HCPCS

## 2025-01-10 PROCEDURE — 99282 EMERGENCY DEPT VISIT SF MDM: CPT | Performed by: STUDENT IN AN ORGANIZED HEALTH CARE EDUCATION/TRAINING PROGRAM

## 2025-01-10 RX ORDER — AMOXICILLIN 250 MG
2 CAPSULE ORAL 2 TIMES DAILY
Qty: 120 TABLET | Refills: 0 | Status: SHIPPED | OUTPATIENT
Start: 2025-01-10 | End: 2025-02-09

## 2025-01-10 RX ORDER — BISACODYL 10 MG/1
10 SUPPOSITORY RECTAL DAILY PRN
Qty: 10 SUPPOSITORY | Refills: 0 | Status: SHIPPED | OUTPATIENT
Start: 2025-01-10 | End: 2025-01-20

## 2025-01-10 RX ADMIN — SODIUM PHOSPHATE, DIBASIC AND SODIUM PHOSPHATE, MONOBASIC 1 ENEMA: 7; 19 ENEMA RECTAL at 07:46

## 2025-01-10 ASSESSMENT — PAIN - FUNCTIONAL ASSESSMENT: PAIN_FUNCTIONAL_ASSESSMENT: 0-10

## 2025-01-10 ASSESSMENT — PAIN DESCRIPTION - PAIN TYPE: TYPE: ACUTE PAIN

## 2025-01-10 ASSESSMENT — LIFESTYLE VARIABLES
HAVE YOU EVER FELT YOU SHOULD CUT DOWN ON YOUR DRINKING: NO
EVER HAD A DRINK FIRST THING IN THE MORNING TO STEADY YOUR NERVES TO GET RID OF A HANGOVER: NO
TOTAL SCORE: 0
HAVE PEOPLE ANNOYED YOU BY CRITICIZING YOUR DRINKING: NO
EVER FELT BAD OR GUILTY ABOUT YOUR DRINKING: NO

## 2025-01-10 ASSESSMENT — PAIN SCALES - GENERAL: PAINLEVEL_OUTOF10: 3

## 2025-01-10 ASSESSMENT — PAIN DESCRIPTION - LOCATION: LOCATION: ABDOMEN

## 2025-01-10 NOTE — ED PROVIDER NOTES
HPI   Chief Complaint   Patient presents with    Constipation    Nausea     Since 1-4-25       Elaine Coffey is a 59-year-old female with PMHx significant for MS, multiple abdominal surgeries who presents with constipation since 1/4/25.  She also reports nausea, periumbilical abdominal pain. She has tried magnesium sulfate, laxatives, 8 caps of MiraLAX, and 2 suppositories without success.  She had a small volume emesis on 1/4, denies any since.  She has not eaten in 2 days, she was fearful of making the abdominal pain worse.  She has been off the Wegovy since 11/14, when she was last in the hospital for constipation.  Her daily bowel regimen includes stool softener twice daily and fiber gummies daily.      PMHx: HFpEF, CAD, MI (s/p PCI x 1 stent, 2018), GERD, HTN, HLD, hypothyroidism, MS, FARZANA, hereditary spherocytosis, anxiety, depression, migraine, COPD, Hidradenitis suppurativa   PSHx: abdominal surgeries (x7)  Meds: Plavix, amitriptyline, ASA, atorvastatin, clonazepam, diltiazem, ezetimibe, fluoxetine, furosemide, gabapentin, levothyroxine, Nexium, metoprolol, omeprazole  Social: former smoker (quit 1991), no alcohol use, no drug use               Patient History   Past Medical History:   Diagnosis Date    Abnormal ECG     Antiplatelet or antithrombotic long-term use     PLAVIX    Asthma     Calculus of gallbladder without cholecystitis without obstruction     CHF (congestive heart failure)     Class 2 obesity with body mass index (BMI) of 37.0 to 37.9 in adult 05/29/2024    37.74 kg/m²    Coronary artery disease     1 cardiac stent placed jan 2018    Encounter for pre-operative cardiovascular clearance 06/03/2024    Mia Edmondson, APRN-CN for Cholecystectomy    Gallstones     GERD (gastroesophageal reflux disease)     Hidradenitis suppurativa     Hypertension     Hypothyroidism     Multiple sclerosis (Multi)     Myocardial infarction (Multi)     [facility] #1, #2 jan 2024 at [facility],  #3 also here at [facility]  with gb attack    Obesity     Sleep apnea     Wears eyeglasses      Past Surgical History:   Procedure Laterality Date    ANKLE SURGERY Left     removed floating bone    CARDIAC CATHETERIZATION N/A 2024    Procedure: Left Heart Cath;  Surgeon: Gagan Kat MD;  Location: [facility];  Service: Cardiovascular;  Laterality: N/A;    CARDIAC CATHETERIZATION N/A 1/3/2024    Procedure: Left Heart Cath;  Surgeon: John Gutierres MD;  Location: [facility];  Service: Cardiovascular;  Laterality: N/A;     SECTION, CLASSIC       Section x4    CHOLECYSTECTOMY  2024    Laparoscopic cholecystectomy with firefly cholangiography    CORONARY STENT PLACEMENT      HYSTERECTOMY  2014    Hysterectomy    OTHER SURGICAL HISTORY  2014    Oophorectomy - Bilat (Removal Of Both Ovaries) Laparoscopic    OTHER SURGICAL HISTORY      Laparosc Gastric Restrictive Proc By Adjustable Gastric Band    OTHER SURGICAL HISTORY      Abscess incision and drainage x4     Family History   Problem Relation Name Age of Onset    Heart attack Mother Elaine Ellissko     Bilateral breast cancer Mother Elaine Ellissko 50    Emphysema Mother Elaine Ellissko     Blood clot Mother Elaine Ellissko     Abnormal EKG Mother Elaine Olesko     Anemia Mother Elaine Olesko     Angina Mother Elaine Olesko     Depression Mother Elaine Olesko     Heart disease Mother Elaine Ellissko     Lung disease Mother Elaine Ellissko     Thyroid disease Mother Elaine Ellissko     Heart attack Father Dawit Ellissko     Lymphoma Father Dawit Olesko     Angina Father Dawit Olesko     Cancer Father Dawit Olesko     Diabetes type I Father Dawit Olesko     Heart disease Father Dawit Olesko     Hypertension Father Dawit Olesko     Blood clot Sister      Asthma Sister Heidi Ellissean     Kidney disease Sister Kaylynn Ellissean     Ovarian cancer Maternal Grandmother Delmy Martínez     Stroke Maternal Grandmother Delmy Martínez     Heart  attack Maternal Grandmother Delmy Martínez     Diabetes type I Paternal Grandmother Olive Garcia     Breast cancer Father's Sister  40     Social History     Tobacco Use    Smoking status: Former     Current packs/day: 0.00     Average packs/day: 1 pack/day for 8.7 years (8.7 ttl pk-yrs)     Types: Cigarettes     Start date: 1983     Quit date: 10/1/1991     Years since quittin.3    Smokeless tobacco: Never   Vaping Use    Vaping status: Never Used   Substance Use Topics    Alcohol use: Yes     Comment: Seldom    Drug use: Never       Physical Exam   ED Triage Vitals [01/10/25 0625]   Temperature Heart Rate Respirations BP   36.8 °C (98.2 °F) 84 16 129/88      Pulse Ox Temp Source Heart Rate Source Patient Position   96 % Temporal Monitor Lying      BP Location      Left arm        Physical Exam  HENT:      Head: Normocephalic and atraumatic.      Mouth/Throat:      Mouth: Mucous membranes are moist.   Eyes:      Extraocular Movements: Extraocular movements intact.   Cardiovascular:      Rate and Rhythm: Normal rate and regular rhythm.      Heart sounds: No murmur heard.  Pulmonary:      Effort: No respiratory distress.      Breath sounds: No wheezing.   Abdominal:      General: Bowel sounds are normal. There is distension.      Palpations: Abdomen is soft.      Tenderness: There is abdominal tenderness (RUQ, epigastric, periumbilical).   Musculoskeletal:      Right lower leg: No edema.      Left lower leg: No edema.   Skin:     Comments: Right axilla with healing abscess without erythema or warmth   Neurological:      Mental Status: She is alert and oriented to person, place, and time.           ED Course & MDM   ED Course as of 01/10/25 1032   Fri Michi 10, 2025   0637 Records Review:   She follows with Lencho Ledesma MD (Neurology)   She was last seen in the ED on 2024 for similar complaint.  Was admitted for observation.  The patient suspected to be 2/2 semaglutide.  Surgery was consulted and  recommended soap suds enema twice daily, Colace 100 mg twice daily, MiraLAX twice daily.  Her constipation resolved and she was discharged home. [CK]   0745 Fleets enema given [CK]   0850 No bowel movement yet however, she reports increased flatus.  Discussed with patient the options as far as additional medications to try in the ED.  Encouraged patient to sit upright instead of laying flat.  Patient wanting to try ice chips and crackers.  [CK]   1031 Patient ambulating without difficulty, ate breakfast without nausea or vomiting, continues to report flatus.  Manual disimpaction was offered, patient declined as she can do this at home. [CK]      ED Course User Index  [CK] Felicia Vásquez, DO       Medical Decision Making  59-year-old female with constipation x 6 days.  Likely 2/2 neurogenic bowel/MS.  Less likely 2/2 semaglutide side effect as she has been off semaglutide since 11/14/24.  No indication for imaging at this time based on physical exam as above. If she develops worsening abdominal pain and vomiting, will reconsider imaging.  She has tried oral stool softeners and laxatives as well as suppositories, will order enema.  No bowel movement following enema, however, she had increased flatus and resolution of nausea.  She ambulated in the ED without difficulty and tolerated p.o. intake without nausea or vomiting.  She declined manual disimpaction at this time, stating she can do this herself at home. Given likely etiology of constipation and recurrent ED visits, patient will likely need more aggressive home bowel regimen.  Encouraged patient to discuss with her neurologist, however, will prescribe stool softener and stimulant twice daily upon discharge with suppository as needed. Also discussed adequate hydration, fiber intake, and regular physical activity.  Discussed red flag symptoms of bowel obstruction including abdominal distention, vomiting, inability to tolerate p.o. intake, absence of flatus.   Patient feels comfortable going home at this time with plans for increasing her home bowel regimen.  Discussed return precautions.         Felicia Vásquez, DO  Resident  01/10/25 8258

## 2025-01-10 NOTE — DISCHARGE INSTRUCTIONS
A prescription was sent to your pharmacy for a stronger stool softener/stimulant laxative to be taken twice daily. A refill of your suppository was also sent to the pharmacy if additional doses are needed. Continue taking fiber supplement daily, aim for 15 grams of fiber daily. Ensure you are staying hydrated. Increasing daily physical activity by walking can also help keep your bowels regular. Reach out to your neurologist if the suggested bowel regimen needs to be adjusted further.

## 2025-01-11 DIAGNOSIS — F19.982 DRUG-INDUCED INSOMNIA (MULTI): ICD-10-CM

## 2025-01-12 ENCOUNTER — APPOINTMENT (OUTPATIENT)
Dept: RADIOLOGY | Facility: HOSPITAL | Age: 60
End: 2025-01-12
Payer: COMMERCIAL

## 2025-01-14 RX ORDER — SEMAGLUTIDE 1.7 MG/.75ML
INJECTION, SOLUTION SUBCUTANEOUS
Refills: 1 | OUTPATIENT
Start: 2025-01-14

## 2025-01-17 ENCOUNTER — APPOINTMENT (OUTPATIENT)
Dept: SURGERY | Facility: CLINIC | Age: 60
End: 2025-01-17
Payer: COMMERCIAL

## 2025-01-21 RX ORDER — CLONAZEPAM 0.5 MG/1
0.5 TABLET, ORALLY DISINTEGRATING ORAL NIGHTLY
Qty: 30 TABLET | Refills: 2 | Status: SHIPPED | OUTPATIENT
Start: 2025-01-21

## 2025-01-21 NOTE — PROGRESS NOTES
Clonazepam refilled. UDS and CSA UTD. I have personally reviewed the OARRS report for . This report is scanned into the electronic medical record. I have considered the risks of abuse, dependence, addiction and diversion.

## 2025-01-22 ENCOUNTER — APPOINTMENT (OUTPATIENT)
Dept: RADIOLOGY | Facility: HOSPITAL | Age: 60
End: 2025-01-22
Payer: COMMERCIAL

## 2025-01-24 ENCOUNTER — APPOINTMENT (OUTPATIENT)
Dept: RADIOLOGY | Facility: HOSPITAL | Age: 60
End: 2025-01-24
Payer: COMMERCIAL

## 2025-01-27 ENCOUNTER — APPOINTMENT (OUTPATIENT)
Dept: RADIOLOGY | Facility: HOSPITAL | Age: 60
End: 2025-01-27
Payer: COMMERCIAL

## 2025-01-29 ENCOUNTER — APPOINTMENT (OUTPATIENT)
Dept: PRIMARY CARE | Facility: CLINIC | Age: 60
End: 2025-01-29
Payer: COMMERCIAL

## 2025-01-29 VITALS
WEIGHT: 198 LBS | OXYGEN SATURATION: 95 % | HEART RATE: 64 BPM | BODY MASS INDEX: 31.01 KG/M2 | DIASTOLIC BLOOD PRESSURE: 73 MMHG | SYSTOLIC BLOOD PRESSURE: 122 MMHG

## 2025-01-29 DIAGNOSIS — E66.01 CLASS 2 SEVERE OBESITY DUE TO EXCESS CALORIES WITH SERIOUS COMORBIDITY AND BODY MASS INDEX (BMI) OF 36.0 TO 36.9 IN ADULT: Primary | ICD-10-CM

## 2025-01-29 DIAGNOSIS — G35 MULTIPLE SCLEROSIS (MULTI): ICD-10-CM

## 2025-01-29 DIAGNOSIS — E66.812 CLASS 2 SEVERE OBESITY DUE TO EXCESS CALORIES WITH SERIOUS COMORBIDITY AND BODY MASS INDEX (BMI) OF 36.0 TO 36.9 IN ADULT: Primary | ICD-10-CM

## 2025-01-29 DIAGNOSIS — E87.6 HYPOKALEMIA: ICD-10-CM

## 2025-01-29 PROCEDURE — 3074F SYST BP LT 130 MM HG: CPT | Performed by: INTERNAL MEDICINE

## 2025-01-29 PROCEDURE — 1036F TOBACCO NON-USER: CPT | Performed by: INTERNAL MEDICINE

## 2025-01-29 PROCEDURE — 99214 OFFICE O/P EST MOD 30 MIN: CPT | Performed by: INTERNAL MEDICINE

## 2025-01-29 PROCEDURE — 3078F DIAST BP <80 MM HG: CPT | Performed by: INTERNAL MEDICINE

## 2025-01-29 RX ORDER — POTASSIUM CHLORIDE 1.5 G/1.58G
20 POWDER, FOR SOLUTION ORAL DAILY
Qty: 90 PACKET | Refills: 1 | Status: SHIPPED | OUTPATIENT
Start: 2025-01-29 | End: 2026-01-29

## 2025-01-29 RX ORDER — SEMAGLUTIDE 0.5 MG/.5ML
0.5 INJECTION, SOLUTION SUBCUTANEOUS WEEKLY
Qty: 2 ML | Refills: 0 | Status: SHIPPED | OUTPATIENT
Start: 2025-01-29 | End: 2025-02-20

## 2025-01-29 ASSESSMENT — PATIENT HEALTH QUESTIONNAIRE - PHQ9
2. FEELING DOWN, DEPRESSED OR HOPELESS: NOT AT ALL
SUM OF ALL RESPONSES TO PHQ9 QUESTIONS 1 AND 2: 0
1. LITTLE INTEREST OR PLEASURE IN DOING THINGS: NOT AT ALL

## 2025-01-29 ASSESSMENT — ENCOUNTER SYMPTOMS: CONSTIPATION: 1

## 2025-01-29 ASSESSMENT — COLUMBIA-SUICIDE SEVERITY RATING SCALE - C-SSRS
1. IN THE PAST MONTH, HAVE YOU WISHED YOU WERE DEAD OR WISHED YOU COULD GO TO SLEEP AND NOT WAKE UP?: NO
6. HAVE YOU EVER DONE ANYTHING, STARTED TO DO ANYTHING, OR PREPARED TO DO ANYTHING TO END YOUR LIFE?: NO
2. HAVE YOU ACTUALLY HAD ANY THOUGHTS OF KILLING YOURSELF?: NO

## 2025-01-29 ASSESSMENT — PAIN SCALES - GENERAL: PAINLEVEL_OUTOF10: 0-NO PAIN

## 2025-01-29 NOTE — PATIENT INSTRUCTIONS
Follow up in 3 months or sooner if needed    Will retry Wegovy at 0.5 mg daily, but please call us if your constipation gets worse.

## 2025-01-29 NOTE — PROGRESS NOTES
Subjective   Elaine Coffey is a 59 y.o. female with a PMH of HTN, HLD, CAD, HFpEF, MS, hypothyroidism, asthma, HS, depression/anxiety, and constipation who is here for a follow up appointment for recurrent bouts of constipation. Currently states that her bowel movements have been significantly improved with her current bowel regimen. Has been able to tolerate PO intake without nausea/vomiting. She otherwise has no acute complaints    Chief Complaint:  Follow-up (Bowel blockage)    Review of Systems   Skin:         HS in armpit   Gastrointestinal:  Positive for constipation (Significant improvement).   All other systems reviewed and are negative.      Objective   Vitals reviewed.   Constitutional:       Appearance: Healthy appearance. Not in distress.   Pulmonary:      Effort: Pulmonary effort is normal.      Breath sounds: Normal breath sounds. No wheezing. No rhonchi.   Cardiovascular:      Normal rate. Regular rhythm.      Murmurs: There is no murmur.   Edema:     Peripheral edema absent.   Abdominal:      General: There is no distension.      Palpations: Abdomen is soft.      Tenderness: There is no abdominal tenderness.   Skin:     Comments: Mild scars in right armpit from I and D in ER. No pustular drainage noted, no tenderness to palpation   Neurological:      General: No focal deficit present.      Mental Status: Alert and oriented to person, place and time.         Lab Review:   Lab Results   Component Value Date     11/15/2024    K 4.0 11/15/2024     (H) 11/15/2024    CO2 30 11/15/2024    BUN 12 11/15/2024    CREATININE 0.82 11/15/2024    GLUCOSE 93 11/15/2024    CALCIUM 8.3 (L) 11/15/2024     Lab Results   Component Value Date    CKTOTAL 1,774 (H) 01/03/2018    CKMB 128.3 (H) 01/03/2018    CKMBINDEX 7.2 (H) 01/03/2018    TROPONINI <0.02 08/19/2021     Lab Results   Component Value Date    WBC 4.6 11/15/2024    HGB 12.9 11/15/2024    HCT 39.7 11/15/2024    MCV 94 11/15/2024     (L)  11/15/2024     Lab Results   Component Value Date    CHOL 121 10/14/2024    TRIG 143 10/14/2024    HDL 35.4 10/14/2024       Assessment/Plan   The primary encounter diagnosis was Multiple sclerosis (Multi). Diagnoses of Hypokalemia and Class 2 severe obesity due to excess calories with serious comorbidity and body mass index (BMI) of 36.0 to 36.9 in adult were also pertinent to this visit.  #Constipation  - Current bowel regimen: Stacey-Colace 2 tabs BID, an over the counter medication, and 3 fiber gummies daily, patient with significant improvement of bowel movements  - Continue current management, titrate up or down as necessary    #Obesity  - Will cautiously retrial Wegovy at 0.5 mg, as this is the likely source of her severe constipation  - Instructed patient to give us a call back immediately if she starts to be come constipated.    #Hypokalemia  - Switching from oral pills to powder packets as patient is concerned for potentially aspirating on the pills    #HTN  #HFpEF  - /73 and stable  - Continue diltiazem 240 mg daily  - Continue Lasix 40 mg daily  - Continue metoprolol succinate 25 mg daily  - Continue ranolazine 500 mg BID    #HLD  #CAD  - Continue aspirin 81 mg daily  - Continue Plavix 75 mg daily  - Continue atorvastatin 40 mg daily  - Continue Zetia 10 mg nightly  - Continue cardiology follow up outpatient    #MS  #Migraines  #Peripheral neuropathy  - Following up with neurology as outpatient, will pursue MRI imaging in February for further workup  - Continue gabapentin 400 mg TID  - Ordered disability placard for MS    #GERD  - Continue omeprazole 40 mg daily    #Hypothyroidism  - Continue levothyroxine 50 mcg daily    #Depression and anxiety  - Continue amitriptyline 25 mg nightly  - Continue Klonopin 0.5 mg nightly  - Continue fluoxetine 40 mg daily    Flu shot: Declines  COVID: Declines  RSV: Declines  Prevnar: Declines  Shingrix: Declines  Mammogram: Done in 2024, will order in next  visit  Colonoscopy: Recommended to schedule, order is in  DEXA: NI    Follow up in 3 months or sooner if needed     Rusty Mckeon MD  PGY-2 IM resident    I saw and evaluated the patient. I personally obtained the key and critical portions of the history and physical exam or was physically present for key and critical portions performed by the resident/fellow. I reviewed the resident/fellow's documentation and discussed the patient with the resident/fellow. I agree with the resident/fellow's medical decision making as documented in the note.    Devan Newell, DO

## 2025-02-06 DIAGNOSIS — L02.411 ABSCESS OF AXILLA, RIGHT: ICD-10-CM

## 2025-02-06 DIAGNOSIS — K59.00 CONSTIPATION, UNSPECIFIED CONSTIPATION TYPE: ICD-10-CM

## 2025-02-06 RX ORDER — AMOXICILLIN 250 MG
2 CAPSULE ORAL 2 TIMES DAILY
Qty: 120 TABLET | Refills: 0 | Status: SHIPPED | OUTPATIENT
Start: 2025-02-06 | End: 2025-03-08

## 2025-02-07 DIAGNOSIS — I10 HYPERTENSION, UNSPECIFIED TYPE: ICD-10-CM

## 2025-02-07 DIAGNOSIS — R07.9 CHEST PAIN, UNSPECIFIED TYPE: ICD-10-CM

## 2025-02-07 DIAGNOSIS — R00.2 HEART PALPITATIONS: ICD-10-CM

## 2025-02-07 RX ORDER — DILTIAZEM HYDROCHLORIDE 240 MG/1
240 CAPSULE, COATED, EXTENDED RELEASE ORAL DAILY
Qty: 90 CAPSULE | Refills: 3 | Status: SHIPPED | OUTPATIENT
Start: 2025-02-07

## 2025-02-09 ENCOUNTER — HOSPITAL ENCOUNTER (OUTPATIENT)
Dept: RADIOLOGY | Facility: HOSPITAL | Age: 60
Discharge: HOME | End: 2025-02-09
Payer: COMMERCIAL

## 2025-02-09 DIAGNOSIS — G35 MULTIPLE SCLEROSIS (MULTI): ICD-10-CM

## 2025-02-09 PROCEDURE — 72146 MRI CHEST SPINE W/O DYE: CPT

## 2025-02-09 PROCEDURE — 72146 MRI CHEST SPINE W/O DYE: CPT | Performed by: RADIOLOGY

## 2025-02-09 PROCEDURE — 70551 MRI BRAIN STEM W/O DYE: CPT

## 2025-02-09 PROCEDURE — 72141 MRI NECK SPINE W/O DYE: CPT | Performed by: RADIOLOGY

## 2025-02-09 PROCEDURE — 72141 MRI NECK SPINE W/O DYE: CPT

## 2025-02-09 PROCEDURE — 70551 MRI BRAIN STEM W/O DYE: CPT | Performed by: RADIOLOGY

## 2025-02-10 ENCOUNTER — OFFICE VISIT (OUTPATIENT)
Dept: PRIMARY CARE | Facility: CLINIC | Age: 60
End: 2025-02-10
Payer: COMMERCIAL

## 2025-02-10 VITALS
HEART RATE: 70 BPM | DIASTOLIC BLOOD PRESSURE: 60 MMHG | SYSTOLIC BLOOD PRESSURE: 125 MMHG | OXYGEN SATURATION: 93 % | WEIGHT: 197 LBS | BODY MASS INDEX: 30.92 KG/M2

## 2025-02-10 DIAGNOSIS — R30.0 DYSURIA: Primary | ICD-10-CM

## 2025-02-10 PROCEDURE — 99213 OFFICE O/P EST LOW 20 MIN: CPT | Performed by: INTERNAL MEDICINE

## 2025-02-10 PROCEDURE — 3074F SYST BP LT 130 MM HG: CPT | Performed by: INTERNAL MEDICINE

## 2025-02-10 PROCEDURE — 1036F TOBACCO NON-USER: CPT | Performed by: INTERNAL MEDICINE

## 2025-02-10 PROCEDURE — 3078F DIAST BP <80 MM HG: CPT | Performed by: INTERNAL MEDICINE

## 2025-02-10 RX ORDER — NITROFURANTOIN 25; 75 MG/1; MG/1
100 CAPSULE ORAL 2 TIMES DAILY
Qty: 10 CAPSULE | Refills: 0 | Status: SHIPPED | OUTPATIENT
Start: 2025-02-10 | End: 2025-02-15

## 2025-02-10 ASSESSMENT — PAIN SCALES - GENERAL: PAINLEVEL_OUTOF10: 0-NO PAIN

## 2025-02-10 NOTE — PROGRESS NOTES
Subjective   Patient ID: Elaine Coffey is a 59 y.o. female who presents for UTI.    UTI          Reports dysuria and increased urinary frequency x 2 days. No hematuria, flank pain or fever     Review of Systems   All other systems reviewed and are negative.      Objective   /60   Pulse 70   Wt 89.4 kg (197 lb)   SpO2 93%   BMI 30.92 kg/m²     Physical Exam  Constitutional:       Appearance: Normal appearance.   Pulmonary:      Effort: Pulmonary effort is normal.   Neurological:      Mental Status: She is alert.   Psychiatric:         Mood and Affect: Mood normal.         Behavior: Behavior normal.         Thought Content: Thought content normal.         Assessment/Plan       #Dysuria  -Rx Macrobid 100mg BID x 5 days

## 2025-02-25 DIAGNOSIS — E66.01 CLASS 2 SEVERE OBESITY DUE TO EXCESS CALORIES WITH SERIOUS COMORBIDITY AND BODY MASS INDEX (BMI) OF 36.0 TO 36.9 IN ADULT: ICD-10-CM

## 2025-02-25 DIAGNOSIS — E66.812 CLASS 2 SEVERE OBESITY DUE TO EXCESS CALORIES WITH SERIOUS COMORBIDITY AND BODY MASS INDEX (BMI) OF 36.0 TO 36.9 IN ADULT: ICD-10-CM

## 2025-02-26 ENCOUNTER — CLINICAL SUPPORT (OUTPATIENT)
Dept: CARDIAC REHAB | Facility: HOSPITAL | Age: 60
End: 2025-02-26
Payer: COMMERCIAL

## 2025-02-26 DIAGNOSIS — I21.9 ACUTE MYOCARDIAL INFARCTION, UNSPECIFIED MI TYPE, UNSPECIFIED ARTERY (MULTI): Primary | ICD-10-CM

## 2025-02-26 PROCEDURE — 9430000001 HC PHASE III CARDIAC REHAB MONTHLY FEE

## 2025-02-26 RX ORDER — SEMAGLUTIDE 0.5 MG/.5ML
0.5 INJECTION, SOLUTION SUBCUTANEOUS WEEKLY
Qty: 2 ML | Refills: 1 | Status: SHIPPED | OUTPATIENT
Start: 2025-02-26 | End: 2025-04-17

## 2025-03-12 ENCOUNTER — APPOINTMENT (OUTPATIENT)
Dept: CARDIOLOGY | Facility: HOSPITAL | Age: 60
End: 2025-03-12
Payer: COMMERCIAL

## 2025-03-16 DIAGNOSIS — K59.00 CONSTIPATION, UNSPECIFIED CONSTIPATION TYPE: ICD-10-CM

## 2025-03-17 RX ORDER — DOCUSATE SODIUM 50MG AND SENNOSIDES 8.6MG 8.6; 5 MG/1; MG/1
2 TABLET, FILM COATED ORAL 2 TIMES DAILY
Qty: 360 TABLET | Refills: 1 | Status: SHIPPED | OUTPATIENT
Start: 2025-03-17

## 2025-03-26 DIAGNOSIS — F19.982 DRUG-INDUCED INSOMNIA (MULTI): ICD-10-CM

## 2025-03-27 RX ORDER — CLONAZEPAM 0.5 MG/1
0.5 TABLET, ORALLY DISINTEGRATING ORAL NIGHTLY
Qty: 30 TABLET | Refills: 2 | Status: SHIPPED | OUTPATIENT
Start: 2025-03-27

## 2025-03-27 NOTE — PROGRESS NOTES
Clonazepam refilled. UDS and CSA UTD  I have personally reviewed the OARRS report for . This report is scanned into the electronic medical record. I have considered the risks of abuse, dependence, addiction and diversion.

## 2025-04-06 DIAGNOSIS — J04.0 LARYNGITIS FROM REFLUX OF STOMACH ACID: ICD-10-CM

## 2025-04-06 DIAGNOSIS — K21.9 LARYNGITIS FROM REFLUX OF STOMACH ACID: ICD-10-CM

## 2025-04-07 RX ORDER — OMEPRAZOLE 40 MG/1
40 CAPSULE, DELAYED RELEASE ORAL
Qty: 90 CAPSULE | Refills: 1 | Status: SHIPPED | OUTPATIENT
Start: 2025-04-07 | End: 2025-10-04

## 2025-04-20 DIAGNOSIS — E66.812 CLASS 2 SEVERE OBESITY DUE TO EXCESS CALORIES WITH SERIOUS COMORBIDITY AND BODY MASS INDEX (BMI) OF 36.0 TO 36.9 IN ADULT: ICD-10-CM

## 2025-04-20 DIAGNOSIS — E66.01 CLASS 2 SEVERE OBESITY DUE TO EXCESS CALORIES WITH SERIOUS COMORBIDITY AND BODY MASS INDEX (BMI) OF 36.0 TO 36.9 IN ADULT: ICD-10-CM

## 2025-04-22 RX ORDER — SEMAGLUTIDE 0.5 MG/.5ML
0.5 INJECTION, SOLUTION SUBCUTANEOUS WEEKLY
Qty: 2 ML | Refills: 1 | Status: SHIPPED | OUTPATIENT
Start: 2025-04-22 | End: 2025-06-11

## 2025-04-24 ENCOUNTER — APPOINTMENT (OUTPATIENT)
Dept: PRIMARY CARE | Facility: CLINIC | Age: 60
End: 2025-04-24
Payer: COMMERCIAL

## 2025-04-28 DIAGNOSIS — F19.982 DRUG-INDUCED INSOMNIA (MULTI): ICD-10-CM

## 2025-05-01 ENCOUNTER — APPOINTMENT (OUTPATIENT)
Dept: PRIMARY CARE | Facility: CLINIC | Age: 60
End: 2025-05-01
Payer: COMMERCIAL

## 2025-05-05 DIAGNOSIS — E66.812 CLASS 2 SEVERE OBESITY DUE TO EXCESS CALORIES WITH SERIOUS COMORBIDITY AND BODY MASS INDEX (BMI) OF 36.0 TO 36.9 IN ADULT: Primary | ICD-10-CM

## 2025-05-05 DIAGNOSIS — E66.01 CLASS 2 SEVERE OBESITY DUE TO EXCESS CALORIES WITH SERIOUS COMORBIDITY AND BODY MASS INDEX (BMI) OF 36.0 TO 36.9 IN ADULT: Primary | ICD-10-CM

## 2025-05-05 RX ORDER — SEMAGLUTIDE 1 MG/.5ML
1 INJECTION, SOLUTION SUBCUTANEOUS WEEKLY
Qty: 2 ML | Refills: 5 | Status: SHIPPED | OUTPATIENT
Start: 2025-05-05 | End: 2025-05-27

## 2025-05-17 ENCOUNTER — HOSPITAL ENCOUNTER (EMERGENCY)
Facility: HOSPITAL | Age: 60
Discharge: HOME | End: 2025-05-17
Payer: COMMERCIAL

## 2025-05-17 VITALS
OXYGEN SATURATION: 98 % | HEIGHT: 67 IN | BODY MASS INDEX: 29.35 KG/M2 | DIASTOLIC BLOOD PRESSURE: 66 MMHG | TEMPERATURE: 97.5 F | RESPIRATION RATE: 18 BRPM | SYSTOLIC BLOOD PRESSURE: 133 MMHG | WEIGHT: 187 LBS | HEART RATE: 85 BPM

## 2025-05-17 DIAGNOSIS — L03.811 CELLULITIS OF HEAD EXCEPT FACE: Primary | ICD-10-CM

## 2025-05-17 DIAGNOSIS — L02.91 ABSCESS: ICD-10-CM

## 2025-05-17 PROCEDURE — 99283 EMERGENCY DEPT VISIT LOW MDM: CPT

## 2025-05-17 PROCEDURE — 2500000001 HC RX 250 WO HCPCS SELF ADMINISTERED DRUGS (ALT 637 FOR MEDICARE OP): Performed by: PHYSICIAN ASSISTANT

## 2025-05-17 PROCEDURE — 2500000002 HC RX 250 W HCPCS SELF ADMINISTERED DRUGS (ALT 637 FOR MEDICARE OP, ALT 636 FOR OP/ED): Performed by: PHYSICIAN ASSISTANT

## 2025-05-17 RX ORDER — SULFAMETHOXAZOLE AND TRIMETHOPRIM 800; 160 MG/1; MG/1
2 TABLET ORAL ONCE
Status: COMPLETED | OUTPATIENT
Start: 2025-05-17 | End: 2025-05-17

## 2025-05-17 RX ORDER — OXYCODONE HYDROCHLORIDE 5 MG/1
5 TABLET ORAL EVERY 6 HOURS PRN
Qty: 12 TABLET | Refills: 0 | Status: SHIPPED | OUTPATIENT
Start: 2025-05-17 | End: 2025-05-20

## 2025-05-17 RX ORDER — SULFAMETHOXAZOLE AND TRIMETHOPRIM 800; 160 MG/1; MG/1
2 TABLET ORAL 2 TIMES DAILY
Qty: 28 TABLET | Refills: 0 | Status: SHIPPED | OUTPATIENT
Start: 2025-05-17 | End: 2025-05-24

## 2025-05-17 RX ORDER — OXYCODONE HYDROCHLORIDE 5 MG/1
5 TABLET ORAL ONCE
Refills: 0 | Status: COMPLETED | OUTPATIENT
Start: 2025-05-17 | End: 2025-05-17

## 2025-05-17 RX ORDER — DOCUSATE SODIUM 100 MG/1
100 CAPSULE, LIQUID FILLED ORAL EVERY 12 HOURS
Qty: 10 CAPSULE | Refills: 0 | Status: SHIPPED | OUTPATIENT
Start: 2025-05-17 | End: 2025-05-22

## 2025-05-17 RX ADMIN — OXYCODONE HYDROCHLORIDE 5 MG: 5 TABLET ORAL at 14:10

## 2025-05-17 RX ADMIN — SULFAMETHOXAZOLE AND TRIMETHOPRIM 2 TABLET: 800; 160 TABLET ORAL at 14:10

## 2025-05-17 ASSESSMENT — LIFESTYLE VARIABLES
EVER FELT BAD OR GUILTY ABOUT YOUR DRINKING: NO
HAVE YOU EVER FELT YOU SHOULD CUT DOWN ON YOUR DRINKING: NO
TOTAL SCORE: 0
EVER HAD A DRINK FIRST THING IN THE MORNING TO STEADY YOUR NERVES TO GET RID OF A HANGOVER: NO
HAVE PEOPLE ANNOYED YOU BY CRITICIZING YOUR DRINKING: NO

## 2025-05-17 ASSESSMENT — PAIN DESCRIPTION - ORIENTATION: ORIENTATION: POSTERIOR;LEFT

## 2025-05-17 ASSESSMENT — PAIN DESCRIPTION - LOCATION: LOCATION: HEAD

## 2025-05-17 ASSESSMENT — PAIN - FUNCTIONAL ASSESSMENT: PAIN_FUNCTIONAL_ASSESSMENT: 0-10

## 2025-05-17 ASSESSMENT — PAIN DESCRIPTION - DESCRIPTORS: DESCRIPTORS: ACHING

## 2025-05-17 ASSESSMENT — PAIN DESCRIPTION - PAIN TYPE: TYPE: ACUTE PAIN

## 2025-05-17 ASSESSMENT — PAIN SCALES - GENERAL: PAINLEVEL_OUTOF10: 6

## 2025-05-17 NOTE — ED PROVIDER NOTES
HPI   Chief Complaint   Patient presents with    Abscess       Is a 59-year-old female coming in for abscess and pain to the back of the head.  Patient has a history of multiple sclerosis but also has a history of at bedtime.  Patient states nearly 1 week ago she started having swelling and pain to the back left side of her head.  She had increased discomfort to this area and thought that it was an abscess and would drain.  She has had cellulitis from this before in the past.  She has tried multiple over-the-counter medications of Tylenol and ibuprofen and it has not helped her pain.  She states she has not had fevers or chills.  No vomiting or diarrhea.  She also notes an area to the back left side of her neck and also to the left axillary area.      History provided by:  Patient and relative          Patient History   Medical History[1]  Surgical History[2]  Family History[3]  Social History[4]    Physical Exam   ED Triage Vitals [05/17/25 1350]   Temperature Heart Rate Respirations BP   36.4 °C (97.5 °F) 85 18 133/66      Pulse Ox Temp Source Heart Rate Source Patient Position   97 % Temporal Monitor --      BP Location FiO2 (%)     -- --       Physical Exam  Constitutional:       General: She is awake. She is not in acute distress.     Appearance: Normal appearance. She is not ill-appearing, toxic-appearing or diaphoretic.   HENT:      Head: Atraumatic.        Comments: Patient has an area of erythema and slight swelling with tenderness to the left posterior scalp however there is active drainage and scabbing to this area.  Drainage is a purulent material.     Nose: No rhinorrhea.   Eyes:      General: No scleral icterus.        Right eye: No discharge.         Left eye: No discharge.   Neck:      Comments: Swelling of left-sided posterior cervical lymph nodes.  Pulmonary:      Effort: Pulmonary effort is normal. No respiratory distress.   Musculoskeletal:      Cervical back: Normal range of motion and neck  supple.      Comments: Very small left axillary area swelling without overlying skin changes.  Potential lymph node enlargement versus small area of abscess.   Lymphadenopathy:      Cervical: Cervical adenopathy present.   Neurological:      Mental Status: She is alert.      GCS: GCS eye subscore is 4. GCS verbal subscore is 5. GCS motor subscore is 6.      Gait: Gait is intact.           ED Course & MDM   Diagnoses as of 05/17/25 1408   Cellulitis of head except face   Abscess                 No data recorded     Quinn Coma Scale Score: 15 (05/17/25 1351 : Abdirahman Tomlin RN)                           Medical Decision Making  Summary:  Medical Decision Making:   Patient presented as described in HPI. Patient case including ROS, PE, and treatment and plan discussed with ED attending if attached as cosigner. Results from labs and or imaging included below if completed. Elaine Coffey  is a 59 y.o. coming in for Patient presents with:  Abscess  .  Swelling and tenderness to the left posterior side of the head.  Patient does appear to have an abscess and cellulitis that is actively draining.  Patient will be treated for cellulitis with Bactrim.  She will be placed on 2 tabs twice daily for 7 days.  She will also be given pain medication if she was taking over-the-counter medications as it was not helping her pain.  She will be given oxycodone for discomfort.  Patient will be discharged on antibiotics and pain medication but advised close follow-up with both her general surgeon as well as her PCP.  She is to return if she develops any change in her symptoms or worsening symptoms outside of 48 hours.  Patient agrees with treatment plan and states that she will comply.      Disposition is completed with shared decision making with the patient or guardian present with the patient. They were advised to follow up with PCP or recommended provider in 2-3 days for another evaluation and exam. I advised the patient to return or go  to closest emergency room immediately if symptoms change, get worse, or new symptoms develop prior to follow up. I explained the plan and treatment course. Patient/guardian is in agreement with plan, treatment course, and follow up and state that they will comply.    Labs Reviewed - No data to display   No orders to display                         Tests/Medications/Escalations of Care considered but not given: As in MDM    Patient care discussed with: N/A  Social Determinants affecting care: N/A    Final diagnosis and disposition as documented     Diagnoses as of 05/17/25 1411  Cellulitis of head except face  Abscess       Shared decision making was completed and determined that patient will be discharged. I discussed the differential; results and discharge plan with the patient and/or family/friend/caregiver if present.  I emphasized the importance of follow-up with the physician I referred them to in the timeframe recommended.  I explained reasons for the patient to return to the Emergency Department. They agreed that if they feel their condition is worsening or if they have any other concern they should call 911 immediately for further assistance. I gave the patient an opportunity to ask all questions they had and answered all of them accordingly. They understand return precautions and discharge instructions. The patient and/or family/friend/caregiver expressed understanding verbally and that they would comply.     Disposition: Discharge      This note has been transcribed using voice recognition and may contain grammatical errors, misplaced words, incorrect words, incorrect phrases or other errors.         Procedure  Procedures       [1]   Past Medical History:  Diagnosis Date    Abnormal ECG     Antiplatelet or antithrombotic long-term use     PLAVIX    Asthma     Calculus of gallbladder without cholecystitis without obstruction     CHF (congestive heart failure)     Class 2 obesity with body mass index (BMI)  of 37.0 to 37.9 in adult 2024    37.74 kg/m²    Coronary artery disease     1 cardiac stent placed 2018    Encounter for pre-operative cardiovascular clearance 2024    Mia Edmondson, APRN-CN for Cholecystectomy    Gallstones     GERD (gastroesophageal reflux disease)     Hidradenitis suppurativa     Hypertension     Hypothyroidism     Multiple sclerosis (Multi)     Myocardial infarction (Multi)     [facility] #1, #2024 at [facility],  #3 also here at [facility] with gb attack    Obesity     Sleep apnea     Wears eyeglasses    [2]   Past Surgical History:  Procedure Laterality Date    ANKLE SURGERY Left     removed floating bone    CARDIAC CATHETERIZATION N/A 2024    Procedure: Left Heart Cath;  Surgeon: Gagan Kat MD;  Location: [facility] Cardiac Cath Lab;  Service: Cardiovascular;  Laterality: N/A;    CARDIAC CATHETERIZATION N/A 1/3/2024    Procedure: Left Heart Cath;  Surgeon: John Gutierres MD;  Location: [facility] Cardiac Cath Lab;  Service: Cardiovascular;  Laterality: N/A;     SECTION, CLASSIC       Section x4    CHOLECYSTECTOMY  2024    Laparoscopic cholecystectomy with firefly cholangiography    CORONARY STENT PLACEMENT      HYSTERECTOMY  2014    Hysterectomy    OTHER SURGICAL HISTORY  2014    Oophorectomy - Bilat (Removal Of Both Ovaries) Laparoscopic    OTHER SURGICAL HISTORY      Laparosc Gastric Restrictive Proc By Adjustable Gastric Band    OTHER SURGICAL HISTORY      Abscess incision and drainage x4   [3]   Family History  Problem Relation Name Age of Onset    Heart attack Mother Elaine Garcia     Bilateral breast cancer Mother Elaine Garcia 50    Emphysema Mother Elaine Garcia     Blood clot Mother Elaine Garcia     Abnormal EKG Mother Elaine Garcia     Anemia Mother Elaine Garcia     Angina Mother Elaine Garcia     Depression Mother Elaine Garcia     Heart disease Mother Elaine Garcia     Lung disease Mother Elaine Garcia     Thyroid disease  Mother Elaine Garcia     Heart attack Father Dawit Garcia     Lymphoma Father Dawit Garcia     Angina Father Dawit Garcia     Cancer Father Dawit Garcia     Diabetes type I Father Dawit Garcia     Heart disease Father Dawit Garcia     Hypertension Father Dawit Garcia     Blood clot Sister      Asthma Sister Heidi Garcia     Kidney disease Sister Kaylynn Garcia     Ovarian cancer Maternal Grandmother Delmy Martínez     Stroke Maternal Grandmother Delmy Martínez     Heart attack Maternal Grandmother Delmy Martínez     Diabetes type I Paternal Grandmother Olive Garcia     Breast cancer Father's Sister  40   [4]   Social History  Tobacco Use    Smoking status: Former     Current packs/day: 0.00     Average packs/day: 1 pack/day for 8.7 years (8.7 ttl pk-yrs)     Types: Cigarettes     Start date: 1983     Quit date: 10/1/1991     Years since quittin.6    Smokeless tobacco: Never   Vaping Use    Vaping status: Never Used   Substance Use Topics    Alcohol use: Yes     Comment: Seldom    Drug use: Never        Max Castillo PA-C  25 1419

## 2025-05-17 NOTE — ED TRIAGE NOTES
Patient with a hx of MS c/o a lump on the back of her head for over a week now and a small lump on the left side of her neck for the past few days. Patient states they are painful and she is concerned for cellulitis, she has had multiple falls recently but denies hitting her head.

## 2025-05-18 DIAGNOSIS — Z95.820 PERIPHERAL VASCULAR ANGIOPLASTY STATUS WITH IMPLANTS AND GRAFTS: ICD-10-CM

## 2025-05-18 DIAGNOSIS — R07.9 CHEST PAIN, UNSPECIFIED TYPE: ICD-10-CM

## 2025-05-18 DIAGNOSIS — R06.02 SHORTNESS OF BREATH: ICD-10-CM

## 2025-05-19 RX ORDER — METOPROLOL SUCCINATE 25 MG/1
25 TABLET, EXTENDED RELEASE ORAL DAILY
Qty: 90 TABLET | Refills: 3 | Status: SHIPPED | OUTPATIENT
Start: 2025-05-19

## 2025-05-19 RX ORDER — FUROSEMIDE 40 MG/1
80 TABLET ORAL DAILY
Qty: 180 TABLET | Refills: 3 | Status: SHIPPED | OUTPATIENT
Start: 2025-05-19

## 2025-05-19 RX ORDER — EZETIMIBE 10 MG/1
10 TABLET ORAL NIGHTLY
Qty: 90 TABLET | Refills: 3 | Status: SHIPPED | OUTPATIENT
Start: 2025-05-19 | End: 2026-05-19

## 2025-05-22 ENCOUNTER — APPOINTMENT (OUTPATIENT)
Dept: PRIMARY CARE | Facility: CLINIC | Age: 60
End: 2025-05-22
Payer: COMMERCIAL

## 2025-05-30 RX ORDER — CLONAZEPAM 0.5 MG/1
0.5 TABLET, ORALLY DISINTEGRATING ORAL NIGHTLY
Qty: 30 TABLET | Refills: 2 | OUTPATIENT
Start: 2025-05-30

## 2025-06-11 ENCOUNTER — APPOINTMENT (OUTPATIENT)
Dept: PRIMARY CARE | Facility: CLINIC | Age: 60
End: 2025-06-11
Payer: COMMERCIAL

## 2025-06-19 ENCOUNTER — OFFICE VISIT (OUTPATIENT)
Dept: NEUROLOGY | Facility: CLINIC | Age: 60
End: 2025-06-19
Payer: COMMERCIAL

## 2025-06-19 VITALS — BODY MASS INDEX: 29.44 KG/M2 | SYSTOLIC BLOOD PRESSURE: 117 MMHG | DIASTOLIC BLOOD PRESSURE: 78 MMHG | WEIGHT: 188 LBS

## 2025-06-19 DIAGNOSIS — G47.30 HYPERSOMNIA WITH SLEEP APNEA: ICD-10-CM

## 2025-06-19 DIAGNOSIS — G47.10 HYPERSOMNIA WITH SLEEP APNEA: ICD-10-CM

## 2025-06-19 DIAGNOSIS — G35 MULTIPLE SCLEROSIS (MULTI): Primary | ICD-10-CM

## 2025-06-19 PROCEDURE — 1036F TOBACCO NON-USER: CPT | Performed by: PSYCHIATRY & NEUROLOGY

## 2025-06-19 PROCEDURE — 99215 OFFICE O/P EST HI 40 MIN: CPT | Performed by: PSYCHIATRY & NEUROLOGY

## 2025-06-19 PROCEDURE — 99417 PROLNG OP E/M EACH 15 MIN: CPT | Performed by: PSYCHIATRY & NEUROLOGY

## 2025-06-19 PROCEDURE — 3078F DIAST BP <80 MM HG: CPT | Performed by: PSYCHIATRY & NEUROLOGY

## 2025-06-19 PROCEDURE — 3074F SYST BP LT 130 MM HG: CPT | Performed by: PSYCHIATRY & NEUROLOGY

## 2025-06-19 RX ORDER — TIZANIDINE 4 MG/1
4 TABLET ORAL NIGHTLY PRN
Qty: 30 TABLET | Refills: 1 | Status: SHIPPED | OUTPATIENT
Start: 2025-06-19

## 2025-06-19 ASSESSMENT — PAIN SCALES - GENERAL: PAINLEVEL_OUTOF10: 0-NO PAIN

## 2025-06-19 NOTE — PROGRESS NOTES
"Subjective   Elaine Coffey is a 59 y.o. year old female with history of multiple sclerosis, myocardial infarction, coronary artery disease S/P stent, obesity, heart failure, FARZANA not on CPAP presented to f/u for multiple sclerosis    History of Present Illness   MS  Date of Onset: 2013  Date of Diagnosis: 2013  Disease Course at Onset: Relapsing-Remitting  Disease Course Now: Relapsing stable (last relapse 5/17, 9/17), inactive  Current Disease Modifying Therapies: None  Previous Disease Modifying Therapies: IVMP x 2, Rebif 2014 (stopped 12/2022 due to elevated LFTs)  Last MRI Brain: 2/25, stable  Last MRI Cervical: 2/25, no cord lesions  Last MRI Thoracis: 2/25 vs 5/2015: stable hyperintensity in the right hemicord at T4  There is a hypointense lesion within the T12 vertebral body measuring  1.5 cm which corresponds to a sclerotic density on the prior CT  abdomen 01/05/2018 and may represent a bone island, however this is  not visualized on the prior MRI 05/28/2015. Attention on follow-up  exams.  Last OCT/VEP: 8/2024 OCT normal  Cerebrospinal Fluid: 2013, no records, positive for MS per patient    Previous history  She has been diagnosed with MS since 2013, after presenting with right sided numbness in the arm and leg. She received steroids and started on Rebif in 2014.  The Rebif therapy was complicated by elevated liver function test and was discontinued in December 2022.  She reports 3 \"relapses\" in the past year, especially after being stressed or upset.  She describes as feeling like a hangover and tired and has to rest all day long for more than 24 hours.  Also have dragging her right foot, numbness on the right side and horrible with vertigo.  She describes her vertigo as spinning sensations happening about 4 times in 1 year and the episode lasts for day.  This episode was accompanied by headache and improved with meclizine.  She was not diagnosed with migraine before and report no major " headaches.    Interval hx:  She is doing fair. Last seen 12/2024.  Has mid spine to lower back pain, not shooting, going on since last visit.  Reports some cervicogenic HA and balance issues and dragging the right foot.  Numbness on the right hand while sleeping.   Fell 4 times in the last 6 months, one time spraining her ankle. One time her legs did not move and fell forward landing on her knees.  Vision fuzzier at night.  Stutters, cannot remember words.  Not seen sleep medicine yet.     Medical History[1]    RX Allergies[2]    Current Medications[3]     Objective   Neurological Exam  Physical Exam    General Appearance:  No distress, alert, interactive and cooperative.   Mental State: Orientation was normal to time, place and person. Recent and remote memory was intact.  Attention span and concentration were normal.   Cranial Nerves:   CN 3, 4, 6: Lids symmetric; no ptosis. EOMs normal alignment, full range with normal saccades, pursuit.   CN 5: Facial sensation intact bilaterally.   CN 7: Normal and symmetric facial strength. Nasolabial folds symmetric.   CN 8: Hearing intact to voice  CN 11: Normal strength of shoulder shrug and neck turning.     Strength: 5/5 throughout     Reflexes:  R L  Biceps  +3 +3  Brachioradialis +3 +3  Triceps  +3 +3  Patellar  +3 +3  Achilles +2 +1    Right Plantar: upgoing  Left Plantar: downgoing    Sensory: normal to LT today    Gait: Gait was mildly unstable with a normal arm swing and fair speed.     25-foot walk (sec):  7.38 Assistive device: None.  9-Hole peg test (sec) right:  31.96  left: 38.96      Assessment/Plan   This is a 59 y.o. year old right handed female  With a PMH of multiple sclerosis, myocardial infarction, coronary artery disease, obesity, heart failure who presents to follow up for her multiple sclerosis. She has multiple symptoms, with main issues being imbalance, fatigue, concentration difficulties and brain fog.    -I explained a main intervention for these  "symptoms would be to get back in to seeing sleep medicine and get treated for FARZANA  -Referral to PT  -her referrals for urology and ENT (tinnitus) should still be valid, encouraged to call and make appts     She is currently not on any medication as free or relapses and MRI stable for many years.   I think it is reasonable to c/w this approach and to keep regularly monitoring clinically and radiologically.     Elaine was seen today for multiple sclerosis.  Diagnoses and all orders for this visit:  Multiple sclerosis (Multi) (Primary)  -     Referral to Physical Therapy; Future  -     Referral to Adult Sleep Medicine; Future  -     tiZANidine (Zanaflex) 4 mg tablet; Take 1 tablet (4 mg) by mouth as needed at bedtime for muscle spasms. FOR SPASMS  -     Follow Up In Neurology; Future  Hypersomnia with sleep apnea  -     Referral to Adult Sleep Medicine; Future          [1]   Past Medical History:  Diagnosis Date    Abnormal ECG     Antiplatelet or antithrombotic long-term use     PLAVIX    Asthma     Calculus of gallbladder without cholecystitis without obstruction     CHF (congestive heart failure)     Class 2 obesity with body mass index (BMI) of 37.0 to 37.9 in adult 05/29/2024    37.74 kg/m²    Coronary artery disease     1 cardiac stent placed jan 2018    Encounter for pre-operative cardiovascular clearance 06/03/2024    Mia Edmondson, APRN-CN for Cholecystectomy    Gallstones     GERD (gastroesophageal reflux disease)     Hidradenitis suppurativa     Hypertension     Hypothyroidism     Multiple sclerosis (Multi)     Myocardial infarction (Multi)     [facility] #1, #2 jan 2024 at [facility],  #3 also here at [facility] with gb attack    Obesity     Sleep apnea     Wears eyeglasses    [2]   Allergies  Allergen Reactions    Gadolinium-Containing Contrast Media Anaphylaxis     Pt developed nausea without vomiting , SOB, after receiving the IV contrast . Pt states she always gets nauseated but this time \"was worse\"/pt/ "   [3]   Current Outpatient Medications:     amitriptyline (Elavil) 25 mg tablet, Take 1 tablet (25 mg) by mouth once daily at bedtime., Disp: , Rfl:     aspirin 81 mg EC tablet, Take 1 tablet (81 mg) by mouth once daily in the morning., Disp: , Rfl:     atorvastatin (Lipitor) 40 mg tablet, TAKE 1 TABLET DAILY, Disp: 90 tablet, Rfl: 3    calcium carbonate (OS-SHANTA) 500 mg calcium (1,250 mg) capsule, Take 1,200 mg by mouth 2 times a day with meals. Take with food., Disp: , Rfl:     clonazePAM (KlonoPIN) 0.5 mg disintegrating tablet, Dissolve 1 tablet (0.5 mg) in the mouth once daily at bedtime., Disp: 30 tablet, Rfl: 2    clopidogrel (Plavix) 75 mg tablet, TAKE 1 TABLET BY MOUTH ONCE DAILY., Disp: 90 tablet, Rfl: 3    dilTIAZem CD (Cardizem CD) 240 mg 24 hr capsule, TAKE 1 CAPSULE BY MOUTH EVERY DAY, Disp: 90 capsule, Rfl: 3    ergocalciferol (Vitamin D-2) 1.25 MG (98280 UT) capsule, Take 1 capsule (1,250 mcg) by mouth 1 (one) time per week. On Saturdays, Disp: , Rfl:     ezetimibe (Zetia) 10 mg tablet, Take 1 tablet (10 mg) by mouth once daily at bedtime., Disp: 90 tablet, Rfl: 3    FLUoxetine (PROzac) 40 mg capsule, Take 1 capsule (40 mg) by mouth once daily., Disp: 90 capsule, Rfl: 3    furosemide (Lasix) 40 mg tablet, TAKE 2 TABLETS BY MOUTH EVERY DAY, Disp: 180 tablet, Rfl: 3    furosemide (Lasix) 80 mg tablet, Take 0.5 tablets (40 mg) by mouth once daily., Disp: , Rfl:     gabapentin (Neurontin) 400 mg capsule, Take 1 capsule (400 mg) by mouth 3 times a day. Last OARRS fill: 4/7/23 #270 for 90 days, Disp: 270 capsule, Rfl: 1    levothyroxine (Synthroid, Levoxyl) 50 mcg tablet, Take 1 tablet (50 mcg) by mouth once daily., Disp: 90 tablet, Rfl: 3    meclizine (Antivert) 25 mg tablet, Take 1 tablet (25 mg) by mouth if needed for dizziness., Disp: , Rfl:     metoprolol succinate XL (Toprol-XL) 25 mg 24 hr tablet, TAKE 1 TABLET BY MOUTH EVERY DAY, Disp: 90 tablet, Rfl: 3    nitroglycerin (Nitrostat) 0.4 mg SL tablet,  DISSOLVE 1 TABLET UNDER THE TONGUE AS NEEDED FOR CHEST PAIN., Disp: 25 tablet, Rfl: 2    nystatin-triamcinolone (Mycolog II) ointment, Apply 1 Application topically once daily. to affected area, Disp: , Rfl:     omeprazole (PriLOSEC) 40 mg DR capsule, TAKE 1 CAPSULE (40 MG) BY MOUTH ONCE DAILY IN THE MORNING. TAKE BEFORE MEALS. DO NOT CRUSH OR CHEW., Disp: 90 capsule, Rfl: 1    potassium chloride (Klor-Con) 20 mEq packet, Take 20 mEq by mouth once daily., Disp: 90 packet, Rfl: 1    ranolazine (Ranexa) 500 mg 12 hr tablet, TAKE 1 TABLET (500 MG) BY MOUTH 2 TIMES A DAY. DO NOT CRUSH, CHEW, OR SPLIT., Disp: 180 tablet, Rfl: 3    semaglutide, weight loss, (Wegovy) 1 mg/0.5 mL pen injector, Inject 1 mg under the skin 1 (one) time per week for 4 doses., Disp: 2 mL, Rfl: 5    Senexon-S 8.6-50 mg tablet, TAKE 2 TABLETS BY MOUTH TWICE A DAY, Disp: 360 tablet, Rfl: 1    tiZANidine (Zanaflex) 4 mg tablet, Take 1 tablet (4 mg) by mouth as needed at bedtime for muscle spasms. FOR SPASMS, Disp: 30 tablet, Rfl: 1

## 2025-06-26 ENCOUNTER — APPOINTMENT (OUTPATIENT)
Dept: PRIMARY CARE | Facility: CLINIC | Age: 60
End: 2025-06-26
Payer: COMMERCIAL

## 2025-06-27 DIAGNOSIS — E66.01 CLASS 2 SEVERE OBESITY DUE TO EXCESS CALORIES WITH SERIOUS COMORBIDITY AND BODY MASS INDEX (BMI) OF 36.0 TO 36.9 IN ADULT: ICD-10-CM

## 2025-06-27 DIAGNOSIS — E66.812 CLASS 2 SEVERE OBESITY DUE TO EXCESS CALORIES WITH SERIOUS COMORBIDITY AND BODY MASS INDEX (BMI) OF 36.0 TO 36.9 IN ADULT: ICD-10-CM

## 2025-06-27 RX ORDER — SEMAGLUTIDE 1 MG/.5ML
1 INJECTION, SOLUTION SUBCUTANEOUS WEEKLY
Qty: 2 ML | Refills: 5 | Status: CANCELLED | OUTPATIENT
Start: 2025-06-27 | End: 2025-07-19

## 2025-06-30 DIAGNOSIS — E66.01 CLASS 2 SEVERE OBESITY DUE TO EXCESS CALORIES WITH SERIOUS COMORBIDITY AND BODY MASS INDEX (BMI) OF 36.0 TO 36.9 IN ADULT: ICD-10-CM

## 2025-06-30 DIAGNOSIS — E66.812 CLASS 2 SEVERE OBESITY DUE TO EXCESS CALORIES WITH SERIOUS COMORBIDITY AND BODY MASS INDEX (BMI) OF 36.0 TO 36.9 IN ADULT: ICD-10-CM

## 2025-06-30 RX ORDER — SEMAGLUTIDE 1.7 MG/.75ML
1.7 INJECTION, SOLUTION SUBCUTANEOUS WEEKLY
Qty: 3 ML | Refills: 2 | Status: SHIPPED | OUTPATIENT
Start: 2025-06-30 | End: 2025-07-11 | Stop reason: ALTCHOICE

## 2025-07-01 ENCOUNTER — PATIENT MESSAGE (OUTPATIENT)
Dept: CARDIOLOGY | Facility: HOSPITAL | Age: 60
End: 2025-07-01

## 2025-07-01 ENCOUNTER — APPOINTMENT (OUTPATIENT)
Dept: CARDIOLOGY | Facility: CLINIC | Age: 60
End: 2025-07-01
Payer: COMMERCIAL

## 2025-07-01 DIAGNOSIS — I21.29 MYOCARDIAL INFARCTION INVOLVING OTHER CORONARY ARTERY, UNSPECIFIED MI TYPE (MULTI): ICD-10-CM

## 2025-07-01 DIAGNOSIS — R07.9 CHEST PAIN, UNSPECIFIED TYPE: ICD-10-CM

## 2025-07-01 DIAGNOSIS — Z95.820 STATUS POST ANGIOPLASTY WITH STENT: ICD-10-CM

## 2025-07-02 DIAGNOSIS — E78.5 HYPERLIPIDEMIA, UNSPECIFIED HYPERLIPIDEMIA TYPE: ICD-10-CM

## 2025-07-02 RX ORDER — ATORVASTATIN CALCIUM 40 MG/1
40 TABLET, FILM COATED ORAL DAILY
Qty: 90 TABLET | Refills: 1 | Status: SHIPPED | OUTPATIENT
Start: 2025-07-02 | End: 2025-12-29

## 2025-07-03 ENCOUNTER — APPOINTMENT (OUTPATIENT)
Dept: PRIMARY CARE | Facility: CLINIC | Age: 60
End: 2025-07-03
Payer: COMMERCIAL

## 2025-07-11 ENCOUNTER — APPOINTMENT (OUTPATIENT)
Dept: PRIMARY CARE | Facility: CLINIC | Age: 60
End: 2025-07-11
Payer: COMMERCIAL

## 2025-07-11 VITALS
OXYGEN SATURATION: 97 % | HEIGHT: 67 IN | BODY MASS INDEX: 29.82 KG/M2 | WEIGHT: 190 LBS | DIASTOLIC BLOOD PRESSURE: 75 MMHG | SYSTOLIC BLOOD PRESSURE: 107 MMHG | HEART RATE: 79 BPM

## 2025-07-11 DIAGNOSIS — I10 PRIMARY HYPERTENSION: ICD-10-CM

## 2025-07-11 DIAGNOSIS — F19.982 DRUG-INDUCED INSOMNIA (MULTI): ICD-10-CM

## 2025-07-11 DIAGNOSIS — G47.19 EXCESSIVE DAYTIME SLEEPINESS: ICD-10-CM

## 2025-07-11 DIAGNOSIS — R59.0 LEFT CERVICAL LYMPHADENOPATHY: ICD-10-CM

## 2025-07-11 DIAGNOSIS — E66.812 CLASS 2 SEVERE OBESITY DUE TO EXCESS CALORIES WITH SERIOUS COMORBIDITY AND BODY MASS INDEX (BMI) OF 36.0 TO 36.9 IN ADULT: ICD-10-CM

## 2025-07-11 DIAGNOSIS — Z79.899 MEDICATION MANAGEMENT: ICD-10-CM

## 2025-07-11 DIAGNOSIS — E66.01 CLASS 2 SEVERE OBESITY DUE TO EXCESS CALORIES WITH SERIOUS COMORBIDITY AND BODY MASS INDEX (BMI) OF 36.0 TO 36.9 IN ADULT: ICD-10-CM

## 2025-07-11 DIAGNOSIS — Z12.31 ENCOUNTER FOR SCREENING MAMMOGRAM FOR MALIGNANT NEOPLASM OF BREAST: ICD-10-CM

## 2025-07-11 DIAGNOSIS — E03.9 HYPOTHYROIDISM, UNSPECIFIED TYPE: Primary | ICD-10-CM

## 2025-07-11 DIAGNOSIS — E78.5 HYPERLIPIDEMIA, UNSPECIFIED HYPERLIPIDEMIA TYPE: ICD-10-CM

## 2025-07-11 DIAGNOSIS — Z12.11 SCREENING FOR COLON CANCER: ICD-10-CM

## 2025-07-11 PROCEDURE — 1036F TOBACCO NON-USER: CPT | Performed by: INTERNAL MEDICINE

## 2025-07-11 PROCEDURE — 3074F SYST BP LT 130 MM HG: CPT | Performed by: INTERNAL MEDICINE

## 2025-07-11 PROCEDURE — 3008F BODY MASS INDEX DOCD: CPT | Performed by: INTERNAL MEDICINE

## 2025-07-11 PROCEDURE — 3078F DIAST BP <80 MM HG: CPT | Performed by: INTERNAL MEDICINE

## 2025-07-11 PROCEDURE — 99214 OFFICE O/P EST MOD 30 MIN: CPT | Performed by: INTERNAL MEDICINE

## 2025-07-11 RX ORDER — SEMAGLUTIDE 2.4 MG/.75ML
2.4 INJECTION, SOLUTION SUBCUTANEOUS WEEKLY
Qty: 3 ML | Refills: 5 | Status: SHIPPED | OUTPATIENT
Start: 2025-07-11 | End: 2025-08-02

## 2025-07-11 RX ORDER — CLONAZEPAM 0.5 MG/1
0.5 TABLET, ORALLY DISINTEGRATING ORAL NIGHTLY
Qty: 30 TABLET | Refills: 2 | Status: SHIPPED | OUTPATIENT
Start: 2025-07-11

## 2025-07-11 ASSESSMENT — ENCOUNTER SYMPTOMS
DYSURIA: 0
SLEEP DISTURBANCE: 1
ABDOMINAL DISTENTION: 1
LOSS OF SENSATION IN FEET: 0
WEAKNESS: 0
CONSTIPATION: 0
ABDOMINAL PAIN: 0
OCCASIONAL FEELINGS OF UNSTEADINESS: 1
SHORTNESS OF BREATH: 0
ADENOPATHY: 1
HEADACHES: 0
DEPRESSION: 0

## 2025-07-11 ASSESSMENT — PATIENT HEALTH QUESTIONNAIRE - PHQ9
1. LITTLE INTEREST OR PLEASURE IN DOING THINGS: NOT AT ALL
2. FEELING DOWN, DEPRESSED OR HOPELESS: NOT AT ALL
SUM OF ALL RESPONSES TO PHQ9 QUESTIONS 1 AND 2: 0

## 2025-07-11 NOTE — PROGRESS NOTES
"Subjective   Patient ID: Elaine Coffey is a 59 y.o. female who presents for ER Follow-up.    HPI   Patient is a 60 yo female here today for an ED follow up visit. Overall no concerns today.     Of note, she was seen in [facility] ED in May for left posterior cervical absecess, which resolved with Macrobid.  Today, she feels well but is inquiring about increasing current Weygovy dose. She is exercising more often, but not losing weight. She is tolerating previously increased Weygovy dose without abdominal symptoms and is tolerating current bowel regimen without any constipation.     Pt also inquired about increased Klonopin dose as she is struggling to fall asleep at night. Has not yet seen sleep medicine nor has tried melatonin. Denies any issues with her MS.     Further denies headaches, CP, SOB, muscle weakness on ROS. Compliant with her medications.    Review of Systems   Eyes:  Positive for visual disturbance (Right eye with blurry vision, believes it to be due to her MS).   Respiratory:  Negative for shortness of breath.    Cardiovascular:  Negative for chest pain and leg swelling.   Gastrointestinal:  Positive for abdominal distention (Endorsing mild bloating). Negative for abdominal pain and constipation.   Genitourinary:  Negative for dysuria.   Neurological:  Negative for weakness and headaches.   Hematological:  Positive for adenopathy (Left cervical lymphadenopathy).   Psychiatric/Behavioral:  Positive for sleep disturbance.        Objective   /75   Pulse 79   Ht 1.702 m (5' 7\")   Wt 86.2 kg (190 lb)   SpO2 97%   BMI 29.76 kg/m²     Physical Exam  Constitutional:       Appearance: Normal appearance.   HENT:      Head: Normocephalic and atraumatic.   Neck:      Thyroid: No thyroid tenderness.   Pulmonary:      Effort: Pulmonary effort is normal.      Breath sounds: Normal breath sounds.   Musculoskeletal:      Cervical back: No edema or erythema. No muscular tenderness.   Lymphadenopathy:      Cervical: " Cervical adenopathy present.      Left cervical: Superficial cervical adenopathy present.   Skin:     General: Skin is warm and dry.   Neurological:      General: No focal deficit present.      Mental Status: She is alert and oriented to person, place, and time.   Psychiatric:         Mood and Affect: Mood normal.         Behavior: Behavior normal.         Assessment/Plan     #Left Cervical Lymphadenopathy   -Lymphadenopathy present in left submandibular region on physical exam, will evaluate with CT soft tissue head and neck    #Constipation  - Current bowel regimen: Stacey-Colace 2 tabs BID, an over the counter medication, and 3 fiber gummies daily, patient with significant improvement of bowel movements  -Currently endorsing normal bowel movements on increased Wegovy dose  - Continue current bowel regimen      #Obesity  - Wegovy increased to 2.4 mg as pt currently tolerating medication without evidence of constipation or GI side effects.  -Pt advised to continue to monitor diet and encouraged to continue increased workout regimen.    #MS  #Migraines  #Peripheral neuropathy  - Following with neurology  -MRI brain, cervical and thoracic spine normal, MS stable and unchanged from prior imaging  - Continue gabapentin 400 mg TID    #Depression and anxiety  #Insomnia  #FARZANA  - Continue amitriptyline 25 mg nightly  - Continue Klonopin 0.5 mg nightly        -Pt requesting increased dose, however, current regimen will stay due to neurology note stating increased confusion/brain fog on 6/19/25. Will consider decreasing at next visit   -UDS 7/11/25  -CSA 10/10/24  I have personally reviewed the OARRS report for Elainemarilynn Coffey. This report is scanned into the electronic medical record. I have considered the risks of abuse, dependence, addiction and diversion.  - Continue fluoxetine 40 mg daily  -Recommended melatonin to pt  -Referral to sleep medicine in setting of untreated FARZANA, insomnia, and decreased concentration and brain  fog     #HTN  #HFpEF   -EF on TTE 1/2024 with EF 55-60%  - /75 today and stable  - Continue diltiazem 240 mg daily  - Continue Lasix 40 mg daily  - Continue metoprolol succinate 25 mg daily  - Continue ranolazine 500 mg BID  -CBC, CMP ordered today     #HLD  #CAD s/p PCI in 2018   - Continue aspirin 81 mg daily  - Continue Plavix 75 mg daily  - Continue atorvastatin 40 mg daily  - Continue Zetia 10 mg nightly  -Lipid panel reordered     #Hypothyroidism  - Continue levothyroxine 50 mcg daily  -Will recheck TSH      Not assessed:    #GERD  - Continue omeprazole 40 mg daily    Flu shot: Declines  COVID: Declines  RSV: Declines  Prevnar: Declines  Shingrix: Declines  Mammogram: Done in 2024, re-ordered 7/11/25  Colonoscopy: Ordered 7/11/25  DEXA: NI    RTC in 3 months      Landon Hubbard MD  PGY-1

## 2025-07-11 NOTE — PATIENT INSTRUCTIONS
You were seen for an ED follow up visit.    Your Wegyovy was increased to 2.4 mg    Your Klonazepam was reordered.    CT soft tissue head and neck was ordered for you today to evaluate your left cervical lymphadenopathy.    Mammogram was ordered for you today for routine breast cancer screening.    Colonoscopy was ordered today for routine colorectal cancer screening.    Labs were ordered for you today.  Please complete fasting blood work. Fast for 10 hours, black coffee and water the morning of labs are OK.     Return to clinic in 3 months.

## 2025-07-13 LAB
1OH-MIDAZOLAM UR-MCNC: NEGATIVE NG/ML
7AMINOCLONAZEPAM UR-MCNC: NEGATIVE NG/ML
A-OH ALPRAZ UR-MCNC: NEGATIVE NG/ML
A-OH-TRIAZOLAM UR-MCNC: NEGATIVE NG/ML
AMPHETAMINES UR QL: NEGATIVE NG/ML
BARBITURATES UR QL: NEGATIVE NG/ML
BZE UR QL: NEGATIVE NG/ML
CODEINE UR-MCNC: ABNORMAL NG/ML
CREAT UR-MCNC: 7.2 MG/DL
DRUG SCREEN COMMENT UR-IMP: ABNORMAL
EDDP UR-MCNC: ABNORMAL NG/ML
FENTANYL UR-MCNC: ABNORMAL NG/ML
HYDROCODONE UR-MCNC: ABNORMAL UG/ML
HYDROMORPHONE UR-MCNC: ABNORMAL NG/ML
LORAZEPAM UR-MCNC: NEGATIVE NG/ML
METHADONE UR-MCNC: ABNORMAL UG/L
MORPHINE UR-MCNC: ABNORMAL NG/ML
NORDIAZEPAM UR-MCNC: NEGATIVE NG/ML
NORFENTANYL UR-MCNC: ABNORMAL NG/ML
NORHYDROCODONE UR CFM-MCNC: ABNORMAL NG/ML
NOROXYCODONE UR CFM-MCNC: ABNORMAL NG/ML
NORTRAMADOL UR-MCNC: ABNORMAL UG/ML
OH-ETHYLFLURAZ UR-MCNC: NEGATIVE NG/ML
OXAZEPAM UR-MCNC: NEGATIVE NG/ML
OXIDANTS UR QL: NEGATIVE MCG/ML
OXYCODONE UR CFM-MCNC: ABNORMAL NG/ML
OXYMORPHONE UR CFM-MCNC: ABNORMAL NG/ML
PCP UR QL: NEGATIVE NG/ML
PH UR: 7.4 [PH] (ref 4.5–9)
QUEST 6 ACETYLMORPHINE: ABNORMAL
QUEST NOTES AND COMMENTS: ABNORMAL
QUEST PATIENT HISTORICAL REPORT: ABNORMAL
QUEST ZOLPIDEM: ABNORMAL
SP GR UR: 1
TEMAZEPAM UR-MCNC: NEGATIVE NG/ML
THC UR QL: NEGATIVE NG/ML
TRAMADOL UR-MCNC: ABNORMAL UG/ML
ZOLPIDEM PHENYL-4-CARB UR CFM-MCNC: ABNORMAL NG/ML

## 2025-07-14 ENCOUNTER — APPOINTMENT (OUTPATIENT)
Dept: PRIMARY CARE | Facility: CLINIC | Age: 60
End: 2025-07-14
Payer: COMMERCIAL

## 2025-07-15 ENCOUNTER — APPOINTMENT (OUTPATIENT)
Dept: CARDIOLOGY | Facility: CLINIC | Age: 60
End: 2025-07-15
Payer: COMMERCIAL

## 2025-07-15 LAB
1OH-MIDAZOLAM UR-MCNC: NEGATIVE NG/ML
7AMINOCLONAZEPAM UR-MCNC: NEGATIVE NG/ML
A-OH ALPRAZ UR-MCNC: NEGATIVE NG/ML
A-OH-TRIAZOLAM UR-MCNC: NEGATIVE NG/ML
AMPHETAMINES UR QL: NEGATIVE NG/ML
BARBITURATES UR QL: NEGATIVE NG/ML
BZE UR QL: NEGATIVE NG/ML
CODEINE UR-MCNC: NEGATIVE NG/ML
CREAT UR-MCNC: 7.2 MG/DL
DRUG SCREEN COMMENT UR-IMP: ABNORMAL
EDDP UR-MCNC: NEGATIVE NG/ML
FENTANYL UR-MCNC: NEGATIVE NG/ML
HYDROCODONE UR-MCNC: NEGATIVE NG/ML
HYDROMORPHONE UR-MCNC: NEGATIVE NG/ML
LORAZEPAM UR-MCNC: NEGATIVE NG/ML
METHADONE UR-MCNC: NEGATIVE NG/ML
MORPHINE UR-MCNC: NEGATIVE NG/ML
NORDIAZEPAM UR-MCNC: NEGATIVE NG/ML
NORFENTANYL UR-MCNC: NEGATIVE NG/ML
NORHYDROCODONE UR CFM-MCNC: NEGATIVE NG/ML
NOROXYCODONE UR CFM-MCNC: NEGATIVE NG/ML
NORTRAMADOL UR-MCNC: NEGATIVE NG/ML
OH-ETHYLFLURAZ UR-MCNC: NEGATIVE NG/ML
OXAZEPAM UR-MCNC: NEGATIVE NG/ML
OXIDANTS UR QL: NEGATIVE MCG/ML
OXYCODONE UR CFM-MCNC: NEGATIVE NG/ML
OXYMORPHONE UR CFM-MCNC: NEGATIVE NG/ML
PCP UR QL: NEGATIVE NG/ML
PH UR: 7.4 [PH] (ref 4.5–9)
QUEST 6 ACETYLMORPHINE: NEGATIVE NG/ML
QUEST NOTES AND COMMENTS: ABNORMAL
QUEST ZOLPIDEM: NEGATIVE NG/ML
SP GR UR: 1
TEMAZEPAM UR-MCNC: NEGATIVE NG/ML
THC UR QL: NEGATIVE NG/ML
TRAMADOL UR-MCNC: NEGATIVE NG/ML
ZOLPIDEM PHENYL-4-CARB UR CFM-MCNC: NEGATIVE NG/ML

## 2025-07-16 DIAGNOSIS — G35 MULTIPLE SCLEROSIS (MULTI): ICD-10-CM

## 2025-07-16 RX ORDER — GABAPENTIN 400 MG/1
CAPSULE ORAL
Qty: 270 CAPSULE | Refills: 1 | Status: SHIPPED | OUTPATIENT
Start: 2025-07-16

## 2025-07-24 ENCOUNTER — HOSPITAL ENCOUNTER (OUTPATIENT)
Dept: RADIOLOGY | Facility: CLINIC | Age: 60
Discharge: HOME | End: 2025-07-24
Payer: COMMERCIAL

## 2025-07-24 DIAGNOSIS — R59.0 LEFT CERVICAL LYMPHADENOPATHY: ICD-10-CM

## 2025-07-24 PROCEDURE — 70491 CT SOFT TISSUE NECK W/DYE: CPT

## 2025-07-24 PROCEDURE — 2550000001 HC RX 255 CONTRASTS

## 2025-07-24 PROCEDURE — 70491 CT SOFT TISSUE NECK W/DYE: CPT | Performed by: RADIOLOGY

## 2025-07-24 RX ADMIN — IOHEXOL 50 ML: 350 INJECTION, SOLUTION INTRAVENOUS at 12:58

## 2025-07-25 DIAGNOSIS — J38.00 VOCAL CORD PARALYSIS: Primary | ICD-10-CM

## 2025-07-25 LAB
ALBUMIN SERPL-MCNC: 4.3 G/DL (ref 3.6–5.1)
ALP SERPL-CCNC: 79 U/L (ref 37–153)
ALT SERPL-CCNC: 18 U/L (ref 6–29)
ANION GAP SERPL CALCULATED.4IONS-SCNC: 8 MMOL/L (CALC) (ref 7–17)
AST SERPL-CCNC: 15 U/L (ref 10–35)
BILIRUB SERPL-MCNC: 0.8 MG/DL (ref 0.2–1.2)
BUN SERPL-MCNC: 9 MG/DL (ref 7–25)
CALCIUM SERPL-MCNC: 9.2 MG/DL (ref 8.6–10.4)
CHLORIDE SERPL-SCNC: 102 MMOL/L (ref 98–110)
CHOLEST SERPL-MCNC: 119 MG/DL
CHOLEST/HDLC SERPL: 2.9 (CALC)
CO2 SERPL-SCNC: 29 MMOL/L (ref 20–32)
CREAT SERPL-MCNC: 0.9 MG/DL (ref 0.5–1.03)
EGFRCR SERPLBLD CKD-EPI 2021: 74 ML/MIN/1.73M2
ERYTHROCYTE [DISTWIDTH] IN BLOOD BY AUTOMATED COUNT: 12.4 % (ref 11–15)
GLUCOSE SERPL-MCNC: 85 MG/DL (ref 65–99)
HCT VFR BLD AUTO: 40.3 % (ref 35–45)
HDLC SERPL-MCNC: 41 MG/DL
HGB BLD-MCNC: 13.3 G/DL (ref 11.7–15.5)
LDLC SERPL CALC-MCNC: 59 MG/DL (CALC)
MCH RBC QN AUTO: 30.9 PG (ref 27–33)
MCHC RBC AUTO-ENTMCNC: 33 G/DL (ref 32–36)
MCV RBC AUTO: 93.5 FL (ref 80–100)
NONHDLC SERPL-MCNC: 78 MG/DL (CALC)
PLATELET # BLD AUTO: 157 THOUSAND/UL (ref 140–400)
PMV BLD REES-ECKER: 10.2 FL (ref 7.5–12.5)
POTASSIUM SERPL-SCNC: 4.2 MMOL/L (ref 3.5–5.3)
PROT SERPL-MCNC: 6.2 G/DL (ref 6.1–8.1)
RBC # BLD AUTO: 4.31 MILLION/UL (ref 3.8–5.1)
SODIUM SERPL-SCNC: 139 MMOL/L (ref 135–146)
TRIGL SERPL-MCNC: 103 MG/DL
TSH SERPL-ACNC: 1.31 MIU/L (ref 0.4–4.5)
WBC # BLD AUTO: 4.6 THOUSAND/UL (ref 3.8–10.8)

## 2025-08-05 ENCOUNTER — APPOINTMENT (OUTPATIENT)
Dept: CARDIOLOGY | Facility: CLINIC | Age: 60
End: 2025-08-05
Payer: COMMERCIAL

## 2025-08-11 ENCOUNTER — APPOINTMENT (OUTPATIENT)
Dept: CARDIOLOGY | Facility: HOSPITAL | Age: 60
End: 2025-08-11
Payer: COMMERCIAL

## 2025-08-21 DIAGNOSIS — G35 MULTIPLE SCLEROSIS (MULTI): ICD-10-CM

## 2025-08-21 RX ORDER — TIZANIDINE 4 MG/1
4 TABLET ORAL NIGHTLY PRN
Qty: 90 TABLET | Refills: 0 | Status: SHIPPED | OUTPATIENT
Start: 2025-08-21

## 2025-08-26 ENCOUNTER — APPOINTMENT (OUTPATIENT)
Dept: OTOLARYNGOLOGY | Facility: CLINIC | Age: 60
End: 2025-08-26
Payer: COMMERCIAL

## 2025-09-03 ENCOUNTER — APPOINTMENT (OUTPATIENT)
Dept: CARDIOLOGY | Facility: HOSPITAL | Age: 60
End: 2025-09-03
Payer: COMMERCIAL

## 2025-09-03 DIAGNOSIS — E66.812 CLASS 2 SEVERE OBESITY DUE TO EXCESS CALORIES WITH SERIOUS COMORBIDITY AND BODY MASS INDEX (BMI) OF 36.0 TO 36.9 IN ADULT: ICD-10-CM

## 2025-09-03 DIAGNOSIS — E66.09 CLASS 1 OBESITY DUE TO EXCESS CALORIES WITH SERIOUS COMORBIDITY AND BODY MASS INDEX (BMI) OF 30.0 TO 30.9 IN ADULT: ICD-10-CM

## 2025-09-03 DIAGNOSIS — E66.811 CLASS 1 OBESITY DUE TO EXCESS CALORIES WITH SERIOUS COMORBIDITY AND BODY MASS INDEX (BMI) OF 30.0 TO 30.9 IN ADULT: ICD-10-CM

## 2025-09-03 DIAGNOSIS — E66.01 CLASS 2 SEVERE OBESITY DUE TO EXCESS CALORIES WITH SERIOUS COMORBIDITY AND BODY MASS INDEX (BMI) OF 36.0 TO 36.9 IN ADULT: ICD-10-CM

## 2025-09-03 RX ORDER — SEMAGLUTIDE 0.5 MG/.5ML
0.5 INJECTION, SOLUTION SUBCUTANEOUS WEEKLY
Qty: 2 ML | Refills: 1 | Status: CANCELLED | OUTPATIENT
Start: 2025-09-03 | End: 2025-10-23

## 2025-09-03 RX ORDER — SEMAGLUTIDE 0.25 MG/.5ML
INJECTION, SOLUTION SUBCUTANEOUS
Refills: 0 | OUTPATIENT
Start: 2025-09-03

## 2025-09-04 RX ORDER — SEMAGLUTIDE 2.4 MG/.75ML
2.4 INJECTION, SOLUTION SUBCUTANEOUS WEEKLY
Qty: 3 ML | Refills: 5 | Status: SHIPPED | OUTPATIENT
Start: 2025-09-04 | End: 2025-09-26

## 2025-09-15 ENCOUNTER — APPOINTMENT (OUTPATIENT)
Dept: CARDIOLOGY | Facility: HOSPITAL | Age: 60
End: 2025-09-15
Payer: COMMERCIAL

## 2025-09-29 ENCOUNTER — APPOINTMENT (OUTPATIENT)
Dept: OTOLARYNGOLOGY | Facility: CLINIC | Age: 60
End: 2025-09-29
Payer: COMMERCIAL

## 2025-10-01 ENCOUNTER — APPOINTMENT (OUTPATIENT)
Dept: AUDIOLOGY | Facility: CLINIC | Age: 60
End: 2025-10-01
Payer: COMMERCIAL

## 2025-10-15 ENCOUNTER — APPOINTMENT (OUTPATIENT)
Dept: OTOLARYNGOLOGY | Facility: CLINIC | Age: 60
End: 2025-10-15
Payer: COMMERCIAL

## 2025-10-22 ENCOUNTER — APPOINTMENT (OUTPATIENT)
Dept: PRIMARY CARE | Facility: CLINIC | Age: 60
End: 2025-10-22
Payer: COMMERCIAL

## (undated) DEVICE — ANGIO KIT, LEFT HEART, LF, CUSTOM

## (undated) DEVICE — CARE KIT, LAPAROSCOPIC, ADVANCED

## (undated) DEVICE — NEEDLE, SAFETY, 21 G X 1.5 IN

## (undated) DEVICE — CLIP, ENDO, CLINCH II, W/RATCHET, ON/OFF, CLINCH II, 5 MM

## (undated) DEVICE — Device

## (undated) DEVICE — SUTURE, VICRYL, 4-0, 18 IN, PS2, UNDYED

## (undated) DEVICE — ELECTRODE, ELECTROSURGICAL, BLADE, INSULATED, ENT/IMA, STERILE

## (undated) DEVICE — TROCAR, KII OPTICAL SEPARATOR, 8MM X 100MM,  Z-THREAD

## (undated) DEVICE — APPLICATOR, CHLORAPREP, W/ORANGE TINT, 26ML

## (undated) DEVICE — GOWN, ASTOUND, L

## (undated) DEVICE — COVER, PROBE, PULL UP ULTRASOUND KIT, 5 X 48

## (undated) DEVICE — DRAPE, INSTRUMENT, W/POUCH, STERI DRAPE, 9 5/8 X 18 LONG

## (undated) DEVICE — SCISSOR, MINI ENDO CUT, TIPS, DISP

## (undated) DEVICE — RETRIEVAL SYSTEM, MONARCH INZII, 5MM

## (undated) DEVICE — WIRE, NITINOL,.018 X 40CM, PLATINUM COIL

## (undated) DEVICE — NEEDLE, INSUFFLATION, 13GAX120MM, DISP

## (undated) DEVICE — CATHETER, OPTITORQUE, 5FR, TIG1, 1H/100CM

## (undated) DEVICE — INTRODUCER SHEATH, GLIDESHEATH, 6FR 10CM

## (undated) DEVICE — ACCESS SYS, KII SHIELDED BLADED, Z-THREAD, 5X100CM

## (undated) DEVICE — DRAPE PACK, LAPAROSCOPIC CHOLECYSTECTOMY, CUSTOM, GEAUGA

## (undated) DEVICE — TISSUE ADHESIVE, PREMIERPRO EXOFIN, PRECISION PEN HV, 1.0ML

## (undated) DEVICE — CAUTERY, PENCIL, PUSH BUTTON, SMOKE EVAC, 70MM

## (undated) DEVICE — TR BAND, RADIAL COMPRESSION, STANDARD, 24CM

## (undated) DEVICE — TROCAR, OPTICAL BLADELESS 5MM X 100 W/ADVANCED FIXATION

## (undated) DEVICE — ASSEMBLY, STRYKER FLOW 2, SUCTION IRRIGATOR, WITH TIP

## (undated) DEVICE — CLIP, ENDO APPLIER LIGAMAX 5MM

## (undated) DEVICE — SUTURE, VICRYL, 0, 18 IN, TIE, VIOLET

## (undated) DEVICE — TUBE SET, PNEUMOCLEAR, SMOKE EVACU, HIGH-FLOW

## (undated) DEVICE — SOLUTION, INJECTION, USP, SODIUM CHLORIDE 0.9%, .9, NACL, 1000 ML, BAG

## (undated) DEVICE — CABLE, ELECTROSURGICAL, MONOPOLAR, LAPAROSCOPIC, 10 FT, LF

## (undated) DEVICE — SOLUTION, IRRIGATION, SODIUM CHLORIDE 0.9%, 1000 ML, POUR BOTTLE